# Patient Record
Sex: FEMALE | Race: BLACK OR AFRICAN AMERICAN | NOT HISPANIC OR LATINO | Employment: OTHER | URBAN - METROPOLITAN AREA
[De-identification: names, ages, dates, MRNs, and addresses within clinical notes are randomized per-mention and may not be internally consistent; named-entity substitution may affect disease eponyms.]

---

## 2017-01-03 ENCOUNTER — TELEPHONE (OUTPATIENT)
Dept: FAMILY MEDICINE | Facility: CLINIC | Age: 67
End: 2017-01-03

## 2017-01-03 NOTE — TELEPHONE ENCOUNTER
----- Message from Sole Mckeon sent at 12/29/2016  1:17 PM CST -----  Liliam ([facility]) called to see if a fax was receiving for a verification/pls call back at [phone]

## 2017-01-21 ENCOUNTER — TELEPHONE (OUTPATIENT)
Dept: FAMILY MEDICINE | Facility: CLINIC | Age: 67
End: 2017-01-21

## 2017-01-21 NOTE — TELEPHONE ENCOUNTER
----- Message from RT Harman sent at 1/21/2017  8:05 AM CST -----  Contact: pt    pt , requesting an appt to be worked in for a possible sinus infection, thanks.

## 2017-01-21 NOTE — TELEPHONE ENCOUNTER
Patient called office and left message requesting appointment r/t sinus infection. Spoke to patient who states she believes she has a sinus infection. No availability in office on this date. Offered appointment on 1/23/17; patient experiencing a lot of pain and sinus pressure, suggested being evaluated at Urgent Care. Patient agreed to be seen at Urgent Care. Encouraged patient to call office with any further concerns.

## 2017-05-30 ENCOUNTER — DOCUMENTATION ONLY (OUTPATIENT)
Dept: FAMILY MEDICINE | Facility: CLINIC | Age: 67
End: 2017-05-30

## 2017-05-30 NOTE — PROGRESS NOTES
Pre-Visit Chart Review  For Appointment Scheduled on 5-31-17    Health Maintenance Due   Topic Date Due    DEXA SCAN  06/27/2017

## 2017-05-31 ENCOUNTER — OFFICE VISIT (OUTPATIENT)
Dept: FAMILY MEDICINE | Facility: CLINIC | Age: 67
End: 2017-05-31
Payer: MEDICARE

## 2017-05-31 VITALS
BODY MASS INDEX: 24.27 KG/M2 | SYSTOLIC BLOOD PRESSURE: 126 MMHG | TEMPERATURE: 98 F | HEART RATE: 67 BPM | OXYGEN SATURATION: 98 % | WEIGHT: 151 LBS | DIASTOLIC BLOOD PRESSURE: 78 MMHG | HEIGHT: 66 IN | RESPIRATION RATE: 12 BRPM

## 2017-05-31 DIAGNOSIS — L25.9 CONTACT DERMATITIS, UNSPECIFIED CONTACT DERMATITIS TYPE, UNSPECIFIED TRIGGER: Primary | ICD-10-CM

## 2017-05-31 DIAGNOSIS — J45.20 MILD INTERMITTENT ASTHMA WITHOUT COMPLICATION: ICD-10-CM

## 2017-05-31 DIAGNOSIS — I10 HYPERTENSION, ESSENTIAL: ICD-10-CM

## 2017-05-31 DIAGNOSIS — G47.00 INSOMNIA, UNSPECIFIED TYPE: ICD-10-CM

## 2017-05-31 DIAGNOSIS — A60.9 HSV (HERPES SIMPLEX VIRUS) ANOGENITAL INFECTION: ICD-10-CM

## 2017-05-31 PROCEDURE — 1159F MED LIST DOCD IN RCRD: CPT | Mod: S$GLB,,, | Performed by: FAMILY MEDICINE

## 2017-05-31 PROCEDURE — 1126F AMNT PAIN NOTED NONE PRSNT: CPT | Mod: S$GLB,,, | Performed by: FAMILY MEDICINE

## 2017-05-31 PROCEDURE — 99499 UNLISTED E&M SERVICE: CPT | Mod: S$GLB,,, | Performed by: FAMILY MEDICINE

## 2017-05-31 PROCEDURE — 99999 PR PBB SHADOW E&M-EST. PATIENT-LVL IV: CPT | Mod: PBBFAC,,, | Performed by: FAMILY MEDICINE

## 2017-05-31 PROCEDURE — 99213 OFFICE O/P EST LOW 20 MIN: CPT | Mod: S$GLB,,, | Performed by: FAMILY MEDICINE

## 2017-05-31 RX ORDER — MONTELUKAST SODIUM 10 MG/1
TABLET ORAL
Qty: 90 TABLET | Refills: 3 | Status: SHIPPED | OUTPATIENT
Start: 2017-05-31 | End: 2018-03-01 | Stop reason: SDUPTHER

## 2017-05-31 RX ORDER — TRAZODONE HYDROCHLORIDE 50 MG/1
50 TABLET ORAL NIGHTLY PRN
Qty: 30 TABLET | Refills: 11 | Status: SHIPPED | OUTPATIENT
Start: 2017-05-31 | End: 2018-07-01 | Stop reason: SDUPTHER

## 2017-05-31 RX ORDER — VALACYCLOVIR HYDROCHLORIDE 500 MG/1
500 TABLET, FILM COATED ORAL 2 TIMES DAILY PRN
Qty: 60 TABLET | Refills: 11 | Status: SHIPPED | OUTPATIENT
Start: 2017-05-31 | End: 2018-07-08 | Stop reason: SDUPTHER

## 2017-05-31 RX ORDER — BETAMETHASONE DIPROPIONATE 0.5 MG/G
CREAM TOPICAL DAILY
Qty: 50 G | Refills: 0 | Status: SHIPPED | OUTPATIENT
Start: 2017-05-31 | End: 2018-09-10 | Stop reason: SDUPTHER

## 2017-05-31 RX ORDER — CARVEDILOL 6.25 MG/1
6.25 TABLET ORAL 2 TIMES DAILY WITH MEALS
Qty: 60 TABLET | Refills: 11 | Status: SHIPPED | OUTPATIENT
Start: 2017-05-31 | End: 2017-11-06 | Stop reason: SDUPTHER

## 2017-05-31 NOTE — PATIENT INSTRUCTIONS
Controlling High Blood Pressure  High blood pressure (hypertension) is often called the silent killer. This is because many people who have it dont know it. High blood pressure is defined as 140/90 mm Hg or higher. Know your blood pressure and remember to check it regularly. Doing so can save your life. Here are some things you can do to help control your blood pressure.    Choose heart-healthy foods  · Select low-salt, low-fat foods. Limit sodium intake to 2,400 mg per day or the amount suggested by your healthcare provider.  · Limit canned, dried, cured, packaged, and fast foods. These can contain a lot of salt.  · Eat 8 to 10 servings of fruits and vegetables every day.  · Choose lean meats, fish, or chicken.  · Eat whole-grain pasta, brown rice, and beans.  · Eat 2 to 3 servings of low-fat or fat-free dairy products.  · Ask your doctor about the DASH eating plan. This plan helps reduce blood pressure.  · When you go to a restaurant, ask that your meal be prepared with no added salt.  Maintain a healthy weight  · Ask your healthcare provider how many calories to eat a day. Then stick to that number.  · Ask your healthcare provider what weight range is healthiest for you. If you are overweight, a weight loss of only 3% to 5% of your body weight can help lower blood pressure. Generally, a good weight loss goal is to lose 10% of your body weight in a year.  · Limit snacks and sweets.  · Get regular exercise.  Get up and get active  · Choose activities you enjoy. Find ones you can do with friends or family. This includes bicycling, dancing, walking, and jogging.  · Park farther away from building entrances.  · Use stairs instead of the elevator.  · When you can, walk or bike instead of driving.  · Mcloud leaves, garden, or do household repairs.  · Be active at a moderate to vigorous level of physical activity for at least 40 minutes for a minimum of 3 to 4 days a week.   Manage stress  · Make time to relax and enjoy  life. Find time to laugh.  · Communicate your concerns with your loved ones and your healthcare provider.  · Visit with family and friends, and keep up with hobbies.  Limit alcohol and quit smoking  · Men should have no more than 2 drinks per day.  · Women should have no more than 1 drink per day.  · Talk with your healthcare provider about quitting smoking. Smoking significantly increases your risk for heart disease and stroke. Ask your healthcare provider about community smoking cessation programs and other options.  Medicines  If lifestyle changes arent enough, your healthcare provider may prescribe high blood pressure medicine. Take all medicines as prescribed. If you have any questions about your medicines, ask your healthcare provider before stopping or changing them.   Date Last Reviewed: 4/27/2016  © 8034-3111 [employer], [employer]. [address] [place] [address], [place] [address]. All rights reserved. This information is not intended as a substitute for professional medical care. Always follow your healthcare professional's instructions.

## 2017-05-31 NOTE — PROGRESS NOTES
Subjective:       Patient ID: Loren Anrde is a 67 y.o. female.    Chief Complaint: Rash    67 year old female recently was on vacation where she applied a [place] skin lotion containing sunscreen and moisturizer to her outer arms while wearing a short sleeve blouse.  She did not apply it anywhere else.  A few days later the area broke out in a micropapular/follicular rash with mild but worsening itching which is still present.  She left the cream behind and does not know the ingredients or the SPF factor.  She also is having problems sleeping since her  .  She was using citalopram but it did not help and she discontinued it.  She needs refills on carvedilol, valtrex, and singulair.    Past Medical History:  No date: Allergy  No date: Anemia      Comment: sickle cell trait  No date: Anxiety  No date: Arthritis  No date: Asthma  No date: Colon polyps  No date: Depression  3/7/2013: Dermatitis  No date: Fibromyalgia  No date: HEARING LOSS  No date: Hypertension  No date: Polyneuropathy  No date: Rheumatoid arthritis  No date: Scoliosis  No date: Sickle cell trait  No date: Trouble in sleeping  No date: Urticaria    Past Surgical History:  No date: ADENOIDECTOMY  No date: APPENDECTOMY  2008: COLONOSCOPY      Comment: Dr. Guerrero, 10 year recheck  No date: HYSTERECTOMY  No date: OOPHORECTOMY  2016: ROTATOR CUFF REPAIR Left  No date: TONSILLECTOMY      Current Outpatient Prescriptions:     A/B-CAROTENE/C/BIOFL/B6/MN/ECH (ECHINACEA C COMPLETE ORAL), Take 1 tablet by mouth once daily., Disp: , Rfl:     albuterol (PROAIR HFA) 90 mcg/actuation inhaler, Inhale 2 puffs into the lungs every 4 (four) hours as needed for Wheezing., Disp: 18 g, Rfl: 11    ascorbic acid, vitamin C, (VITAMIN C) 500 MG tablet, Take 500 mg by mouth once daily., Disp: , Rfl:     BLACK COHOSH ORAL, Take 1 capsule by mouth once daily. Female hormone blend sp - 7c Black Cohosh, Disp: , Rfl:     CALCIUM CARBONATE/VITAMIN D3  (VITAMIN D-3 ORAL), Take 100 Int'l Units by mouth once daily. , Disp: , Rfl:     carvedilol (COREG) 6.25 MG tablet, Take 1 tablet (6.25 mg total) by mouth 2 (two) times daily with meals., Disp: 60 tablet, Rfl: 11    fluticasone (FLONASE) 50 mcg/actuation nasal spray, SPRAY TWICE IN EACH NOSTRIL EVERY DAY, Disp: 48 g, Rfl: 3    fluticasone-salmeterol 250-50 mcg/dose (ADVAIR DISKUS) 250-50 mcg/dose diskus inhaler, Inhale 1 puff into the lungs 2 (two) times daily. INHALE 1 PUFF BY MOUTH INTO THE LUNGS TWICE DAILY, Disp: 60 each, Rfl: 11    folic acid (FOLVITE) 400 MCG tablet, Take 400 mcg by mouth once daily., Disp: , Rfl:     GARLIC OIL ORAL, Take 1 capsule by mouth once daily., Disp: , Rfl:     montelukast (SINGULAIR) 10 mg tablet, TAKE 1 TABLET ONE TIME DAILY, Disp: 90 tablet, Rfl: 3    NIACIN, INOSITOL NIACINATE, (NIACIN FLUSH FREE ORAL), Take 500 mg by mouth once daily., Disp: , Rfl:     OMEGA-3/DHA/EPA/FISH OIL (OMEGA-3 FISH OIL ORAL), Take 1 capsule by mouth once daily., Disp: , Rfl:     valacyclovir (VALTREX) 500 MG tablet, Take 1 tablet (500 mg total) by mouth 2 (two) times daily as needed., Disp: 60 tablet, Rfl: 11    vitamin E 100 UNIT capsule, Take 100 Units by mouth once daily., Disp: , Rfl:     DEXA SCAN due on 06/27/2017        Review of Systems   Skin: Positive for rash.   Psychiatric/Behavioral: Positive for sleep disturbance.       Objective:      Physical Exam   Constitutional: She is oriented to person, place, and time. She appears well-developed and well-nourished. No distress.   Good blood pressure control  Patient is normal weight with bmi of 24.4.   Weight is increased by 2.2lb since last visit of 10/13/16.     Neurological: She is alert and oriented to person, place, and time.   Skin: Rash (hyperkeratotic follicular pattern of micropapules over the outer elbows and forearms bilaterally) noted. She is not diaphoretic.   Psychiatric: She has a normal mood and affect. Her behavior is  normal. Judgment and thought content normal.       Assessment:       1. Contact dermatitis, unspecified contact dermatitis type, unspecified trigger    2. HSV (herpes simplex virus) anogenital infection    3. Hypertension, essential    4. Mild intermittent asthma without complication    5. Insomnia, unspecified type        Plan:       1. Contact dermatitis, unspecified contact dermatitis type, unspecified trigger  - augmented betamethasone dipropionate (DIPROLENE-AF) 0.05 % cream; Apply topically once daily at 6am.  Dispense: 50 g; Refill: 0    2. HSV (herpes simplex virus) anogenital infection  - valacyclovir (VALTREX) 500 MG tablet; Take 1 tablet (500 mg total) by mouth 2 (two) times daily as needed.  Dispense: 60 tablet; Refill: 11    3. Hypertension, essential  - carvedilol (COREG) 6.25 MG tablet; Take 1 tablet (6.25 mg total) by mouth 2 (two) times daily with meals.  Dispense: 60 tablet; Refill: 11    4. Mild intermittent asthma without complication  - montelukast (SINGULAIR) 10 mg tablet; TAKE 1 TABLET ONE TIME DAILY  Dispense: 90 tablet; Refill: 3    5. Insomnia, unspecified type  - trazodone (DESYREL) 50 MG tablet; Take 1 tablet (50 mg total) by mouth nightly as needed for Insomnia.  Dispense: 30 tablet; Refill: 11         Patient readiness: acceptance and barriers:none    During the course of the visit the patient was educated and counseled about the following:     Hypertension:   Medication: no change.  Dietary sodium restriction.  Regular aerobic exercise.    Goals: Hypertension: Reduce Blood Pressure    Did patient meet goals/outcomes: Yes    The following self management tools provided: blood pressure log    Patient Instructions (the written plan) was given to the patient/family.     Time spent with patient: 30 minutes

## 2017-08-31 ENCOUNTER — HOSPITAL ENCOUNTER (OUTPATIENT)
Dept: RADIOLOGY | Facility: CLINIC | Age: 67
Discharge: HOME | End: 2017-08-31
Attending: FAMILY MEDICINE
Payer: MEDICARE

## 2017-08-31 DIAGNOSIS — Z12.31 OTHER SCREENING MAMMOGRAM: ICD-10-CM

## 2017-08-31 PROCEDURE — 77063 BREAST TOMOSYNTHESIS BI: CPT | Mod: 26,,, | Performed by: RADIOLOGY

## 2017-08-31 PROCEDURE — 77067 SCR MAMMO BI INCL CAD: CPT | Mod: TC

## 2017-08-31 PROCEDURE — 77067 SCR MAMMO BI INCL CAD: CPT | Mod: 26,,, | Performed by: RADIOLOGY

## 2017-09-01 ENCOUNTER — PATIENT MESSAGE (OUTPATIENT)
Dept: FAMILY MEDICINE | Facility: CLINIC | Age: 67
End: 2017-09-01

## 2017-09-18 ENCOUNTER — DOCUMENTATION ONLY (OUTPATIENT)
Dept: FAMILY MEDICINE | Facility: CLINIC | Age: 67
End: 2017-09-18

## 2017-09-18 NOTE — PROGRESS NOTES
Pre-Visit Chart Review  For Appointment Scheduled on 09/19/2017      Health Maintenance Due   Topic Date Due    DEXA SCAN  06/27/2017    Pneumococcal (65+) (2 of 2 - PCV13) 07/14/2017    Influenza Vaccine  08/01/2017

## 2017-09-19 ENCOUNTER — OFFICE VISIT (OUTPATIENT)
Dept: FAMILY MEDICINE | Facility: CLINIC | Age: 67
End: 2017-09-19
Payer: MEDICARE

## 2017-09-19 VITALS
SYSTOLIC BLOOD PRESSURE: 146 MMHG | WEIGHT: 151.69 LBS | BODY MASS INDEX: 24.38 KG/M2 | HEART RATE: 64 BPM | TEMPERATURE: 98 F | DIASTOLIC BLOOD PRESSURE: 70 MMHG | HEIGHT: 66 IN

## 2017-09-19 DIAGNOSIS — G70.9 MYONEURAL DISORDER: Primary | ICD-10-CM

## 2017-09-19 DIAGNOSIS — M06.9 RHEUMATOID ARTHRITIS, INVOLVING UNSPECIFIED SITE, UNSPECIFIED RHEUMATOID FACTOR PRESENCE: ICD-10-CM

## 2017-09-19 DIAGNOSIS — Z78.0 ASYMPTOMATIC MENOPAUSAL STATE: ICD-10-CM

## 2017-09-19 DIAGNOSIS — D57.3 SICKLE CELL TRAIT: ICD-10-CM

## 2017-09-19 DIAGNOSIS — I10 UNCONTROLLED HYPERTENSION: ICD-10-CM

## 2017-09-19 DIAGNOSIS — M79.7 FIBROMYALGIA: ICD-10-CM

## 2017-09-19 DIAGNOSIS — F33.9 MAJOR DEPRESSION, RECURRENT, CHRONIC: ICD-10-CM

## 2017-09-19 DIAGNOSIS — Z00.00 ENCOUNTER FOR PREVENTIVE HEALTH EXAMINATION: ICD-10-CM

## 2017-09-19 DIAGNOSIS — Z23 FLU VACCINE NEED: ICD-10-CM

## 2017-09-19 DIAGNOSIS — F41.9 ANXIETY: ICD-10-CM

## 2017-09-19 PROCEDURE — 99499 UNLISTED E&M SERVICE: CPT | Mod: S$GLB,,, | Performed by: PHYSICIAN ASSISTANT

## 2017-09-19 PROCEDURE — G0008 ADMIN INFLUENZA VIRUS VAC: HCPCS | Mod: S$GLB,,, | Performed by: PHYSICIAN ASSISTANT

## 2017-09-19 PROCEDURE — G0439 PPPS, SUBSEQ VISIT: HCPCS | Mod: S$GLB,,, | Performed by: PHYSICIAN ASSISTANT

## 2017-09-19 PROCEDURE — 90662 IIV NO PRSV INCREASED AG IM: CPT | Mod: S$GLB,,, | Performed by: PHYSICIAN ASSISTANT

## 2017-09-19 PROCEDURE — 99999 PR PBB SHADOW E&M-EST. PATIENT-LVL IV: CPT | Mod: PBBFAC,,, | Performed by: PHYSICIAN ASSISTANT

## 2017-09-19 RX ORDER — BETAMETHASONE DIPROPIONATE 0.5 MG/G
CREAM TOPICAL 2 TIMES DAILY
Qty: 50 G | Refills: 3 | Status: SHIPPED | OUTPATIENT
Start: 2017-09-19 | End: 2019-02-15 | Stop reason: SDUPTHER

## 2017-09-19 NOTE — PROGRESS NOTES
Pt. Identified using  and name. VIS given to pt. Procedure was explained and pt. verbalized full understanding. FLU zone HD administered IM in the right deltoid using sterile technique. No bleeding at injection site. Pt. tolerated procedure well.

## 2017-09-19 NOTE — PATIENT INSTRUCTIONS
Counseling and Referral of Other Preventative  (Italic type indicates deductible and co-insurance are waived)    Patient Name: Loren Andre  Today's Date: 9/19/2017      SERVICE LIMITATIONS RECOMMENDATION    Vaccines    · Pneumococcal (once after 65)    · Influenza (annually)    · Hepatitis B (if medium/high risk)    · Prevnar 13      Hepatitis B medium/high risk factors:       - End-stage renal disease       - Hemophiliacs who received Factor VII or         IX concentrates       - Clients of institutions for the mentally             retarded       - Persons who live in the same house as          a HepB carrier       - Homosexual men       - Illicit injectable drug abusers     Pneumococcal: Scheduled - see appointments     Influenza: Scheduled - see appointments     Hepatitis B: N/A     Prevnar 13: N/A    Mammogram (biennial age 50-74)  Annually (age 40 or over)  Done this year, repeat every year    Pap (up to age 70 and after 70 if unknown history or abnormal study last 10 years)    Done this year, repeat every year     The USPSTF recommends against screening for cervical cancer in women older than age 65 years who have had adequate prior screening and are not otherwise at high risk for cervical cancer.      Colorectal cancer screening (to age 75)    · Fecal occult blood test (annual)  · Flexible sigmoidoscopy (5y)  · Screening colonoscopy (10y)  · Barium enema   Last done 4/14/08, recommend to repeat every 10  years    Diabetes self-management training (no USPSTF recommendations)  Requires referral by treating physician for patient with diabetes or renal disease. 10 hours of initial DSMT sessions of no less than 30 minutes each in a continuous 12-month period. 2 hours of follow-up DSMT in subsequent years.  N/A    Bone mass measurements (age 65 & older, biennial)  Requires diagnosis related to osteoporosis or estrogen deficiency. Biennial benefit unless patient has history of long-term glucocorticoid   Scheduled, see appointments    Glaucoma screening (no USPSTF recommendation)  Diabetes mellitus, family history   , age 50 or over    American, age 65 or over  Recommend follow up with eye care professional regularly    Medical nutrition therapy for diabetes or renal disease (no recommended schedule)  Requires referral by treating physician for patient with diabetes or renal disease or kidney transplant within the past 3 years.  Can be provided in same year as diabetes self-management training (DSMT), and CMS recommends medical nutrition therapy take place after DSMT. Up to 3 hours for initial year and 2 hours in subsequent years.  N/A    Cardiovascular screening blood tests (every 5 years)  · Fasting lipid panel  Order as a panel if possible  Scheduled, see appointments    Diabetes screening tests (at least every 3 years, Medicare covers annually or at 6-month intervals for prediabetic patients)  · Fasting blood sugar (FBS) or glucose tolerance test (GTT)  Patient must be diagnosed with one of the following:       - Hypertension       - Dyslipidemia       - Obesity (BMI 30kg/m2)       - Previous elevated impaired FBS or GTT       ... or any two of the following:       - Overweight (BMI 25 but <30)       - Family history of diabetes       - Age 65 or older       - History of gestational diabetes or birth of baby weighing more than 9 pounds  N/A    HIV screening (annually for increased risk patients)  · HIV-1 and HIV-2 by EIA, or SHARATH, rapid antibody test or oral mucosa transudate  Patients must be at increased risk for HIV infection per USPSTF guidelines or pregnant. Tests covered annually for patient at increased risk or as requested by the patient. Pregnant patients may receive up to 3 tests during pregnancy.  Risks discussed, screening is not recommended    Smoking cessation counseling (up to 8 sessions per year)  Patients must be asymptomatic of tobacco-related conditions to receive as a  preventative service.  Non-smoker    Subsequent annual wellness visit  At least 12 months since last AWV  Return in one year     The following information is provided to all patients.  This information is to help you find resources for any of the problems found today that may be affecting your health:                Living healthy guide: www.[facility].[email or web address].[email or web address]      Understanding Diabetes: www.diabetes.org      Eating healthy: www.cdc.gov/healthyweight      CDC home safety checklist: www.cdc.gov/steadi/patient.html      Agency on Aging: www.goea.[place].[facility]      Alcoholics anonymous (AA): www.aa.org      Physical Activity: www.katie.nih.gov/qf8sjwz      Tobacco use: www.quitwithusla.org

## 2017-09-19 NOTE — PROGRESS NOTES
"Loren Andre presented for a  Medicare AWV and comprehensive Health Risk Assessment today. The following components were reviewed and updated:    · Medical history  · Family History  · Social history  · Allergies and Current Medications  · Health Risk Assessment  · Health Maintenance  · Care Team     ** See Completed Assessments for Annual Wellness Visit within the encounter summary.**       The following assessments were completed:  · Living Situation  · CAGE  · Depression Screening  · Timed Get Up and Go  · Whisper Test  · Cognitive Function Screening  · Nutrition Screening  · ADL Screening  · PAQ Screening    Vitals:    09/19/17 0959 09/19/17 1000   BP: (!) 173/77 (!) 146/70   BP Location: Left arm Right arm   Patient Position: Sitting Sitting   BP Method: Small (Automatic) Medium (Manual)   Pulse: 64    Temp: 97.8 °F (36.6 °C)    TempSrc: Oral    Weight: 68.8 kg (151 lb 10.8 oz)    Height: 5' 6" (1.676 m)      Body mass index is 24.48 kg/m².  Physical Exam   Constitutional: She is oriented to person, place, and time. She appears well-developed and well-nourished.   HENT:   Head: Normocephalic and atraumatic.   Eyes: Conjunctivae are normal. Pupils are equal, round, and reactive to light.   Neck: Normal range of motion. Neck supple. No JVD present.   Cardiovascular: Normal rate and regular rhythm.  Exam reveals no gallop and no friction rub.    No murmur heard.  Pulmonary/Chest: Effort normal and breath sounds normal. No respiratory distress. She has no wheezes. She has no rales.   Neurological: She is alert and oriented to person, place, and time.   Skin: Skin is warm and dry.   Psychiatric: She has a normal mood and affect. Her behavior is normal. Judgment and thought content normal.               Diagnoses and health risks identified today and associated recommendations/orders:    Loren was seen today for health risk assessment.    Diagnoses and all orders for this visit:    Myoneural " disorder  Comments:  chronic, followed by rheum    Sickle cell trait  Comments:  chronic, continue regimen    Rheumatoid arthritis, involving unspecified site, unspecified rheumatoid factor presence  Comments:  chronic, followed by rheum    Major depression, recurrent, chronic  Comments:  controlled, continue regimen    Uncontrolled hypertension  Comments:  uncontrolled, encouraged lifestyle changes    Anxiety  Comments:  controlled, continue regimen    Fibromyalgia  Comments:  chronic, followed by rheum    Asymptomatic menopausal state  Comments:  stable, continue regimen  Orders:  -     DXA Bone Density Spine And Hip; Future    Flu vaccine need  Comments:  flu vaccine given today  Orders:  -     Influenza - High Dose (65+) (PF) (IM)    Encounter for preventive health examination    Other orders  -     Cancel: (In Office Administered) Pneumococcal Conjugate Vaccine (13 Valent) (IM)  -     Cancel: Influenza - High Dose (65+) (PF) (IM)  -     betamethasone dipropionate (DIPROLENE) 0.05 % cream; Apply topically 2 (two) times daily.        Provided Loren with a 5-10 year written screening schedule and personal prevention plan. Recommendations were developed using the USPSTF age appropriate recommendations. Education, counseling, and referrals were provided as needed. After Visit Summary printed and given to patient which includes a list of additional screenings\tests needed.    No Follow-up on file.    LONNIE Shaver

## 2017-10-05 ENCOUNTER — OFFICE VISIT (OUTPATIENT)
Dept: HEMATOLOGY/ONCOLOGY | Facility: CLINIC | Age: 67
End: 2017-10-05
Payer: MEDICARE

## 2017-10-05 VITALS
HEIGHT: 65 IN | RESPIRATION RATE: 18 BRPM | HEART RATE: 84 BPM | WEIGHT: 152.31 LBS | BODY MASS INDEX: 25.38 KG/M2 | TEMPERATURE: 98 F | SYSTOLIC BLOOD PRESSURE: 156 MMHG | DIASTOLIC BLOOD PRESSURE: 95 MMHG

## 2017-10-05 DIAGNOSIS — D72.819 LEUKOPENIA, UNSPECIFIED TYPE: ICD-10-CM

## 2017-10-05 DIAGNOSIS — D63.8 CHRONIC DISEASE ANEMIA: ICD-10-CM

## 2017-10-05 DIAGNOSIS — D57.3 SICKLE-CELL TRAIT: ICD-10-CM

## 2017-10-05 PROCEDURE — 99213 OFFICE O/P EST LOW 20 MIN: CPT | Mod: ,,, | Performed by: INTERNAL MEDICINE

## 2017-10-05 NOTE — PROGRESS NOTES
[facility] Hematology/Oncology  PROGRESS NOTE      Subjective:       Patient ID:   NAME: Loren Andre : 1950     67 y.o. female    Referring Doc: ALEX Alanis  Other Physicians: Shanelle Pena    Chief Complaint:  Anemia/leucopenia f/u    History of Present Illness:     Patient returns today for a regularly scheduled follow-up visit.  The patient has been having some carpal tunnel issues on the right arm and is seeing Dr Timmons with ortho. She is otherwise doing ok. No CP, SOB,HA's or N/V.             ROS:   GEN: normal without any fever, night sweats or weight loss  HEENT: normal with no HA's, sore throat, stiff neck, changes in vision  CV: normal with no CP, SOB, PND, NAJERA or orthopnea  PULM: normal with no SOB, cough, hemoptysis, sputum or pleuritic pain  GI: normal with no abdominal pain, nausea, vomiting, constipation, diarrhea, melanotic stools, BRBPR, or hematemesis  : normal with no hematuria, dysuria  BREAST: normal with no mass, discharge, pain  SKIN: normal with no rash, erythema, bruising, or swelling    Allergies:  Review of patient's allergies indicates:   Allergen Reactions    Doxepin Anaphylaxis     abd pain    Penicillins Rash    Sulfa (sulfonamide antibiotics) Rash    Duloxetine      Other reaction(s): insomnia    Gabapentin Nausea Only    Levofloxacin      Other reaction(s): Headache    Milnacipran      Other reaction(s): stomach pain    Nitrofurantoin monohyd/m-cryst      Other reaction(s): Itching    Prednisone      Other reaction(s): Itching    Pseudoephedrine-guaifenesin      Other reaction(s): Stomach upset    Terbinafine      Other reaction(s): rash    Trazodone      Other reaction(s): heart racing       Medications:    Current Outpatient Prescriptions:     A/B-CAROTENE/C/BIOFL/B6/MN/ECH (ECHINACEA C COMPLETE ORAL), Take 1 tablet by mouth once daily., Disp: , Rfl:     albuterol (PROAIR HFA) 90 mcg/actuation inhaler, Inhale 2 puffs into the lungs every 4 (four) hours  as needed for Wheezing., Disp: 18 g, Rfl: 11    ascorbic acid, vitamin C, (VITAMIN C) 500 MG tablet, Take 500 mg by mouth once daily., Disp: , Rfl:     augmented betamethasone dipropionate (DIPROLENE-AF) 0.05 % cream, Apply topically once daily at 6am., Disp: 50 g, Rfl: 0    betamethasone dipropionate (DIPROLENE) 0.05 % cream, Apply topically 2 (two) times daily., Disp: 50 g, Rfl: 3    BLACK COHOSH ORAL, Take 1 capsule by mouth once daily. Female hormone blend sp - 7c Black Cohosh, Disp: , Rfl:     CALCIUM CARBONATE/VITAMIN D3 (VITAMIN D-3 ORAL), Take 100 Int'l Units by mouth once daily. , Disp: , Rfl:     carvedilol (COREG) 6.25 MG tablet, Take 1 tablet (6.25 mg total) by mouth 2 (two) times daily with meals., Disp: 60 tablet, Rfl: 11    fluticasone (FLONASE) 50 mcg/actuation nasal spray, SPRAY TWICE IN EACH NOSTRIL EVERY DAY, Disp: 48 g, Rfl: 3    fluticasone-salmeterol 250-50 mcg/dose (ADVAIR DISKUS) 250-50 mcg/dose diskus inhaler, Inhale 1 puff into the lungs 2 (two) times daily. INHALE 1 PUFF BY MOUTH INTO THE LUNGS TWICE DAILY, Disp: 60 each, Rfl: 11    folic acid (FOLVITE) 400 MCG tablet, Take 400 mcg by mouth once daily., Disp: , Rfl:     GARLIC OIL ORAL, Take 1 capsule by mouth once daily., Disp: , Rfl:     montelukast (SINGULAIR) 10 mg tablet, TAKE 1 TABLET ONE TIME DAILY, Disp: 90 tablet, Rfl: 3    NIACIN, INOSITOL NIACINATE, (NIACIN FLUSH FREE ORAL), Take 500 mg by mouth once daily., Disp: , Rfl:     OMEGA-3/DHA/EPA/FISH OIL (OMEGA-3 FISH OIL ORAL), Take 1 capsule by mouth once daily., Disp: , Rfl:     trazodone (DESYREL) 50 MG tablet, Take 1 tablet (50 mg total) by mouth nightly as needed for Insomnia., Disp: 30 tablet, Rfl: 11    valacyclovir (VALTREX) 500 MG tablet, Take 1 tablet (500 mg total) by mouth 2 (two) times daily as needed., Disp: 60 tablet, Rfl: 11    vitamin E 100 UNIT capsule, Take 100 Units by mouth once daily., Disp: , Rfl:     PMHx/PSHx Updates:  See patient's last  "visit with me on 4/6/2017.  See H&P on 12/1/2015        Pathology:  No matching staging information was found for the patient.          Objective:     Vitals:  Blood pressure (!) 156/95, pulse 84, temperature 97.6 °F (36.4 °C), resp. rate 18, height 5' 5" (1.651 m), weight 69.1 kg (152 lb 4.8 oz).    Physical Examination:   GEN: no apparent distress, comfortable; AAOx3  HEAD: atraumatic and normocephalic  EYES: no pallor, no icterus, PERRLA  ENT: OMM, no pharyngeal erythema, external ears WNL; no nasal discharge; no thrush  NECK: no masses, thyroid normal, trachea midline, no LAD/LN's, supple  CV: RRR with no murmur; normal pulse; normal S1 and S2; no pedal edema  CHEST: Normal respiratory effort; CTAB; normal breath sounds; no wheeze or crackles  ABDOM: nontender and nondistended; soft; normal bowel sounds; no rebound/guarding  MUSC/Skeletal: ROM normal; no crepitus; joints normal; no deformities or arthropathy  EXTREM: no clubbing, cyanosis, inflammation or swelling  SKIN: no rashes, lesions, ulcers, petechiae or subcutaneous nodules  : no tr[place]  NEURO: grossly intact; motor/sensory WNL; AAOx3; no tremors  PSYCH: normal mood, affect and behavior  LYMPH: normal cervical, supraclavicular, axillary and groin LN's            Labs:     9/26/2017 on chart    Radiology/Diagnostic Studies:    No results found.    I have reviewed all available lab results and radiology reports.    Assessment/Plan:   (1) 67 y.o. 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 Check labs again in 12 months  2. RTC 12 months  3. F/u with PCP and rheum  4. RTC in 12 months  Fax note to " Sanket Alanis MD, ALEX Alanis and Shanelle    Discussion:     I have explained all of the above in detail and the patient understands all of the current recommendation(s). I have answered all of their questions to the best of my ability and to their complete satisfaction.   The patient is to continue with the current management plan.            Electronically signed by Denilson Salgado MD

## 2017-10-05 NOTE — LETTER
October 5, 2017      Sanket Toure MD  [address]  [place] [address]           [facility] Hematology Oncology  [address]  [address]  [place] [address]  Phone: [phone]  Fax: [phone]          Patient: Loren Andre   MR Number: [id number]   YOB: 1950   Date of Visit: 10/5/2017       Dear Dr. Sanket Toure:    Thank you for referring Loren Andre to me for evaluation. Attached you will find relevant portions of my assessment and plan of care.    If you have questions, please do not hesitate to call me. I look forward to following Loren Andre along with you.    Sincerely,    Denilson Salgado MD    Enclosure  CC:  No Recipients    If you would like to receive this communication electronically, please contact [email or web address] or [phone] to request more information on [facility] access.    For providers and/or their staff who would like to refer a patient to [facility], please contact us through our one-stop-shop provider referral line, [facility], at [phone].    If you feel you have received this communication in error or would no longer like to receive these types of communications, please e-mail [email or web address]

## 2017-10-19 ENCOUNTER — DOCUMENTATION ONLY (OUTPATIENT)
Dept: FAMILY MEDICINE | Facility: CLINIC | Age: 67
End: 2017-10-19

## 2017-10-19 NOTE — PROGRESS NOTES
Pre-Visit Chart Review  For Appointment Scheduled on 10-26-17    Health Maintenance Due   Topic Date Due    DEXA SCAN  06/27/2017    Pneumococcal (65+) (2 of 2 - PCV13) 07/14/2017

## 2017-10-24 DIAGNOSIS — Z78.0 ASYMPTOMATIC MENOPAUSAL STATE: Primary | ICD-10-CM

## 2017-10-26 ENCOUNTER — OFFICE VISIT (OUTPATIENT)
Dept: FAMILY MEDICINE | Facility: CLINIC | Age: 67
End: 2017-10-26
Payer: MEDICARE

## 2017-10-26 ENCOUNTER — HOSPITAL ENCOUNTER (OUTPATIENT)
Dept: RADIOLOGY | Facility: CLINIC | Age: 67
Discharge: HOME | End: 2017-10-26
Attending: PHYSICIAN ASSISTANT
Payer: MEDICARE

## 2017-10-26 ENCOUNTER — PATIENT MESSAGE (OUTPATIENT)
Dept: FAMILY MEDICINE | Facility: CLINIC | Age: 67
End: 2017-10-26

## 2017-10-26 VITALS
WEIGHT: 153.69 LBS | TEMPERATURE: 98 F | HEIGHT: 65 IN | HEART RATE: 68 BPM | OXYGEN SATURATION: 95 % | DIASTOLIC BLOOD PRESSURE: 78 MMHG | BODY MASS INDEX: 25.61 KG/M2 | SYSTOLIC BLOOD PRESSURE: 150 MMHG | RESPIRATION RATE: 12 BRPM

## 2017-10-26 DIAGNOSIS — Z00.00 ANNUAL PHYSICAL EXAM: Primary | ICD-10-CM

## 2017-10-26 DIAGNOSIS — R76.8 RHEUMATOID FACTOR POSITIVE: ICD-10-CM

## 2017-10-26 DIAGNOSIS — D57.3 SICKLE-CELL TRAIT: ICD-10-CM

## 2017-10-26 DIAGNOSIS — Z23 IMMUNIZATION DUE: ICD-10-CM

## 2017-10-26 DIAGNOSIS — I10 HYPERTENSION, POOR CONTROL: ICD-10-CM

## 2017-10-26 DIAGNOSIS — J45.20 ASTHMA, MILD INTERMITTENT, WELL-CONTROLLED: ICD-10-CM

## 2017-10-26 DIAGNOSIS — Z78.0 ASYMPTOMATIC MENOPAUSAL STATE: ICD-10-CM

## 2017-10-26 DIAGNOSIS — E78.5 HYPERLIPIDEMIA, UNSPECIFIED HYPERLIPIDEMIA TYPE: ICD-10-CM

## 2017-10-26 PROCEDURE — 99499 UNLISTED E&M SERVICE: CPT | Mod: S$GLB,,, | Performed by: FAMILY MEDICINE

## 2017-10-26 PROCEDURE — 90670 PCV13 VACCINE IM: CPT | Mod: S$GLB,,, | Performed by: FAMILY MEDICINE

## 2017-10-26 PROCEDURE — 77080 DXA BONE DENSITY AXIAL: CPT | Mod: 26,,, | Performed by: RADIOLOGY

## 2017-10-26 PROCEDURE — 99999 PR PBB SHADOW E&M-EST. PATIENT-LVL IV: CPT | Mod: PBBFAC,,, | Performed by: FAMILY MEDICINE

## 2017-10-26 PROCEDURE — 77080 DXA BONE DENSITY AXIAL: CPT | Mod: TC,PO

## 2017-10-26 PROCEDURE — 99397 PER PM REEVAL EST PAT 65+ YR: CPT | Mod: S$GLB,,, | Performed by: FAMILY MEDICINE

## 2017-10-26 PROCEDURE — G0009 ADMIN PNEUMOCOCCAL VACCINE: HCPCS | Mod: S$GLB,,, | Performed by: FAMILY MEDICINE

## 2017-10-26 RX ORDER — BETAMETHASONE DIPROPIONATE 0.5 MG/G
CREAM TOPICAL 2 TIMES DAILY
COMMUNITY
End: 2018-09-10 | Stop reason: SDUPTHER

## 2017-10-26 RX ORDER — FLUTICASONE PROPIONATE AND SALMETEROL 250; 50 UG/1; UG/1
1 POWDER RESPIRATORY (INHALATION) 2 TIMES DAILY
Qty: 60 EACH | Refills: 11 | Status: SHIPPED | OUTPATIENT
Start: 2017-10-26 | End: 2018-11-29 | Stop reason: SDUPTHER

## 2017-10-26 NOTE — PATIENT INSTRUCTIONS
Controlling High Blood Pressure  High blood pressure (hypertension) is often called the silent killer. This is because many people who have it dont know it. High blood pressure is defined as 140/90 mm Hg or higher. Know your blood pressure and remember to check it regularly. Doing so can save your life. Here are some things you can do to help control your blood pressure.    Choose heart-healthy foods  · Select low-salt, low-fat foods. Limit sodium intake to 2,400 mg per day or the amount suggested by your healthcare provider.  · Limit canned, dried, cured, packaged, and fast foods. These can contain a lot of salt.  · Eat 8 to 10 servings of fruits and vegetables every day.  · Choose lean meats, fish, or chicken.  · Eat whole-grain pasta, brown rice, and beans.  · Eat 2 to 3 servings of low-fat or fat-free dairy products.  · Ask your doctor about the DASH eating plan. This plan helps reduce blood pressure.  · When you go to a restaurant, ask that your meal be prepared with no added salt.  Maintain a healthy weight  · Ask your healthcare provider how many calories to eat a day. Then stick to that number.  · Ask your healthcare provider what weight range is healthiest for you. If you are overweight, a weight loss of only 3% to 5% of your body weight can help lower blood pressure. Generally, a good weight loss goal is to lose 10% of your body weight in a year.  · Limit snacks and sweets.  · Get regular exercise.  Get up and get active  · Choose activities you enjoy. Find ones you can do with friends or family. This includes bicycling, dancing, walking, and jogging.  · Park farther away from building entrances.  · Use stairs instead of the elevator.  · When you can, walk or bike instead of driving.  · Sterling leaves, garden, or do household repairs.  · Be active at a moderate to vigorous level of physical activity for at least 40 minutes for a minimum of 3 to 4 days a week.   Manage stress  · Make time to relax and enjoy  life. Find time to laugh.  · Communicate your concerns with your loved ones and your healthcare provider.  · Visit with family and friends, and keep up with hobbies.  Limit alcohol and quit smoking  · Men should have no more than 2 drinks per day.  · Women should have no more than 1 drink per day.  · Talk with your healthcare provider about quitting smoking. Smoking significantly increases your risk for heart disease and stroke. Ask your healthcare provider about community smoking cessation programs and other options.  Medicines  If lifestyle changes arent enough, your healthcare provider may prescribe high blood pressure medicine. Take all medicines as prescribed. If you have any questions about your medicines, ask your healthcare provider before stopping or changing them.   Date Last Reviewed: 4/27/2016  © 4996-8755 [employer]. [address] [place] [address], [place] [address]. All rights reserved. This information is not intended as a substitute for professional medical care. Always follow your healthcare professional's instructions.

## 2017-10-26 NOTE — PROGRESS NOTES
Subjective:       Patient ID: Loren Andre is a 67 y.o. female.    Chief Complaint: Annual Exam    67-year-old female coming in for physical exam.  She recently had lab done by Dr. Tate for his semiannual check.  She has no active complaints at this time.  She has not been checking her blood pressure she loaned her blood pressure cuff to her daughter.  She is usually elevated when she comes in here.  She is due for a Prevnar 13 and willing to do that today and had her DEXA scan done earlier today with no results available yet.  She is due for a lipid panel and we'll defer that to her next lab to be done with Dr. Tate in April.  She had mild elevation of cholesterol and is on no treatment.    Past Medical History:  No date: Allergy  No date: Anemia      Comment: sickle cell trait  No date: Anxiety  No date: Arthritis  No date: Asthma  10/5/2017: Chronic disease anemia  No date: Colon polyps  No date: Depression  3/7/2013: Dermatitis  No date: Fibromyalgia  No date: HEARING LOSS  No date: Hypertension  10/5/2017: Leucopenia  No date: Polyneuropathy  No date: Rheumatoid arthritis(714.0)  No date: Scoliosis  No date: Sickle cell trait  10/5/2017: Sickle-cell trait  No date: Trouble in sleeping  No date: Urticaria    Past Surgical History:  No date: ADENOIDECTOMY  No date: APPENDECTOMY  4/14/2008: COLONOSCOPY      Comment: Dr. Guerrero, 10 year recheck  No date: HYSTERECTOMY  No date: OOPHORECTOMY  01/2016: ROTATOR CUFF REPAIR Left  No date: TONSILLECTOMY    Review of patient's family history indicates:    Multiple sclerosis             Mother                    Seizures                       Mother                    Cancer                         Father                      Comment: lung    Alcohol abuse                  Brother                   Early death                    Brother                   Diabetes                       Maternal Aunt             Diabetes                       Maternal Uncle             Diabetes                       Maternal Grandmother      Mental illness                 Maternal Grandfather      Asthma                         Paternal Aunt             Diabetes                       Sister                    Arthritis                      Daughter                  Hyperlipidemia                 Son                       Hypertension                   Son                       Sickle cell anemia             Paternal Uncle            Asthma                         Brother                   No Known Problems              Sister                    Miscarriages / Stillbirths     Sister                      Comment: fibroids    No Known Problems              Sister                    No Known Problems              Brother                   Miscarriages / Stillbirths     Daughter                  No Known Problems              Daughter                  Allergies                      Neg Hx                    Angioedema                     Neg Hx                    Eczema                         Neg Hx                    Immunodeficiency               Neg Hx                    Rheum arthritis                Neg Hx                    Psoriasis                      Neg Hx                    Lupus                          Neg Hx                    Social History    Marital status:              Spouse name:                       Years of education:                 Number of children:               Social History Main Topics    Smoking status: Never Smoker                                                                Smokeless tobacco: Never Used                        Comment: ; retired     Alcohol use: Yes                Comment: occasionally    Drug use: No              Sexual activity: No                       Current Outpatient Prescriptions:     A/B-CAROTENE/C/BIOFL/B6/MN/ECH (ECHINACEA C COMPLETE ORAL), Take 1 tablet by mouth once daily., Disp: , Rfl:     albuterol  (PROAIR HFA) 90 mcg/actuation inhaler, Inhale 2 puffs into the lungs every 4 (four) hours as needed for Wheezing., Disp: 18 g, Rfl: 11    ascorbic acid, vitamin C, (VITAMIN C) 500 MG tablet, Take 500 mg by mouth once daily., Disp: , Rfl:     betamethasone dipropionate (DIPROLENE) 0.05 % cream, Apply topically 2 (two) times daily., Disp: , Rfl:     BLACK COHOSH ORAL, Take 1 capsule by mouth once daily. Female hormone blend sp - 7c Black Cohosh, Disp: , Rfl:     CALCIUM CARBONATE/VITAMIN D3 (VITAMIN D-3 ORAL), Take 100 Int'l Units by mouth once daily. , Disp: , Rfl:     carvedilol (COREG) 6.25 MG tablet, Take 1 tablet (6.25 mg total) by mouth 2 (two) times daily with meals., Disp: 60 tablet, Rfl: 11    fluticasone (FLONASE) 50 mcg/actuation nasal spray, SPRAY TWICE IN EACH NOSTRIL EVERY DAY, Disp: 48 g, Rfl: 3    fluticasone-salmeterol 250-50 mcg/dose (ADVAIR DISKUS) 250-50 mcg/dose diskus inhaler, Inhale 1 puff into the lungs 2 (two) times daily. INHALE 1 PUFF BY MOUTH INTO THE LUNGS TWICE DAILY, Disp: 60 each, Rfl: 11    folic acid (FOLVITE) 400 MCG tablet, Take 400 mcg by mouth once daily., Disp: , Rfl:     GARLIC OIL ORAL, Take 1 capsule by mouth once daily., Disp: , Rfl:     montelukast (SINGULAIR) 10 mg tablet, TAKE 1 TABLET ONE TIME DAILY, Disp: 90 tablet, Rfl: 3    NIACIN, INOSITOL NIACINATE, (NIACIN FLUSH FREE ORAL), Take 500 mg by mouth once daily., Disp: , Rfl:     OMEGA-3/DHA/EPA/FISH OIL (OMEGA-3 FISH OIL ORAL), Take 1 capsule by mouth once daily., Disp: , Rfl:     trazodone (DESYREL) 50 MG tablet, Take 1 tablet (50 mg total) by mouth nightly as needed for Insomnia., Disp: 30 tablet, Rfl: 11    valacyclovir (VALTREX) 500 MG tablet, Take 1 tablet (500 mg total) by mouth 2 (two) times daily as needed., Disp: 60 tablet, Rfl: 11    vitamin E 100 UNIT capsule, Take 100 Units by mouth once daily., Disp: , Rfl:     augmented betamethasone dipropionate (DIPROLENE-AF) 0.05 % cream, Apply topically  once daily at 6am., Disp: 50 g, Rfl: 0    TETANUS VACCINE due on 03/03/1967  DEXA SCAN due on 06/27/2017-done  Pneumococcal (65+)(2 of 2 - PCV13) due on 07/14/2017        Review of Systems   Constitutional: Negative for chills, diaphoresis, fatigue, fever and unexpected weight change.   HENT: Negative for congestion, ear pain, hearing loss, postnasal drip and sinus pressure.    Eyes: Negative for itching and visual disturbance.   Respiratory: Negative for cough, chest tightness, shortness of breath and wheezing.    Cardiovascular: Negative for chest pain, palpitations and leg swelling.   Gastrointestinal: Negative for abdominal pain, blood in stool, constipation, diarrhea, nausea and vomiting.   Genitourinary: Negative for dysuria, frequency, hematuria, menstrual problem, pelvic pain, vaginal bleeding and vaginal discharge.   Musculoskeletal: Negative for arthralgias, back pain, joint swelling and myalgias.   Skin: Negative for color change and rash.   Neurological: Negative for dizziness and headaches.   Hematological: Negative for adenopathy.   Psychiatric/Behavioral: Negative for sleep disturbance. The patient is not nervous/anxious.        Objective:      Physical Exam   Constitutional: She is oriented to person, place, and time. She appears well-developed and well-nourished. No distress.   Mildly elevated systolic blood pressure.  Normal weight with BMI 25.6.  She is up 2.7 pounds from her last visit with me May 31, 2017   HENT:   Head: Normocephalic and atraumatic.   Right Ear: External ear normal.   Left Ear: External ear normal.   Nose: Nose normal.   Mouth/Throat: Oropharynx is clear and moist. No oropharyngeal exudate.   Eyes: Conjunctivae are normal. Pupils are equal, round, and reactive to light. Right eye exhibits no discharge. Left eye exhibits no discharge. No scleral icterus.   Neck: Normal range of motion. Neck supple. No JVD present. No tracheal deviation present. No thyromegaly present.    Cardiovascular: Normal rate, regular rhythm and normal heart sounds.  Exam reveals no gallop and no friction rub.    No murmur heard.  Carotid pulses 2+ no bruit   Pulmonary/Chest: Effort normal and breath sounds normal. No respiratory distress. She has no wheezes. She has no rales. She exhibits no tenderness.   Abdominal: Soft. Bowel sounds are normal. She exhibits no distension and no mass. There is no tenderness. There is no rebound and no guarding.   Musculoskeletal: Normal range of motion. She exhibits no edema or tenderness.   Lymphadenopathy:     She has no cervical adenopathy.   Neurological: She is alert and oriented to person, place, and time. She has normal reflexes. She displays normal reflexes. No cranial nerve deficit or sensory deficit. She exhibits normal muscle tone.   Skin: Skin is warm and dry. No rash noted. She is not diaphoretic. No erythema. No pallor.   Psychiatric: She has a normal mood and affect. Her behavior is normal. Thought content normal.   Nursing note and vitals reviewed.      Assessment:       1. Annual physical exam    2. Hypertension, poor control    3. Hyperlipidemia, unspecified hyperlipidemia type    4. Asthma, mild intermittent, well-controlled    5. Immunization due    6. Rheumatoid factor positive    7. Sickle-cell trait    8. BMI 25.0-25.9,adult        Plan:       1. Annual physical exam    2. Hypertension, poor control  Continue current medications.  She will record home blood pressure readings for several days and then call results to me before making any changes    3. Hyperlipidemia, unspecified hyperlipidemia type  Lipid panel done at [facility] in April with Dr. Tate's labs  - Lipid panel; Future  - Lipid panel    4. Asthma, mild intermittent, well-controlled  - fluticasone-salmeterol 250-50 mcg/dose (ADVAIR DISKUS) 250-50 mcg/dose diskus inhaler; Inhale 1 puff into the lungs 2 (two) times daily. INHALE 1 PUFF BY MOUTH INTO THE LUNGS TWICE DAILY  Dispense: 60 each;  Refill: 11    5. Immunization due  - (In Office Administered) Pneumococcal Conjugate Vaccine (13 Valent) (IM)    6. Rheumatoid factor positive    7. Sickle-cell trait    8. BMI 25.0-25.9,adult         Patient readiness: acceptance and barriers:none    During the course of the visit the patient was educated and counseled about the following:     Hypertension:   Medication: no change.  Dietary sodium restriction.  Regular aerobic exercise.    Goals: Hypertension: Reduce Blood Pressure    Did patient meet goals/outcomes: Yes    The following self management tools provided: blood pressure log    Patient Instructions (the written plan) was given to the patient/family.     Time spent with patient: 30 minutes

## 2017-10-27 ENCOUNTER — TELEPHONE (OUTPATIENT)
Dept: FAMILY MEDICINE | Facility: CLINIC | Age: 67
End: 2017-10-27

## 2017-10-27 NOTE — TELEPHONE ENCOUNTER
Called pt in regards to below message. Pt states she checked her blood pressure today at 1:50pm and it was 148/ 78. Informed pt Dr. Toure wanted her to record her BP for several days and then call with that information in order for Dr. Toure to adjust her plan of care appropriately. Instructed pt to check her BP every morning until Wednesday and call us back with her readings. Pt verbalized understanding and no further questions.   ----- Message from Kayla Turner sent at 10/27/2017  2:10 PM CDT -----  Contact: self  Patient is calling [facility] regarding her blood pressure.please call patient at [phone]. Thanks!

## 2017-11-01 LAB
CHOLEST SERPL-MCNC: 246 MG/DL
CHOLEST/HDLC SERPL: 2.6 (CALC)
HDLC SERPL-MCNC: 93 MG/DL
LDLC SERPL CALC-MCNC: 139 MG/DL (CALC)
NONHDLC SERPL-MCNC: 153 MG/DL (CALC)
TRIGL SERPL-MCNC: 58 MG/DL

## 2017-11-02 ENCOUNTER — TELEPHONE (OUTPATIENT)
Dept: FAMILY MEDICINE | Facility: CLINIC | Age: 67
End: 2017-11-02

## 2017-11-02 NOTE — TELEPHONE ENCOUNTER
----- Message from Sanket Toure MD sent at 11/1/2017  7:41 PM CDT -----  Your cholesterol levels are high and increasing.  The Kensett risk score is 16%.  This means you have a 16% chance of a heart attack or stroke in the next 10 years.  The American Heart Association recommends use of a statin if the risk is over 7.5%.    Recommend start on 40 mg Lipitor or 10-20 mg of Crestor.    Results sent by email

## 2017-11-02 NOTE — TELEPHONE ENCOUNTER
Patient informed. Please send in rx What ever you feel is best to her pharmacy. Please place orders for follow up labs.

## 2017-11-03 DIAGNOSIS — E78.5 HYPERLIPIDEMIA, UNSPECIFIED HYPERLIPIDEMIA TYPE: Primary | ICD-10-CM

## 2017-11-03 NOTE — TELEPHONE ENCOUNTER
----- Message from Ange Lanier sent at 11/3/2017 12:20 PM CDT -----  Contact: patient  Patient calling to follow up on a new prescription that was supposed to be called in yesterday. Please advise.  Call back   Thanks!

## 2017-11-03 NOTE — TELEPHONE ENCOUNTER
Patient reporting blood pressure readings 10/30/17   144/83     10/31 148/77 161/90    11/2  159/94  No pulse readings    Has been in a lot of pain with right arm- has possible torn rotator cuff- has had steroid injection and is doing phys therapy.    Also needs new cholesterol med sent to [facility].

## 2017-11-06 RX ORDER — CARVEDILOL 6.25 MG/1
TABLET ORAL
Qty: 60 TABLET | Refills: 1 | Status: SHIPPED | OUTPATIENT
Start: 2017-11-06 | End: 2017-12-11 | Stop reason: SDUPTHER

## 2017-11-07 RX ORDER — ATORVASTATIN CALCIUM 40 MG/1
40 TABLET, FILM COATED ORAL DAILY
Qty: 90 TABLET | Refills: 3 | Status: SHIPPED | OUTPATIENT
Start: 2017-11-07 | End: 2018-09-10 | Stop reason: SDUPTHER

## 2017-11-07 NOTE — TELEPHONE ENCOUNTER
Looks like 11/2/17 message was not routed back to me.    Lipitor 40mg sent to [facility].  Order in for lipid panel and liver panel in  Three months.

## 2017-11-10 NOTE — TELEPHONE ENCOUNTER
"PAtient called with BP readings as requested.    "Patient calling nurse (Taya)back with blood pressure readings: 11/08 read at 2:40 141/79 pulse 83;read again at 6:00 157/90 pulse 71;11/09 at 12:00 172/88 pulse 73; than at 6:19 read 157/82 pulse 68;11/10 read at 2:01 145/81 pulse 71."   "

## 2017-11-11 RX ORDER — LISINOPRIL 10 MG/1
10 TABLET ORAL DAILY
Qty: 30 TABLET | Refills: 5 | Status: SHIPPED | OUTPATIENT
Start: 2017-11-11 | End: 2017-11-28

## 2017-11-11 NOTE — TELEPHONE ENCOUNTER
bp consistently a little high.  I would recommend we add some lisinopril 10mg to the carvedilol and recheck bp with me in four weeks.

## 2017-11-28 ENCOUNTER — OFFICE VISIT (OUTPATIENT)
Dept: FAMILY MEDICINE | Facility: CLINIC | Age: 67
End: 2017-11-28
Payer: MEDICARE

## 2017-11-28 VITALS
SYSTOLIC BLOOD PRESSURE: 158 MMHG | HEIGHT: 65 IN | DIASTOLIC BLOOD PRESSURE: 80 MMHG | WEIGHT: 150 LBS | TEMPERATURE: 98 F | BODY MASS INDEX: 24.99 KG/M2 | HEART RATE: 63 BPM

## 2017-11-28 DIAGNOSIS — I10 UNCONTROLLED HYPERTENSION: Primary | ICD-10-CM

## 2017-11-28 DIAGNOSIS — J45.20 MILD INTERMITTENT CHRONIC ASTHMA WITHOUT COMPLICATION: ICD-10-CM

## 2017-11-28 DIAGNOSIS — M25.511 ACUTE PAIN OF RIGHT SHOULDER: ICD-10-CM

## 2017-11-28 DIAGNOSIS — R00.2 HEART PALPITATIONS: ICD-10-CM

## 2017-11-28 PROCEDURE — 99999 PR PBB SHADOW E&M-EST. PATIENT-LVL V: CPT | Mod: PBBFAC,,, | Performed by: NURSE PRACTITIONER

## 2017-11-28 PROCEDURE — 99499 UNLISTED E&M SERVICE: CPT | Mod: S$GLB,,, | Performed by: NURSE PRACTITIONER

## 2017-11-28 PROCEDURE — 99213 OFFICE O/P EST LOW 20 MIN: CPT | Mod: S$GLB,,, | Performed by: NURSE PRACTITIONER

## 2017-11-28 RX ORDER — AMLODIPINE AND BENAZEPRIL HYDROCHLORIDE 5; 10 MG/1; MG/1
1 CAPSULE ORAL DAILY
Qty: 30 CAPSULE | Refills: 1 | Status: SHIPPED | OUTPATIENT
Start: 2017-11-28 | End: 2018-03-01 | Stop reason: ALTCHOICE

## 2017-11-28 NOTE — PROGRESS NOTES
Subjective:       Patient ID: Lorne Andre is a 67 y.o. female.    Chief Complaint: Hypertension (Follow Up )    HPI   Chief Complaint  Chief Complaint   Patient presents with    Hypertension     Follow Up        HPI  Loren Andre is a 67 y.o. female with medical diagnoses as listed in the medical history and problem list that presents for follow up of elevated blood pressure.  Patient's blood pressure is elevated today. Patient is taking coreg 6.25 mg BID and on 11/11/17 lisinopril 10 mg was added. Has not had any side effects with medication, tolerating well.  Blood pressure readings at home with systolic in 140's, diastolic always < 90.  Patient has been having issue with right shoulder pain due to bone spur and is having arthroscopic shoulder with Dr. Timmons this coming Monday.  BMI today is 24.96 today with a 3 pound weight loss since last visit. Established patient with last clinic appointment 10/26/2017.        PAST MEDICAL HISTORY:  Past Medical History:   Diagnosis Date    Allergy     Anemia     sickle cell trait    Anxiety     Arthritis     Asthma     Chronic disease anemia 10/5/2017    Colon polyps     Depression     Dermatitis 3/7/2013    Fibromyalgia     HEARING LOSS     Hypertension     Leucopenia 10/5/2017    Polyneuropathy     Rheumatoid arthritis(714.0)     Scoliosis     Sickle cell trait     Sickle-cell trait 10/5/2017    Trouble in sleeping     Urticaria        PAST SURGICAL HISTORY:  Past Surgical History:   Procedure Laterality Date    ADENOIDECTOMY      APPENDECTOMY      COLONOSCOPY  4/14/2008    Dr. Guerrero, 10 year recheck    HYSTERECTOMY      OOPHORECTOMY      ROTATOR CUFF REPAIR Left 01/2016    TONSILLECTOMY         SOCIAL HISTORY:  Social History     Social History    Marital status:      Spouse name: N/A    Number of children: N/A    Years of education: N/A     Occupational History    Not on file.     Social History Main Topics    Smoking  status: Never Smoker    Smokeless tobacco: Never Used      Comment: ; retired     Alcohol use Yes      Comment: occasionally    Drug use: No    Sexual activity: No     Other Topics Concern    Not on file     Social History Narrative    No narrative on file       FAMILY HISTORY:  Family History   Problem Relation Age of Onset    Multiple sclerosis Mother     Seizures Mother     Cancer Father      lung    Alcohol abuse Brother     Early death Brother     Diabetes Maternal Aunt     Diabetes Maternal Uncle     Diabetes Maternal Grandmother     Mental illness Maternal Grandfather     Asthma Paternal Aunt     Diabetes Sister     Arthritis Daughter     Hyperlipidemia Son     Hypertension Son     Sickle cell anemia Paternal Uncle     Asthma Brother     No Known Problems Sister     Miscarriages / Stillbirths Sister      fibroids    No Known Problems Sister     No Known Problems Brother     Miscarriages / Stillbirths Daughter     No Known Problems Daughter     Allergies Neg Hx     Angioedema Neg Hx     Eczema Neg Hx     Immunodeficiency Neg Hx     Rheum arthritis Neg Hx     Psoriasis Neg Hx     Lupus Neg Hx        ALLERGIES AND MEDICATIONS: updated and reviewed.  Review of patient's allergies indicates:   Allergen Reactions    Doxepin Anaphylaxis     abd pain    Penicillins Rash    Sulfa (sulfonamide antibiotics) Rash    Duloxetine      Other reaction(s): insomnia    Gabapentin Nausea Only    Levofloxacin      Other reaction(s): Headache    Milnacipran      Other reaction(s): stomach pain    Nitrofurantoin monohyd/m-cryst      Other reaction(s): Itching    Prednisone      Other reaction(s): Itching    Pseudoephedrine-guaifenesin      Other reaction(s): Stomach upset    Terbinafine      Other reaction(s): rash    Trazodone      Other reaction(s): heart racing     Current Outpatient Prescriptions   Medication Sig Dispense Refill    A/B-CAROTENE/C/BIOFL/B6/MN/ECH  (ECHINACEA C COMPLETE ORAL) Take 1 tablet by mouth once daily.      albuterol (PROAIR HFA) 90 mcg/actuation inhaler Inhale 2 puffs into the lungs every 4 (four) hours as needed for Wheezing. 18 g 11    ascorbic acid, vitamin C, (VITAMIN C) 500 MG tablet Take 500 mg by mouth once daily.      atorvastatin (LIPITOR) 40 MG tablet Take 1 tablet (40 mg total) by mouth once daily. 90 tablet 3    betamethasone dipropionate (DIPROLENE) 0.05 % cream Apply topically 2 (two) times daily.      BLACK COHOSH ORAL Take 1 capsule by mouth once daily. Female hormone blend sp - 7c Black Cohosh      CALCIUM CARBONATE/VITAMIN D3 (VITAMIN D-3 ORAL) Take 100 Int'l Units by mouth once daily.       carvedilol (COREG) 6.25 MG tablet TAKE 1 TABLET(6.25 MG) BY MOUTH TWICE DAILY WITH MEALS 60 tablet 1    fluticasone (FLONASE) 50 mcg/actuation nasal spray SPRAY TWICE IN EACH NOSTRIL EVERY DAY 48 g 3    fluticasone-salmeterol 250-50 mcg/dose (ADVAIR DISKUS) 250-50 mcg/dose diskus inhaler Inhale 1 puff into the lungs 2 (two) times daily. INHALE 1 PUFF BY MOUTH INTO THE LUNGS TWICE DAILY 60 each 11    folic acid (FOLVITE) 400 MCG tablet Take 400 mcg by mouth once daily.      GARLIC OIL ORAL Take 1 capsule by mouth once daily.      montelukast (SINGULAIR) 10 mg tablet TAKE 1 TABLET ONE TIME DAILY 90 tablet 3    NIACIN, INOSITOL NIACINATE, (NIACIN FLUSH FREE ORAL) Take 500 mg by mouth once daily.      OMEGA-3/DHA/EPA/FISH OIL (OMEGA-3 FISH OIL ORAL) Take 1 capsule by mouth once daily.      trazodone (DESYREL) 50 MG tablet Take 1 tablet (50 mg total) by mouth nightly as needed for Insomnia. 30 tablet 11    valacyclovir (VALTREX) 500 MG tablet Take 1 tablet (500 mg total) by mouth 2 (two) times daily as needed. 60 tablet 11    vitamin E 100 UNIT capsule Take 100 Units by mouth once daily.      amlodipine-benazepril 5-10 mg (LOTREL) 5-10 mg per capsule Take 1 capsule by mouth once daily. 30 capsule 1    augmented betamethasone  "dipropionate (DIPROLENE-AF) 0.05 % cream Apply topically once daily at 6am. 50 g 0     No current facility-administered medications for this visit.      Review of Systems   Constitutional: Positive for fatigue. Negative for appetite change, chills and fever.        Some fatigue related to pain   Respiratory: Positive for cough. Negative for shortness of breath.         Coughs all the time with clear secretions due to allergies and asthma   Cardiovascular: Positive for palpitations. Negative for chest pain and leg swelling.        Infrequent transient rapid heart beat   Musculoskeletal: Positive for arthralgias.        Pain to right shoulder, has been taking some advil for pain 2 tabs daily maximum   Neurological: Positive for headaches.        Occasional headache       Objective:       Vitals:    11/28/17 0951 11/28/17 0956 11/28/17 1047   BP: (!) 146/87 (!) 154/88 (!) 158/80   BP Location: Right arm Left arm Right arm   Patient Position: Sitting Sitting Sitting   BP Method: Large (Automatic)  Small (Manual)   Pulse: 63     Temp: 98.1 °F (36.7 °C)     TempSrc: Oral     Weight: 68 kg (150 lb)     Height: 5' 5" (1.651 m)       Physical Exam   Constitutional: She is oriented to person, place, and time. She appears well-developed and well-nourished. No distress.   HENT:   Head: Normocephalic and atraumatic.   Eyes: Conjunctivae and lids are normal. Right conjunctiva is not injected. Left conjunctiva is not injected.   Neck: Normal carotid pulses present. Carotid bruit is not present.   Cardiovascular: Normal rate, regular rhythm, S1 normal, S2 normal, normal heart sounds, intact distal pulses and normal pulses.  Exam reveals no gallop.    No murmur heard.  Pulses:       Carotid pulses are 2+ on the right side, and 2+ on the left side.       Radial pulses are 2+ on the right side, and 2+ on the left side.        Posterior tibial pulses are 2+ on the right side, and 2+ on the left side.   No edema   Pulmonary/Chest: " Effort normal and breath sounds normal. No respiratory distress. She has no decreased breath sounds. She has no wheezes. She has no rhonchi. She has no rales.   Neurological: She is alert and oriented to person, place, and time. She has normal strength.   Skin: Skin is warm, dry and intact. She is not diaphoretic. No pallor.   Psychiatric: She has a normal mood and affect. Her speech is normal and behavior is normal. Judgment and thought content normal. Cognition and memory are normal.   Nursing note and vitals reviewed.      Assessment:       1. Uncontrolled hypertension    2. Mild intermittent chronic asthma without complication    3. Acute pain of right shoulder    4. Heart palpitations    5. BMI 24.0-24.9, adult        Plan:   Loren was seen today for hypertension.    Diagnoses and all orders for this visit:    Uncontrolled hypertension  -     amlodipine-benazepril 5-10 mg (LOTREL) 5-10 mg per capsule; Take 1 capsule by mouth once daily.  - Stop lisinopril  - Follow up visit in 6 weeks  - Educational handouts provided. Controlling your high blood pressure    Mild intermittent chronic asthma without complication   Controlled on current treatment plan  Acute pain of right shoulder   Scheduled for surgery with Dr. Timmons 12/4/17  Heart palpitations   Infrequent, no chest pain, taking a beta blocker  BMI 24.0-24.9, adult   Healthy weight    Return in about 6 weeks (around 1/9/2018).    Patient readiness: acceptance and barriers:none    During the course of the visit the patient was educated and counseled about the following:     Hypertension:   Medication: discontinue lisinopril 10 mg and begin Lotrel 5/10 mg daily.    Goals: Hypertension: Reduce Blood Pressure    Did patient meet goals/outcomes: No    The following self management tools provided: declined    Patient Instructions (the written plan) was given to the patient/family.     Time spent with patient: 15 minutes

## 2017-11-28 NOTE — PATIENT INSTRUCTIONS
Controlling High Blood Pressure  High blood pressure (hypertension) is often called the silent killer. This is because many people who have it dont know it. High blood pressure is defined as 140/90 mm Hg or higher. Know your blood pressure and remember to check it regularly. Doing so can save your life. Here are some things you can do to help control your blood pressure.    Choose heart-healthy foods  · Select low-salt, low-fat foods. Limit sodium intake to 2,400 mg per day or the amount suggested by your healthcare provider.  · Limit canned, dried, cured, packaged, and fast foods. These can contain a lot of salt.  · Eat 8 to 10 servings of fruits and vegetables every day.  · Choose lean meats, fish, or chicken.  · Eat whole-grain pasta, brown rice, and beans.  · Eat 2 to 3 servings of low-fat or fat-free dairy products.  · Ask your doctor about the DASH eating plan. This plan helps reduce blood pressure.  · When you go to a restaurant, ask that your meal be prepared with no added salt.  Maintain a healthy weight  · Ask your healthcare provider how many calories to eat a day. Then stick to that number.  · Ask your healthcare provider what weight range is healthiest for you. If you are overweight, a weight loss of only 3% to 5% of your body weight can help lower blood pressure. Generally, a good weight loss goal is to lose 10% of your body weight in a year.  · Limit snacks and sweets.  · Get regular exercise.  Get up and get active  · Choose activities you enjoy. Find ones you can do with friends or family. This includes bicycling, dancing, walking, and jogging.  · Park farther away from building entrances.  · Use stairs instead of the elevator.  · When you can, walk or bike instead of driving.  · [place] leaves, garden, or do household repairs.  · Be active at a moderate to vigorous level of physical activity for at least 40 minutes for a minimum of 3 to 4 days a week.   Manage stress  · Make time to relax and enjoy  life. Find time to laugh.  · Communicate your concerns with your loved ones and your healthcare provider.  · Visit with family and friends, and keep up with hobbies.  Limit alcohol and quit smoking  · Men should have no more than 2 drinks per day.  · Women should have no more than 1 drink per day.  · Talk with your healthcare provider about quitting smoking. Smoking significantly increases your risk for heart disease and stroke. Ask your healthcare provider about community smoking cessation programs and other options.  Medicines  If lifestyle changes arent enough, your healthcare provider may prescribe high blood pressure medicine. Take all medicines as prescribed. If you have any questions about your medicines, ask your healthcare provider before stopping or changing them.   Date Last Reviewed: 4/27/2016  © 3986-9214 [employer]. [address] [place] [address], [place] [address]. All rights reserved. This information is not intended as a substitute for professional medical care. Always follow your healthcare professional's instructions.

## 2017-11-30 ENCOUNTER — TELEPHONE (OUTPATIENT)
Dept: FAMILY MEDICINE | Facility: CLINIC | Age: 67
End: 2017-11-30

## 2017-11-30 NOTE — TELEPHONE ENCOUNTER
----- Message from Edward Barraza sent at 11/30/2017  8:18 AM CST -----  Contact: self   Patient want to speak with a nurse regarding rx amlodipine-benazepril 5-10 mg (LOTREL) 5-10 mg per capsule, please call back at [phone] (home)

## 2017-11-30 NOTE — TELEPHONE ENCOUNTER
Patient reassured its okay to change to Lotrel and stop the Lisinopril-advised her we are trying to get her bp under better control. Patient agrees to change and follow up 1/11/18.

## 2017-12-11 RX ORDER — CARVEDILOL 6.25 MG/1
TABLET ORAL
Qty: 60 TABLET | Refills: 1 | Status: SHIPPED | OUTPATIENT
Start: 2017-12-11 | End: 2018-02-22 | Stop reason: SDUPTHER

## 2018-01-02 RX ORDER — ALBUTEROL SULFATE 90 UG/1
AEROSOL, METERED RESPIRATORY (INHALATION)
Qty: 18 G | Refills: 0 | Status: SHIPPED | OUTPATIENT
Start: 2018-01-02 | End: 2018-10-03 | Stop reason: SDUPTHER

## 2018-02-12 ENCOUNTER — PATIENT MESSAGE (OUTPATIENT)
Dept: FAMILY MEDICINE | Facility: CLINIC | Age: 68
End: 2018-02-12

## 2018-02-12 ENCOUNTER — LAB VISIT (OUTPATIENT)
Dept: LAB | Facility: HOSPITAL | Age: 68
End: 2018-02-12
Attending: FAMILY MEDICINE
Payer: MEDICARE

## 2018-02-12 DIAGNOSIS — E78.5 HYPERLIPIDEMIA, UNSPECIFIED HYPERLIPIDEMIA TYPE: ICD-10-CM

## 2018-02-12 LAB
ALBUMIN SERPL BCP-MCNC: 3.3 G/DL
ALP SERPL-CCNC: 82 U/L
ALT SERPL W/O P-5'-P-CCNC: 24 U/L
AST SERPL-CCNC: 26 U/L
BILIRUB DIRECT SERPL-MCNC: 0.2 MG/DL
BILIRUB SERPL-MCNC: 0.5 MG/DL
CHOLEST SERPL-MCNC: 133 MG/DL
CHOLEST/HDLC SERPL: 1.9 {RATIO}
HDLC SERPL-MCNC: 69 MG/DL
HDLC SERPL: 51.9 %
LDLC SERPL CALC-MCNC: 52 MG/DL
NONHDLC SERPL-MCNC: 64 MG/DL
PROT SERPL-MCNC: 6.8 G/DL
TRIGL SERPL-MCNC: 60 MG/DL

## 2018-02-12 PROCEDURE — 80076 HEPATIC FUNCTION PANEL: CPT

## 2018-02-12 PROCEDURE — 80061 LIPID PANEL: CPT

## 2018-02-12 PROCEDURE — 36415 COLL VENOUS BLD VENIPUNCTURE: CPT | Mod: PO

## 2018-02-22 ENCOUNTER — DOCUMENTATION ONLY (OUTPATIENT)
Dept: FAMILY MEDICINE | Facility: CLINIC | Age: 68
End: 2018-02-22

## 2018-02-22 RX ORDER — CARVEDILOL 6.25 MG/1
TABLET ORAL
Qty: 60 TABLET | Refills: 0 | Status: SHIPPED | OUTPATIENT
Start: 2018-02-22 | End: 2018-03-01 | Stop reason: SDUPTHER

## 2018-02-22 NOTE — PROGRESS NOTES
Pre-Visit Chart Review  For Appointment Scheduled on 3-1-18    Health Maintenance Due   Topic Date Due    TETANUS VACCINE  03/03/1968

## 2018-02-22 NOTE — TELEPHONE ENCOUNTER
Blood pressure uncontrolled, Katerina changed meds, patient cancelled follow up blood pressure check in January.  Needs ov to check bp.  No further refills after this until checked

## 2018-03-01 ENCOUNTER — OFFICE VISIT (OUTPATIENT)
Dept: FAMILY MEDICINE | Facility: CLINIC | Age: 68
End: 2018-03-01
Attending: FAMILY MEDICINE
Payer: MEDICARE

## 2018-03-01 VITALS
HEIGHT: 65 IN | SYSTOLIC BLOOD PRESSURE: 144 MMHG | DIASTOLIC BLOOD PRESSURE: 86 MMHG | HEART RATE: 74 BPM | WEIGHT: 151.44 LBS | BODY MASS INDEX: 25.23 KG/M2 | TEMPERATURE: 98 F

## 2018-03-01 DIAGNOSIS — J45.20 MILD INTERMITTENT ASTHMA WITHOUT COMPLICATION: ICD-10-CM

## 2018-03-01 DIAGNOSIS — I10 HYPERTENSION, ESSENTIAL: ICD-10-CM

## 2018-03-01 DIAGNOSIS — I10 HYPERTENSION, POOR CONTROL: Primary | ICD-10-CM

## 2018-03-01 PROCEDURE — 99999 PR PBB SHADOW E&M-EST. PATIENT-LVL III: CPT | Mod: PBBFAC,,, | Performed by: FAMILY MEDICINE

## 2018-03-01 PROCEDURE — 99214 OFFICE O/P EST MOD 30 MIN: CPT | Mod: S$GLB,,, | Performed by: FAMILY MEDICINE

## 2018-03-01 PROCEDURE — 3079F DIAST BP 80-89 MM HG: CPT | Mod: S$GLB,,, | Performed by: FAMILY MEDICINE

## 2018-03-01 PROCEDURE — 3077F SYST BP >= 140 MM HG: CPT | Mod: S$GLB,,, | Performed by: FAMILY MEDICINE

## 2018-03-01 PROCEDURE — 99499 UNLISTED E&M SERVICE: CPT | Mod: S$GLB,,, | Performed by: FAMILY MEDICINE

## 2018-03-01 RX ORDER — CARVEDILOL 6.25 MG/1
TABLET ORAL
Qty: 180 TABLET | Refills: 3 | Status: SHIPPED | OUTPATIENT
Start: 2018-03-01 | End: 2018-09-10 | Stop reason: SDUPTHER

## 2018-03-01 RX ORDER — MONTELUKAST SODIUM 10 MG/1
TABLET ORAL
Qty: 90 TABLET | Refills: 3 | Status: SHIPPED | OUTPATIENT
Start: 2018-03-01 | End: 2019-02-18 | Stop reason: SDUPTHER

## 2018-03-01 RX ORDER — CLINDAMYCIN HYDROCHLORIDE 150 MG/1
150 CAPSULE ORAL 4 TIMES DAILY
COMMUNITY
Start: 2018-02-24 | End: 2018-09-10 | Stop reason: ALTCHOICE

## 2018-03-01 RX ORDER — AA/PROT/LYSINE/METHIO/VIT C/B6 50-12.5 MG
10 TABLET ORAL DAILY
COMMUNITY

## 2018-03-01 RX ORDER — LISINOPRIL 10 MG/1
10 TABLET ORAL DAILY
COMMUNITY
End: 2018-03-01 | Stop reason: SDUPTHER

## 2018-03-01 RX ORDER — LISINOPRIL 20 MG/1
20 TABLET ORAL DAILY
Qty: 90 TABLET | Refills: 3 | Status: SHIPPED | OUTPATIENT
Start: 2018-03-01 | End: 2018-11-05

## 2018-03-01 NOTE — PROGRESS NOTES
Subjective:       Patient ID: Loren Andre is a 67 y.o. female.    Chief Complaint: Hypertension    67-year-old female coming in for blood pressure check.  She has been taken off of her Lotrel and is now on 10 mg of lisinopril along with Carvedilol 6.25 twice daily.  Her home blood pressures tend to run better than they are here but they are still mildly elevated.  She has been taking clindamycin for a chronic sinus infection that is already failed on several other antibiotics and continues to have frontal and maxillary pain.  She was warned to use probiotics and given symptoms to watch for for C. difficile.  She needs refills of her Singulair in addition to her blood pressure medications and is requested that we send copies of her recent labs to Dr. Catherine.    Past Medical History:  No date: Allergy  No date: Anemia      Comment: sickle cell trait  No date: Anxiety  No date: Arthritis  No date: Asthma  10/5/2017: Chronic disease anemia  No date: Colon polyps  No date: Depression  3/7/2013: Dermatitis  No date: Fibromyalgia  No date: HEARING LOSS  No date: Hypertension  10/5/2017: Leucopenia  No date: Polyneuropathy  No date: Rheumatoid arthritis(714.0)  No date: Scoliosis  No date: Sickle cell trait  10/5/2017: Sickle-cell trait  No date: Trouble in sleeping  No date: Urticaria    Past Surgical History:  No date: ADENOIDECTOMY  No date: APPENDECTOMY  4/14/2008: COLONOSCOPY      Comment: Dr. Guerrero, 10 year recheck  No date: HYSTERECTOMY  No date: OOPHORECTOMY  01/2016: ROTATOR CUFF REPAIR Left  No date: TONSILLECTOMY      Current Outpatient Prescriptions:     A/B-CAROTENE/C/BIOFL/B6/MN/ECH (ECHINACEA C COMPLETE ORAL), Take 1 tablet by mouth once daily., Disp: , Rfl:     ascorbic acid, vitamin C, (VITAMIN C) 500 MG tablet, Take 500 mg by mouth once daily., Disp: , Rfl:     atorvastatin (LIPITOR) 40 MG tablet, Take 1 tablet (40 mg total) by mouth once daily., Disp: 90 tablet, Rfl: 3    betamethasone  dipropionate (DIPROLENE) 0.05 % cream, Apply topically 2 (two) times daily., Disp: , Rfl:     BLACK COHOSH ORAL, Take 1 capsule by mouth once daily. Female hormone blend sp - 7c Black Cohosh, Disp: , Rfl:     CALCIUM CARBONATE/VITAMIN D3 (VITAMIN D-3 ORAL), Take 100 Int'l Units by mouth once daily. , Disp: , Rfl:     carvedilol (COREG) 6.25 MG tablet, TAKE 1 TABLET(6.25 MG) BY MOUTH TWICE DAILY WITH MEALS, Disp: 180 tablet, Rfl: 3    clindamycin (CLEOCIN) 150 MG capsule, Take 150 mg by mouth 4 (four) times daily., Disp: , Rfl:     coenzyme Q10 (CO Q-10) 10 mg capsule, Take 10 mg by mouth once daily., Disp: , Rfl:     fluticasone (FLONASE) 50 mcg/actuation nasal spray, SPRAY TWICE IN EACH NOSTRIL EVERY DAY, Disp: 48 g, Rfl: 3    folic acid (FOLVITE) 400 MCG tablet, Take 400 mcg by mouth once daily., Disp: , Rfl:     GARLIC OIL ORAL, Take 1 capsule by mouth once daily., Disp: , Rfl:     lisinopril (PRINIVIL,ZESTRIL) 10 MG tablet, Take 1 tablet (10 mg total) by mouth once daily., Disp: 90 tablet, Rfl: 3    montelukast (SINGULAIR) 10 mg tablet, TAKE 1 TABLET ONE TIME DAILY, Disp: 90 tablet, Rfl: 3    NIACIN, INOSITOL NIACINATE, (NIACIN FLUSH FREE ORAL), Take 500 mg by mouth once daily., Disp: , Rfl:     trazodone (DESYREL) 50 MG tablet, Take 1 tablet (50 mg total) by mouth nightly as needed for Insomnia., Disp: 30 tablet, Rfl: 11    valacyclovir (VALTREX) 500 MG tablet, Take 1 tablet (500 mg total) by mouth 2 (two) times daily as needed., Disp: 60 tablet, Rfl: 11    VENTOLIN HFA 90 mcg/actuation inhaler, INHALE 2 PUFFS BY MOUTH EVERY 4 HOURS AS NEEDED FOR WHEEZING, Disp: 18 g, Rfl: 0    vitamin E 100 UNIT capsule, Take 100 Units by mouth once daily., Disp: , Rfl:     augmented betamethasone dipropionate (DIPROLENE-AF) 0.05 % cream, Apply topically once daily at 6am., Disp: 50 g, Rfl: 0    fluticasone-salmeterol 250-50 mcg/dose (ADVAIR DISKUS) 250-50 mcg/dose diskus inhaler, Inhale 1 puff into the lungs  2 (two) times daily. INHALE 1 PUFF BY MOUTH INTO THE LUNGS TWICE DAILY, Disp: 60 each, Rfl: 11    OMEGA-3/DHA/EPA/FISH OIL (OMEGA-3 FISH OIL ORAL), Take 1 capsule by mouth once daily., Disp: , Rfl:         Review of Systems   HENT: Positive for congestion, sinus pain and sinus pressure.    Respiratory: Negative for chest tightness and shortness of breath.    Cardiovascular: Negative for chest pain and palpitations.   Neurological: Positive for headaches. Negative for dizziness and light-headedness.       Objective:      Physical Exam   Constitutional: She is oriented to person, place, and time. She appears well-developed and well-nourished. No distress.   Mildly elevated blood pressure  Normal weight with BMI 25.2 she is down 2.2 pounds from her last visit with me October 26, 2017   HENT:   Head: Normocephalic and atraumatic.   Eyes: EOM are normal. Pupils are equal, round, and reactive to light. No scleral icterus.   Neck: Normal range of motion. Neck supple. No JVD present. No tracheal deviation present. No thyromegaly present.   Cardiovascular: Normal rate, regular rhythm and normal heart sounds.    Pulmonary/Chest: Effort normal and breath sounds normal.   Lymphadenopathy:     She has no cervical adenopathy.   Neurological: She is alert and oriented to person, place, and time.   Skin: She is not diaphoretic.   Psychiatric: She has a normal mood and affect. Her behavior is normal. Thought content normal.   Nursing note and vitals reviewed.      Assessment:       1. Hypertension, poor control    2. Mild intermittent asthma without complication    3. Hypertension, essential        Plan:       1. Hypertension, poor control  See below    2. Mild intermittent asthma without complication  - montelukast (SINGULAIR) 10 mg tablet; TAKE 1 TABLET ONE TIME DAILY  Dispense: 90 tablet; Refill: 3    3. Hypertension, essential  - carvedilol (COREG) 6.25 MG tablet; TAKE 1 TABLET(6.25 MG) BY MOUTH TWICE DAILY WITH MEALS   Dispense: 180 tablet; Refill: 3  - lisinopril (PRINIVIL,ZESTRIL) 20 MG tablet; Take 1 tablet (20 mg total) by mouth once daily.  Dispense: 90 tablet; Refill: 3         Patient readiness: acceptance and barriers:none    During the course of the visit the patient was educated and counseled about the following:     Hypertension:   Medication: Increase lisinopril from 10 mg to 20 mg daily, continue Coreg twice a day and come in for nurses blood pressure check in 4 weeks.  Dietary sodium restriction.  Regular aerobic exercise.    Goals: Hypertension: Reduce Blood Pressure    Did patient meet goals/outcomes: No    The following self management tools provided: blood pressure log    Patient Instructions (the written plan) was given to the patient/family.     Time spent with patient: 15 minutes

## 2018-03-12 ENCOUNTER — TELEPHONE (OUTPATIENT)
Dept: FAMILY MEDICINE | Facility: CLINIC | Age: 68
End: 2018-03-12

## 2018-03-12 DIAGNOSIS — R05.9 COUGH: Primary | ICD-10-CM

## 2018-03-12 RX ORDER — PROMETHAZINE HYDROCHLORIDE AND DEXTROMETHORPHAN HYDROBROMIDE 6.25; 15 MG/5ML; MG/5ML
5 SYRUP ORAL
Qty: 180 ML | Refills: 0 | Status: SHIPPED | OUTPATIENT
Start: 2018-03-12 | End: 2018-09-10

## 2018-03-12 NOTE — TELEPHONE ENCOUNTER
[facility]  Will probbly not be covered by insurance, no cough meds covered by Medicare.  Thanks.  Katerina

## 2018-03-12 NOTE — TELEPHONE ENCOUNTER
----- Message from Quyen Campos RT sent at 3/12/2018 10:18 AM CDT -----  Contact: pt    pt , requesting to inform she needs a better cough suppressant, for the frequent cough, please call her something in, would like a call from Nurse Whitley,  thanks

## 2018-03-12 NOTE — TELEPHONE ENCOUNTER
Patient finished the Clindamycin she was prescribed by urgent care- she has frequent cough and has used a bottle of Delsym recommended by Dr. Toure at visit 3/1/18- patient asking for prescription cough med to suppress.

## 2018-04-02 ENCOUNTER — CLINICAL SUPPORT (OUTPATIENT)
Dept: FAMILY MEDICINE | Facility: CLINIC | Age: 68
End: 2018-04-02
Attending: FAMILY MEDICINE
Payer: MEDICARE

## 2018-04-02 ENCOUNTER — TELEPHONE (OUTPATIENT)
Dept: FAMILY MEDICINE | Facility: CLINIC | Age: 68
End: 2018-04-02

## 2018-04-02 VITALS — SYSTOLIC BLOOD PRESSURE: 114 MMHG | DIASTOLIC BLOOD PRESSURE: 64 MMHG | HEART RATE: 66 BPM

## 2018-04-02 DIAGNOSIS — I10 HYPERTENSION, UNSPECIFIED TYPE: Primary | ICD-10-CM

## 2018-04-02 PROCEDURE — 99499 UNLISTED E&M SERVICE: CPT | Mod: S$GLB,,, | Performed by: FAMILY MEDICINE

## 2018-04-02 PROCEDURE — 99999 PR PBB SHADOW E&M-EST. PATIENT-LVL I: CPT | Mod: PBBFAC,,, | Performed by: FAMILY MEDICINE

## 2018-05-01 ENCOUNTER — OFFICE VISIT (OUTPATIENT)
Dept: ORTHOPEDICS | Facility: CLINIC | Age: 68
End: 2018-05-01
Payer: MEDICARE

## 2018-05-01 VITALS
BODY MASS INDEX: 24.99 KG/M2 | SYSTOLIC BLOOD PRESSURE: 157 MMHG | DIASTOLIC BLOOD PRESSURE: 87 MMHG | HEART RATE: 67 BPM | HEIGHT: 65 IN | WEIGHT: 150 LBS

## 2018-05-01 DIAGNOSIS — M75.41 IMPINGEMENT SYNDROME OF RIGHT SHOULDER: Primary | ICD-10-CM

## 2018-05-01 DIAGNOSIS — M62.838 CERVICAL PARASPINAL MUSCLE SPASM: ICD-10-CM

## 2018-05-01 PROCEDURE — 20550 NJX 1 TENDON SHEATH/LIGAMENT: CPT | Mod: RT,,, | Performed by: ORTHOPAEDIC SURGERY

## 2018-05-01 PROCEDURE — 99213 OFFICE O/P EST LOW 20 MIN: CPT | Mod: 25,,, | Performed by: ORTHOPAEDIC SURGERY

## 2018-05-01 PROCEDURE — 3077F SYST BP >= 140 MM HG: CPT | Mod: ,,, | Performed by: ORTHOPAEDIC SURGERY

## 2018-05-01 PROCEDURE — 3079F DIAST BP 80-89 MM HG: CPT | Mod: ,,, | Performed by: ORTHOPAEDIC SURGERY

## 2018-05-01 RX ORDER — METHYLPREDNISOLONE ACETATE 40 MG/ML
40 INJECTION, SUSPENSION INTRA-ARTICULAR; INTRALESIONAL; INTRAMUSCULAR; SOFT TISSUE
Status: DISCONTINUED | OUTPATIENT
Start: 2018-05-01 | End: 2018-05-01 | Stop reason: HOSPADM

## 2018-05-01 RX ADMIN — METHYLPREDNISOLONE ACETATE 40 MG: 40 INJECTION, SUSPENSION INTRA-ARTICULAR; INTRALESIONAL; INTRAMUSCULAR; SOFT TISSUE at 08:05

## 2018-05-01 NOTE — PROGRESS NOTES
Subjective:       Patient ID: Loren Andre is a 68 y.o. female.    Chief Complaint: Pain of the Right Shoulder (Follow up for right SAD (12/4/18). She states that it is feeling much better. Just  A little sore. )      History of Present Illness  Patient comes in today for her right shoulder. Her shoulder was doing great she is having some mild discomfort along the medial scapular border.    Current Medications  Current Outpatient Prescriptions   Medication Sig Dispense Refill    A/B-CAROTENE/C/BIOFL/B6/MN/ECH (ECHINACEA C COMPLETE ORAL) Take 1 tablet by mouth once daily.      ascorbic acid, vitamin C, (VITAMIN C) 500 MG tablet Take 500 mg by mouth once daily.      atorvastatin (LIPITOR) 40 MG tablet Take 1 tablet (40 mg total) by mouth once daily. 90 tablet 3    betamethasone dipropionate (DIPROLENE) 0.05 % cream Apply topically 2 (two) times daily.      BLACK COHOSH ORAL Take 1 capsule by mouth once daily. Female hormone blend sp - 7c Black Cohosh      CALCIUM CARBONATE/VITAMIN D3 (VITAMIN D-3 ORAL) Take 100 Int'l Units by mouth once daily.       carvedilol (COREG) 6.25 MG tablet TAKE 1 TABLET(6.25 MG) BY MOUTH TWICE DAILY WITH MEALS 180 tablet 3    coenzyme Q10 (CO Q-10) 10 mg capsule Take 10 mg by mouth once daily.      fluticasone (FLONASE) 50 mcg/actuation nasal spray SPRAY TWICE IN EACH NOSTRIL EVERY DAY 48 g 3    fluticasone-salmeterol 250-50 mcg/dose (ADVAIR DISKUS) 250-50 mcg/dose diskus inhaler Inhale 1 puff into the lungs 2 (two) times daily. INHALE 1 PUFF BY MOUTH INTO THE LUNGS TWICE DAILY 60 each 11    folic acid (FOLVITE) 400 MCG tablet Take 400 mcg by mouth once daily.      GARLIC OIL ORAL Take 1 capsule by mouth once daily.      lisinopril (PRINIVIL,ZESTRIL) 20 MG tablet Take 1 tablet (20 mg total) by mouth once daily. 90 tablet 3    montelukast (SINGULAIR) 10 mg tablet TAKE 1 TABLET ONE TIME DAILY 90 tablet 3    NIACIN, INOSITOL NIACINATE, (NIACIN FLUSH FREE ORAL) Take 500 mg by  mouth once daily.      valacyclovir (VALTREX) 500 MG tablet Take 1 tablet (500 mg total) by mouth 2 (two) times daily as needed. 60 tablet 11    VENTOLIN HFA 90 mcg/actuation inhaler INHALE 2 PUFFS BY MOUTH EVERY 4 HOURS AS NEEDED FOR WHEEZING 18 g 0    vitamin E 100 UNIT capsule Take 100 Units by mouth once daily.      augmented betamethasone dipropionate (DIPROLENE-AF) 0.05 % cream Apply topically once daily at 6am. 50 g 0    clindamycin (CLEOCIN) 150 MG capsule Take 150 mg by mouth 4 (four) times daily.      OMEGA-3/DHA/EPA/FISH OIL (OMEGA-3 FISH OIL ORAL) Take 1 capsule by mouth once daily.      promethazine-dextromethorphan (PROMETHAZINE-DM) 6.25-15 mg/5 mL Syrp Take 5 mLs by mouth every 4 to 6 hours as needed (cough). 180 mL 0    trazodone (DESYREL) 50 MG tablet Take 1 tablet (50 mg total) by mouth nightly as needed for Insomnia. 30 tablet 11     No current facility-administered medications for this visit.        Allergies  Review of patient's allergies indicates:   Allergen Reactions    Doxepin Anaphylaxis     abd pain    Penicillins Rash    Sulfa (sulfonamide antibiotics) Rash    Duloxetine      Other reaction(s): insomnia    Gabapentin Nausea Only    Levofloxacin      Other reaction(s): Headache    Milnacipran      Other reaction(s): stomach pain    Nitrofurantoin monohyd/m-cryst      Other reaction(s): Itching    Prednisone      Other reaction(s): Itching    Pseudoephedrine-guaifenesin      Other reaction(s): Stomach upset    Terbinafine      Other reaction(s): rash    Trazodone      Other reaction(s): heart racing       Past Medical History  Past Medical History:   Diagnosis Date    Allergy     Anemia     sickle cell trait    Anxiety     Arthritis     Asthma     Chronic disease anemia 10/5/2017    Colon polyps     Depression     Dermatitis 3/7/2013    Fibromyalgia     HEARING LOSS     Hypertension     Leucopenia 10/5/2017    Polyneuropathy     Rheumatoid arthritis(714.0)      Scoliosis     Sickle cell trait     Sickle-cell trait 10/5/2017    Trouble in sleeping     Urticaria        Surgical History  Past Surgical History:   Procedure Laterality Date    ADENOIDECTOMY      APPENDECTOMY      COLONOSCOPY  4/14/2008    Dr. Guerrero, 10 year recheck    HYSTERECTOMY      OOPHORECTOMY      ROTATOR CUFF REPAIR Left 01/2016    ROTATOR CUFF REPAIR W/ DISTAL CLAVICLE EXCISION Right 12/04/2017    Dr[facility]ligio Timmons [facility] )    TONSILLECTOMY         Family History:   Family History   Problem Relation Age of Onset    Multiple sclerosis Mother     Seizures Mother     Cancer Father      lung    Alcohol abuse Brother     Early death Brother     Diabetes Maternal Aunt     Diabetes Maternal Uncle     Diabetes Maternal Grandmother     Mental illness Maternal Grandfather     Asthma Paternal Aunt     Diabetes Sister     Arthritis Daughter     Hyperlipidemia Son     Hypertension Son     Sickle cell anemia Paternal Uncle     Asthma Brother     No Known Problems Sister     Miscarriages / Stillbirths Sister      fibroids    No Known Problems Sister     No Known Problems Brother     Miscarriages / Stillbirths Daughter     No Known Problems Daughter     Allergies Neg Hx     Angioedema Neg Hx     Eczema Neg Hx     Immunodeficiency Neg Hx     Rheum arthritis Neg Hx     Psoriasis Neg Hx     Lupus Neg Hx        Social History:   Social History     Social History    Marital status:      Spouse name: N/A    Number of children: N/A    Years of education: N/A     Occupational History    Not on file.     Social History Main Topics    Smoking status: Never Smoker    Smokeless tobacco: Never Used      Comment: ; retired     Alcohol use Yes      Comment: occasionally    Drug use: No    Sexual activity: No     Other Topics Concern    Not on file     Social History Narrative    No narrative on file       Hospitalization/Major Diagnostic Procedure:      Review of Systems     General/Constitutional:  Chills denies. Fatigue denies. Fever denies. Weight gain denies. Weight loss denies.    Respiratory:  Shortness of breath denies.    Cardiovascular:  Chest pain denies.    Gastrointestinal:  Constipation denies. Diarrhea denies. Nausea denies. Vomiting denies.     Hematology:  Easy bruising denies. Prolonged bleeding denies.     Genitourinary:  Frequent urination denies. Pain in lower back denies. Painful urination denies.     Musculoskeletal:  See HPI for details    Skin:  Rash denies.    Neurologic:  Dizziness denies. Gait abnormalities denies. Seizures denies. Tingling/Numbess denies.    Psychiatric:  Anxiety denies. Depressed mood denies.     Objective:   Vital Signs:   Vitals:    05/01/18 0759   BP: (!) 157/87   Pulse: 67        Physical Exam      General Examination:     Constitutional: The patient is alert and oriented to lace person and time. Mood is pleasant.     Head/Face: Normal facial features normal eyebrows    Eyes: Normal extraocular motion bilaterally    Lungs: Respirations are equal and unlabored    Gait is coordinated.    Cardiovascular: There are no swelling or varicosities present.    Lymphatic: Negative for adenopathy    Skin: Normal    Neurological: Level of consciousness normal. Oriented to place person and time and situation    Psychiatric: Oriented to time place person and situation    Patient has full range of motion of her shoulder. She has mild pain along the medial scapular border. Negative impingement negative crossover. Full range of motion of the left shoulder. Negative impingement. Rotator cuff is strong to resisted testing.      XRAY Report/ Interpretation:No new XRAYS Today.      Assessment:       No diagnosis found.    Plan:       There are no diagnoses linked to this encounter.     No Follow-up on file.    Stable following arthroscopy of the shoulder. I injected her along the medial scapular border with Depo-Medrol and lidocaine.  A total of 3 trigger point injections. She will follow-up  when necessary    This note was created using Dragon voice recognition software that occasionally misinterpreted phrases or words.

## 2018-05-01 NOTE — PROCEDURES
Tendon Sheath  Date/Time: 5/1/2018 8:11 AM  Performed by: MICHELE WEAVER  Authorized by: MICHELE WEAVER     Consent Done?: Yes (Verbal)  Timeout: prior to procedure the correct patient, procedure, and site was verified   Indications:  Pain  Site marked: the procedure site was marked    Timeout: prior to procedure the correct patient, procedure, and site was verified    Location: scapula.  Prep: patient was prepped and draped in usual sterile fashion    Ultrasonic guidance for needle placement?: No    Needle size:  25 G  Medications:  40 mg methylPREDNISolone acetate 40 mg/mL  Patient tolerance:  Patient tolerated the procedure well with no immediate complications

## 2018-05-11 ENCOUNTER — TELEPHONE (OUTPATIENT)
Dept: FAMILY MEDICINE | Facility: CLINIC | Age: 68
End: 2018-05-11

## 2018-05-11 NOTE — TELEPHONE ENCOUNTER
Xanax is too habit forming for a sleeping pill.  Agree with increase of trazodone to 100mg, try two 50's, can send in 100's if that works.

## 2018-05-11 NOTE — TELEPHONE ENCOUNTER
Patient has Trazodone 50mg- says it helps her sleep some nights but other night she can't- asked for Xanax- advised her it is a sedative and a controlled substance and cannot be prescribed for sleep or covered on her insurance- she asks if Trazodone dose can be increased-Romel. Advised her she can take two of her 50mg on the nights one is not working for her.

## 2018-05-11 NOTE — TELEPHONE ENCOUNTER
----- Message from Ange Lanier sent at 5/11/2018  9:42 AM CDT -----  Contact: patient  Type: Needs Medical Advice    Who Called:  Patient  Symptoms (please be specific):  Sleeping pattern is out of wack again  How long has patient had these symptoms:  For a while now  Pharmacy name and phone #:  NA  Best Call Back Number:     Additional Information: She is calling to speak with the Nurse Taya. Please advise

## 2018-05-11 NOTE — TELEPHONE ENCOUNTER
Patient notified to try taking 2 of the 50mg and call next week if she wants the 100mg called in.

## 2018-05-17 ENCOUNTER — PES CALL (OUTPATIENT)
Dept: ADMINISTRATIVE | Facility: CLINIC | Age: 68
End: 2018-05-17

## 2018-06-04 ENCOUNTER — PES CALL (OUTPATIENT)
Dept: ADMINISTRATIVE | Facility: CLINIC | Age: 68
End: 2018-06-04

## 2018-06-26 ENCOUNTER — TELEPHONE (OUTPATIENT)
Dept: FAMILY MEDICINE | Facility: CLINIC | Age: 68
End: 2018-06-26

## 2018-06-26 DIAGNOSIS — F43.23 ADJUSTMENT DISORDER WITH MIXED ANXIETY AND DEPRESSED MOOD: Primary | ICD-10-CM

## 2018-06-26 RX ORDER — SERTRALINE HYDROCHLORIDE 25 MG/1
25 TABLET, FILM COATED ORAL DAILY
Qty: 30 TABLET | Refills: 5 | Status: SHIPPED | OUTPATIENT
Start: 2018-06-26 | End: 2018-09-10 | Stop reason: ALTCHOICE

## 2018-06-26 NOTE — TELEPHONE ENCOUNTER
----- Message from Gem Uribe sent at 6/26/2018  7:25 AM CDT -----  Type: Needs Medical Advice    Who Called:  Patient  Best Call Back Number: [phone]  Additional Information: Patient stated she left previous message for nurse (Taya)requesting to speak with nurse concerning medication: trazodone (DESYREL) 50 MG tablet/patient stated medication is not helping/please call patient back to advise.

## 2018-06-26 NOTE — TELEPHONE ENCOUNTER
Patient says she is not sleeping well since her  passed- she has increased the Trazodone 50mg to two tabs at night- she called [employer] and was told Xanax would be covered- explained to patient Dr. Toure will not prescribe that and why- I asked her if she was feeling anxious or depressed- she states that at times she is and gets to missing . Feels more anxious and depressed at night.

## 2018-06-26 NOTE — TELEPHONE ENCOUNTER
We can use zoloft/sertraline for depression and anxiety symptoms, she can still take trazodone if needed for sleep.

## 2018-07-01 DIAGNOSIS — G47.00 INSOMNIA, UNSPECIFIED TYPE: ICD-10-CM

## 2018-07-02 RX ORDER — TRAZODONE HYDROCHLORIDE 50 MG/1
TABLET ORAL
Qty: 30 TABLET | Refills: 5 | Status: SHIPPED | OUTPATIENT
Start: 2018-07-02 | End: 2018-09-10 | Stop reason: SDUPTHER

## 2018-07-08 ENCOUNTER — TELEPHONE (OUTPATIENT)
Dept: FAMILY MEDICINE | Facility: CLINIC | Age: 68
End: 2018-07-08

## 2018-07-08 DIAGNOSIS — A60.9 HSV (HERPES SIMPLEX VIRUS) ANOGENITAL INFECTION: ICD-10-CM

## 2018-07-09 RX ORDER — VALACYCLOVIR HYDROCHLORIDE 500 MG/1
TABLET, FILM COATED ORAL
Qty: 60 TABLET | Refills: 0 | Status: SHIPPED | OUTPATIENT
Start: 2018-07-09 | End: 2018-09-10 | Stop reason: SDUPTHER

## 2018-07-12 ENCOUNTER — TELEPHONE (OUTPATIENT)
Dept: FAMILY MEDICINE | Facility: CLINIC | Age: 68
End: 2018-07-12

## 2018-07-12 NOTE — TELEPHONE ENCOUNTER
----- Message from Irlanda Andre sent at 7/12/2018 10:01 AM CDT -----  Asking if her blood type is in her chart ? Call  [phone] (home)

## 2018-07-13 ENCOUNTER — TELEPHONE (OUTPATIENT)
Dept: FAMILY MEDICINE | Facility: CLINIC | Age: 68
End: 2018-07-13

## 2018-07-13 NOTE — TELEPHONE ENCOUNTER
----- Message from Edward Barraza sent at 7/13/2018  8:07 AM CDT -----  Contact: self   Patient need to speak with a nurse regarding what blood type she has, please call back at [phone] (home)

## 2018-07-13 NOTE — TELEPHONE ENCOUNTER
Patient notified blood type not available- per  insurance likely won't cover for routine diagnosis. She will check with her oncologist to see if they have it or they can order it.

## 2018-09-04 ENCOUNTER — DOCUMENTATION ONLY (OUTPATIENT)
Dept: FAMILY MEDICINE | Facility: CLINIC | Age: 68
End: 2018-09-04

## 2018-09-04 NOTE — PROGRESS NOTES
Pre-Visit Chart Review  For Appointment Scheduled on 9-10-18    Health Maintenance Due   Topic Date Due    TETANUS VACCINE  03/03/1968    Colonoscopy  04/04/2018    Influenza Vaccine  08/01/2018

## 2018-09-10 ENCOUNTER — OFFICE VISIT (OUTPATIENT)
Dept: FAMILY MEDICINE | Facility: CLINIC | Age: 68
End: 2018-09-10
Attending: FAMILY MEDICINE
Payer: MEDICARE

## 2018-09-10 VITALS
SYSTOLIC BLOOD PRESSURE: 132 MMHG | BODY MASS INDEX: 24.5 KG/M2 | DIASTOLIC BLOOD PRESSURE: 76 MMHG | TEMPERATURE: 98 F | OXYGEN SATURATION: 98 % | HEIGHT: 65 IN | RESPIRATION RATE: 16 BRPM | WEIGHT: 147.06 LBS | HEART RATE: 67 BPM

## 2018-09-10 DIAGNOSIS — I10 GOOD HYPERTENSION CONTROL: Primary | ICD-10-CM

## 2018-09-10 DIAGNOSIS — E78.5 HYPERLIPIDEMIA, UNSPECIFIED HYPERLIPIDEMIA TYPE: ICD-10-CM

## 2018-09-10 DIAGNOSIS — G47.00 INSOMNIA, UNSPECIFIED TYPE: ICD-10-CM

## 2018-09-10 DIAGNOSIS — Z12.11 COLON CANCER SCREENING: ICD-10-CM

## 2018-09-10 DIAGNOSIS — I10 HYPERTENSION, ESSENTIAL: ICD-10-CM

## 2018-09-10 DIAGNOSIS — A60.9 HSV (HERPES SIMPLEX VIRUS) ANOGENITAL INFECTION: ICD-10-CM

## 2018-09-10 PROCEDURE — 3078F DIAST BP <80 MM HG: CPT | Mod: CPTII,,, | Performed by: FAMILY MEDICINE

## 2018-09-10 PROCEDURE — 3077F SYST BP >= 140 MM HG: CPT | Mod: CPTII,,, | Performed by: FAMILY MEDICINE

## 2018-09-10 PROCEDURE — 99214 OFFICE O/P EST MOD 30 MIN: CPT | Mod: S$PBB,,, | Performed by: FAMILY MEDICINE

## 2018-09-10 PROCEDURE — 1101F PT FALLS ASSESS-DOCD LE1/YR: CPT | Mod: CPTII,,, | Performed by: FAMILY MEDICINE

## 2018-09-10 PROCEDURE — 99999 PR PBB SHADOW E&M-EST. PATIENT-LVL IV: CPT | Mod: PBBFAC,,, | Performed by: FAMILY MEDICINE

## 2018-09-10 PROCEDURE — 99214 OFFICE O/P EST MOD 30 MIN: CPT | Mod: PBBFAC,PO | Performed by: FAMILY MEDICINE

## 2018-09-10 RX ORDER — BETAMETHASONE DIPROPIONATE 0.5 MG/G
CREAM TOPICAL 2 TIMES DAILY
Qty: 15 G | Refills: 1 | Status: SHIPPED | OUTPATIENT
Start: 2018-09-10 | End: 2019-02-18 | Stop reason: SDUPTHER

## 2018-09-10 RX ORDER — TRAZODONE HYDROCHLORIDE 50 MG/1
50 TABLET ORAL NIGHTLY
Qty: 90 TABLET | Refills: 3 | Status: SHIPPED | OUTPATIENT
Start: 2018-09-10 | End: 2019-08-20 | Stop reason: DRUGHIGH

## 2018-09-10 RX ORDER — VALACYCLOVIR HYDROCHLORIDE 500 MG/1
500 TABLET, FILM COATED ORAL 2 TIMES DAILY PRN
Qty: 60 TABLET | Refills: 0 | Status: SHIPPED | OUTPATIENT
Start: 2018-09-10 | End: 2018-09-11 | Stop reason: SDUPTHER

## 2018-09-10 RX ORDER — CARVEDILOL 6.25 MG/1
TABLET ORAL
Qty: 180 TABLET | Refills: 3 | Status: SHIPPED | OUTPATIENT
Start: 2018-09-10 | End: 2018-12-13 | Stop reason: SDUPTHER

## 2018-09-10 RX ORDER — ATORVASTATIN CALCIUM 40 MG/1
40 TABLET, FILM COATED ORAL DAILY
Qty: 90 TABLET | Refills: 1 | Status: SHIPPED | OUTPATIENT
Start: 2018-09-10 | End: 2019-04-22 | Stop reason: SDUPTHER

## 2018-09-10 NOTE — PATIENT INSTRUCTIONS
Controlling High Blood Pressure  High blood pressure (hypertension) is often called the silent killer. This is because many people who have it dont know it. High blood pressure is defined as 140/90 mm Hg or higher. Know your blood pressure and remember to check it regularly. Doing so can save your life. Here are some things you can do to help control your blood pressure.    Choose heart-healthy foods  · Select low-salt, low-fat foods. Limit sodium intake to 2,400 mg per day or the amount suggested by your healthcare provider.  · Limit canned, dried, cured, packaged, and fast foods. These can contain a lot of salt.  · Eat 8 to 10 servings of fruits and vegetables every day.  · Choose lean meats, fish, or chicken.  · Eat whole-grain pasta, brown rice, and beans.  · Eat 2 to 3 servings of low-fat or fat-free dairy products.  · Ask your doctor about the DASH eating plan. This plan helps reduce blood pressure.  · When you go to a restaurant, ask that your meal be prepared with no added salt.  Maintain a healthy weight  · Ask your healthcare provider how many calories to eat a day. Then stick to that number.  · Ask your healthcare provider what weight range is healthiest for you. If you are overweight, a weight loss of only 3% to 5% of your body weight can help lower blood pressure. Generally, a good weight loss goal is to lose 10% of your body weight in a year.  · Limit snacks and sweets.  · Get regular exercise.  Get up and get active  · Choose activities you enjoy. Find ones you can do with friends or family. This includes bicycling, dancing, walking, and jogging.  · Park farther away from building entrances.  · Use stairs instead of the elevator.  · When you can, walk or bike instead of driving.  · Dalton leaves, garden, or do household repairs.  · Be active at a moderate to vigorous level of physical activity for at least 40 minutes for a minimum of 3 to 4 days a week.   Manage stress  · Make time to relax and enjoy  life. Find time to laugh.  · Communicate your concerns with your loved ones and your healthcare provider.  · Visit with family and friends, and keep up with hobbies.  Limit alcohol and quit smoking  · Men should have no more than 2 drinks per day.  · Women should have no more than 1 drink per day.  · Talk with your healthcare provider about quitting smoking. Smoking significantly increases your risk for heart disease and stroke. Ask your healthcare provider about community smoking cessation programs and other options.  Medicines  If lifestyle changes arent enough, your healthcare provider may prescribe high blood pressure medicine. Take all medicines as prescribed. If you have any questions about your medicines, ask your healthcare provider before stopping or changing them.   Date Last Reviewed: 4/27/2016  © 7082-4302 [employer] [address] [place] [address], [place] [address]. All rights reserved. This information is not intended as a substitute for professional medical care. Always follow your healthcare professional's instructions.

## 2018-09-10 NOTE — PROGRESS NOTES
Subjective:       Patient ID: Loren Andre is a 68 y.o. female.    Chief Complaint: Hypertension    68-year-old female coming in for recheck of blood pressure.  She has not been checking it at home lately because she was not having headaches and in think it would be elevated.  She has been having some intermittent vague abdominal discomfort.  She denies any sensation of heartburn but does have some mild belching.  She has a tendency to constipation with no blood in bowel movements and no urinary problems.  She has no symptoms at this time.  She has not noted whether eating aggravates or relieves her symptoms.  She is due for her colonoscopy.    Past Medical History:  No date: Allergy  No date: Anemia      Comment:  sickle cell trait  No date: Anxiety  No date: Arthritis  No date: Asthma  10/5/2017: Chronic disease anemia  No date: Colon polyps  No date: Depression  3/7/2013: Dermatitis  No date: Fibromyalgia  No date: HEARING LOSS  No date: Hypertension  10/5/2017: Leucopenia  No date: Polyneuropathy  No date: Rheumatoid arthritis(714.0)  No date: Scoliosis  No date: Sickle cell trait  10/5/2017: Sickle-cell trait  No date: Trouble in sleeping  No date: Urticaria    Past Surgical History:  No date: ADENOIDECTOMY  No date: APPENDECTOMY  4/14/2008: COLONOSCOPY      Comment:  Dr. Guerrero, 10 year recheck  No date: HYSTERECTOMY  No date: OOPHORECTOMY  01/2016: ROTATOR CUFF REPAIR; Left  12/04/2017: ROTATOR CUFF REPAIR W/ DISTAL CLAVICLE EXCISION; Right      Comment:  Dr. Eligio Timmons ( [facility] )  No date: TONSILLECTOMY    Current Outpatient Medications on File Prior to Visit:  A/B-CAROTENE/C/BIOFL/B6/MN/ECH (ECHINACEA C COMPLETE ORAL), Take 1 tablet by mouth daily as needed. , Disp: , Rfl:   ascorbic acid, vitamin C, (VITAMIN C) 500 MG tablet, Take 500 mg by mouth daily as needed. , Disp: , Rfl:   BLACK COHOSH ORAL, Take 1 capsule by mouth 2 (two) times daily. Female hormone blend sp - 7c Black Cohosh , Disp: ,  Rfl:   CALCIUM CARBONATE/VITAMIN D3 (VITAMIN D-3 ORAL), Take 100 Int'l Units by mouth once daily. , Disp: , Rfl:   coenzyme Q10 (CO Q-10) 10 mg capsule, Take 10 mg by mouth once daily., Disp: , Rfl:   fluticasone-salmeterol 250-50 mcg/dose (ADVAIR DISKUS) 250-50 mcg/dose diskus inhaler, Inhale 1 puff into the lungs 2 (two) times daily. INHALE 1 PUFF BY MOUTH INTO THE LUNGS TWICE DAILY (Patient taking differently: Inhale 1 puff into the lungs 2 (two) times daily as needed. INHALE 1 PUFF BY MOUTH INTO THE LUNGS TWICE DAILY), Disp: 60 each, Rfl: 11  folic acid (FOLVITE) 400 MCG tablet, Take 400 mcg by mouth once daily., Disp: , Rfl:   GARLIC OIL ORAL, Take 1 capsule by mouth once daily., Disp: , Rfl:   lisinopril (PRINIVIL,ZESTRIL) 20 MG tablet, Take 1 tablet (20 mg total) by mouth once daily., Disp: 90 tablet, Rfl: 3  montelukast (SINGULAIR) 10 mg tablet, TAKE 1 TABLET ONE TIME DAILY, Disp: 90 tablet, Rfl: 3  NIACIN, INOSITOL NIACINATE, (NIACIN FLUSH FREE ORAL), Take 500 mg by mouth once daily., Disp: , Rfl:   VENTOLIN HFA 90 mcg/actuation inhaler, INHALE 2 PUFFS BY MOUTH EVERY 4 HOURS AS NEEDED FOR WHEEZING, Disp: 18 g, Rfl: 0  vitamin E 100 UNIT capsule, Take 100 Units by mouth once daily., Disp: , Rfl:   (DISCONTINUED) atorvastatin (LIPITOR) 40 MG tablet, Take 1 tablet (40 mg total) by mouth once daily., Disp: 90 tablet, Rfl: 3  (DISCONTINUED) betamethasone dipropionate (DIPROLENE) 0.05 % cream, Apply topically 2 (two) times daily., Disp: , Rfl:   (DISCONTINUED) carvedilol (COREG) 6.25 MG tablet, TAKE 1 TABLET(6.25 MG) BY MOUTH TWICE DAILY WITH MEALS, Disp: 180 tablet, Rfl: 3  (DISCONTINUED) traZODone (DESYREL) 50 MG tablet, TAKE 1 TABLET BY MOUTH NIGHTLY AS NEEDED FOR INSOMNIA, Disp: 30 tablet, Rfl: 5  (DISCONTINUED) valACYclovir (VALTREX) 500 MG tablet, TAKE 1 TABLET BY MOUTH TWICE DAILY AS NEEDED, Disp: 60 tablet, Rfl: 0  (DISCONTINUED) augmented betamethasone dipropionate (DIPROLENE-AF) 0.05 % cream, Apply  topically once daily at 6am., Disp: 50 g, Rfl: 0  (DISCONTINUED) clindamycin (CLEOCIN) 150 MG capsule, Take 150 mg by mouth 4 (four) times daily., Disp: , Rfl:   (DISCONTINUED) fluticasone (FLONASE) 50 mcg/actuation nasal spray, SPRAY TWICE IN EACH NOSTRIL EVERY DAY, Disp: 48 g, Rfl: 3  (DISCONTINUED) OMEGA-3/DHA/EPA/FISH OIL (OMEGA-3 FISH OIL ORAL), Take 1 capsule by mouth once daily., Disp: , Rfl:   (DISCONTINUED) promethazine-dextromethorphan (PROMETHAZINE-DM) 6.25-15 mg/5 mL Syrp, Take 5 mLs by mouth every 4 to 6 hours as needed (cough)., Disp: 180 mL, Rfl: 0  (DISCONTINUED) sertraline (ZOLOFT) 25 MG tablet, Take 1 tablet (25 mg total) by mouth once daily., Disp: 30 tablet, Rfl: 5    No current facility-administered medications on file prior to visit.           Review of Systems   Constitutional: Negative for chills, diaphoresis and fever.   HENT: Negative for congestion, postnasal drip, rhinorrhea, sinus pressure and sinus pain.    Respiratory: Negative for chest tightness, shortness of breath and wheezing.    Cardiovascular: Negative for chest pain and palpitations.   Gastrointestinal: Positive for abdominal pain (mild, vague, intermittent and not present now) and constipation. Negative for abdominal distention, blood in stool, diarrhea, nausea and vomiting.   Genitourinary: Negative for dysuria, frequency and hematuria.   Musculoskeletal: Positive for arthralgias. Negative for joint swelling.   Skin: Positive for rash (Intermittent rash on the neck in hot weather).   Neurological: Negative for dizziness, light-headedness and headaches.   Psychiatric/Behavioral: Positive for sleep disturbance (Much better with trazodone). Negative for dysphoric mood. The patient is nervous/anxious.        Objective:      Physical Exam   Constitutional: She is oriented to person, place, and time. She appears well-developed and well-nourished. No distress.   Good blood pressure control  Normal weight with a BMI of 24.5 she is  down 4.4 lb from her March 1, 2018 visit   HENT:   Head: Normocephalic and atraumatic.   Eyes: EOM are normal. Pupils are equal, round, and reactive to light. No scleral icterus.   Neck: Normal range of motion. Neck supple.   Cardiovascular: Normal rate, regular rhythm and normal heart sounds.   Abdominal: Soft. Bowel sounds are normal. She exhibits no distension and no mass. There is no tenderness. There is no rebound and no guarding. No hernia.   Neurological: She is alert and oriented to person, place, and time.   Skin: She is not diaphoretic.   Psychiatric: She has a normal mood and affect. Her behavior is normal. Judgment and thought content normal.   Nursing note and vitals reviewed.      Assessment:       1. Good hypertension control    2. Colon cancer screening    3. Hyperlipidemia, unspecified hyperlipidemia type    4. Hypertension, essential    5. HSV (herpes simplex virus) anogenital infection    6. Insomnia, unspecified type        Plan:       1. Good hypertension control  Well controlled, no changes needed.  Patient instructed to keep a log for the next week and send results to me and to check it again for a week before she comes in for follow-up    2. Colon cancer screening  Due for screening colonoscopy  - Ambulatory referral to Gastroenterology    3. Hyperlipidemia, unspecified hyperlipidemia type  Next lab due in February will do with her next blood pressure recheck in 6 months  - atorvastatin (LIPITOR) 40 MG tablet; Take 1 tablet (40 mg total) by mouth once daily.  Dispense: 90 tablet; Refill: 1    4. Hypertension, essential  No changes needed  - carvedilol (COREG) 6.25 MG tablet; TAKE 1 TABLET(6.25 MG) BY MOUTH TWICE DAILY WITH MEALS  Dispense: 180 tablet; Refill: 3    5. HSV (herpes simplex virus) anogenital infection  Intermittent symptoms with pulse therapy  - valACYclovir (VALTREX) 500 MG tablet; Take 1 tablet (500 mg total) by mouth 2 (two) times daily as needed.  Dispense: 60 tablet; Refill:  0    6. Insomnia, unspecified type  - traZODone (DESYREL) 50 MG tablet; Take 1 tablet (50 mg total) by mouth nightly.  Dispense: 90 tablet; Refill: 3         Patient readiness: acceptance and barriers:none    During the course of the visit the patient was educated and counseled about the following:     Hypertension:   Medication: no change.  Dietary sodium restriction.  Regular aerobic exercise.    Goals: Hypertension: Reduce Blood Pressure    Did patient meet goals/outcomes: Yes    The following self management tools provided: blood pressure log    Patient Instructions (the written plan) was given to the patient/family.     Time spent with patient: 30 minutes

## 2018-09-11 DIAGNOSIS — A60.9 HSV (HERPES SIMPLEX VIRUS) ANOGENITAL INFECTION: ICD-10-CM

## 2018-09-11 RX ORDER — VALACYCLOVIR HYDROCHLORIDE 500 MG/1
500 TABLET, FILM COATED ORAL 2 TIMES DAILY
Qty: 60 TABLET | Refills: 0 | Status: SHIPPED | OUTPATIENT
Start: 2018-09-11 | End: 2018-10-05 | Stop reason: SDUPTHER

## 2018-09-21 DIAGNOSIS — Z12.11 SCREENING FOR COLON CANCER: Primary | ICD-10-CM

## 2018-09-24 ENCOUNTER — TELEPHONE (OUTPATIENT)
Dept: FAMILY MEDICINE | Facility: CLINIC | Age: 68
End: 2018-09-24

## 2018-09-24 RX ORDER — AMLODIPINE BESYLATE 5 MG/1
5 TABLET ORAL DAILY
COMMUNITY
End: 2018-10-02 | Stop reason: SDUPTHER

## 2018-09-24 NOTE — TELEPHONE ENCOUNTER
Went to [facility]- Amlodipine 5mg one daily. Erythromycin for sinusitis. F/u appt made 10/2/18.

## 2018-09-24 NOTE — TELEPHONE ENCOUNTER
----- Message from Indio Adler sent at 9/24/2018  8:07 AM CDT -----  Contact: pt  Pt is calling to speak with a nurse about her blood pressure, had to go to the ER on Friday,pls call pt back and advise   Call Back#[phone]  Thanks

## 2018-09-26 ENCOUNTER — DOCUMENTATION ONLY (OUTPATIENT)
Dept: FAMILY MEDICINE | Facility: CLINIC | Age: 68
End: 2018-09-26

## 2018-09-26 NOTE — PROGRESS NOTES
Pre-Visit Chart Review  For Appointment Scheduled on 10-2-18    Health Maintenance Due   Topic Date Due    TETANUS VACCINE  03/03/1967    Colonoscopy  04/04/2018    Influenza Vaccine  08/01/2018

## 2018-10-02 ENCOUNTER — PES CALL (OUTPATIENT)
Dept: ADMINISTRATIVE | Facility: CLINIC | Age: 68
End: 2018-10-02

## 2018-10-02 ENCOUNTER — OFFICE VISIT (OUTPATIENT)
Dept: FAMILY MEDICINE | Facility: CLINIC | Age: 68
End: 2018-10-02
Attending: FAMILY MEDICINE
Payer: MEDICARE

## 2018-10-02 VITALS
DIASTOLIC BLOOD PRESSURE: 66 MMHG | HEIGHT: 65 IN | TEMPERATURE: 98 F | SYSTOLIC BLOOD PRESSURE: 118 MMHG | WEIGHT: 145.75 LBS | BODY MASS INDEX: 24.28 KG/M2 | HEART RATE: 74 BPM

## 2018-10-02 DIAGNOSIS — J01.00 ACUTE MAXILLARY SINUSITIS, RECURRENCE NOT SPECIFIED: ICD-10-CM

## 2018-10-02 DIAGNOSIS — I10 HYPERTENSION, ESSENTIAL: ICD-10-CM

## 2018-10-02 DIAGNOSIS — I10 GOOD HYPERTENSION CONTROL: Primary | ICD-10-CM

## 2018-10-02 DIAGNOSIS — J30.89 SEASONAL ALLERGIC RHINITIS DUE TO OTHER ALLERGIC TRIGGER: ICD-10-CM

## 2018-10-02 PROCEDURE — 99213 OFFICE O/P EST LOW 20 MIN: CPT | Mod: PBBFAC,PO | Performed by: FAMILY MEDICINE

## 2018-10-02 PROCEDURE — 3078F DIAST BP <80 MM HG: CPT | Mod: CPTII,,, | Performed by: FAMILY MEDICINE

## 2018-10-02 PROCEDURE — 1101F PT FALLS ASSESS-DOCD LE1/YR: CPT | Mod: CPTII,,, | Performed by: FAMILY MEDICINE

## 2018-10-02 PROCEDURE — 3074F SYST BP LT 130 MM HG: CPT | Mod: CPTII,,, | Performed by: FAMILY MEDICINE

## 2018-10-02 PROCEDURE — 99999 PR PBB SHADOW E&M-EST. PATIENT-LVL III: CPT | Mod: PBBFAC,,, | Performed by: FAMILY MEDICINE

## 2018-10-02 PROCEDURE — 99214 OFFICE O/P EST MOD 30 MIN: CPT | Mod: S$PBB,,, | Performed by: FAMILY MEDICINE

## 2018-10-02 PROCEDURE — 90662 IIV NO PRSV INCREASED AG IM: CPT | Mod: PBBFAC,PO

## 2018-10-02 RX ORDER — AMLODIPINE BESYLATE 5 MG/1
5 TABLET ORAL DAILY
Qty: 30 TABLET | Refills: 0 | Status: SHIPPED | OUTPATIENT
Start: 2018-10-02 | End: 2018-10-02 | Stop reason: SDUPTHER

## 2018-10-02 RX ORDER — AZITHROMYCIN 250 MG/1
250 TABLET, FILM COATED ORAL DAILY
COMMUNITY
End: 2018-10-16 | Stop reason: ALTCHOICE

## 2018-10-02 RX ORDER — AMLODIPINE BESYLATE 5 MG/1
5 TABLET ORAL DAILY
Qty: 90 TABLET | Refills: 3 | Status: SHIPPED | OUTPATIENT
Start: 2018-10-02 | End: 2018-10-16

## 2018-10-02 NOTE — PROGRESS NOTES
Subjective:       Patient ID: Loren Andre is a 68 y.o. female.    Chief Complaint: Follow-up ([facility] f/u high BP and Sinus infection)    68-year-old female comes in with a history of going to Urgent Care for sinus infection and was found to have a very high blood pressure of 180 over?.  They referred her to the emergency room but she declined to go initially in instead went home the and had her family take her later.  At the ER her blood pressure was in the 220 systolic over she thinks 180 diastolic.  She was also found to have a sinus infection and was treated with a Zithromax Z-Ovi.  They gave her amlodipine 5 mg after bring her pressure down with something given IV and discharged her on the amlodipine.  I have not yet been able to get records from the ER visit.  She brings in a blood pressure log since that time with systolics ranging from a low of 119 and a high of 140 and diastolics ranging from 63-80.  Her pressure today is very good.  She completed the Zithromax Z-Ovi and all of her sinusitis symptoms have resolved with no facial pain or pressure.  She still has a minor amount of clear nasal drainage.    Past Medical History:  No date: Allergy  No date: Anemia      Comment:  sickle cell trait  No date: Anxiety  No date: Arthritis  No date: Asthma  10/5/2017: Chronic disease anemia  No date: Colon polyps  No date: Depression  3/7/2013: Dermatitis  No date: Fibromyalgia  No date: HEARING LOSS  No date: Hypertension  10/5/2017: Leucopenia  No date: Polyneuropathy  No date: Rheumatoid arthritis(714.0)  No date: Scoliosis  No date: Sickle cell trait  10/5/2017: Sickle-cell trait  No date: Trouble in sleeping  No date: Urticaria    Past Surgical History:  No date: ADENOIDECTOMY  No date: APPENDECTOMY  4/14/2008: COLONOSCOPY      Comment:  Dr. Guerrero, 10 year recheck  No date: HYSTERECTOMY  No date: OOPHORECTOMY  01/2016: ROTATOR CUFF REPAIR; Left  12/04/2017: ROTATOR CUFF REPAIR W/ DISTAL CLAVICLE EXCISION;  Right      Comment:  Dr. Eligio Timmons ( [facility] )  No date: TONSILLECTOMY    Current Outpatient Medications on File Prior to Visit:  A/B-CAROTENE/C/BIOFL/B6/MN/ECH (ECHINACEA C COMPLETE ORAL), Take 1 tablet by mouth daily as needed. , Disp: , Rfl:   ascorbic acid, vitamin C, (VITAMIN C) 500 MG tablet, Take 500 mg by mouth daily as needed. , Disp: , Rfl:   atorvastatin (LIPITOR) 40 MG tablet, Take 1 tablet (40 mg total) by mouth once daily., Disp: 90 tablet, Rfl: 1  azithromycin (Z-MARISABEL) 250 MG tablet, Take 250 mg by mouth once daily., Disp: , Rfl:   betamethasone dipropionate (DIPROLENE) 0.05 % cream, Apply topically 2 (two) times daily., Disp: 15 g, Rfl: 1  BLACK COHOSH ORAL, Take 1 capsule by mouth 2 (two) times daily. Female hormone blend sp - 7c Black Cohosh , Disp: , Rfl:   CALCIUM CARBONATE/VITAMIN D3 (VITAMIN D-3 ORAL), Take 100 Int'l Units by mouth once daily. , Disp: , Rfl:   carvedilol (COREG) 6.25 MG tablet, TAKE 1 TABLET(6.25 MG) BY MOUTH TWICE DAILY WITH MEALS, Disp: 180 tablet, Rfl: 3  coenzyme Q10 (CO Q-10) 10 mg capsule, Take 10 mg by mouth once daily., Disp: , Rfl:   fluticasone-salmeterol 250-50 mcg/dose (ADVAIR DISKUS) 250-50 mcg/dose diskus inhaler, Inhale 1 puff into the lungs 2 (two) times daily. INHALE 1 PUFF BY MOUTH INTO THE LUNGS TWICE DAILY (Patient taking differently: Inhale 1 puff into the lungs 2 (two) times daily as needed. INHALE 1 PUFF BY MOUTH INTO THE LUNGS TWICE DAILY), Disp: 60 each, Rfl: 11  folic acid (FOLVITE) 400 MCG tablet, Take 400 mcg by mouth once daily., Disp: , Rfl:   GARLIC OIL ORAL, Take 1 capsule by mouth once daily., Disp: , Rfl:   lisinopril (PRINIVIL,ZESTRIL) 20 MG tablet, Take 1 tablet (20 mg total) by mouth once daily., Disp: 90 tablet, Rfl: 3  montelukast (SINGULAIR) 10 mg tablet, TAKE 1 TABLET ONE TIME DAILY, Disp: 90 tablet, Rfl: 3  NIACIN, INOSITOL NIACINATE, (NIACIN FLUSH FREE ORAL), Take 500 mg by mouth once daily., Disp: , Rfl:   traZODone (DESYREL)  50 MG tablet, Take 1 tablet (50 mg total) by mouth nightly., Disp: 90 tablet, Rfl: 3  valACYclovir (VALTREX) 500 MG tablet, Take 1 tablet (500 mg total) by mouth 2 (two) times daily. for 7 days, Disp: 60 tablet, Rfl: 0  VENTOLIN HFA 90 mcg/actuation inhaler, INHALE 2 PUFFS BY MOUTH EVERY 4 HOURS AS NEEDED FOR WHEEZING, Disp: 18 g, Rfl: 0  vitamin E 100 UNIT capsule, Take 100 Units by mouth once daily., Disp: , Rfl:   (DISCONTINUED) amLODIPine (NORVASC) 5 MG tablet, Take 5 mg by mouth once daily., Disp: , Rfl:     No current facility-administered medications on file prior to visit.           Review of Systems   Constitutional: Negative for chills, diaphoresis and fever.   HENT: Positive for congestion, postnasal drip and rhinorrhea. Negative for sinus pressure and sinus pain.    Respiratory: Negative for cough, chest tightness and shortness of breath.    Cardiovascular: Negative for chest pain and palpitations.       Objective:      Physical Exam   Constitutional: She appears well-developed and well-nourished. No distress.   Excellent blood pressure control  Normal weight with a BMI of 24.3 she is down 1.4 lb from her last visit with me September 10, 2018   HENT:   Head: Normocephalic and atraumatic.   Right Ear: External ear normal.   Left Ear: External ear normal.   Nose: Nose normal.   Mouth/Throat: Oropharynx is clear and moist. No oropharyngeal exudate.   Minor nasal turbinate swelling with no erythema and only a small amount of clear discharge.  No facial tenderness to percussion   Eyes: EOM are normal. Pupils are equal, round, and reactive to light. No scleral icterus.   Neck: Normal range of motion. Neck supple. No JVD present. No tracheal deviation present. No thyromegaly present.   Cardiovascular: Normal rate, regular rhythm and normal heart sounds. Exam reveals no gallop and no friction rub.   No murmur heard.  Pulmonary/Chest: Effort normal and breath sounds normal. No stridor. No respiratory distress.  She has no wheezes. She has no rales. She exhibits no tenderness.   Musculoskeletal: She exhibits no edema.   Lymphadenopathy:     She has no cervical adenopathy.   Skin: She is not diaphoretic.   Nursing note and vitals reviewed.      Assessment:       1. Good hypertension control    2. Seasonal allergic rhinitis due to other allergic trigger    3. Acute maxillary sinusitis, recurrence not specified    4. Hypertension, essential        Plan:       1. Good hypertension control  No changes needed, continue amlodipine 5 mg and recheck blood pressure in about 3 months    2. Seasonal allergic rhinitis due to other allergic trigger  Patient requests referral to Allergy for re-evaluation.  Last saw Allergy longer than 10 years ago with no record available  - Ambulatory referral to Allergy    3. Acute maxillary sinusitis, recurrence not specified  Resolving, no fever in the last 48 hr so she will be given a high-dose flu vaccine before leaving the office    4. Hypertension, essential  - amLODIPine (NORVASC) 5 MG tablet; Take 1 tablet (5 mg total) by mouth once daily.  Dispense: 90 tablet; Refill: 3         Patient readiness: acceptance and barriers:none    During the course of the visit the patient was educated and counseled about the following:     Hypertension:   Medication: no change.  Dietary sodium restriction.  Regular aerobic exercise.    Goals: Hypertension: Reduce Blood Pressure    Did patient meet goals/outcomes: Yes    The following self management tools provided: blood pressure log    Patient Instructions (the written plan) was given to the patient/family.     Time spent with patient: 30 minutes

## 2018-10-03 ENCOUNTER — TELEPHONE (OUTPATIENT)
Dept: FAMILY MEDICINE | Facility: CLINIC | Age: 68
End: 2018-10-03

## 2018-10-03 RX ORDER — ALBUTEROL SULFATE 90 UG/1
AEROSOL, METERED RESPIRATORY (INHALATION)
Qty: 18 G | Refills: 0 | Status: SHIPPED | OUTPATIENT
Start: 2018-10-03 | End: 2019-10-07 | Stop reason: SDUPTHER

## 2018-10-03 NOTE — TELEPHONE ENCOUNTER
----- Message from Kobi Garza sent at 10/3/2018 11:13 AM CDT -----  Contact: pt  The Pt is requesting a call back regarding the allergy Dr that she is to see. Please call Pt to advise.     Call Back#: [phone]  Thanks

## 2018-10-04 ENCOUNTER — ANESTHESIA (OUTPATIENT)
Dept: ENDOSCOPY | Facility: HOSPITAL | Age: 68
End: 2018-10-04
Payer: MEDICARE

## 2018-10-04 ENCOUNTER — HOSPITAL ENCOUNTER (OUTPATIENT)
Facility: HOSPITAL | Age: 68
Discharge: HOME | End: 2018-10-04
Attending: INTERNAL MEDICINE | Admitting: INTERNAL MEDICINE
Payer: MEDICARE

## 2018-10-04 ENCOUNTER — ANESTHESIA EVENT (OUTPATIENT)
Dept: ENDOSCOPY | Facility: HOSPITAL | Age: 68
End: 2018-10-04
Payer: MEDICARE

## 2018-10-04 VITALS
DIASTOLIC BLOOD PRESSURE: 81 MMHG | OXYGEN SATURATION: 100 % | WEIGHT: 145 LBS | SYSTOLIC BLOOD PRESSURE: 169 MMHG | RESPIRATION RATE: 17 BRPM | HEART RATE: 69 BPM | TEMPERATURE: 98 F | BODY MASS INDEX: 23.3 KG/M2 | HEIGHT: 66 IN

## 2018-10-04 DIAGNOSIS — Z12.11 SCREEN FOR COLON CANCER: ICD-10-CM

## 2018-10-04 DIAGNOSIS — K64.8 INTERNAL HEMORRHOIDS: Primary | ICD-10-CM

## 2018-10-04 PROCEDURE — D9220A PRA ANESTHESIA: Mod: ANES,,, | Performed by: ANESTHESIOLOGY

## 2018-10-04 PROCEDURE — G0121 COLON CA SCRN NOT HI RSK IND: HCPCS | Performed by: INTERNAL MEDICINE

## 2018-10-04 PROCEDURE — 37000009 HC ANESTHESIA EA ADD 15 MINS: Performed by: INTERNAL MEDICINE

## 2018-10-04 PROCEDURE — G0121 COLON CA SCRN NOT HI RSK IND: HCPCS | Mod: ,,, | Performed by: INTERNAL MEDICINE

## 2018-10-04 PROCEDURE — 37000008 HC ANESTHESIA 1ST 15 MINUTES: Performed by: INTERNAL MEDICINE

## 2018-10-04 PROCEDURE — 25000003 PHARM REV CODE 250: Performed by: INTERNAL MEDICINE

## 2018-10-04 PROCEDURE — D9220A PRA ANESTHESIA: Mod: CRNA,,, | Performed by: NURSE ANESTHETIST, CERTIFIED REGISTERED

## 2018-10-04 PROCEDURE — 63600175 PHARM REV CODE 636 W HCPCS: Performed by: NURSE ANESTHETIST, CERTIFIED REGISTERED

## 2018-10-04 RX ORDER — LIDOCAINE HCL/PF 100 MG/5ML
SYRINGE (ML) INTRAVENOUS
Status: DISCONTINUED | OUTPATIENT
Start: 2018-10-04 | End: 2018-10-04

## 2018-10-04 RX ORDER — SODIUM CHLORIDE 0.9 % (FLUSH) 0.9 %
3 SYRINGE (ML) INJECTION
Status: CANCELLED | OUTPATIENT
Start: 2018-10-04

## 2018-10-04 RX ORDER — SODIUM CHLORIDE 9 MG/ML
INJECTION, SOLUTION INTRAVENOUS CONTINUOUS
Status: DISCONTINUED | OUTPATIENT
Start: 2018-10-04 | End: 2018-10-04 | Stop reason: HOSPADM

## 2018-10-04 RX ORDER — PROPOFOL 10 MG/ML
VIAL (ML) INTRAVENOUS
Status: DISCONTINUED | OUTPATIENT
Start: 2018-10-04 | End: 2018-10-04

## 2018-10-04 RX ADMIN — PROPOFOL 50 MG: 10 INJECTION, EMULSION INTRAVENOUS at 12:10

## 2018-10-04 RX ADMIN — SODIUM CHLORIDE: 0.9 INJECTION, SOLUTION INTRAVENOUS at 11:10

## 2018-10-04 RX ADMIN — PROPOFOL 50 MG: 10 INJECTION, EMULSION INTRAVENOUS at 11:10

## 2018-10-04 RX ADMIN — LIDOCAINE HYDROCHLORIDE 100 MG: 20 INJECTION, SOLUTION INTRAVENOUS at 11:10

## 2018-10-04 RX ADMIN — PROPOFOL 100 MG: 10 INJECTION, EMULSION INTRAVENOUS at 11:10

## 2018-10-04 NOTE — OR NURSING
Have you had a colonoscopy LESS THAN 3 years ago?   * If YES, answer these questions*:        NO  1. Did patient have a prior colonic polyp in a previous surveillance/diagnostic colonoscopy and is 18 years or older on date of encounter?       NO  2. Documentation of < 3 year interval since the patients last colonoscopy due to medical reasons (eg., last colonoscopy incomplete, last colonoscopy had inadequate prep, piecemeal removal of adenomas, or last colonoscopy found > 10 adenomas) ?   Last colon 10 years ago

## 2018-10-04 NOTE — TRANSFER OF CARE
"Anesthesia Transfer of Care Note    Patient: Loren Andre    Procedure(s) Performed: Procedure(s) (LRB):  COLONOSCOPY (N/A)    Patient location: PACU    Anesthesia Type: general    Transport from OR: Transported from OR on room air with adequate spontaneous ventilation    Post pain: adequate analgesia    Post assessment: no apparent anesthetic complications and tolerated procedure well    Post vital signs: stable    Level of consciousness: awake    Nausea/Vomiting: no nausea/vomiting    Complications: none    Transfer of care protocol was followed      Last vitals:   Visit Vitals  BP (!) 156/72 (BP Location: Left arm)   Temp 36.6 °C (97.9 °F) (Temporal)   Resp 18   Ht 5' 6" (1.676 m)   Wt 65.8 kg (145 lb)   SpO2 100%   Breastfeeding? No   BMI 23.40 kg/m²     "

## 2018-10-04 NOTE — ANESTHESIA POSTPROCEDURE EVALUATION
"Anesthesia Post Evaluation    Patient: Loren Andre    Procedure(s) Performed: Procedure(s) (LRB):  COLONOSCOPY (N/A)    Final Anesthesia Type: general  Patient location during evaluation: PACU  Patient participation: Yes- Able to Participate  Level of consciousness: awake and alert, oriented and awake  Post-procedure vital signs: reviewed and stable  Pain management: adequate  Airway patency: patent  PONV status at discharge: No PONV  Anesthetic complications: no      Cardiovascular status: blood pressure returned to baseline and hemodynamically stable  Respiratory status: unassisted, spontaneous ventilation and room air  Hydration status: euvolemic  Follow-up not needed.        Visit Vitals  BP (!) 92/55   Pulse 70   Temp 36.6 °C (97.9 °F) (Temporal)   Resp 18   Ht 5' 6" (1.676 m)   Wt 65.8 kg (145 lb)   SpO2 100%   Breastfeeding? No   BMI 23.40 kg/m²       Pain/Charlene Score: Pain Assessment Performed: Yes (10/4/2018 12:10 PM)  Presence of Pain: denies (10/4/2018 10:58 AM)        "

## 2018-10-04 NOTE — [employer] PATIENT INSTRUCTIONS
Discharge Summary/Instructions after an Endoscopic Procedure  Patient Name: Loren Andre  Patient MRN: [id number]  Patient YOB: 1950  Thursday, October 04, 2018  Tam Amin MD  RESTRICTIONS:  During your procedure today, you received medications for sedation.  These   medications may affect your judgment, balance and coordination.  Therefore,   for 24 hours, you have the following restrictions:   - DO NOT drive a car, operate machinery, make legal/financial decisions,   sign important papers or drink alcohol.    ACTIVITY:  Today: no heavy lifting, straining or running due to procedural   sedation/anesthesia.  The following day: return to full activity including work.  DIET:  Eat and drink normally unless instructed otherwise.     TREATMENT FOR COMMON SIDE EFFECTS:  - Mild abdominal pain, nausea, belching, bloating or excessive gas:  rest,   eat lightly and use a heating pad.  - Sore Throat: treat with throat lozenges and/or gargle with warm salt   water.  - Because air was used during the procedure, expelling large amounts of air   from your rectum or belching is normal.  - If a bowel prep was taken, you may not have a bowel movement for 1-3 days.    This is normal.  SYMPTOMS TO WATCH FOR AND REPORT TO YOUR PHYSICIAN:  1. Abdominal pain or bloating, other than gas cramps.  2. Chest pain.  3. Back pain.  4. Signs of infection such as: chills or fever occurring within 24 hours   after the procedure.  5. Rectal bleeding, which would show as bright red, maroon, or black stools.   (A tablespoon of blood from the rectum is not serious, especially if   hemorrhoids are present.)  6. Vomiting.  7. Weakness or dizziness.  GO DIRECTLY TO THE NEAREST EMERGENCY ROOM IF YOU HAVE ANY OF THE FOLLOWING:      Difficulty breathing              Chills and/or fever over 101 F   Persistent vomiting and/or vomiting blood   Severe abdominal pain   Severe chest pain   Black, tarry stools   Bleeding- more than one  tablespoon   Any other symptom or condition that you feel may need urgent attention  Your doctor recommends these additional instructions:  If any biopsies were taken, your doctors clinic will contact you in 1 to 2   weeks with any results.  - Patient has a contact number available for emergencies.  The signs and   symptoms of potential delayed complications were discussed with the   patient.  Return to normal activities tomorrow.  Written discharge   instructions were provided to the patient.   - High fiber diet.   - Continue present medications.   - Repeat colonoscopy in 10 years for screening purposes.   - Discharge patient to home (ambulatory).   - Return to my office PRN.  For questions, problems or results please call your physician - Tam Amin MD at Work:  [phone].  [facility], EMERGENCY ROOM PHONE NUMBER: [phone]  IF A COMPLICATION OR EMERGENCY SITUATION ARISES AND YOU ARE UNABLE TO REACH   YOUR PHYSICIAN - GO DIRECTLY TO THE EMERGENCY ROOM.  Tam Amin MD  10/4/2018 12:12:29 PM  This report has been verified and signed electronically.  [employer]

## 2018-10-04 NOTE — ANESTHESIA PREPROCEDURE EVALUATION
10/04/2018  Loren Andre is a 68 y.o., female.    Anesthesia Evaluation    I have reviewed the Patient Summary Reports.    I have reviewed the Nursing Notes.      Review of Systems  Anesthesia Hx:  No problems with previous Anesthesia    Cardiovascular:   Hypertension    Neurological:   Neuromuscular Disease,    Psych:   Psychiatric History          Physical Exam  General:  Well nourished    Airway/Jaw/Neck:  Airway Findings: Mouth Opening: Normal Tongue: Normal  General Airway Assessment: Adult  Mallampati: II  TM Distance: Normal, at least 6 cm  Jaw/Neck Findings:  Neck ROM: Normal ROM     Eyes/Ears/Nose:  Eyes/Ears/Nose Findings:    Dental:  Dental Findings: In tact   Chest/Lungs:  Chest/Lungs Findings: Normal Respiratory Rate     Heart/Vascular:  Heart Findings: Rate: Normal  Rhythm: Regular Rhythm        Mental Status:  Mental Status Findings:  Cooperative, Alert and Oriented         Anesthesia Plan  Type of Anesthesia, risks & benefits discussed:  Anesthesia Type:  general  Patient's Preference: General  Intra-op Monitoring Plan: standard ASA monitors  Intra-op Monitoring Plan Comments:   Post Op Pain Control Plan:   Post Op Pain Control Plan Comments:   Induction:   IV  Beta Blocker:  Patient is not currently on a Beta-Blocker (No further documentation required).       Informed Consent: Patient understands risks and agrees with Anesthesia plan.  Questions answered. Anesthesia consent signed with patient.  ASA Score: 2     Day of Surgery Review of History & Physical:    H&P update referred to the surgeon.         Ready For Surgery From Anesthesia Perspective.

## 2018-10-04 NOTE — H&P
CC: Screening for colorectal cancer - last scope greater than 10 years ago    68 year old female with above. States that symptoms are absent, no alleviating/exacerbating factors. No family history of CA. No personal history of polyps. No bleeding or weight loss.     ROS:  No headache, no fever/chills, no chest pain/SOB, no nausea/vomiting/diarrhea/constipation/GI bleeding/abdominal pain, no dysuria/hematuria.    VSSAF   Exam:   Alert and oriented x 3; no apparent distress   PERRLA, sclera anicteric  CV: Regular rate/rhythm, normal PMI   Lungs: Clear bilaterally with no wheeze/rales   Abdomen: Soft, NT/ND, normal bowel sounds   Ext: No cyanosis, clubbing     Impression:   As above    Plan:   Proceed with endoscopy. Further recs to follow.

## 2018-10-05 DIAGNOSIS — A60.9 HSV (HERPES SIMPLEX VIRUS) ANOGENITAL INFECTION: ICD-10-CM

## 2018-10-05 RX ORDER — VALACYCLOVIR HYDROCHLORIDE 500 MG/1
TABLET, FILM COATED ORAL
Qty: 60 TABLET | Refills: 0 | Status: SHIPPED | OUTPATIENT
Start: 2018-10-05 | End: 2018-11-05 | Stop reason: ALTCHOICE

## 2018-10-06 LAB
ALBUMIN SERPL-MCNC: 3.9 G/DL (ref 3.6–5.1)
ALBUMIN/GLOB SERPL: 1.3 (CALC) (ref 1–2.5)
ALP SERPL-CCNC: 80 U/L (ref 33–130)
ALT SERPL-CCNC: 21 U/L (ref 6–29)
AST SERPL-CCNC: 27 U/L (ref 10–35)
BASOPHILS # BLD AUTO: 22 CELLS/UL (ref 0–200)
BASOPHILS NFR BLD AUTO: 0.6 %
BILIRUB SERPL-MCNC: 0.5 MG/DL (ref 0.2–1.2)
BUN SERPL-MCNC: 15 MG/DL (ref 7–25)
BUN/CREAT SERPL: 13 (CALC) (ref 6–22)
CALCIUM SERPL-MCNC: 9.2 MG/DL (ref 8.6–10.4)
CHLORIDE SERPL-SCNC: 102 MMOL/L (ref 98–110)
CO2 SERPL-SCNC: 26 MMOL/L (ref 20–32)
CREAT SERPL-MCNC: 1.15 MG/DL (ref 0.5–0.99)
EOSINOPHIL # BLD AUTO: 90 CELLS/UL (ref 15–500)
EOSINOPHIL NFR BLD AUTO: 2.5 %
ERYTHROCYTE [DISTWIDTH] IN BLOOD BY AUTOMATED COUNT: 13.8 % (ref 11–15)
GFR SERPL CREATININE-BSD FRML MDRD: 49 ML/MIN/1.73M2
GLOBULIN SER CALC-MCNC: 2.9 G/DL (CALC) (ref 1.9–3.7)
GLUCOSE SERPL-MCNC: 100 MG/DL (ref 65–99)
HCT VFR BLD AUTO: 40.2 % (ref 35–45)
HGB BLD-MCNC: 12.6 G/DL (ref 11.7–15.5)
LYMPHOCYTES # BLD AUTO: 1318 CELLS/UL (ref 850–3900)
LYMPHOCYTES NFR BLD AUTO: 36.6 %
MCH RBC QN AUTO: 26.7 PG (ref 27–33)
MCHC RBC AUTO-ENTMCNC: 31.3 G/DL (ref 32–36)
MCV RBC AUTO: 85.2 FL (ref 80–100)
MONOCYTES # BLD AUTO: 439 CELLS/UL (ref 200–950)
MONOCYTES NFR BLD AUTO: 12.2 %
NEUTROPHILS # BLD AUTO: 1732 CELLS/UL (ref 1500–7800)
NEUTROPHILS NFR BLD AUTO: 48.1 %
PLATELET # BLD AUTO: 244 THOUSAND/UL (ref 140–400)
PMV BLD REES-ECKER: 10.7 FL (ref 7.5–12.5)
POTASSIUM SERPL-SCNC: 4.6 MMOL/L (ref 3.5–5.3)
PROT SERPL-MCNC: 6.8 G/DL (ref 6.1–8.1)
RBC # BLD AUTO: 4.72 MILLION/UL (ref 3.8–5.1)
SODIUM SERPL-SCNC: 138 MMOL/L (ref 135–146)
WBC # BLD AUTO: 3.6 THOUSAND/UL (ref 3.8–10.8)

## 2018-10-11 ENCOUNTER — OFFICE VISIT (OUTPATIENT)
Dept: HEMATOLOGY/ONCOLOGY | Facility: CLINIC | Age: 68
End: 2018-10-11
Payer: MEDICARE

## 2018-10-11 VITALS
WEIGHT: 147 LBS | HEART RATE: 71 BPM | SYSTOLIC BLOOD PRESSURE: 155 MMHG | DIASTOLIC BLOOD PRESSURE: 82 MMHG | RESPIRATION RATE: 18 BRPM | BODY MASS INDEX: 23.73 KG/M2 | TEMPERATURE: 98 F

## 2018-10-11 DIAGNOSIS — D63.8 CHRONIC DISEASE ANEMIA: ICD-10-CM

## 2018-10-11 DIAGNOSIS — D57.3 SICKLE-CELL TRAIT: Primary | ICD-10-CM

## 2018-10-11 DIAGNOSIS — D72.819 LEUKOPENIA, UNSPECIFIED TYPE: ICD-10-CM

## 2018-10-11 PROCEDURE — 99213 OFFICE O/P EST LOW 20 MIN: CPT | Mod: ,,, | Performed by: INTERNAL MEDICINE

## 2018-10-11 PROCEDURE — 3077F SYST BP >= 140 MM HG: CPT | Mod: ,,, | Performed by: INTERNAL MEDICINE

## 2018-10-11 PROCEDURE — 3079F DIAST BP 80-89 MM HG: CPT | Mod: ,,, | Performed by: INTERNAL MEDICINE

## 2018-10-11 PROCEDURE — 1101F PT FALLS ASSESS-DOCD LE1/YR: CPT | Mod: ,,, | Performed by: INTERNAL MEDICINE

## 2018-10-11 NOTE — PROGRESS NOTES
[facility] Hematology/Oncology  PROGRESS NOTE      Subjective:       Patient ID:   NAME: Loren Andre : 1950     68 y.o. female    Referring Doc: ALEX Alanis  Other Physicians: Shanelle Pena Dibuono    Chief Complaint:  Anemia/leucopenia f/u    History of Present Illness:     Patient returns today for a regularly scheduled follow-up visit. She had recent endoscopy with Dr Amin.  She is otherwise doing ok. No CP, SOB,HA's or N/V. She had some internal hemorrhoids that were not actively bleeding. She was recently went to ER because of HTN and she is on new BP meds. She is here by herself.           ROS:   GEN: normal without any fever, night sweats or weight loss  HEENT: normal with no HA's, sore throat, stiff neck, changes in vision  CV: normal with no CP, SOB, PND, NAJERA or orthopnea  PULM: normal with no SOB, cough, hemoptysis, sputum or pleuritic pain  GI: normal with no abdominal pain, nausea, vomiting, constipation, diarrhea, melanotic stools, BRBPR, or hematemesis  : normal with no hematuria, dysuria  BREAST: normal with no mass, discharge, pain  SKIN: normal with no rash, erythema, bruising, or swelling    Allergies:  Review of patient's allergies indicates:   Allergen Reactions    Doxepin Anaphylaxis     abd pain    Penicillins Rash    Sulfa (sulfonamide antibiotics) Rash    Duloxetine      Other reaction(s): insomnia    Gabapentin Nausea Only    Levofloxacin      Other reaction(s): Headache    Milnacipran      Other reaction(s): stomach pain    Nitrofurantoin monohyd/m-cryst      Other reaction(s): Itching    Prednisone      Other reaction(s): Itching    Pseudoephedrine-guaifenesin      Other reaction(s): Stomach upset    Terbinafine      Other reaction(s): rash    Trazodone      Other reaction(s): heart racing       Medications:    Current Outpatient Medications:     A/B-CAROTENE/C/BIOFL/B6/MN/ECH (ECHINACEA C COMPLETE ORAL), Take 1 tablet by mouth daily as needed. , Disp: ,  Rfl:     amLODIPine (NORVASC) 5 MG tablet, Take 1 tablet (5 mg total) by mouth once daily., Disp: 90 tablet, Rfl: 3    ascorbic acid, vitamin C, (VITAMIN C) 500 MG tablet, Take 500 mg by mouth daily as needed. , Disp: , Rfl:     atorvastatin (LIPITOR) 40 MG tablet, Take 1 tablet (40 mg total) by mouth once daily., Disp: 90 tablet, Rfl: 1    azithromycin (Z-MARISABEL) 250 MG tablet, Take 250 mg by mouth once daily., Disp: , Rfl:     betamethasone dipropionate (DIPROLENE) 0.05 % cream, Apply topically 2 (two) times daily., Disp: 15 g, Rfl: 1    BLACK COHOSH ORAL, Take 1 capsule by mouth 2 (two) times daily. Female hormone blend sp - 7c Black Cohosh , Disp: , Rfl:     CALCIUM CARBONATE/VITAMIN D3 (VITAMIN D-3 ORAL), Take 100 Int'l Units by mouth once daily. , Disp: , Rfl:     carvedilol (COREG) 6.25 MG tablet, TAKE 1 TABLET(6.25 MG) BY MOUTH TWICE DAILY WITH MEALS, Disp: 180 tablet, Rfl: 3    coenzyme Q10 (CO Q-10) 10 mg capsule, Take 10 mg by mouth once daily., Disp: , Rfl:     fluticasone-salmeterol 250-50 mcg/dose (ADVAIR DISKUS) 250-50 mcg/dose diskus inhaler, Inhale 1 puff into the lungs 2 (two) times daily. INHALE 1 PUFF BY MOUTH INTO THE LUNGS TWICE DAILY (Patient taking differently: Inhale 1 puff into the lungs 2 (two) times daily as needed. INHALE 1 PUFF BY MOUTH INTO THE LUNGS TWICE DAILY), Disp: 60 each, Rfl: 11    folic acid (FOLVITE) 400 MCG tablet, Take 400 mcg by mouth once daily., Disp: , Rfl:     GARLIC OIL ORAL, Take 1 capsule by mouth once daily., Disp: , Rfl:     lisinopril (PRINIVIL,ZESTRIL) 20 MG tablet, Take 1 tablet (20 mg total) by mouth once daily., Disp: 90 tablet, Rfl: 3    montelukast (SINGULAIR) 10 mg tablet, TAKE 1 TABLET ONE TIME DAILY, Disp: 90 tablet, Rfl: 3    NIACIN, INOSITOL NIACINATE, (NIACIN FLUSH FREE ORAL), Take 500 mg by mouth once daily., Disp: , Rfl:     traZODone (DESYREL) 50 MG tablet, Take 1 tablet (50 mg total) by mouth nightly., Disp: 90 tablet, Rfl: 3     valACYclovir (VALTREX) 500 MG tablet, TAKE 1 TABLET (500 MG TOTAL) BY MOUTH 2 (TWO) TIMES DAILY FOR 7 DAYS AS DIRECTED. , Disp: 60 tablet, Rfl: 0    VENTOLIN HFA 90 mcg/actuation inhaler, INHALE 2 PUFFS BY MOUTH EVERY 4 HOURS AS NEEDED FOR WHEEZING, Disp: 18 g, Rfl: 0    vitamin E 100 UNIT capsule, Take 100 Units by mouth once daily., Disp: , Rfl:     PMHx/PSHx Updates:  See patient's last visit with me on 10/5/2017.  See H&P on 12/1/2015        Pathology:  Cancer Staging  No matching staging information was found for the patient.          Objective:     Vitals:  Blood pressure (!) 155/82, pulse 71, temperature 98.2 °F (36.8 °C), resp. rate 18, weight 66.7 kg (147 lb).    Physical Examination:   GEN: no apparent distress, comfortable; AAOx3  HEAD: atraumatic and normocephalic  EYES: no pallor, no icterus, PERRLA  ENT: OMM, no pharyngeal erythema, external ears WNL; no nasal discharge; no thrush  NECK: no masses, thyroid normal, trachea midline, no LAD/LN's, supple  CV: RRR with no murmur; normal pulse; normal S1 and S2; no pedal edema  CHEST: Normal respiratory effort; CTAB; normal breath sounds; no wheeze or crackles  ABDOM: nontender and nondistended; soft; normal bowel sounds; no rebound/guarding  MUSC/Skeletal: ROM normal; no crepitus; joints normal; no deformities or arthropathy  EXTREM: no clubbing, cyanosis, inflammation or swelling  SKIN: no rashes, lesions, ulcers, petechiae or subcutaneous nodules  : no [place]  NEURO: grossly intact; motor/sensory WNL; AAOx3; no tremors  PSYCH: normal mood, affect and behavior  LYMPH: normal cervical, supraclavicular, axillary and groin LN's            Labs:     10/5/2018  Lab Results   Component Value Date    WBC 3.6 (L) 10/05/2018    HGB 12.6 10/05/2018    HCT 40.2 10/05/2018    MCV 85.2 10/05/2018     10/05/2018     BMP  Lab Results   Component Value Date     10/05/2018    K 4.6 10/05/2018     10/05/2018    CO2 26 10/05/2018    BUN 15 10/05/2018     CREATININE 1.15 (H) 10/05/2018    CALCIUM 9.2 10/05/2018    ANIONGAP 9 10/13/2016    ESTGFRAFRICA 56 (L) 10/05/2018    EGFRNONAA 49 (L) 10/05/2018     Lab Results   Component Value Date    ALT 21 10/05/2018    AST 27 10/05/2018    ALKPHOS 80 10/05/2018    BILITOT 0.5 10/05/2018         Radiology/Diagnostic Studies:    No results found.    I have reviewed all available lab results and radiology reports.    Assessment/Plan:   (1) 68 y.o. 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 Sickle-cell trait     2. Leukopenia, unspecified type     3. Chronic disease anemia           PLAN:  1. Check labs again in 12 months  2. RTC 12 months  3. F/u with PCP, GI and rheum etc  4. RTC in 12 months  Fax note to  Sanket Toure MD, Eloisa Zhang and Shanelle    Discussion:     I have explained all of the above in detail and the patient understands all of the current recommendation(s). I have answered all of their questions to the best of my ability and to their complete satisfaction.   The patient is to continue with the current management plan.            Electronically signed by Denilson Salgado MD

## 2018-10-11 NOTE — LETTER
October 11, 2018      Sanket Toure MD  [address]  [place] [address]           [facility] - Hematology Oncology  [address]  [address]  [place] [address]  Phone: [phone]  Fax: [phone]          Patient: Loren Andre   MR Number: [id number]   YOB: 1950   Date of Visit: 10/11/2018       Dear Dr. Sanket Toure:    Thank you for referring Loren Andre to me for evaluation. Attached you will find relevant portions of my assessment and plan of care.    If you have questions, please do not hesitate to call me. I look forward to following Loren Andre along with you.    Sincerely,    Denilson Salgado MD    Enclosure  CC:  No Recipients    If you would like to receive this communication electronically, please contact [email or web address] or [phone] to request more information on [facility] Link access.    For providers and/or their staff who would like to refer a patient to [facility], please contact us through our one-stop-shop provider referral line, [facility], at [phone].    If you feel you have received this communication in error or would no longer like to receive these types of communications, please e-mail [email or web address]

## 2018-10-15 ENCOUNTER — DOCUMENTATION ONLY (OUTPATIENT)
Dept: FAMILY MEDICINE | Facility: CLINIC | Age: 68
End: 2018-10-15

## 2018-10-15 ENCOUNTER — TELEPHONE (OUTPATIENT)
Dept: FAMILY MEDICINE | Facility: CLINIC | Age: 68
End: 2018-10-15

## 2018-10-15 NOTE — PROGRESS NOTES
Pre-Visit Chart Review  For Appointment Scheduled on 10-16-18    Health Maintenance Due   Topic Date Due    TETANUS VACCINE  03/03/1967    Influenza Vaccine  08/01/2018

## 2018-10-15 NOTE — TELEPHONE ENCOUNTER
----- Message from Sara Jolly sent at 10/15/2018  8:09 AM CDT -----  Contact: Self   Patient is requesting a call back from [facility] to discuss some issues from the new medicine amLODIPine (NORVASC) 5 MG tablet that the doctor put her on at her last visit.  Call back at [phone] (home).  Thanks.

## 2018-10-16 ENCOUNTER — OFFICE VISIT (OUTPATIENT)
Dept: FAMILY MEDICINE | Facility: CLINIC | Age: 68
End: 2018-10-16
Attending: FAMILY MEDICINE
Payer: MEDICARE

## 2018-10-16 VITALS
SYSTOLIC BLOOD PRESSURE: 138 MMHG | RESPIRATION RATE: 16 BRPM | OXYGEN SATURATION: 95 % | TEMPERATURE: 98 F | DIASTOLIC BLOOD PRESSURE: 64 MMHG | HEART RATE: 65 BPM | WEIGHT: 146.19 LBS | BODY MASS INDEX: 23.49 KG/M2 | HEIGHT: 66 IN

## 2018-10-16 DIAGNOSIS — R00.2 PALPITATIONS: Primary | ICD-10-CM

## 2018-10-16 DIAGNOSIS — I10 GOOD HYPERTENSION CONTROL: ICD-10-CM

## 2018-10-16 DIAGNOSIS — J45.20 MILD INTERMITTENT CHRONIC ASTHMA WITHOUT COMPLICATION: ICD-10-CM

## 2018-10-16 DIAGNOSIS — R07.89 CHEST TIGHTNESS: ICD-10-CM

## 2018-10-16 DIAGNOSIS — I10 HYPERTENSION, ESSENTIAL: ICD-10-CM

## 2018-10-16 PROCEDURE — 3078F DIAST BP <80 MM HG: CPT | Mod: CPTII,,, | Performed by: FAMILY MEDICINE

## 2018-10-16 PROCEDURE — 93005 ELECTROCARDIOGRAM TRACING: CPT | Mod: PBBFAC,PO | Performed by: INTERNAL MEDICINE

## 2018-10-16 PROCEDURE — 99213 OFFICE O/P EST LOW 20 MIN: CPT | Mod: PBBFAC,PO,25 | Performed by: FAMILY MEDICINE

## 2018-10-16 PROCEDURE — 99999 PR PBB SHADOW E&M-EST. PATIENT-LVL III: CPT | Mod: PBBFAC,,, | Performed by: FAMILY MEDICINE

## 2018-10-16 PROCEDURE — 99214 OFFICE O/P EST MOD 30 MIN: CPT | Mod: S$PBB,,, | Performed by: FAMILY MEDICINE

## 2018-10-16 PROCEDURE — 3075F SYST BP GE 130 - 139MM HG: CPT | Mod: CPTII,,, | Performed by: FAMILY MEDICINE

## 2018-10-16 PROCEDURE — 1101F PT FALLS ASSESS-DOCD LE1/YR: CPT | Mod: CPTII,,, | Performed by: FAMILY MEDICINE

## 2018-10-16 PROCEDURE — 93010 ELECTROCARDIOGRAM REPORT: CPT | Mod: S$PBB,,, | Performed by: INTERNAL MEDICINE

## 2018-10-16 RX ORDER — AMLODIPINE BESYLATE 5 MG/1
2.5 TABLET ORAL DAILY
Qty: 90 TABLET | Refills: 3
Start: 2018-10-16 | End: 2018-11-05 | Stop reason: SINTOL

## 2018-10-16 NOTE — PROGRESS NOTES
Subjective:       Patient ID: Loren Andre is a 68 y.o. female.    Chief Complaint: Chest Pain (tightness in chest at night / heart racing )    68-year-old female seen earlier this month as a follow-up from an ER visit for high blood pressure.  She had had multiple instances of hypertension and had been started on amlodipine 5 mg daily in the ER in addition to her other medications.  At the time of her visit her blood pressure was controlled and she was having no symptoms but soon afterwards she began experiencing nocturnal palpitations with a heaviness in the chest.  She had no shortness of breath nausea or vomiting or diaphoresis.  She rarely noticed any symptoms during the day and only as she was preparing to go to sleep.  Her blood pressures have been under fair control bringing in 2 readings of 135/69 and 140/64 both of the pulses were in the mid 60s.  She is asymptomatic at this time.  She reports she has been having little bit of trouble with her asthma which she describes as producing headaches.  She does not note any wheezing.  She did use her Ventolin inhaler this morning and did not note any palpitations or chest symptoms afterwards.    Past Medical History:  No date: Allergy  No date: Anemia      Comment:  sickle cell trait  No date: Anxiety  No date: Arthritis  No date: Asthma  10/5/2017: Chronic disease anemia  No date: Colon polyps  No date: Depression  3/7/2013: Dermatitis  No date: Fibromyalgia  No date: HEARING LOSS  No date: Hypertension  10/5/2017: Leucopenia  No date: Polyneuropathy  No date: Rheumatoid arthritis(714.0)  No date: Scoliosis  No date: Sickle cell trait  10/5/2017: Sickle-cell trait  No date: Trouble in sleeping  No date: Urticaria    Past Surgical History:  No date: ADENOIDECTOMY  No date: APPENDECTOMY  4/14/2008: COLONOSCOPY      Comment:  Dr. Guerrero, 10 year recheck  10/4/2018: COLONOSCOPY; N/A      Comment:  Procedure: COLONOSCOPY;  Surgeon: Tam Kemp MD;                 Location: [facility];  Service: Endoscopy;  Laterality:                N/A;  10/4/2018: COLONOSCOPY; N/A      Comment:  Performed by Tam Kemp MD at [facility]  No date: HYSTERECTOMY  No date: OOPHORECTOMY  01/2016: ROTATOR CUFF REPAIR; Left  12/04/2017: ROTATOR CUFF REPAIR W/ DISTAL CLAVICLE EXCISION; Right      Comment:  Dr. Eligio Timmons ( [facility] )  No date: TONSILLECTOMY    Current Outpatient Medications on File Prior to Visit:  amLODIPine (NORVASC) 5 MG tablet, Take 1 tablet (5 mg total) by mouth once daily., Disp: 90 tablet, Rfl: 3  ascorbic acid, vitamin C, (VITAMIN C) 500 MG tablet, Take 500 mg by mouth daily as needed. , Disp: , Rfl:   atorvastatin (LIPITOR) 40 MG tablet, Take 1 tablet (40 mg total) by mouth once daily., Disp: 90 tablet, Rfl: 1  betamethasone dipropionate (DIPROLENE) 0.05 % cream, Apply topically 2 (two) times daily., Disp: 15 g, Rfl: 1  BLACK COHOSH ORAL, Take 1 capsule by mouth 2 (two) times daily. Female hormone blend sp - 7c Black Cohosh , Disp: , Rfl:   CALCIUM CARBONATE/VITAMIN D3 (VITAMIN D-3 ORAL), Take 100 Int'l Units by mouth once daily. , Disp: , Rfl:   carvedilol (COREG) 6.25 MG tablet, TAKE 1 TABLET(6.25 MG) BY MOUTH TWICE DAILY WITH MEALS, Disp: 180 tablet, Rfl: 3  coenzyme Q10 (CO Q-10) 10 mg capsule, Take 10 mg by mouth once daily., Disp: , Rfl:   fluticasone-salmeterol 250-50 mcg/dose (ADVAIR DISKUS) 250-50 mcg/dose diskus inhaler, Inhale 1 puff into the lungs 2 (two) times daily. INHALE 1 PUFF BY MOUTH INTO THE LUNGS TWICE DAILY (Patient taking differently: Inhale 1 puff into the lungs 2 (two) times daily as needed. INHALE 1 PUFF BY MOUTH INTO THE LUNGS TWICE DAILY), Disp: 60 each, Rfl: 11  folic acid (FOLVITE) 400 MCG tablet, Take 400 mcg by mouth once daily., Disp: , Rfl:   GARLIC OIL ORAL, Take 1 capsule by mouth once daily., Disp: , Rfl:   lisinopril (PRINIVIL,ZESTRIL) 20 MG tablet, Take 1 tablet (20 mg total) by mouth once daily., Disp: 90 tablet, Rfl:  3  montelukast (SINGULAIR) 10 mg tablet, TAKE 1 TABLET ONE TIME DAILY, Disp: 90 tablet, Rfl: 3  NIACIN, INOSITOL NIACINATE, (NIACIN FLUSH FREE ORAL), Take 500 mg by mouth once daily., Disp: , Rfl:   traZODone (DESYREL) 50 MG tablet, Take 1 tablet (50 mg total) by mouth nightly., Disp: 90 tablet, Rfl: 3  valACYclovir (VALTREX) 500 MG tablet, TAKE 1 TABLET (500 MG TOTAL) BY MOUTH 2 (TWO) TIMES DAILY FOR 7 DAYS AS DIRECTED. , Disp: 60 tablet, Rfl: 0  VENTOLIN HFA 90 mcg/actuation inhaler, INHALE 2 PUFFS BY MOUTH EVERY 4 HOURS AS NEEDED FOR WHEEZING, Disp: 18 g, Rfl: 0  vitamin E 100 UNIT capsule, Take 100 Units by mouth once daily., Disp: , Rfl:   (DISCONTINUED) A/B-CAROTENE/C/BIOFL/B6/MN/ECH (ECHINACEA C COMPLETE ORAL), Take 1 tablet by mouth daily as needed. , Disp: , Rfl:   (DISCONTINUED) azithromycin (Z-MARISABEL) 250 MG tablet, Take 250 mg by mouth once daily., Disp: , Rfl:     No current facility-administered medications on file prior to visit.           Review of Systems   Constitutional: Negative for chills, diaphoresis and fever.   Respiratory: Positive for chest tightness. Negative for cough, shortness of breath and wheezing.    Cardiovascular: Positive for chest pain and palpitations.   Gastrointestinal: Negative for nausea and vomiting.       Objective:      Physical Exam   Constitutional: She appears well-developed and well-nourished. No distress.   Good blood pressure control  Regular pulse  Normal weight with a BMI of 23.6 she is up 0.4 lb from her October 2, 2018 visit   HENT:   Head: Normocephalic and atraumatic.   Cardiovascular: Normal rate, regular rhythm and normal heart sounds. Exam reveals no gallop and no friction rub.   No murmur heard.  Pulmonary/Chest: Effort normal and breath sounds normal. No stridor. No respiratory distress. She has no wheezes. She has no rales. She exhibits no tenderness.   Skin: She is not diaphoretic.   Nursing note and vitals reviewed.      Assessment:       1. Palpitations     2. Good hypertension control    3. Mild intermittent chronic asthma without complication    4. Chest tightness    5. Hypertension, essential        Plan:       1. Palpitations  Onset occurred after she started on amlodipine.  There have been no changes in her carvedilol.  The patient will be scheduled for a treadmill stress echo.  She will reduce the amlodipine to 1/2 tablet or 2.5 mg daily.  If her stress test is scheduled for the morning she will hold the evening carvedilol before the test and delay the morning dose until after the test.  If it is scheduled for the afternoon she will hold the morning dose only.  - IN OFFICE EKG 12-LEAD (to Muse)  - EKG RHYTHM STRIP (to Muse); Future  - Echocardiogram stress test (CUPID ONLY-[facility]); Future    2. Good hypertension control  See above    3. Mild intermittent chronic asthma without complication  Asymptomatic at this time, use of Ventolin inhaler does not appear to be associated with her palpitations    4. Chest tightness  - Echocardiogram stress test (CUPID ONLY-[facility]); Future    5. Hypertension, essential  - amLODIPine (NORVASC) 5 MG tablet; Take 0.5 tablets (2.5 mg total) by mouth once daily.  Dispense: 90 tablet; Refill: 3

## 2018-10-19 ENCOUNTER — CLINICAL SUPPORT (OUTPATIENT)
Dept: CARDIOLOGY | Facility: CLINIC | Age: 68
End: 2018-10-19
Attending: FAMILY MEDICINE
Payer: MEDICARE

## 2018-10-19 VITALS — BODY MASS INDEX: 23.49 KG/M2 | WEIGHT: 146.19 LBS | HEIGHT: 66 IN

## 2018-10-19 DIAGNOSIS — R07.89 CHEST TIGHTNESS: ICD-10-CM

## 2018-10-19 DIAGNOSIS — R00.2 PALPITATIONS: ICD-10-CM

## 2018-10-19 LAB
ASCENDING AORTA: 2.44 CM
BSA FOR ECHO PROCEDURE: 1.76 M2
CV ECHO LV RWT: 0.36 CM
CV STRESS BASE HR: 62
DIASTOLIC BLOOD PRESSURE: 74
DOP CALC LVOT AREA: 2.03 CM2
DOP CALC LVOT DIAMETER: 1.61 CM
DOP CALC LVOT STROKE VOLUME: 33.31 CM3
DOP CALCLVOT PEAK VEL VTI: 16.37 CM
E WAVE DECELERATION TIME: 180.97 MSEC
E/A RATIO: 0.98
E/E' RATIO: 14.67
ECHO LV POSTERIOR WALL: 0.95 CM (ref 0.6–1.1)
FRACTIONAL SHORTENING: 38 % (ref 28–44)
INTERVENTRICULAR SEPTUM: 0.62 CM (ref 0.6–1.1)
LA MAJOR: 3.38 CM
LA MINOR: 4.22 CM
LA WIDTH: 2.04 CM
LEFT ATRIUM SIZE: 3.23 CM
LEFT ATRIUM VOLUME INDEX: 11.9 ML/M2
LEFT ATRIUM VOLUME: 21.02 CM3
LEFT INTERNAL DIMENSION IN SYSTOLE: 2.69 CM (ref 2.1–4)
LEFT VENTRICLE DIASTOLIC VOLUME INDEX: 48.11 ML/M2
LEFT VENTRICLE DIASTOLIC VOLUME: 84.67 ML
LEFT VENTRICLE MASS INDEX: 59.1 G/M2
LEFT VENTRICLE SYSTOLIC VOLUME INDEX: 15.3 ML/M2
LEFT VENTRICLE SYSTOLIC VOLUME: 26.86 ML
LEFT VENTRICULAR INTERNAL DIMENSION IN DIASTOLE: 4.33 CM (ref 3.5–6)
LEFT VENTRICULAR MASS: 103.93 G
LV LATERAL E/E' RATIO: 14.67
LV SEPTAL E/E' RATIO: 14.67
MV PEAK A VEL: 0.9 M/S
MV PEAK E VEL: 0.88 M/S
MV STENOSIS PRESSURE HALF TIME: 52.48 MS
MV VALVE AREA P 1/2 METHOD: 4.19 CM2
OHS CV CPX 1 MINUTE RECOVERY HEART RATE: 105 BPM
OHS CV CPX 85 PERCENT MAX PREDICTED HEART RATE MALE: 123
OHS CV CPX ESTIMATED METS: 11
OHS CV CPX MAX PREDICTED HEART RATE: 145
OHS CV CPX PATIENT IS FEMALE: 1
OHS CV CPX PATIENT IS MALE: 0
OHS CV CPX PEAK DIASTOLIC BLOOD PRESSURE: 73 MMHG
OHS CV CPX PEAK HEAR RATE: 153
OHS CV CPX PEAK RATE PRESSURE PRODUCT: NORMAL
OHS CV CPX PEAK SYSTOLIC BLOOD PRESSURE: 249
OHS CV CPX PERCENT MAX PREDICTED HEART RATE ACHIEVED: 105
OHS CV CPX PERCENT TARGET HEART RATE ACHIEVED: 105.52
OHS CV CPX RATE PRESSURE PRODUCT PRESENTING: 8990
OHS CV CPX TARGET HEART RATE: 145
PULM VEIN S/D RATIO: 1.15
PV PEAK D VEL: 0.4 M/S
PV PEAK S VEL: 0.46 M/S
RA MAJOR: 4.46 CM
RA WIDTH: 3.3 CM
RETIRED EF AND QEF - SEE NOTES: 68.28 %
SINUS: 2.42 CM
STJ: 2.37 CM
STRESS ECHO POST EXERCISE DUR MIN: 6 MIN
STRESS ECHO POST EXERCISE DUR SEC: 45
STRESS ST DEPRESSION: 2 MM
SYSTOLIC BLOOD PRESSURE: 145
TDI LATERAL: 0.06
TDI SEPTAL: 0.06
TDI: 0.06

## 2018-10-19 PROCEDURE — 99211 OFF/OP EST MAY X REQ PHY/QHP: CPT | Mod: PBBFAC,PO,25

## 2018-10-19 PROCEDURE — 99999 PR PBB SHADOW E&M-EST. PATIENT-LVL I: CPT | Mod: PBBFAC,,,

## 2018-10-19 PROCEDURE — 93351 STRESS TTE COMPLETE: CPT | Mod: PBBFAC,PO | Performed by: INTERNAL MEDICINE

## 2018-10-22 ENCOUNTER — TELEPHONE (OUTPATIENT)
Dept: FAMILY MEDICINE | Facility: CLINIC | Age: 68
End: 2018-10-22

## 2018-10-23 ENCOUNTER — OFFICE VISIT (OUTPATIENT)
Dept: ALLERGY | Facility: CLINIC | Age: 68
End: 2018-10-23
Attending: FAMILY MEDICINE
Payer: MEDICARE

## 2018-10-23 VITALS — BODY MASS INDEX: 23.59 KG/M2 | HEIGHT: 66 IN | OXYGEN SATURATION: 99 % | WEIGHT: 146.81 LBS | HEART RATE: 67 BPM

## 2018-10-23 DIAGNOSIS — H10.423 SIMPLE CHRONIC CONJUNCTIVITIS OF BOTH EYES: ICD-10-CM

## 2018-10-23 DIAGNOSIS — J31.0 CHRONIC RHINITIS: Primary | ICD-10-CM

## 2018-10-23 DIAGNOSIS — J45.40 MODERATE PERSISTENT ASTHMA WITHOUT COMPLICATION: ICD-10-CM

## 2018-10-23 DIAGNOSIS — J32.9 RECURRENT SINUS INFECTIONS: ICD-10-CM

## 2018-10-23 PROCEDURE — 99499 UNLISTED E&M SERVICE: CPT | Mod: S$GLB,,, | Performed by: ALLERGY & IMMUNOLOGY

## 2018-10-23 PROCEDURE — 99213 OFFICE O/P EST LOW 20 MIN: CPT | Mod: PBBFAC,PO | Performed by: ALLERGY & IMMUNOLOGY

## 2018-10-23 PROCEDURE — 99999 PR PBB SHADOW E&M-EST. PATIENT-LVL III: CPT | Mod: PBBFAC,,, | Performed by: ALLERGY & IMMUNOLOGY

## 2018-10-23 PROCEDURE — 99204 OFFICE O/P NEW MOD 45 MIN: CPT | Mod: S$PBB,,, | Performed by: ALLERGY & IMMUNOLOGY

## 2018-10-23 PROCEDURE — 1101F PT FALLS ASSESS-DOCD LE1/YR: CPT | Mod: CPTII,,, | Performed by: ALLERGY & IMMUNOLOGY

## 2018-10-23 RX ORDER — AZELASTINE 1 MG/ML
2 SPRAY, METERED NASAL 2 TIMES DAILY
Qty: 30 ML | Refills: 12 | Status: SHIPPED | OUTPATIENT
Start: 2018-10-23 | End: 2020-07-08

## 2018-10-23 NOTE — TELEPHONE ENCOUNTER
----- Message from Sanket Toure MD sent at 10/19/2018  6:33 PM CDT -----  Overall the stress test is negative for signs of a blockage.  The EKG did look like a blockage but the echo showed normal wall motion and usually over rides the EKG.  There was repeated episodes of rapid heartbeat during the recovery phase after exercise.  This is very likely which she is experiencing at night.  The amlodipine will not suppress the extra activity but we could increase her carvedilol which should reduce the frequency of palpitations.    The heart is very strong and the exercise capacity was above average.

## 2018-10-23 NOTE — TELEPHONE ENCOUNTER
----- Message from Indio Adler sent at 10/22/2018  1:56 PM CDT -----  Contact: pt  Pt is returning call, call placed to pod no answer   Call Back#[phone]  Thanks

## 2018-10-23 NOTE — PROGRESS NOTES
Subjective:       Patient ID: Loren Andre is a 68 y.o. female.    Chief Complaint:  Allergies (allergies, asthma, pt has hx of dustmite  allergy\)      67 yo woman presents for new patient evaluation of allergies and asthma. She states she has had both for decades. She gets slight congestion, runny nose, sneeze and PND. Sometimes itchy watery eyes. Gets recurrent sinus infections few times per year, on antibiotics 2-3 times per year. No pneumonia. Worse in fall and winter but has all year. No time of day worse. No eczema. No known food, insect or latex allergy. She has asthma and is on Advair 250 BID and montelukast daily. She sues ventolin less then once per week and no night symptoms. No steroid burst in last year. Feels well controlled. She stats Claritin does not help sinuses. Just started zyrtec again. Has on OTC nose spray like Flonase and using 1 SEN daily. Also does neti pot. Never been on azelastine. Never had sinus surgery. Did try allergy shots many years ago and no help. Does have HTN, anemia, RA, sickle cell trait.        Environmental History: see history section for home environment  Review of Systems   Constitutional: Negative for appetite change, chills, fatigue and fever.   HENT: Positive for congestion, postnasal drip, rhinorrhea, sinus pressure and sneezing. Negative for ear discharge, ear pain, facial swelling, nosebleeds, sore throat, trouble swallowing and voice change.    Eyes: Negative for discharge, redness, itching and visual disturbance.   Respiratory: Positive for cough, shortness of breath and wheezing. Negative for choking and chest tightness.    Cardiovascular: Negative for chest pain, palpitations and leg swelling.   Gastrointestinal: Negative for abdominal distention, abdominal pain, constipation, diarrhea, nausea and vomiting.   Genitourinary: Negative for difficulty urinating.   Musculoskeletal: Positive for arthralgias. Negative for gait problem, joint swelling and myalgias.    Skin: Negative for color change and rash.   Neurological: Negative for dizziness, syncope, weakness, light-headedness and headaches.   Hematological: Negative for adenopathy. Does not bruise/bleed easily.   Psychiatric/Behavioral: Negative for agitation, behavioral problems, confusion and sleep disturbance. The patient is not nervous/anxious.         Objective:      Physical Exam   Constitutional: She is oriented to person, place, and time. She appears well-developed and well-nourished. No distress.   HENT:   Head: Normocephalic and atraumatic.   Right Ear: Hearing, tympanic membrane, external ear and ear canal normal.   Left Ear: Hearing, tympanic membrane, external ear and ear canal normal.   Nose: No mucosal edema, rhinorrhea, sinus tenderness or septal deviation. No epistaxis. Right sinus exhibits no maxillary sinus tenderness and no frontal sinus tenderness. Left sinus exhibits no maxillary sinus tenderness and no frontal sinus tenderness.   Mouth/Throat: Uvula is midline, oropharynx is clear and moist and mucous membranes are normal. No uvula swelling.   Eyes: Conjunctivae are normal. Right eye exhibits no discharge. Left eye exhibits no discharge.   Neck: Normal range of motion. No thyromegaly present.   Cardiovascular: Normal rate, regular rhythm and normal heart sounds.   No murmur heard.  Pulmonary/Chest: Effort normal and breath sounds normal. No respiratory distress. She has no wheezes.   Abdominal: Soft. She exhibits no distension. There is no tenderness.   Musculoskeletal: Normal range of motion. She exhibits no edema or tenderness.   Lymphadenopathy:     She has no cervical adenopathy.   Neurological: She is alert and oriented to person, place, and time.   Skin: Skin is warm and dry. No rash noted. No erythema.   Psychiatric: She has a normal mood and affect. Her behavior is normal. Judgment and thought content normal.   Nursing note and vitals reviewed.      Laboratory:   none performed    Assessment:       1. Chronic rhinitis    2. Simple chronic conjunctivitis of both eyes    3. Moderate persistent asthma without complication    4. Recurrent sinus infections         Plan:       1. Dust mite avoidance, measures discussed and handout provided.  2. Continue fluticasone but use 2 SEN daily and add azelastine 2 SEN daily  3. cetirizine prn  4. continue montelukast 10 mg daily and Advair 250 1 puff BID  5. Ventolin 2 puffs every 4 hours as needed  6. If not better needs consider re test

## 2018-10-23 NOTE — TELEPHONE ENCOUNTER
Patient given stress test results- patient wants Dr. Toure to know she did not take the Amlodipine last night and had no palpitations- also she was not as tired today.

## 2018-10-23 NOTE — LETTER
October 23, 2018      Sanket Toure MD  [address]  [place] [address]           [place] - Allergy  [address]  [place] [address]  Phone: [phone]          Patient: Loren Andre   MR Number: [id number]   YOB: 1950   Date of Visit: 10/23/2018       Dear Dr. Sanket Toure:    Thank you for referring Loren Andre to me for evaluation. Attached you will find relevant portions of my assessment and plan of care.    If you have questions, please do not hesitate to call me. I look forward to following Loren Andre along with you.    Sincerely,    Sera Almeida MD    Enclosure  CC:  No Recipients    If you would like to receive this communication electronically, please contact [email or web address] or [phone] to request more information on [facility] Link access.    For providers and/or their staff who would like to refer a patient to [facility], please contact us through our one-stop-shop provider referral line, [facility], at [phone].    If you feel you have received this communication in error or would no longer like to receive these types of communications, please e-mail [email or web address]

## 2018-10-29 ENCOUNTER — DOCUMENTATION ONLY (OUTPATIENT)
Dept: FAMILY MEDICINE | Facility: CLINIC | Age: 68
End: 2018-10-29

## 2018-10-29 NOTE — PROGRESS NOTES
Pre-Visit Chart Review  For Appointment Scheduled on 11-5-18    Health Maintenance Due   Topic Date Due    TETANUS VACCINE  03/03/1967

## 2018-11-02 ENCOUNTER — PES CALL (OUTPATIENT)
Dept: ADMINISTRATIVE | Facility: CLINIC | Age: 68
End: 2018-11-02

## 2018-11-05 ENCOUNTER — OFFICE VISIT (OUTPATIENT)
Dept: FAMILY MEDICINE | Facility: CLINIC | Age: 68
End: 2018-11-05
Attending: FAMILY MEDICINE
Payer: MEDICARE

## 2018-11-05 VITALS
WEIGHT: 148.13 LBS | RESPIRATION RATE: 16 BRPM | DIASTOLIC BLOOD PRESSURE: 70 MMHG | TEMPERATURE: 98 F | OXYGEN SATURATION: 97 % | HEART RATE: 66 BPM | SYSTOLIC BLOOD PRESSURE: 158 MMHG | HEIGHT: 66 IN | BODY MASS INDEX: 23.81 KG/M2

## 2018-11-05 DIAGNOSIS — I10 HYPERTENSION, ESSENTIAL: ICD-10-CM

## 2018-11-05 DIAGNOSIS — J45.20 MILD INTERMITTENT CHRONIC ASTHMA WITHOUT COMPLICATION: ICD-10-CM

## 2018-11-05 DIAGNOSIS — R00.2 PALPITATIONS: ICD-10-CM

## 2018-11-05 DIAGNOSIS — I10 HYPERTENSION, POOR CONTROL: Primary | ICD-10-CM

## 2018-11-05 PROCEDURE — 1101F PT FALLS ASSESS-DOCD LE1/YR: CPT | Mod: CPTII,S$GLB,, | Performed by: FAMILY MEDICINE

## 2018-11-05 PROCEDURE — 3077F SYST BP >= 140 MM HG: CPT | Mod: CPTII,S$GLB,, | Performed by: FAMILY MEDICINE

## 2018-11-05 PROCEDURE — 99213 OFFICE O/P EST LOW 20 MIN: CPT | Mod: S$GLB,,, | Performed by: FAMILY MEDICINE

## 2018-11-05 PROCEDURE — 3078F DIAST BP <80 MM HG: CPT | Mod: CPTII,S$GLB,, | Performed by: FAMILY MEDICINE

## 2018-11-05 PROCEDURE — 99999 PR PBB SHADOW E&M-EST. PATIENT-LVL IV: CPT | Mod: PBBFAC,,, | Performed by: FAMILY MEDICINE

## 2018-11-05 RX ORDER — LISINOPRIL 40 MG/1
40 TABLET ORAL DAILY
Qty: 90 TABLET | Refills: 1 | Status: SHIPPED | OUTPATIENT
Start: 2018-11-05 | End: 2018-12-13 | Stop reason: SDUPTHER

## 2018-11-05 NOTE — PROGRESS NOTES
Subjective:       Patient ID: Loren Andre is a 68 y.o. female.    Chief Complaint: Hypertension    68-year-old female coming in to recheck blood pressure.  She had been placed on amlodipine with her lisinopril and carvedilol but she developed palpitations.  We reduce amlodipine from 5 mg to 2.5 mg in the palpitations may have improved a little bit but continued.  She then discontinue the amlodipine altogether and her palpitations have ceased.  Currently she is on carvedilol 6.25 mg twice daily and lisinopril 20 mg daily.  She is not having any palpitations but she brings in a blood pressure log with her pressures running in the high normal to mildly elevated range.  She has asthma and is tolerating the carvedilol at its present dose but I am little concerned about increasing it.  An alternative would be changing carvedilol to diltiazem or increasing the lisinopril.    Past Medical History:  No date: Allergy  No date: Anemia      Comment:  sickle cell trait  No date: Anxiety  No date: Arthritis  No date: Asthma  10/5/2017: Chronic disease anemia  No date: Colon polyps  No date: Depression  3/7/2013: Dermatitis  No date: Fibromyalgia  No date: HEARING LOSS  No date: Hypertension  10/5/2017: Leucopenia  No date: Polyneuropathy  No date: Rheumatoid arthritis(714.0)  No date: Scoliosis  No date: Sickle cell trait  10/5/2017: Sickle-cell trait  No date: Trouble in sleeping  No date: Urticaria  Past Surgical History:  No date: ADENOIDECTOMY  No date: APPENDECTOMY  4/14/2008: COLONOSCOPY      Comment:  Dr. Guerrero, 10 year recheck  10/4/2018: COLONOSCOPY; N/A      Comment:  Procedure: COLONOSCOPY;  Surgeon: Tam Kemp MD;                Location: [facility];  Service: Endoscopy;  Laterality:                N/A;  10/4/2018: COLONOSCOPY; N/A      Comment:  Performed by Tam Kemp MD at [facility]  No date: HYSTERECTOMY  No date: OOPHORECTOMY  01/2016: ROTATOR CUFF REPAIR; Left  12/04/2017: ROTATOR CUFF REPAIR  W/ DISTAL CLAVICLE EXCISION; Right      Comment:  Dr. Eligio Timmons ( [facility] )  No date: TONSILLECTOMY    Current Outpatient Medications on File Prior to Visit:  ascorbic acid, vitamin C, (VITAMIN C) 500 MG tablet, Take 500 mg by mouth daily as needed. , Disp: , Rfl:   atorvastatin (LIPITOR) 40 MG tablet, Take 1 tablet (40 mg total) by mouth once daily., Disp: 90 tablet, Rfl: 1  azelastine (ASTELIN) 137 mcg (0.1 %) nasal spray, 2 sprays (274 mcg total) by Nasal route 2 (two) times daily., Disp: 30 mL, Rfl: 12  betamethasone dipropionate (DIPROLENE) 0.05 % cream, Apply topically 2 (two) times daily., Disp: 15 g, Rfl: 1  BLACK COHOSH ORAL, Take 1 capsule by mouth 2 (two) times daily. Female hormone blend sp - 7c Black Cohosh , Disp: , Rfl:   CALCIUM CARBONATE/VITAMIN D3 (VITAMIN D-3 ORAL), Take 100 Int'l Units by mouth once daily. , Disp: , Rfl:   carvedilol (COREG) 6.25 MG tablet, TAKE 1 TABLET(6.25 MG) BY MOUTH TWICE DAILY WITH MEALS, Disp: 180 tablet, Rfl: 3  coenzyme Q10 (CO Q-10) 10 mg capsule, Take 10 mg by mouth once daily., Disp: , Rfl:   fluticasone-salmeterol 250-50 mcg/dose (ADVAIR DISKUS) 250-50 mcg/dose diskus inhaler, Inhale 1 puff into the lungs 2 (two) times daily. INHALE 1 PUFF BY MOUTH INTO THE LUNGS TWICE DAILY (Patient taking differently: Inhale 1 puff into the lungs 2 (two) times daily as needed. INHALE 1 PUFF BY MOUTH INTO THE LUNGS TWICE DAILY), Disp: 60 each, Rfl: 11  folic acid (FOLVITE) 400 MCG tablet, Take 400 mcg by mouth once daily., Disp: , Rfl:   GARLIC OIL ORAL, Take 1 capsule by mouth once daily., Disp: , Rfl:   NIACIN, INOSITOL NIACINATE, (NIACIN FLUSH FREE ORAL), Take 500 mg by mouth once daily., Disp: , Rfl:   traZODone (DESYREL) 50 MG tablet, Take 1 tablet (50 mg total) by mouth nightly., Disp: 90 tablet, Rfl: 3  VENTOLIN HFA 90 mcg/actuation inhaler, INHALE 2 PUFFS BY MOUTH EVERY 4 HOURS AS NEEDED FOR WHEEZING, Disp: 18 g, Rfl: 0  vitamin E 100 UNIT capsule, Take 100 Units by  mouth once daily., Disp: , Rfl:   (DISCONTINUED) lisinopril (PRINIVIL,ZESTRIL) 20 MG tablet, Take 1 tablet (20 mg total) by mouth once daily., Disp: 90 tablet, Rfl: 3  montelukast (SINGULAIR) 10 mg tablet, TAKE 1 TABLET ONE TIME DAILY, Disp: 90 tablet, Rfl: 3  (DISCONTINUED) amLODIPine (NORVASC) 5 MG tablet, Take 0.5 tablets (2.5 mg total) by mouth once daily., Disp: 90 tablet, Rfl: 3  (DISCONTINUED) valACYclovir (VALTREX) 500 MG tablet, TAKE 1 TABLET (500 MG TOTAL) BY MOUTH 2 (TWO) TIMES DAILY FOR 7 DAYS AS DIRECTED. , Disp: 60 tablet, Rfl: 0    No current facility-administered medications on file prior to visit.           Review of Systems   Respiratory: Negative for chest tightness, shortness of breath and wheezing.    Cardiovascular: Negative for chest pain and palpitations.       Objective:      Physical Exam   Constitutional: She appears well-developed and well-nourished. No distress.   Mildly elevated systolic blood pressure  Normal weight with a BMI 23.9 she is up 2 lb from her October 16, 2018 visit   Cardiovascular: Normal rate, regular rhythm and normal heart sounds. Exam reveals no gallop and no friction rub.   No murmur heard.  Skin: She is not diaphoretic.   Psychiatric: She has a normal mood and affect. Her behavior is normal. Judgment and thought content normal.   Nursing note and vitals reviewed.      Assessment:       1. Hypertension, poor control    2. Hypertension, essential    3. Palpitations    4. Mild intermittent chronic asthma without complication        Plan:       1. Hypertension, poor control  Increase lisinopril to 20 mg 2 daily until she uses upper current supply and make sure she does not have palpitations when her blood pressure reduce his.  She was then given a prescription for the 40 mg lisinopril to replace to 20 is 1 they are used up.  Continue the carvedilol 6.25 b.i.d. for now.  If she has problems with the palpitations will reduce the lisinopril back to 20 daily and substitute  diltiazem for the carvedilol where we should be able to increase the dose without having problems with asthma flare ups    2. Hypertension, essential  - lisinopril (PRINIVIL,ZESTRIL) 40 MG tablet; Take 1 tablet (40 mg total) by mouth once daily.  Dispense: 90 tablet; Refill: 1    3. Palpitations  Resolved post discontinuation of amlodipine.    4. Mild intermittent chronic asthma without complication  Asymptomatic currently

## 2018-11-05 NOTE — PATIENT INSTRUCTIONS
Controlling High Blood Pressure  High blood pressure (hypertension) is often called the silent killer. This is because many people who have it dont know it. High blood pressure is defined as 140/90 mm Hg or higher. Know your blood pressure and remember to check it regularly. Doing so can save your life. Here are some things you can do to help control your blood pressure.    Choose heart-healthy foods  · Select low-salt, low-fat foods. Limit sodium intake to 2,400 mg per day or the amount suggested by your healthcare provider.  · Limit canned, dried, cured, packaged, and fast foods. These can contain a lot of salt.  · Eat 8 to 10 servings of fruits and vegetables every day.  · Choose lean meats, fish, or chicken.  · Eat whole-grain pasta, brown rice, and beans.  · Eat 2 to 3 servings of low-fat or fat-free dairy products.  · Ask your doctor about the DASH eating plan. This plan helps reduce blood pressure.  · When you go to a restaurant, ask that your meal be prepared with no added salt.  Maintain a healthy weight  · Ask your healthcare provider how many calories to eat a day. Then stick to that number.  · Ask your healthcare provider what weight range is healthiest for you. If you are overweight, a weight loss of only 3% to 5% of your body weight can help lower blood pressure. Generally, a good weight loss goal is to lose 10% of your body weight in a year.  · Limit snacks and sweets.  · Get regular exercise.  Get up and get active  · Choose activities you enjoy. Find ones you can do with friends or family. This includes bicycling, dancing, walking, and jogging.  · Park farther away from building entrances.  · Use stairs instead of the elevator.  · When you can, walk or bike instead of driving.  · [place] leaves, garden, or do household repairs.  · Be active at a moderate to vigorous level of physical activity for at least 40 minutes for a minimum of 3 to 4 days a week.   Manage stress  · Make time to relax and enjoy  life. Find time to laugh.  · Communicate your concerns with your loved ones and your healthcare provider.  · Visit with family and friends, and keep up with hobbies.  Limit alcohol and quit smoking  · Men should have no more than 2 drinks per day.  · Women should have no more than 1 drink per day.  · Talk with your healthcare provider about quitting smoking. Smoking significantly increases your risk for heart disease and stroke. Ask your healthcare provider about community smoking cessation programs and other options.  Medicines  If lifestyle changes arent enough, your healthcare provider may prescribe high blood pressure medicine. Take all medicines as prescribed. If you have any questions about your medicines, ask your healthcare provider before stopping or changing them.   Date Last Reviewed: 4/27/2016  © 3128-6224 [employer]. [address] [place] [address], [place] [address]. All rights reserved. This information is not intended as a substitute for professional medical care. Always follow your healthcare professional's instructions.

## 2018-11-07 ENCOUNTER — DOCUMENTATION ONLY (OUTPATIENT)
Dept: FAMILY MEDICINE | Facility: CLINIC | Age: 68
End: 2018-11-07

## 2018-11-07 NOTE — PROGRESS NOTES
Pre-Visit Chart Review  For Appointment Scheduled on 12-13-18    There are no preventive care reminders to display for this patient.

## 2018-11-29 DIAGNOSIS — J45.20 ASTHMA, MILD INTERMITTENT, WELL-CONTROLLED: ICD-10-CM

## 2018-11-29 RX ORDER — FLUTICASONE PROPIONATE AND SALMETEROL 50; 250 UG/1; UG/1
POWDER RESPIRATORY (INHALATION)
Qty: 60 EACH | Refills: 0 | Status: SHIPPED | OUTPATIENT
Start: 2018-11-29 | End: 2018-12-13 | Stop reason: SDUPTHER

## 2018-12-13 ENCOUNTER — OFFICE VISIT (OUTPATIENT)
Dept: FAMILY MEDICINE | Facility: CLINIC | Age: 68
End: 2018-12-13
Attending: FAMILY MEDICINE
Payer: MEDICARE

## 2018-12-13 ENCOUNTER — OFFICE VISIT (OUTPATIENT)
Dept: FAMILY MEDICINE | Facility: CLINIC | Age: 68
End: 2018-12-13
Payer: MEDICARE

## 2018-12-13 VITALS
WEIGHT: 137.81 LBS | SYSTOLIC BLOOD PRESSURE: 122 MMHG | RESPIRATION RATE: 12 BRPM | TEMPERATURE: 98 F | HEIGHT: 66 IN | HEART RATE: 62 BPM | OXYGEN SATURATION: 97 % | BODY MASS INDEX: 22.15 KG/M2 | DIASTOLIC BLOOD PRESSURE: 74 MMHG

## 2018-12-13 VITALS
BODY MASS INDEX: 24.13 KG/M2 | SYSTOLIC BLOOD PRESSURE: 146 MMHG | HEART RATE: 69 BPM | WEIGHT: 150.13 LBS | DIASTOLIC BLOOD PRESSURE: 77 MMHG | HEIGHT: 66 IN

## 2018-12-13 DIAGNOSIS — I10 GOOD HYPERTENSION CONTROL: Primary | ICD-10-CM

## 2018-12-13 DIAGNOSIS — I10 HYPERTENSION, ESSENTIAL: ICD-10-CM

## 2018-12-13 DIAGNOSIS — Z00.00 ENCOUNTER FOR PREVENTIVE HEALTH EXAMINATION: Primary | ICD-10-CM

## 2018-12-13 DIAGNOSIS — J45.20 MILD INTERMITTENT CHRONIC ASTHMA WITHOUT COMPLICATION: ICD-10-CM

## 2018-12-13 DIAGNOSIS — I10 UNCONTROLLED HYPERTENSION: ICD-10-CM

## 2018-12-13 DIAGNOSIS — M79.7 FIBROMYALGIA: ICD-10-CM

## 2018-12-13 DIAGNOSIS — J45.20 ASTHMA, MILD INTERMITTENT, WELL-CONTROLLED: ICD-10-CM

## 2018-12-13 DIAGNOSIS — D57.3 SICKLE-CELL TRAIT: ICD-10-CM

## 2018-12-13 DIAGNOSIS — F41.9 ANXIETY: ICD-10-CM

## 2018-12-13 PROCEDURE — 3078F DIAST BP <80 MM HG: CPT | Mod: CPTII,S$GLB,, | Performed by: NURSE PRACTITIONER

## 2018-12-13 PROCEDURE — 1101F PT FALLS ASSESS-DOCD LE1/YR: CPT | Mod: CPTII,S$GLB,, | Performed by: FAMILY MEDICINE

## 2018-12-13 PROCEDURE — 99213 OFFICE O/P EST LOW 20 MIN: CPT | Mod: S$GLB,,, | Performed by: FAMILY MEDICINE

## 2018-12-13 PROCEDURE — 99999 PR PBB SHADOW E&M-EST. PATIENT-LVL IV: CPT | Mod: PBBFAC,,, | Performed by: NURSE PRACTITIONER

## 2018-12-13 PROCEDURE — 3078F DIAST BP <80 MM HG: CPT | Mod: CPTII,S$GLB,, | Performed by: FAMILY MEDICINE

## 2018-12-13 PROCEDURE — 99499 UNLISTED E&M SERVICE: CPT | Mod: S$GLB,,, | Performed by: NURSE PRACTITIONER

## 2018-12-13 PROCEDURE — 3074F SYST BP LT 130 MM HG: CPT | Mod: CPTII,S$GLB,, | Performed by: FAMILY MEDICINE

## 2018-12-13 PROCEDURE — 99999 PR PBB SHADOW E&M-EST. PATIENT-LVL III: CPT | Mod: PBBFAC,,, | Performed by: FAMILY MEDICINE

## 2018-12-13 PROCEDURE — G0439 PPPS, SUBSEQ VISIT: HCPCS | Mod: S$GLB,,, | Performed by: NURSE PRACTITIONER

## 2018-12-13 PROCEDURE — 3074F SYST BP LT 130 MM HG: CPT | Mod: CPTII,S$GLB,, | Performed by: NURSE PRACTITIONER

## 2018-12-13 RX ORDER — MOMETASONE FUROATE 1 MG/G
CREAM TOPICAL
COMMUNITY
Start: 2016-06-13 | End: 2019-10-03 | Stop reason: SDUPTHER

## 2018-12-13 RX ORDER — FLUTICASONE PROPIONATE 50 MCG
SPRAY, SUSPENSION (ML) NASAL
COMMUNITY
Start: 2016-03-30 | End: 2019-02-18 | Stop reason: SDUPTHER

## 2018-12-13 RX ORDER — ASCORBATE CALCIUM 500 MG
500 TABLET ORAL DAILY PRN
COMMUNITY
End: 2018-12-13

## 2018-12-13 RX ORDER — FLUTICASONE PROPIONATE AND SALMETEROL 250; 50 UG/1; UG/1
POWDER RESPIRATORY (INHALATION)
Qty: 60 EACH | Refills: 5 | Status: SHIPPED | OUTPATIENT
Start: 2018-12-13 | End: 2019-12-18 | Stop reason: SDUPTHER

## 2018-12-13 RX ORDER — CETIRIZINE HYDROCHLORIDE 10 MG/1
10 TABLET ORAL DAILY
COMMUNITY
End: 2019-02-18 | Stop reason: SDUPTHER

## 2018-12-13 RX ORDER — CARVEDILOL 6.25 MG/1
TABLET ORAL
Qty: 180 TABLET | Refills: 3 | Status: SHIPPED | OUTPATIENT
Start: 2018-12-13 | End: 2019-10-01 | Stop reason: DRUGHIGH

## 2018-12-13 RX ORDER — LISINOPRIL 40 MG/1
40 TABLET ORAL DAILY
Qty: 90 TABLET | Refills: 3 | Status: SHIPPED | OUTPATIENT
Start: 2018-12-13 | End: 2020-01-08 | Stop reason: SDUPTHER

## 2018-12-13 RX ORDER — VALACYCLOVIR HYDROCHLORIDE 500 MG/1
TABLET, FILM COATED ORAL DAILY PRN
COMMUNITY
Start: 2016-02-21 | End: 2019-07-17 | Stop reason: SDUPTHER

## 2018-12-13 NOTE — PROGRESS NOTES
I offered to discuss end of life issues, including information on how to make advance directives that the patient could use to name someone who would make medical decisions on their behalf if they became too ill to make themselves.    ___Patient declined  __Patient is interested, I provided paper work and offered to discuss.    ** PT will bring own copy**

## 2018-12-13 NOTE — Clinical Note
Primary Care Providers:Sanket Toure MD, MD (General)Your patient was seen today for a HRA visit. Gap(s) in care (HEDIS gaps) have been identified during this visit that require additional testing and possible follow up.No orders of the defined types were placed in this encounter.These orders were placed using [facility] approved protocol and any results will be forwarded to your office for appropriate follow up. I have included a copy of my visit note; please review the note and feel free to contact me with any questions. Thank you for allowing me to participate in the care of your patients.Maia Herron NP

## 2018-12-13 NOTE — PROGRESS NOTES
Subjective:       Patient ID: Loren Andre is a 68 y.o. female.    Chief Complaint: Hypertension    68-year-old female coming in for a follow-up on blood pressure control after her November 5, 2018 visit at which time lisinopril was increased from 20 mg to 40 mg daily.  She has a history of asthma and I am trying not to increase her beta-blocker.  She reports her blood pressures have been good generally in the 120s over 70s since switching the lisinopril.  She has no complaints of dizziness or lightheadedness.  She has had no problems with the lisinopril increase.  She does need a refill of her Advair and a refill of the carvedilol and lisinopril.  Health maintenance is up-to-date.    Past Medical History:  No date: Allergy  No date: Anemia      Comment:  sickle cell trait  No date: Anxiety  No date: Arthritis  No date: Asthma  10/5/2017: Chronic disease anemia  No date: Colon polyps  No date: Depression  3/7/2013: Dermatitis  No date: Fibromyalgia  No date: HEARING LOSS  No date: Hypertension  10/5/2017: Leucopenia  No date: Polyneuropathy  No date: Rheumatoid arthritis(714.0)  No date: Scoliosis  No date: Sickle cell trait  10/5/2017: Sickle-cell trait  No date: Trouble in sleeping  No date: Urticaria    Past Surgical History:  No date: ADENOIDECTOMY  No date: APPENDECTOMY  4/14/2008: COLONOSCOPY      Comment:  Dr. Guerrero, 10 year recheck  10/4/2018: COLONOSCOPY; N/A      Comment:  Procedure: COLONOSCOPY;  Surgeon: Tam Kemp MD;                Location: [facility];  Service: Endoscopy;  Laterality:                N/A;  10/4/2018: COLONOSCOPY; N/A      Comment:  Performed by Tam Kemp MD at [facility]  No date: HYSTERECTOMY  No date: OOPHORECTOMY  01/2016: ROTATOR CUFF REPAIR; Left  12/04/2017: ROTATOR CUFF REPAIR W/ DISTAL CLAVICLE EXCISION; Right      Comment:  Dr. Eligio Timmons ( [facility] )  No date: TONSILLECTOMY    Current Outpatient Medications on File Prior to Visit:  ascorbic acid,  vitamin C, (VITAMIN C) 500 MG tablet, Take 500 mg by mouth daily as needed. , Disp: , Rfl:   atorvastatin (LIPITOR) 40 MG tablet, Take 1 tablet (40 mg total) by mouth once daily., Disp: 90 tablet, Rfl: 1  azelastine (ASTELIN) 137 mcg (0.1 %) nasal spray, 2 sprays (274 mcg total) by Nasal route 2 (two) times daily. (Patient taking differently: 2 sprays by Nasal route 2 (two) times daily. [employer] brand), Disp: 30 mL, Rfl: 12  betamethasone dipropionate (DIPROLENE) 0.05 % cream, Apply topically 2 (two) times daily., Disp: 15 g, Rfl: 1  BLACK COHOSH ORAL, Take 1 capsule by mouth 2 (two) times daily. Female hormone blend sp - 7c Black Cohosh , Disp: , Rfl:   CALCIUM CARBONATE/VITAMIN D3 (VITAMIN D-3 ORAL), Take 100 Int'l Units by mouth once daily. , Disp: , Rfl:   cetirizine (ZYRTEC) 10 MG tablet, Take 10 mg by mouth once daily., Disp: , Rfl:   coenzyme Q10 (CO Q-10) 10 mg capsule, Take 10 mg by mouth once daily., Disp: , Rfl:   fluticasone (FLONASE) 50 mcg/actuation nasal spray, SPRAY TWICE IN EACH NOSTRIL EVERY DAY, Disp: , Rfl:   folic acid (FOLVITE) 400 MCG tablet, Take 400 mcg by mouth once daily., Disp: , Rfl:   GARLIC OIL ORAL, Take 1 capsule by mouth., Disp: , Rfl:   mometasone 0.1% (ELOCON) 0.1 % cream, APPLY 1 APPLICATION TOPICALLY ONCE A DAY prn, Disp: , Rfl:   montelukast (SINGULAIR) 10 mg tablet, TAKE 1 TABLET ONE TIME DAILY, Disp: 90 tablet, Rfl: 3  NIACIN, INOSITOL NIACINATE, (NIACIN FLUSH FREE ORAL), Take 500 mg by mouth once daily., Disp: , Rfl:   OMEGA-3-DHA-EPA-FISH OIL ORAL, Take 1 capsule by mouth once daily. , Disp: , Rfl:   traZODone (DESYREL) 50 MG tablet, Take 1 tablet (50 mg total) by mouth nightly., Disp: 90 tablet, Rfl: 3  valACYclovir (VALTREX) 500 MG tablet, daily as needed. As directed, Disp: , Rfl:   VENTOLIN HFA 90 mcg/actuation inhaler, INHALE 2 PUFFS BY MOUTH EVERY 4 HOURS AS NEEDED FOR WHEEZING, Disp: 18 g, Rfl: 0  vitamin E 100 UNIT capsule, Take 100 Units by mouth once daily., Disp:  , Rfl:   (DISCONTINUED) ADVAIR DISKUS 250-50 mcg/dose diskus inhaler, INHALE 1 PUFF INTO THE LUNGS TWICE DAILY, Disp: 60 each, Rfl: 0  (DISCONTINUED) BLACK COHOSH ORAL, Take 1 capsule by mouth., Disp: , Rfl:   (DISCONTINUED) carvedilol (COREG) 6.25 MG tablet, TAKE 1 TABLET(6.25 MG) BY MOUTH TWICE DAILY WITH MEALS, Disp: 180 tablet, Rfl: 3  (DISCONTINUED) GARLIC OIL ORAL, Take 1 capsule by mouth once daily., Disp: , Rfl:   (DISCONTINUED) lisinopril (PRINIVIL,ZESTRIL) 40 MG tablet, Take 1 tablet (40 mg total) by mouth once daily., Disp: 90 tablet, Rfl: 1  (DISCONTINUED) ascorbate calcium 500 mg Tab, Take 500 mg by mouth daily as needed. , Disp: , Rfl:     No current facility-administered medications on file prior to visit.     There are no preventive care reminders to display for this patient.        Review of Systems   Respiratory: Negative for chest tightness and shortness of breath.    Cardiovascular: Negative for chest pain and palpitations.   Gastrointestinal: Negative for nausea and vomiting.   Neurological: Negative for dizziness, light-headedness and headaches.       Objective:      Physical Exam   Constitutional: She is oriented to person, place, and time. She appears well-developed and well-nourished. No distress.   Good blood pressure control  Normal weight with a BMI 22.2 she is down 10.4 lb from her November 5, 2018 visit which she attributes to diet and exercise.   Cardiovascular: Normal rate, regular rhythm and normal heart sounds. Exam reveals no gallop and no friction rub.   No murmur heard.  Pulmonary/Chest: Effort normal and breath sounds normal. No stridor. No respiratory distress. She has no wheezes. She has no rales. She exhibits no tenderness.   Neurological: She is alert and oriented to person, place, and time.   Skin: She is not diaphoretic.   Psychiatric: She has a normal mood and affect. Her behavior is normal. Judgment and thought content normal.   Nursing note and vitals reviewed.       Assessment:       1. Good hypertension control    2. Asthma, mild intermittent, well-controlled    3. Hypertension, essential        Plan:       1. Good hypertension control  Good control, no changes needed    2. Asthma, mild intermittent, well-controlled  Stable and asymptomatic at present  - fluticasone-salmeterol 250-50 mcg/dose (ADVAIR DISKUS) 250-50 mcg/dose diskus inhaler; INHALE 1 PUFF INTO THE LUNGS TWICE DAILY  Dispense: 60 each; Refill: 5    3. Hypertension, essential  - carvedilol (COREG) 6.25 MG tablet; TAKE 1 TABLET(6.25 MG) BY MOUTH TWICE DAILY WITH MEALS  Dispense: 180 tablet; Refill: 3  - lisinopril (PRINIVIL,ZESTRIL) 40 MG tablet; Take 1 tablet (40 mg total) by mouth once daily.  Dispense: 90 tablet; Refill: 3         Patient readiness: acceptance and barriers:none    During the course of the visit the patient was educated and counseled about the following:     Hypertension:   Medication: no change.  Dietary sodium restriction.  Regular aerobic exercise.    Goals: Hypertension: Reduce Blood Pressure    Did patient meet goals/outcomes: Yes    The following self management tools provided: blood pressure log    Patient Instructions (the written plan) was given to the patient/family.     Time spent with patient: 15 minutes

## 2018-12-13 NOTE — PATIENT INSTRUCTIONS
Counseling and Referral of Other Preventative  (Italic type indicates deductible and co-insurance are waived)    Patient Name: Loren Andre  Today's Date: 12/13/2018    Health Maintenance       Date Due Completion Date    Lipid Panel 02/12/2019 2/12/2018    Mammogram 08/31/2019 8/31/2017    TETANUS VACCINE 11/05/2019 11/5/2009    DEXA SCAN 10/26/2020 10/26/2017    Colonoscopy 10/04/2028 10/4/2018    Override on 4/4/2008: Done (Dr Guerrero   report in legacy   kb)    Override on 11/9/2005: Done ([facility]--Dr Patino--in legacy)        No orders of the defined types were placed in this encounter.    The following information is provided to all patients.  This information is to help you find resources for any of the problems found today that may be affecting your health:                Living healthy guide: www.[facility].[place].gov      Understanding Diabetes: www.diabetes.org      Eating healthy: www.cdc.gov/healthyweight      CDC home safety checklist: www.cdc.gov/steadi/patient.html      Agency on Aging: www.goea.[place].[facility]      Alcoholics anonymous (AA): www.aa.org      Physical Activity: www.katie.nih.gov/mk5ytwz      Tobacco use: www.quitwithusla.org

## 2018-12-18 ENCOUNTER — TELEPHONE (OUTPATIENT)
Dept: FAMILY MEDICINE | Facility: CLINIC | Age: 68
End: 2018-12-18

## 2018-12-18 DIAGNOSIS — L65.9 THINNING HAIR: Primary | ICD-10-CM

## 2018-12-18 NOTE — PROGRESS NOTES
"Loren Andre presented for a  Medicare AWV and comprehensive Health Risk Assessment today. The following components were reviewed and updated:    · Medical history  · Family History  · Social history  · Allergies and Current Medications  · Health Risk Assessment  · Health Maintenance  · Care Team     ** See Completed Assessments for Annual Wellness Visit within the encounter summary.**       The following assessments were completed:  · Living Situation  · CAGE  · Depression Screening  · Timed Get Up and Go  · Whisper Test  · Cognitive Function Screening  · Nutrition Screening  · ADL Screening  · PAQ Screening    Vitals:    12/13/18 1010   BP: (!) 146/77   Pulse: 69   Weight: 68.1 kg (150 lb 2.1 oz)   Height: 5' 6" (1.676 m)     Body mass index is 24.23 kg/m².  Physical Exam   Constitutional: She is oriented to person, place, and time. She appears well-developed.   HENT:   Head: Normocephalic and atraumatic.   Eyes: Pupils are equal, round, and reactive to light.   Neck: Normal range of motion.   Cardiovascular: Normal rate, regular rhythm and normal heart sounds.   No murmur heard.  Pulmonary/Chest: Effort normal and breath sounds normal. No respiratory distress. She has no decreased breath sounds. She has no wheezes. She has no rhonchi. She has no rales.   Abdominal: Soft. Bowel sounds are normal.   Musculoskeletal: She exhibits no edema.   Neurological: She is alert and oriented to person, place, and time. She exhibits normal muscle tone.   Skin: Skin is warm and dry. She is not diaphoretic. No pallor.   Psychiatric: She has a normal mood and affect. Her speech is normal and behavior is normal.   Nursing note and vitals reviewed.            Diagnoses and health risks identified today and associated recommendations/orders:    Encounter for preventive health examination    Sickle-cell trait  Comments:  chronic; continue to monitor    Anxiety  Comments:  stable; continue to monitor    Uncontrolled " hypertension  Comments:  uncontrolled; continue medication regimen    Mild intermittent chronic asthma without complication  Comments:  chronic; continue to monitor    Fibromyalgia  Comments:  stable; continue to monitor        Provided Loren with a 5-10 year written screening schedule and personal prevention plan. Recommendations were developed using the USPSTF age appropriate recommendations. Education, counseling, and referrals were provided as needed. After Visit Summary printed and given to patient which includes a list of additional screenings\tests needed.    F/U with PCP in 3-4 months    Maia Herron NP     Patient readiness: acceptance and barriers:none    During the course of the visit the patient was educated and counseled about the following:     Hypertension:   Regular aerobic exercise.    Goals: Hypertension: Reduce Blood Pressure    Did patient meet goals/outcomes: No    The following self management tools provided: declined    Patient Instructions (the written plan) was given to the patient/family.     Time spent with patient: 55 minutes    Barriers to medications present (no )    Adverse reactions to current medications (no)    Over the counter medications reviewed (Yes)

## 2018-12-18 NOTE — TELEPHONE ENCOUNTER
----- Message from Jeffery Wasserman sent at 12/18/2018  8:59 AM CST -----  Contact: self   Patient want to know if you can fax a referral over to [facility] patient did not have fax number only provided phone number patient also want to speak with a nurse regarding matter please call patient back at [phone] (home)       [facility] Phone Number [phone]

## 2018-12-29 ENCOUNTER — OFFICE VISIT (OUTPATIENT)
Dept: URGENT CARE | Facility: CLINIC | Age: 68
End: 2018-12-29
Payer: MEDICARE

## 2018-12-29 VITALS
WEIGHT: 150.19 LBS | DIASTOLIC BLOOD PRESSURE: 85 MMHG | OXYGEN SATURATION: 98 % | SYSTOLIC BLOOD PRESSURE: 198 MMHG | BODY MASS INDEX: 24.24 KG/M2 | TEMPERATURE: 97 F | HEART RATE: 65 BPM

## 2018-12-29 DIAGNOSIS — H61.21 IMPACTED CERUMEN OF RIGHT EAR: ICD-10-CM

## 2018-12-29 DIAGNOSIS — J32.9 SINUSITIS, UNSPECIFIED CHRONICITY, UNSPECIFIED LOCATION: Primary | ICD-10-CM

## 2018-12-29 DIAGNOSIS — I10 HYPERTENSION, UNSPECIFIED TYPE: ICD-10-CM

## 2018-12-29 PROCEDURE — 1101F PT FALLS ASSESS-DOCD LE1/YR: CPT | Mod: CPTII,S$GLB,, | Performed by: NURSE PRACTITIONER

## 2018-12-29 PROCEDURE — 99214 OFFICE O/P EST MOD 30 MIN: CPT | Mod: S$GLB,,, | Performed by: NURSE PRACTITIONER

## 2018-12-29 PROCEDURE — 3079F DIAST BP 80-89 MM HG: CPT | Mod: CPTII,S$GLB,, | Performed by: NURSE PRACTITIONER

## 2018-12-29 PROCEDURE — 3077F SYST BP >= 140 MM HG: CPT | Mod: CPTII,S$GLB,, | Performed by: NURSE PRACTITIONER

## 2018-12-29 RX ORDER — AZITHROMYCIN 250 MG/1
TABLET, FILM COATED ORAL
Qty: 6 TABLET | Refills: 0 | Status: SHIPPED | OUTPATIENT
Start: 2018-12-29 | End: 2019-02-18 | Stop reason: SDUPTHER

## 2018-12-29 RX ORDER — DEXAMETHASONE SODIUM PHOSPHATE 4 MG/ML
8 INJECTION, SOLUTION INTRA-ARTICULAR; INTRALESIONAL; INTRAMUSCULAR; INTRAVENOUS; SOFT TISSUE
Status: DISCONTINUED | OUTPATIENT
Start: 2018-12-29 | End: 2018-12-29

## 2018-12-29 RX ORDER — FLUTICASONE PROPIONATE 50 MCG
1 SPRAY, SUSPENSION (ML) NASAL DAILY
Qty: 1 BOTTLE | Refills: 0 | Status: SHIPPED | OUTPATIENT
Start: 2018-12-29 | End: 2019-02-18 | Stop reason: SDUPTHER

## 2018-12-29 NOTE — PROGRESS NOTES
Subjective:       Patient ID: Loren Andre is a 68 y.o. female.    Vitals:  weight is 68.1 kg (150 lb 3.2 oz). Her oral temperature is 97.3 °F (36.3 °C). Her blood pressure is 198/85 (abnormal) and her pulse is 65. Her oxygen saturation is 98%.     Chief Complaint: Sinusitis    Sinusitis   This is a new problem. The current episode started in the past 7 days. The problem has been gradually worsening since onset. There has been no fever. Her pain is at a severity of 4/10. The pain is mild. Associated symptoms include congestion, coughing, headaches and sinus pressure. Past treatments include saline sprays (netti pot ). The treatment provided mild relief.       Constitution: Negative for fever.   HENT: Positive for facial swelling, congestion, sinus pain and sinus pressure.    Respiratory: Positive for cough.    Neurological: Positive for headaches.       Objective:      Physical Exam   Constitutional: She is oriented to person, place, and time. She appears well-developed and well-nourished.   HENT:   Mouth/Throat: Oropharynx is clear and moist.   + maxillary sinus swelling and TTP.   L TM normal  R TM with cerumen impaction    Eyes: Conjunctivae are normal.   Neck: Normal range of motion.   Cardiovascular: Normal rate and regular rhythm.   Pulmonary/Chest: Effort normal and breath sounds normal.   Neurological: She is alert and oriented to person, place, and time.   Skin: Skin is warm and dry.   Psychiatric: She has a normal mood and affect.   Nursing note and vitals reviewed.      Assessment:       1. Sinusitis, unspecified chronicity, unspecified location    2. Impacted cerumen of right ear    3. Hypertension, unspecified type        Plan:         Sinusitis, unspecified chronicity, unspecified location    Impacted cerumen of right ear    Hypertension, unspecified type    Other orders  -     Discontinue: dexamethasone injection 8 mg  -     azithromycin (ZITHROMAX Z-MARISABEL) 250 MG tablet; As directed  Dispense: 6  tablet; Refill: 0  -     fluticasone (FLONASE) 50 mcg/actuation nasal spray; 1 spray (50 mcg total) by Each Nare route once daily.  Dispense: 1 Bottle; Refill: 0

## 2018-12-29 NOTE — PATIENT INSTRUCTIONS
Sinusitis (Antibiotic Treatment)    The sinuses are air-filled spaces within the bones of the face. They connect to the inside of the nose. Sinusitis is an inflammation of the tissue lining the sinus cavity. Sinus inflammation can occur during a cold. It can also be due to allergies to pollens and other particles in the air. Sinusitis can cause symptoms of sinus congestion and fullness. A sinus infection causes fever, headache and facial pain. There is often green or yellow drainage from the nose or into the back of the throat (post-nasal drip). You have been given antibiotics to treat this condition.  Home care:  · Take the full course of antibiotics as instructed. Do not stop taking them, even if you feel better.  · Drink plenty of water, hot tea, and other liquids. This may help thin mucus. It also may promote sinus drainage.  · Heat may help soothe painful areas of the face. Use a towel soaked in hot water. Or,  the shower and direct the hot spray onto your face. Using a vaporizer along with a menthol rub at night may also help.   · An expectorant containing guaifenesin may help thin the mucus and promote drainage from the sinuses.  · Over-the-counter decongestants may be used unless a similar medicine was prescribed. Nasal sprays work the fastest. Use one that contains phenylephrine or oxymetazoline. First blow the nose gently. Then use the spray. Do not use these medicines more often than directed on the label or symptoms may get worse. You may also use tablets containing pseudoephedrine. Avoid products that combine ingredients, because side effects may be increased. Read labels. You can also ask the pharmacist for help. (NOTE: Persons with high blood pressure should not use decongestants. They can raise blood pressure.)  · Over-the-counter antihistamines may help if allergies contributed to your sinusitis.    · Do not use nasal rinses or irrigation during an acute sinus infection, unless told to by  your health care provider. Rinsing may spread the infection to other sinuses.  · Use acetaminophen or ibuprofen to control pain, unless another pain medicine was prescribed. (If you have chronic liver or kidney disease or ever had a stomach ulcer, talk with your doctor before using these medicines. Aspirin should never be used in anyone under 18 years of age who is ill with a fever. It may cause severe liver damage.)  · Don't smoke. This can worsen symptoms.  Follow-up care  Follow up with your healthcare provider or our staff if you are not improving within the next week.  When to seek medical advice  Call your healthcare provider if any of these occur:  · Facial pain or headache becoming more severe  · Stiff neck  · Unusual drowsiness or confusion  · Swelling of the forehead or eyelids  · Vision problems, including blurred or double vision  · Fever of 100.4ºF (38ºC) or higher, or as directed by your healthcare provider  · Seizure  · Breathing problems  · Symptoms not resolving within 10 days  Date Last Reviewed: 4/13/2015  © 4489-8369 [employer]. [address] [place] [address], [place] [address]. All rights reserved. This information is not intended as a substitute for professional medical care. Always follow your healthcare professional's instructions.

## 2019-02-16 NOTE — TELEPHONE ENCOUNTER
This is a very strong cortisone cream and can cause skin damage if overused.  What does she need it for?

## 2019-02-18 ENCOUNTER — CLINICAL SUPPORT (OUTPATIENT)
Dept: URGENT CARE | Facility: CLINIC | Age: 69
End: 2019-02-18
Payer: MEDICARE

## 2019-02-18 VITALS
HEIGHT: 66 IN | TEMPERATURE: 98 F | WEIGHT: 151.38 LBS | HEART RATE: 68 BPM | SYSTOLIC BLOOD PRESSURE: 204 MMHG | DIASTOLIC BLOOD PRESSURE: 95 MMHG | BODY MASS INDEX: 24.33 KG/M2 | RESPIRATION RATE: 12 BRPM

## 2019-02-18 DIAGNOSIS — J01.90 ACUTE NON-RECURRENT SINUSITIS, UNSPECIFIED LOCATION: ICD-10-CM

## 2019-02-18 DIAGNOSIS — I10 ELEVATED BLOOD PRESSURE READING IN OFFICE WITH DIAGNOSIS OF HYPERTENSION: Primary | ICD-10-CM

## 2019-02-18 DIAGNOSIS — R82.90 FOUL SMELLING URINE: ICD-10-CM

## 2019-02-18 LAB
BILIRUB UR QL STRIP: NEGATIVE
GLUCOSE UR QL STRIP: NEGATIVE
KETONES UR QL STRIP: NEGATIVE
LEUKOCYTE ESTERASE UR QL STRIP: NEGATIVE
PH, POC UA: 5
POC BLOOD, URINE: NEGATIVE
POC NITRATES, URINE: NEGATIVE
PROT UR QL STRIP: NEGATIVE
SP GR UR STRIP: 1 (ref 1–1.03)
UROBILINOGEN UR STRIP-ACNC: NORMAL (ref 0.1–1.1)

## 2019-02-18 PROCEDURE — 81003 POCT URINALYSIS, DIPSTICK, AUTOMATED, W/O SCOPE: ICD-10-PCS | Mod: QW,S$GLB,, | Performed by: NURSE PRACTITIONER

## 2019-02-18 PROCEDURE — 81003 URINALYSIS AUTO W/O SCOPE: CPT | Mod: QW,S$GLB,, | Performed by: NURSE PRACTITIONER

## 2019-02-18 PROCEDURE — 99214 PR OFFICE/OUTPT VISIT, EST, LEVL IV, 30-39 MIN: ICD-10-PCS | Mod: 25,S$GLB,, | Performed by: NURSE PRACTITIONER

## 2019-02-18 PROCEDURE — 99214 OFFICE O/P EST MOD 30 MIN: CPT | Mod: 25,S$GLB,, | Performed by: NURSE PRACTITIONER

## 2019-02-18 RX ORDER — AZITHROMYCIN 250 MG/1
TABLET, FILM COATED ORAL
Qty: 6 TABLET | Refills: 0 | Status: SHIPPED | OUTPATIENT
Start: 2019-02-18 | End: 2019-03-11 | Stop reason: ALTCHOICE

## 2019-02-18 RX ORDER — PROMETHAZINE HYDROCHLORIDE AND DEXTROMETHORPHAN HYDROBROMIDE 6.25; 15 MG/5ML; MG/5ML
5 SYRUP ORAL NIGHTLY PRN
Qty: 118 ML | Refills: 0 | Status: SHIPPED | OUTPATIENT
Start: 2019-02-18 | End: 2019-02-28

## 2019-02-18 RX ORDER — BETAMETHASONE DIPROPIONATE 0.5 MG/G
CREAM TOPICAL
Qty: 45 G | Refills: 0 | Status: SHIPPED | OUTPATIENT
Start: 2019-02-18 | End: 2020-07-08

## 2019-02-18 RX ORDER — MONTELUKAST SODIUM 10 MG/1
10 TABLET ORAL DAILY
Qty: 30 TABLET | Refills: 2 | Status: SHIPPED | OUTPATIENT
Start: 2019-02-18 | End: 2019-04-22 | Stop reason: SDUPTHER

## 2019-02-18 NOTE — PROGRESS NOTES
"Subjective:       Patient ID: Loren Andre is a 68 y.o. female.    Vitals:  height is 5' 6" (1.676 m) and weight is 68.7 kg (151 lb 6.4 oz). Her oral temperature is 97.6 °F (36.4 °C). Her blood pressure is 214/91 (abnormal) and her pulse is 68. Her respiration is 12.     Chief Complaint: Sinus Problem    Patient complains of sinus pressure, sore throat, headache, chills and coughing for 7-8 days. Patient also reports "foul-odor to urine." denies burning with urination, hematuria, frequency. "the only time my urine smells is when I get a sinus infection." Denies sob, chest pain, nausea, vomiting, diarrhea. Patient states she did not take her BP meds this morning.      Sinus Problem   This is a new problem. The current episode started in the past 7 days. The problem has been gradually worsening since onset. Associated symptoms include congestion, coughing, headaches, sinus pressure and a sore throat. Pertinent negatives include no chills or shortness of breath. Treatments tried: Netti-pot. The treatment provided no relief.       Constitution: Negative for chills, fatigue and fever.   HENT: Positive for congestion, postnasal drip, sinus pain, sinus pressure and sore throat.    Neck: Negative for painful lymph nodes.   Cardiovascular: Negative for chest pain and leg swelling.   Eyes: Negative for double vision and blurred vision.   Respiratory: Positive for cough. Negative for shortness of breath.    Gastrointestinal: Negative for abdominal pain, nausea, vomiting and diarrhea.   Endocrine: negative.   Genitourinary: Negative for dysuria, frequency, urgency, urine decreased and history of kidney stones.   Musculoskeletal: Negative for joint pain, joint swelling, muscle cramps and muscle ache.   Skin: Negative for color change, pale, rash and bruising.   Allergic/Immunologic: Negative for seasonal allergies.   Neurological: Positive for headaches. Negative for dizziness, history of vertigo, light-headedness and passing " out.   Hematologic/Lymphatic: Negative for swollen lymph nodes.   Psychiatric/Behavioral: Negative for nervous/anxious, sleep disturbance and depression. The patient is not nervous/anxious.        Objective:      Physical Exam   Constitutional: She is oriented to person, place, and time. Vital signs are normal. She appears well-developed and well-nourished. She is cooperative.  Non-toxic appearance. She does not have a sickly appearance. She does not appear ill. No distress.   HENT:   Head: Normocephalic and atraumatic.   Right Ear: Hearing, tympanic membrane, external ear and ear canal normal.   Left Ear: Hearing, tympanic membrane, external ear and ear canal normal.   Nose: Mucosal edema and rhinorrhea present. No nasal deformity. No epistaxis. Right sinus exhibits maxillary sinus tenderness and frontal sinus tenderness. Left sinus exhibits maxillary sinus tenderness and frontal sinus tenderness.   Mouth/Throat: Uvula is midline, oropharynx is clear and moist and mucous membranes are normal. No trismus in the jaw. Normal dentition. No uvula swelling. No posterior oropharyngeal erythema.   Eyes: Conjunctivae and lids are normal. Right eye exhibits no discharge. Left eye exhibits no discharge. No scleral icterus.   Sclera clear bilat   Neck: Trachea normal, normal range of motion, full passive range of motion without pain and phonation normal. Neck supple.   Cardiovascular: Normal rate, regular rhythm, normal heart sounds, intact distal pulses and normal pulses.   Pulmonary/Chest: Effort normal and breath sounds normal. No respiratory distress.   Abdominal: Soft. Normal appearance and bowel sounds are normal. She exhibits no distension, no pulsatile midline mass and no mass. There is no tenderness.   Musculoskeletal: Normal range of motion. She exhibits no edema or deformity.   Neurological: She is alert and oriented to person, place, and time. She exhibits normal muscle tone. Coordination normal. GCS eye subscore  is 4. GCS verbal subscore is 5. GCS motor subscore is 6.   Skin: Skin is warm, dry and intact. No rash noted. She is not diaphoretic. No pallor.   Psychiatric: She has a normal mood and affect. Her speech is normal and behavior is normal. Judgment and thought content normal. Cognition and memory are normal.   Nursing note and vitals reviewed.      Assessment:       1. Elevated blood pressure reading in office with diagnosis of hypertension    2. Foul smelling urine    3. Acute non-recurrent sinusitis, unspecified location        Plan:         UA negative.     Due to patient presentation as well as length of symptoms, will treat for bacterial sinusitis. Patient is being discharged home. Discussed reasons to return and importance of followup. All questions addressed and patient given discharge instructions and followup information.  Elevated blood pressure reading in office with diagnosis of hypertension    Foul smelling urine  -     POCT Urinalysis, Dipstick, Automated, W/O Scope    Acute non-recurrent sinusitis, unspecified location    Other orders  -     azithromycin (ZITHROMAX) 250 MG tablet; Take 2 tablets (500 mg) on  Day 1,  followed by 1 tablet (250 mg) once daily on Days 2 through 5.  Dispense: 6 tablet; Refill: 0  -     montelukast (SINGULAIR) 10 mg tablet; Take 1 tablet (10 mg total) by mouth once daily.  Dispense: 30 tablet; Refill: 2  -     promethazine-dextromethorphan (PROMETHAZINE-DM) 6.25-15 mg/5 mL Syrp; Take 5 mLs by mouth nightly as needed.  Dispense: 118 mL; Refill: 0

## 2019-02-18 NOTE — PATIENT INSTRUCTIONS
"  Take your blood pressure medication when you get home.     Discharge Instructions for High Blood Pressure (Hypertension)  You have been diagnosed with high blood pressure (also called hypertension). This means the force of blood against your artery walls is too strong. It also means your heart is working hard to move blood. High blood pressure usually has no symptoms, but over time, it can damage your heart, blood vessels, eyes, kidneys, and other organs. With help from your doctor, you can manage your blood pressure and protect your health.  Taking medicine  · Learn to take your own blood pressure. Keep a record of your results. Ask your doctor which readings mean that you need medical attention.  · Take your blood pressure medicine exactly as directed. Dont skip doses. Missing doses can cause your blood pressure to get out of control.  · If you do miss a dose (or doses) check with your healthcare provider about what to do.  · Avoid medicine that contain heart stimulants, including over-the-counter drugs. Check for warnings about high blood pressure on the label. Ask the pharmacist before purchasing something you haven't used before  · Check with your doctor or pharmacist before taking a decongestant. Some decongestants can worsen high blood pressure.  Lifestyle changes  · Maintain a healthy weight. Get help to lose any extra pounds.  · Cut back on salt.  ¨ Limit canned, dried, packaged, and fast foods.  ¨ Dont add salt to your food at the table.  ¨ Season foods with herbs instead of salt when you cook.  ¨ Request no added salt when you go to a restaurant.  ¨ The American Heart Associations (AHA) "ideal" sodium intake recommendation is 1,500 milligrams per day.  However, since American's eat so much salt, the AHA says a positive change can occur by cutting back to even 2,400 milligrams of sodium a day.   · Follow the DASH (Dietary Approaches to Stop Hypertension) eating plan. This plan recommends vegetables, " fruits, whole gains, and other heart healthy foods.  · Begin an exercise program. Ask your doctor how to get started. The American Heart Association recommends aerobic exercise 3 to 4 times a week for an average of 40 minutes at a time, with your doctor's approval. Simple activities like walking or gardening can help.  · Break the smoking habit. Enroll in a stop-smoking program to improve your chances of success. Ask your healthcare provider about programs and medicines to help you stop smoking.  · Limit drinks that contain caffeine (coffee, black or green tea, cola) to 2 per day.  · Never take stimulants such as amphetamines or cocaine; these drugs can be deadly for someone with high blood pressure.  · Control your stress. Learn stress-management techniques.  · Limit alcohol to no more than 1 drink a day for women and 2 drinks a day for men.  Follow-up care  Make a follow-up appointment as directed by our staff.     When to seek medical care  Call your doctor immediately or seek emergency care if you have any of the following:  · Chest pain or shortness of breath (call 911)  · Moderate to severe headache  · Weakness in the muscles of your face, arms, or legs  · Trouble speaking  · Extreme drowsiness  · Confusion  · Fainting or dizziness  · Pulsating or rushing sound in your ears  · Unexplained nosebleed  · Weakness, tingling, or numbness of your face, arms, or legs  · Change in vision  · Blood pressure measured at home that is greater than 180/110   Date Last Reviewed: 4/27/2016  © 0613-0288 [employer]. [address] [place] [address], [place] [address]. All rights reserved. This information is not intended as a substitute for professional medical care. Always follow your healthcare professional's instructions.        Sinusitis (Antibiotic Treatment)    The sinuses are air-filled spaces within the bones of the face. They connect to the inside of the nose. Sinusitis is an inflammation of the tissue lining the  sinus cavity. Sinus inflammation can occur during a cold. It can also be due to allergies to pollens and other particles in the air. Sinusitis can cause symptoms of sinus congestion and fullness. A sinus infection causes fever, headache and facial pain. There is often green or yellow drainage from the nose or into the back of the throat (post-nasal drip). You have been given antibiotics to treat this condition.  Home care:  · Take the full course of antibiotics as instructed. Do not stop taking them, even if you feel better.  · Drink plenty of water, hot tea, and other liquids. This may help thin mucus. It also may promote sinus drainage.  · Heat may help soothe painful areas of the face. Use a towel soaked in hot water. Or,  the shower and direct the hot spray onto your face. Using a vaporizer along with a menthol rub at night may also help.   · An expectorant containing guaifenesin may help thin the mucus and promote drainage from the sinuses.  · Over-the-counter decongestants may be used unless a similar medicine was prescribed. Nasal sprays work the fastest. Use one that contains phenylephrine or oxymetazoline. First blow the nose gently. Then use the spray. Do not use these medicines more often than directed on the label or symptoms may get worse. You may also use tablets containing pseudoephedrine. Avoid products that combine ingredients, because side effects may be increased. Read labels. You can also ask the pharmacist for help. (NOTE: Persons with high blood pressure should not use decongestants. They can raise blood pressure.)  · Over-the-counter antihistamines may help if allergies contributed to your sinusitis.    · Do not use nasal rinses or irrigation during an acute sinus infection, unless told to by your health care provider. Rinsing may spread the infection to other sinuses.  · Use acetaminophen or ibuprofen to control pain, unless another pain medicine was prescribed. (If you have chronic  liver or kidney disease or ever had a stomach ulcer, talk with your doctor before using these medicines. Aspirin should never be used in anyone under 18 years of age who is ill with a fever. It may cause severe liver damage.)  · Don't smoke. This can worsen symptoms.  Follow-up care  Follow up with your healthcare provider or our staff if you are not improving within the next week.  When to seek medical advice  Call your healthcare provider if any of these occur:  · Facial pain or headache becoming more severe  · Stiff neck  · Unusual drowsiness or confusion  · Swelling of the forehead or eyelids  · Vision problems, including blurred or double vision  · Fever of 100.4ºF (38ºC) or higher, or as directed by your healthcare provider  · Seizure  · Breathing problems  · Symptoms not resolving within 10 days  Date Last Reviewed: 4/13/2015  © 5563-9172 [employer]. [address] [place] [address], [place] [address]. All rights reserved. This information is not intended as a substitute for professional medical care. Always follow your healthcare professional's instructions.

## 2019-02-19 ENCOUNTER — DOCUMENTATION ONLY (OUTPATIENT)
Dept: FAMILY MEDICINE | Facility: CLINIC | Age: 69
End: 2019-02-19

## 2019-02-19 NOTE — PROGRESS NOTES
Pre-Visit Chart Review  For Appointment Scheduled on 3-11-19    Health Maintenance Due   Topic Date Due    Lipid Panel  02/12/2019

## 2019-02-22 ENCOUNTER — PATIENT OUTREACH (OUTPATIENT)
Dept: ADMINISTRATIVE | Facility: HOSPITAL | Age: 69
End: 2019-02-22

## 2019-02-22 DIAGNOSIS — E78.5 HYPERLIPIDEMIA, UNSPECIFIED HYPERLIPIDEMIA TYPE: Primary | ICD-10-CM

## 2019-02-22 NOTE — LETTER
February 22, 2019    Loren Andre  [address] [facility] [address]             [facility]  [address]  [facility] [address]  Phone: [phone] Dear L[facility] is committed to your overall health and would like to ensure that you are up to date on your recommended test and/or procedures.   Sanket Toure MD  has found that your chart shows you may be due for the following:    LIPID PANEL    If you have had any of the above done at another facility, please let us know so that we may obtain copies from that facility.  If you have a copy of these records, please provide a copy for us to scan into your chart.  You are welcome to request that the report be faxed to us at  ([phone]).     Otherwise, please schedule these appointments at your earliest convenience by calling [phone] or going to "[email or web address].    If you have an upcoming scheduled appointment for the item above, please disregard this letter.    Sincerely,  Your [facility]  MD Liliam Pham LPN Clinical Care Coordinator  [facility]  [address] [facility] [address]  Phone [phone]  Fax [phone]

## 2019-03-11 ENCOUNTER — LAB VISIT (OUTPATIENT)
Dept: LAB | Facility: HOSPITAL | Age: 69
End: 2019-03-11
Attending: FAMILY MEDICINE
Payer: MEDICARE

## 2019-03-11 ENCOUNTER — OFFICE VISIT (OUTPATIENT)
Dept: FAMILY MEDICINE | Facility: CLINIC | Age: 69
End: 2019-03-11
Attending: FAMILY MEDICINE
Payer: MEDICARE

## 2019-03-11 VITALS
HEIGHT: 66 IN | TEMPERATURE: 98 F | DIASTOLIC BLOOD PRESSURE: 70 MMHG | BODY MASS INDEX: 24.41 KG/M2 | SYSTOLIC BLOOD PRESSURE: 126 MMHG | WEIGHT: 151.88 LBS | HEART RATE: 72 BPM | OXYGEN SATURATION: 97 % | RESPIRATION RATE: 12 BRPM

## 2019-03-11 DIAGNOSIS — I10 GOOD HYPERTENSION CONTROL: ICD-10-CM

## 2019-03-11 DIAGNOSIS — E78.5 HYPERLIPIDEMIA, UNSPECIFIED HYPERLIPIDEMIA TYPE: ICD-10-CM

## 2019-03-11 DIAGNOSIS — I10 GOOD HYPERTENSION CONTROL: Primary | ICD-10-CM

## 2019-03-11 DIAGNOSIS — M54.2 NECK PAIN: ICD-10-CM

## 2019-03-11 LAB
ANION GAP SERPL CALC-SCNC: 6 MMOL/L
BUN SERPL-MCNC: 21 MG/DL
CALCIUM SERPL-MCNC: 9.9 MG/DL
CHLORIDE SERPL-SCNC: 106 MMOL/L
CHOLEST SERPL-MCNC: 154 MG/DL
CHOLEST/HDLC SERPL: 1.9 {RATIO}
CO2 SERPL-SCNC: 28 MMOL/L
CREAT SERPL-MCNC: 1.2 MG/DL
EST. GFR  (AFRICAN AMERICAN): 52.9 ML/MIN/1.73 M^2
EST. GFR  (NON AFRICAN AMERICAN): 45.9 ML/MIN/1.73 M^2
GLUCOSE SERPL-MCNC: 91 MG/DL
HDLC SERPL-MCNC: 80 MG/DL
HDLC SERPL: 51.9 %
LDLC SERPL CALC-MCNC: 64 MG/DL
NONHDLC SERPL-MCNC: 74 MG/DL
POTASSIUM SERPL-SCNC: 4.3 MMOL/L
SODIUM SERPL-SCNC: 140 MMOL/L
TRIGL SERPL-MCNC: 50 MG/DL

## 2019-03-11 PROCEDURE — 1101F PT FALLS ASSESS-DOCD LE1/YR: CPT | Mod: CPTII,S$GLB,, | Performed by: FAMILY MEDICINE

## 2019-03-11 PROCEDURE — 80061 LIPID PANEL: CPT

## 2019-03-11 PROCEDURE — 80048 BASIC METABOLIC PNL TOTAL CA: CPT

## 2019-03-11 PROCEDURE — 1101F PR PT FALLS ASSESS DOC 0-1 FALLS W/OUT INJ PAST YR: ICD-10-PCS | Mod: CPTII,S$GLB,, | Performed by: FAMILY MEDICINE

## 2019-03-11 PROCEDURE — 36415 COLL VENOUS BLD VENIPUNCTURE: CPT | Mod: PO

## 2019-03-11 PROCEDURE — 99499 UNLISTED E&M SERVICE: CPT | Mod: S$GLB,,, | Performed by: FAMILY MEDICINE

## 2019-03-11 PROCEDURE — 99999 PR PBB SHADOW E&M-EST. PATIENT-LVL IV: ICD-10-PCS | Mod: PBBFAC,,, | Performed by: FAMILY MEDICINE

## 2019-03-11 PROCEDURE — 99214 OFFICE O/P EST MOD 30 MIN: CPT | Mod: S$GLB,,, | Performed by: FAMILY MEDICINE

## 2019-03-11 PROCEDURE — 3078F DIAST BP <80 MM HG: CPT | Mod: CPTII,S$GLB,, | Performed by: FAMILY MEDICINE

## 2019-03-11 PROCEDURE — 3074F SYST BP LT 130 MM HG: CPT | Mod: CPTII,S$GLB,, | Performed by: FAMILY MEDICINE

## 2019-03-11 PROCEDURE — 99214 PR OFFICE/OUTPT VISIT, EST, LEVL IV, 30-39 MIN: ICD-10-PCS | Mod: S$GLB,,, | Performed by: FAMILY MEDICINE

## 2019-03-11 PROCEDURE — 3074F PR MOST RECENT SYSTOLIC BLOOD PRESSURE < 130 MM HG: ICD-10-PCS | Mod: CPTII,S$GLB,, | Performed by: FAMILY MEDICINE

## 2019-03-11 PROCEDURE — 99999 PR PBB SHADOW E&M-EST. PATIENT-LVL IV: CPT | Mod: PBBFAC,,, | Performed by: FAMILY MEDICINE

## 2019-03-11 PROCEDURE — 99499 RISK ADDL DX/OHS AUDIT: ICD-10-PCS | Mod: S$GLB,,, | Performed by: FAMILY MEDICINE

## 2019-03-11 PROCEDURE — 3078F PR MOST RECENT DIASTOLIC BLOOD PRESSURE < 80 MM HG: ICD-10-PCS | Mod: CPTII,S$GLB,, | Performed by: FAMILY MEDICINE

## 2019-03-11 RX ORDER — DICLOFENAC SODIUM 10 MG/G
2 GEL TOPICAL DAILY
Qty: 100 G | Refills: 5 | Status: SHIPPED | OUTPATIENT
Start: 2019-03-11 | End: 2019-08-20
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
## 2019-03-11 NOTE — PATIENT INSTRUCTIONS
Controlling High Blood Pressure  High blood pressure (hypertension) is often called the silent killer. This is because many people who have it dont know it. High blood pressure is defined as 140/90 mm Hg or higher. Know your blood pressure and remember to check it regularly. Doing so can save your life. Here are some things you can do to help control your blood pressure.    Choose heart-healthy foods  · Select low-salt, low-fat foods. Limit sodium intake to 2,400 mg per day or the amount suggested by your healthcare provider.  · Limit canned, dried, cured, packaged, and fast foods. These can contain a lot of salt.  · Eat 8 to 10 servings of fruits and vegetables every day.  · Choose lean meats, fish, or chicken.  · Eat whole-grain pasta, brown rice, and beans.  · Eat 2 to 3 servings of low-fat or fat-free dairy products.  · Ask your doctor about the DASH eating plan. This plan helps reduce blood pressure.  · When you go to a restaurant, ask that your meal be prepared with no added salt.  Maintain a healthy weight  · Ask your healthcare provider how many calories to eat a day. Then stick to that number.  · Ask your healthcare provider what weight range is healthiest for you. If you are overweight, a weight loss of only 3% to 5% of your body weight can help lower blood pressure. Generally, a good weight loss goal is to lose 10% of your body weight in a year.  · Limit snacks and sweets.  · Get regular exercise.  Get up and get active  · Choose activities you enjoy. Find ones you can do with friends or family. This includes bicycling, dancing, walking, and jogging.  · Park farther away from building entrances.  · Use stairs instead of the elevator.  · When you can, walk or bike instead of driving.  · [place] leaves, garden, or do household repairs.  · Be active at a moderate to vigorous level of physical activity for at least 40 minutes for a minimum of 3 to 4 days a week.   Manage stress  · Make time to relax and enjoy  life. Find time to laugh.  · Communicate your concerns with your loved ones and your healthcare provider.  · Visit with family and friends, and keep up with hobbies.  Limit alcohol and quit smoking  · Men should have no more than 2 drinks per day.  · Women should have no more than 1 drink per day.  · Talk with your healthcare provider about quitting smoking. Smoking significantly increases your risk for heart disease and stroke. Ask your healthcare provider about community smoking cessation programs and other options.  Medicines  If lifestyle changes arent enough, your healthcare provider may prescribe high blood pressure medicine. Take all medicines as prescribed. If you have any questions about your medicines, ask your healthcare provider before stopping or changing them.   Date Last Reviewed: 4/27/2016  © 3577-0041 [employer]. [address] [place] [address], [place] [address]. All rights reserved. This information is not intended as a substitute for professional medical care. Always follow your healthcare professional's instructions.

## 2019-03-25 ENCOUNTER — TELEPHONE (OUTPATIENT)
Dept: FAMILY MEDICINE | Facility: CLINIC | Age: 69
End: 2019-03-25

## 2019-03-25 DIAGNOSIS — J45.909 ASTHMA, UNSPECIFIED ASTHMA SEVERITY, UNSPECIFIED WHETHER COMPLICATED, UNSPECIFIED WHETHER PERSISTENT: Primary | ICD-10-CM

## 2019-03-25 RX ORDER — ALBUTEROL SULFATE 0.83 MG/ML
2.5 SOLUTION RESPIRATORY (INHALATION) EVERY 6 HOURS PRN
Qty: 90 ML | Refills: 2 | Status: SHIPPED | OUTPATIENT
Start: 2019-03-25 | End: 2020-03-24

## 2019-03-25 NOTE — TELEPHONE ENCOUNTER
Patient notified Albuterol sent to [facility] and supply order faxed to [facility].

## 2019-03-25 NOTE — TELEPHONE ENCOUNTER
----- Message from Jeffery Wasserman sent at 3/25/2019 10:44 AM CDT -----  Contact: self   Patient want to speak with a nurse regarding replacing nebulizer holes and refill on medication please sent to [facility] any questions please call back at [phone] (home)       [facility] [id number] - [address] [place] [facility]  [address] [address]  Phone: [phone] Fax: [phone]

## 2019-03-25 NOTE — TELEPHONE ENCOUNTER
Patient says since it is warming up and pollen is bad her asthma has flared up. Asking for refill of Albuterol for her nebulizer. She wants a small amount sent in. I think they come 30 to a box. I have the order for the nebulizer supplies on your desk for signature.

## 2019-04-02 ENCOUNTER — TELEPHONE (OUTPATIENT)
Dept: FAMILY MEDICINE | Facility: CLINIC | Age: 69
End: 2019-04-02

## 2019-04-02 DIAGNOSIS — J45.909 ASTHMA, UNSPECIFIED ASTHMA SEVERITY, UNSPECIFIED WHETHER COMPLICATED, UNSPECIFIED WHETHER PERSISTENT: Primary | ICD-10-CM

## 2019-04-02 NOTE — TELEPHONE ENCOUNTER
Spoke to patient- says [facility] didn't receive my fax with nebulizer supply order-will refax.

## 2019-04-02 NOTE — TELEPHONE ENCOUNTER
----- Message from Gem Hernandez sent at 4/2/2019 10:15 AM CDT -----  Type: Needs Medical Advice    Who Called:  Patient   Best Call Back Number: [phone]  Additional Information: patient is requesting a return call concerning the nebulizer and supplies ordered by Dr. Toure. Please contact to advise.     Thank you

## 2019-04-22 DIAGNOSIS — E78.5 HYPERLIPIDEMIA, UNSPECIFIED HYPERLIPIDEMIA TYPE: ICD-10-CM

## 2019-04-22 RX ORDER — ATORVASTATIN CALCIUM 40 MG/1
TABLET, FILM COATED ORAL
Qty: 90 TABLET | Refills: 3 | Status: SHIPPED | OUTPATIENT
Start: 2019-04-22 | End: 2020-01-08 | Stop reason: SDUPTHER

## 2019-04-22 RX ORDER — MONTELUKAST SODIUM 10 MG/1
TABLET ORAL
Qty: 90 TABLET | Refills: 3 | Status: SHIPPED | OUTPATIENT
Start: 2019-04-22 | End: 2020-01-08 | Stop reason: SDUPTHER

## 2019-07-09 RX ORDER — VALACYCLOVIR HYDROCHLORIDE 500 MG/1
TABLET, FILM COATED ORAL
Qty: 60 TABLET | Refills: 0 | OUTPATIENT
Start: 2019-07-09

## 2019-07-09 NOTE — TELEPHONE ENCOUNTER
Taken off med list by Stephania shannon in November 2018 stating she was not taking this any longer.

## 2019-07-17 DIAGNOSIS — A60.9 HSV (HERPES SIMPLEX VIRUS) ANOGENITAL INFECTION: Primary | ICD-10-CM

## 2019-07-17 RX ORDER — VALACYCLOVIR HYDROCHLORIDE 500 MG/1
TABLET, FILM COATED ORAL
Qty: 60 TABLET | Refills: 3 | Status: SHIPPED | OUTPATIENT
Start: 2019-07-17 | End: 2020-10-01 | Stop reason: SDUPTHER

## 2019-07-25 DIAGNOSIS — A60.9 HSV (HERPES SIMPLEX VIRUS) ANOGENITAL INFECTION: ICD-10-CM

## 2019-07-25 RX ORDER — VALACYCLOVIR HYDROCHLORIDE 500 MG/1
TABLET, FILM COATED ORAL
Qty: 60 TABLET | Refills: 3 | Status: CANCELLED | OUTPATIENT
Start: 2019-07-25

## 2019-07-25 NOTE — TELEPHONE ENCOUNTER
This refill was just sent to [facility] one week ago.  Why are we getting a request for [employer] now?

## 2019-07-25 NOTE — TELEPHONE ENCOUNTER
----- Message from Edward Barraza sent at 7/25/2019  8:20 AM CDT -----  Contact: patient   Type:  RX Refill Request    Who Called:  Patient   Refill or New Rx:  Refill   RX Name and Strength:  valACYclovir (VALTREX) 500 MG tablet  Preferred Pharmacy with phone number:    [id number] - [place] - [address] AT [facility]  [address]  [place] [address]  Phone: [phone] Fax: [phone]  Local or Mail Order: local   Best Call Back Number: [phone] (home)

## 2019-07-26 ENCOUNTER — DOCUMENTATION ONLY (OUTPATIENT)
Dept: FAMILY MEDICINE | Facility: CLINIC | Age: 69
End: 2019-07-26

## 2019-07-26 NOTE — PROGRESS NOTES
Pre-Visit Chart Review  For Appointment Scheduled on 7-31-19    Health Maintenance Due   Topic Date Due    Mammogram  08/31/2019

## 2019-07-31 ENCOUNTER — OFFICE VISIT (OUTPATIENT)
Dept: FAMILY MEDICINE | Facility: CLINIC | Age: 69
End: 2019-07-31
Attending: FAMILY MEDICINE
Payer: MEDICARE

## 2019-07-31 VITALS
WEIGHT: 152.13 LBS | BODY MASS INDEX: 24.45 KG/M2 | DIASTOLIC BLOOD PRESSURE: 72 MMHG | HEART RATE: 72 BPM | RESPIRATION RATE: 12 BRPM | OXYGEN SATURATION: 98 % | TEMPERATURE: 98 F | HEIGHT: 66 IN | SYSTOLIC BLOOD PRESSURE: 180 MMHG

## 2019-07-31 DIAGNOSIS — R76.8 RHEUMATOID FACTOR POSITIVE: ICD-10-CM

## 2019-07-31 DIAGNOSIS — I10 UNCONTROLLED HYPERTENSION: ICD-10-CM

## 2019-07-31 DIAGNOSIS — M76.62 ACHILLES TENDINITIS OF LEFT LOWER EXTREMITY: ICD-10-CM

## 2019-07-31 DIAGNOSIS — D57.3 SICKLE-CELL TRAIT: ICD-10-CM

## 2019-07-31 DIAGNOSIS — J01.00 ACUTE NON-RECURRENT MAXILLARY SINUSITIS: ICD-10-CM

## 2019-07-31 DIAGNOSIS — Z12.31 ENCOUNTER FOR SCREENING MAMMOGRAM FOR BREAST CANCER: ICD-10-CM

## 2019-07-31 DIAGNOSIS — M72.2 PLANTAR FASCIITIS: Primary | ICD-10-CM

## 2019-07-31 PROCEDURE — 3077F PR MOST RECENT SYSTOLIC BLOOD PRESSURE >= 140 MM HG: ICD-10-PCS | Mod: HCNC,CPTII,S$GLB, | Performed by: FAMILY MEDICINE

## 2019-07-31 PROCEDURE — 99999 PR PBB SHADOW E&M-EST. PATIENT-LVL IV: ICD-10-PCS | Mod: PBBFAC,HCNC,, | Performed by: FAMILY MEDICINE

## 2019-07-31 PROCEDURE — 1101F PR PT FALLS ASSESS DOC 0-1 FALLS W/OUT INJ PAST YR: ICD-10-PCS | Mod: HCNC,CPTII,S$GLB, | Performed by: FAMILY MEDICINE

## 2019-07-31 PROCEDURE — 3077F SYST BP >= 140 MM HG: CPT | Mod: HCNC,CPTII,S$GLB, | Performed by: FAMILY MEDICINE

## 2019-07-31 PROCEDURE — 3078F PR MOST RECENT DIASTOLIC BLOOD PRESSURE < 80 MM HG: ICD-10-PCS | Mod: HCNC,CPTII,S$GLB, | Performed by: FAMILY MEDICINE

## 2019-07-31 PROCEDURE — 3078F DIAST BP <80 MM HG: CPT | Mod: HCNC,CPTII,S$GLB, | Performed by: FAMILY MEDICINE

## 2019-07-31 PROCEDURE — 99214 OFFICE O/P EST MOD 30 MIN: CPT | Mod: HCNC,S$GLB,, | Performed by: FAMILY MEDICINE

## 2019-07-31 PROCEDURE — 99214 PR OFFICE/OUTPT VISIT, EST, LEVL IV, 30-39 MIN: ICD-10-PCS | Mod: HCNC,S$GLB,, | Performed by: FAMILY MEDICINE

## 2019-07-31 PROCEDURE — 99999 PR PBB SHADOW E&M-EST. PATIENT-LVL IV: CPT | Mod: PBBFAC,HCNC,, | Performed by: FAMILY MEDICINE

## 2019-07-31 PROCEDURE — 1101F PT FALLS ASSESS-DOCD LE1/YR: CPT | Mod: HCNC,CPTII,S$GLB, | Performed by: FAMILY MEDICINE

## 2019-07-31 RX ORDER — DOXYCYCLINE HYCLATE 100 MG
100 TABLET ORAL 2 TIMES DAILY
Qty: 20 TABLET | Refills: 0 | Status: SHIPPED | OUTPATIENT
Start: 2019-07-31 | End: 2019-08-10

## 2019-07-31 NOTE — PROGRESS NOTES
Subjective:       Patient ID: Loren Andre is a 69 y.o. female.    Chief Complaint: Foot Pain (left heal )    69-year-old female coming in with pain in her left foot as well as a two week history of frontal headaches.  She also needs a mammogram.  In May she was going down two steps with in her house when she lost her balance and fell backwards sitting on the floor level above the 1st step.  She did not have any injury to the point of impact but in the fall her left foot apparently slap down hard on the lower step or the landing below it.  She had some discomfort in the plantar heel and at the Achilles insertion on the left side which has persisted to present.  She notes pain is worse when she 1st gets out of bed and is relieved by stretching and also the pain is worse after she has been sitting for a while and 1st gets up.  After a few steps the pain disappears entirely.  She has had problems with plantar fasciitis on the right side in the past but has no symptoms there currently.    The patient has been experiencing pain in the frontal and maxillary areas bilaterally, mostly in the maxillary areas.  Pain is a steady ache usually worse in the morning with no throbbing sensation, no photophobia or phonophobia.  She has no nausea or vomiting.  She does have the sensation of a toothache in the upper jaw bilaterally. She has no fever or chills.  She has no cough or chest congestion.    The patient has been checking her blood pressure and usually her normal 120-130 over 70s.  She was rushing to get here today and attributes her elevated blood pressure to that.  Even though she sat for a while relaxing it still remains high but she does not feel she needs any additional blood pressure medication.    Past Medical History:  No date: Allergy  No date: Anemia      Comment:  sickle cell trait  No date: Anxiety  No date: Arthritis  No date: Asthma  10/5/2017: Chronic disease anemia  No date: Colon polyps  No date:  Depression  3/7/2013: Dermatitis  No date: Fibromyalgia  No date: HEARING LOSS  No date: Hyperlipidemia  No date: Hypertension  10/5/2017: Leucopenia  No date: Polyneuropathy  No date: Rheumatoid arthritis(714.0)  No date: Scoliosis  No date: Sickle cell trait  10/5/2017: Sickle-cell trait  No date: Trouble in sleeping  No date: Urticaria    Past Surgical History:  No date: ADENOIDECTOMY  No date: APPENDECTOMY  4/14/2008: COLONOSCOPY      Comment:  Dr. Guerrero, 10 year recheck  10/4/2018: COLONOSCOPY; N/A      Comment:  Performed by Tam Kemp MD at [facility]  No date: HYSTERECTOMY  No date: OOPHORECTOMY  01/2016: ROTATOR CUFF REPAIR; Left  12/04/2017: ROTATOR CUFF REPAIR W/ DISTAL CLAVICLE EXCISION; Right      Comment:  Dr. Eligio Timmons ( [facility] )  No date: TONSILLECTOMY    Current Outpatient Medications on File Prior to Visit:  albuterol (PROVENTIL) 2.5 mg /3 mL (0.083 %) nebulizer solution, Take 3 mLs (2.5 mg total) by nebulization every 6 (six) hours as needed for Wheezing. Rescue, Disp: 90 mL, Rfl: 2  ascorbic acid, vitamin C, (VITAMIN C) 500 MG tablet, Take 500 mg by mouth daily as needed. , Disp: , Rfl:   atorvastatin (LIPITOR) 40 MG tablet, TAKE 1 TABLET EVERY DAY, Disp: 90 tablet, Rfl: 3  azelastine (ASTELIN) 137 mcg (0.1 %) nasal spray, 2 sprays (274 mcg total) by Nasal route 2 (two) times daily. (Patient taking differently: 2 sprays by Nasal route 2 (two) times daily as needed. [place] brand), Disp: 30 mL, Rfl: 12  BLACK COHOSH ORAL, Take 1 capsule by mouth 2 (two) times daily. Female hormone blend sp - 7c Black Cohosh , Disp: , Rfl:   CALCIUM CARBONATE/VITAMIN D3 (VITAMIN D-3 ORAL), Take 100 Int'l Units by mouth once daily. , Disp: , Rfl:   carvedilol (COREG) 6.25 MG tablet, TAKE 1 TABLET(6.25 MG) BY MOUTH TWICE DAILY WITH MEALS, Disp: 180 tablet, Rfl: 3  coenzyme Q10 (CO Q-10) 10 mg capsule, Take 10 mg by mouth once daily., Disp: , Rfl:   diclofenac sodium (VOLTAREN) 1 % Gel, Apply 2 g  topically once daily., Disp: 100 g, Rfl: 5  fluticasone-salmeterol 250-50 mcg/dose (ADVAIR DISKUS) 250-50 mcg/dose diskus inhaler, INHALE 1 PUFF INTO THE LUNGS TWICE DAILY (Patient taking differently: Inhale 1 puff into the lungs daily as needed. INHALE 1 PUFF INTO THE LUNGS TWICE DAILY), Disp: 60 each, Rfl: 5  folic acid (FOLVITE) 400 MCG tablet, Take 400 mcg by mouth once daily., Disp: , Rfl:   GARLIC OIL ORAL, Take 1 capsule by mouth., Disp: , Rfl:   lisinopril (PRINIVIL,ZESTRIL) 40 MG tablet, Take 1 tablet (40 mg total) by mouth once daily., Disp: 90 tablet, Rfl: 3  mometasone 0.1% (ELOCON) 0.1 % cream, APPLY 1 APPLICATION TOPICALLY ONCE A DAY prn, Disp: , Rfl:   montelukast (SINGULAIR) 10 mg tablet, TAKE 1 TABLET EVERY DAY, Disp: 90 tablet, Rfl: 3  NIACIN, INOSITOL NIACINATE, (NIACIN FLUSH FREE ORAL), Take 500 mg by mouth once daily., Disp: , Rfl:   OMEGA-3-DHA-EPA-FISH OIL ORAL, Take 1 capsule by mouth once daily. , Disp: , Rfl:   traZODone (DESYREL) 50 MG tablet, Take 1 tablet (50 mg total) by mouth nightly. (Patient taking differently: Take 100 mg by mouth nightly. ), Disp: 90 tablet, Rfl: 3  valACYclovir (VALTREX) 500 MG tablet, TAKE 1 TABLET (500 MG TOTAL) BY MOUTH 2 (TWO) TIMES DAILY FOR 7 DAYS AS DIRECTED. , Disp: 60 tablet, Rfl: 3  VENTOLIN HFA 90 mcg/actuation inhaler, INHALE 2 PUFFS BY MOUTH EVERY 4 HOURS AS NEEDED FOR WHEEZING, Disp: 18 g, Rfl: 0  vitamin E 100 UNIT capsule, Take 100 Units by mouth once daily., Disp: , Rfl:   betamethasone dipropionate (DIPROLENE) 0.05 % cream, APPLY EXTERNALLY TO THE AFFECTED AREA TWICE DAILY, Disp: 45 g, Rfl: 0  (DISCONTINUED) cetirizine (ZYRTEC) 10 MG tablet, Take 10 mg by mouth once daily., Disp: , Rfl:     No current facility-administered medications on file prior to visit.         Review of Systems   Eyes: Negative for photophobia and visual disturbance.   Respiratory: Negative for chest tightness and shortness of breath.    Cardiovascular: Negative for chest  pain and palpitations.   Gastrointestinal: Negative for nausea and vomiting.   Musculoskeletal: Positive for arthralgias and gait problem. Negative for back pain, joint swelling, neck pain and neck stiffness.   Neurological: Positive for headaches. Negative for dizziness and light-headedness.       Objective:      Physical Exam   Constitutional: She is oriented to person, place, and time. She appears well-developed and well-nourished. No distress.   Elevated systolic blood pressure  Normal weight with a BMI of 24.6 she is up 0.2 lb from her last visit with me March 11, 2019 at which time her blood pressure was 126 over 70.   HENT:   Head: Normocephalic and atraumatic.   Right Ear: Hearing, tympanic membrane, external ear and ear canal normal.   Left Ear: Hearing, tympanic membrane, external ear and ear canal normal.   Nose: Mucosal edema and rhinorrhea present. Right sinus exhibits maxillary sinus tenderness. Right sinus exhibits no frontal sinus tenderness. Left sinus exhibits maxillary sinus tenderness. Left sinus exhibits no frontal sinus tenderness.   Mouth/Throat: Oropharynx is clear and moist. No oropharyngeal exudate, posterior oropharyngeal edema, posterior oropharyngeal erythema or tonsillar abscesses.   Eyes: Pupils are equal, round, and reactive to light. EOM are normal. No scleral icterus.   Cardiovascular: Normal rate, regular rhythm and normal heart sounds. Exam reveals no gallop and no friction rub.   No murmur heard.  Pulmonary/Chest: Effort normal and breath sounds normal. No stridor. No respiratory distress. She has no wheezes. She has no rales. She exhibits no tenderness.   Musculoskeletal:        Left ankle: She exhibits normal range of motion, no swelling, no ecchymosis and no deformity. Tenderness. Achilles tendon exhibits no pain, no defect and normal Saavedra's test results.        Left foot: There is bony tenderness (Mild tenderness over the plantar aponeurosis at the heel). There is normal  range of motion, no swelling, no crepitus and no deformity.   Neurological: She is alert and oriented to person, place, and time.   Skin: Skin is warm and dry. She is not diaphoretic.   Psychiatric: She has a normal mood and affect. Her behavior is normal. Judgment and thought content normal.   Nursing note and vitals reviewed.      Assessment:       1. Plantar fasciitis    2. Achilles tendinitis of left lower extremity    3. Acute non-recurrent maxillary sinusitis    4. Uncontrolled hypertension    5. Encounter for screening mammogram for breast cancer    6. Rheumatoid factor positive    7. Sickle-cell trait    8. BMI 24.0-24.9, adult        Plan:       1. Plantar fasciitis  Stretching exercises discussed and demonstrated    2. Achilles tendinitis of left lower extremity  Stretching exercises discussed and demonstrated    3. Acute non-recurrent maxillary sinusitis  Will avoid any steroid use with elevated blood pressure  - doxycycline (VIBRA-TABS) 100 MG tablet; Take 1 tablet (100 mg total) by mouth 2 (two) times daily. for 10 days  Dispense: 20 tablet; Refill: 0    4. Uncontrolled hypertension  Patient declines change in medication.  She will keep a blood pressure log for the next week and call me Monday with results    5. Encounter for screening mammogram for breast cancer  Scheduled  - Mammo Digital Screening Bilat; Future    6. Rheumatoid factor positive  Asymptomatic    7. Sickle-cell trait  Asymptomatic    8. BMI 24.0-24.9, adult  Good weight with no changes needed

## 2019-07-31 NOTE — PATIENT INSTRUCTIONS
Controlling High Blood Pressure  High blood pressure (hypertension) is often called the silent killer. This is because many people who have it dont know it. High blood pressure is defined as 140/90 mm Hg or higher. Know your blood pressure and remember to check it regularly. Doing so can save your life. Here are some things you can do to help control your blood pressure.    Choose heart-healthy foods  · Select low-salt, low-fat foods. Limit sodium intake to 2,400 mg per day or the amount suggested by your healthcare provider.  · Limit canned, dried, cured, packaged, and fast foods. These can contain a lot of salt.  · Eat 8 to 10 servings of fruits and vegetables every day.  · Choose lean meats, fish, or chicken.  · Eat whole-grain pasta, brown rice, and beans.  · Eat 2 to 3 servings of low-fat or fat-free dairy products.  · Ask your doctor about the DASH eating plan. This plan helps reduce blood pressure.  · When you go to a restaurant, ask that your meal be prepared with no added salt.  Maintain a healthy weight  · Ask your healthcare provider how many calories to eat a day. Then stick to that number.  · Ask your healthcare provider what weight range is healthiest for you. If you are overweight, a weight loss of only 3% to 5% of your body weight can help lower blood pressure. Generally, a good weight loss goal is to lose 10% of your body weight in a year.  · Limit snacks and sweets.  · Get regular exercise.  Get up and get active  · Choose activities you enjoy. Find ones you can do with friends or family. This includes bicycling, dancing, walking, and jogging.  · Park farther away from building entrances.  · Use stairs instead of the elevator.  · When you can, walk or bike instead of driving.  · [place] leaves, garden, or do household repairs.  · Be active at a moderate to vigorous level of physical activity for at least 40 minutes for a minimum of 3 to 4 days a week.   Manage stress  · Make time to relax and enjoy  life. Find time to laugh.  · Communicate your concerns with your loved ones and your healthcare provider.  · Visit with family and friends, and keep up with hobbies.  Limit alcohol and quit smoking  · Men should have no more than 2 drinks per day.  · Women should have no more than 1 drink per day.  · Talk with your healthcare provider about quitting smoking. Smoking significantly increases your risk for heart disease and stroke. Ask your healthcare provider about community smoking cessation programs and other options.  Medicines  If lifestyle changes arent enough, your healthcare provider may prescribe high blood pressure medicine. Take all medicines as prescribed. If you have any questions about your medicines, ask your healthcare provider before stopping or changing them.   Date Last Reviewed: 4/27/2016  © 9090-4115 [employer]. [address] [place] [address], [place] [address]. All rights reserved. This information is not intended as a substitute for professional medical care. Always follow your healthcare professional's instructions.

## 2019-08-01 ENCOUNTER — TELEPHONE (OUTPATIENT)
Dept: FAMILY MEDICINE | Facility: CLINIC | Age: 69
End: 2019-08-01

## 2019-08-01 ENCOUNTER — DOCUMENTATION ONLY (OUTPATIENT)
Dept: FAMILY MEDICINE | Facility: CLINIC | Age: 69
End: 2019-08-01

## 2019-08-01 NOTE — TELEPHONE ENCOUNTER
She was given this in May 2017 after she broke out in a rash after using a skin lotion.  It was felt to be a contact dermatitis.  She was just here yesterday and did not mention a rash.  Diprolene is very strong and should not be used for long periods of time.  This needs to be evaluated.

## 2019-08-01 NOTE — TELEPHONE ENCOUNTER
Patient says she gets a rash every summer on neck, hands and arms- is out of the Diprolene and asking for refill.

## 2019-08-01 NOTE — PROGRESS NOTES
Pre-Visit Chart Review  For Appointment Scheduled on 8-5-19    Health Maintenance Due   Topic Date Due    Mammogram  08/31/2019

## 2019-08-01 NOTE — TELEPHONE ENCOUNTER
----- Message from Juan STALEY Kasandrashawn sent at 8/1/2019  8:10 AM CDT -----  Contact: same  Patient called in and is requesting a refill on her Rx for betamethasone dipropionate (DIPROLENE) 0.05 % cream, APPLY EXTERNALLY TO THE AFFECTED AREA TWICE DAILY, 45 g with 0 refills last filled on 2/18/2019 by Maia Partida.      [id number] - [place] - [address] [facility]  [address] [facility] [place] [address]  Phone: [phone] Fax: [phone]    Patient call back number is [phone]

## 2019-08-05 ENCOUNTER — HOSPITAL ENCOUNTER (OUTPATIENT)
Dept: RADIOLOGY | Facility: CLINIC | Age: 69
Discharge: HOME | End: 2019-08-05
Attending: FAMILY MEDICINE
Payer: MEDICARE

## 2019-08-05 DIAGNOSIS — Z12.31 ENCOUNTER FOR SCREENING MAMMOGRAM FOR BREAST CANCER: ICD-10-CM

## 2019-08-05 PROCEDURE — 77067 SCR MAMMO BI INCL CAD: CPT | Mod: TC,HCNC,PO

## 2019-08-05 PROCEDURE — 77063 MAMMO DIGITAL SCREENING BILAT WITH TOMOSYNTHESIS_CAD: ICD-10-PCS | Mod: 26,HCNC,, | Performed by: RADIOLOGY

## 2019-08-05 PROCEDURE — 77067 MAMMO DIGITAL SCREENING BILAT WITH TOMOSYNTHESIS_CAD: ICD-10-PCS | Mod: 26,HCNC,, | Performed by: RADIOLOGY

## 2019-08-05 PROCEDURE — 77067 SCR MAMMO BI INCL CAD: CPT | Mod: 26,HCNC,, | Performed by: RADIOLOGY

## 2019-08-05 PROCEDURE — 77063 BREAST TOMOSYNTHESIS BI: CPT | Mod: 26,HCNC,, | Performed by: RADIOLOGY

## 2019-08-12 ENCOUNTER — TELEPHONE (OUTPATIENT)
Dept: FAMILY MEDICINE | Facility: CLINIC | Age: 69
End: 2019-08-12

## 2019-08-12 NOTE — TELEPHONE ENCOUNTER
Blood pressure appears to be getting better with the more recent readings but it is still a little high.  Pulses are adequate.  We could increase the carvedilol slightly or add a low-dose diuretic/fluid pill

## 2019-08-12 NOTE — TELEPHONE ENCOUNTER
Patient dropped off blood pressure log    7-31-19 5 pm 158/72 p 67  8- 1-19  3 pm 144/75 p 70  8- 2-19 5 pm 165/?    p 68  8-3-19 12:00 132/73  p 68  8-4-19 12:00 132/72  p 70  8-5-19 3 pm 133/76   p 69

## 2019-08-13 NOTE — TELEPHONE ENCOUNTER
The next step up on coreg is 12.5mg twice a day.  She can take two of her 6.25's twice a day and see how the blood pressure runs before we send in a rx for the 12.5mg.

## 2019-08-13 NOTE — TELEPHONE ENCOUNTER
----- Message from Lizzie Oreilly sent at 8/12/2019  5:34 PM CDT -----  Pt missed a call and would like the nurse to return their call pt can be reached at [phone]        Thank you!

## 2019-08-20 ENCOUNTER — OFFICE VISIT (OUTPATIENT)
Dept: PODIATRY | Facility: CLINIC | Age: 69
End: 2019-08-20
Payer: MEDICARE

## 2019-08-20 ENCOUNTER — TELEPHONE (OUTPATIENT)
Dept: FAMILY MEDICINE | Facility: CLINIC | Age: 69
End: 2019-08-20

## 2019-08-20 ENCOUNTER — HOSPITAL ENCOUNTER (OUTPATIENT)
Dept: RADIOLOGY | Facility: CLINIC | Age: 69
Discharge: HOME | End: 2019-08-20
Attending: PODIATRIST
Payer: MEDICARE

## 2019-08-20 VITALS — WEIGHT: 151.88 LBS | HEIGHT: 66 IN | BODY MASS INDEX: 24.41 KG/M2

## 2019-08-20 DIAGNOSIS — G47.00 INSOMNIA, UNSPECIFIED TYPE: ICD-10-CM

## 2019-08-20 DIAGNOSIS — M79.672 FOOT PAIN, LEFT: ICD-10-CM

## 2019-08-20 DIAGNOSIS — M72.2 PLANTAR FASCIITIS: ICD-10-CM

## 2019-08-20 DIAGNOSIS — M24.573 EQUINUS CONTRACTURE OF ANKLE: ICD-10-CM

## 2019-08-20 DIAGNOSIS — M72.2 PLANTAR FASCIITIS: Primary | ICD-10-CM

## 2019-08-20 DIAGNOSIS — M79.672 LEFT FOOT PAIN: Primary | ICD-10-CM

## 2019-08-20 PROCEDURE — 1101F PR PT FALLS ASSESS DOC 0-1 FALLS W/OUT INJ PAST YR: ICD-10-PCS | Mod: HCNC,CPTII,S$GLB, | Performed by: PODIATRIST

## 2019-08-20 PROCEDURE — 73630 X-RAY EXAM OF FOOT: CPT | Mod: TC,HCNC,FY,PO,LT

## 2019-08-20 PROCEDURE — 73630 X-RAY EXAM OF FOOT: CPT | Mod: 26,HCNC,LT,S$GLB | Performed by: RADIOLOGY

## 2019-08-20 PROCEDURE — 99203 PR OFFICE/OUTPT VISIT, NEW, LEVL III, 30-44 MIN: ICD-10-PCS | Mod: 25,HCNC,S$GLB, | Performed by: PODIATRIST

## 2019-08-20 PROCEDURE — 99203 OFFICE O/P NEW LOW 30 MIN: CPT | Mod: 25,HCNC,S$GLB, | Performed by: PODIATRIST

## 2019-08-20 PROCEDURE — 29540 STRAPPING ANKLE &/FOOT: CPT | Mod: HCNC,LT,S$GLB, | Performed by: PODIATRIST

## 2019-08-20 PROCEDURE — 1101F PT FALLS ASSESS-DOCD LE1/YR: CPT | Mod: HCNC,CPTII,S$GLB, | Performed by: PODIATRIST

## 2019-08-20 PROCEDURE — 29540 PR STRAPPING; ANKLE &/OR FOOT: ICD-10-PCS | Mod: HCNC,LT,S$GLB, | Performed by: PODIATRIST

## 2019-08-20 PROCEDURE — 99999 PR PBB SHADOW E&M-EST. PATIENT-LVL II: CPT | Mod: PBBFAC,HCNC,, | Performed by: PODIATRIST

## 2019-08-20 PROCEDURE — 99999 PR PBB SHADOW E&M-EST. PATIENT-LVL II: ICD-10-PCS | Mod: PBBFAC,HCNC,, | Performed by: PODIATRIST

## 2019-08-20 PROCEDURE — 73630 XR FOOT COMPLETE 3 VIEW LEFT: ICD-10-PCS | Mod: 26,HCNC,LT,S$GLB | Performed by: RADIOLOGY

## 2019-08-20 RX ORDER — DICLOFENAC SODIUM 10 MG/G
2 GEL TOPICAL 4 TIMES DAILY
Qty: 100 G | Refills: 2 | Status: SHIPPED | OUTPATIENT
Start: 2019-08-20 | End: 2020-07-08

## 2019-08-20 RX ORDER — TRAZODONE HYDROCHLORIDE 100 MG/1
100 TABLET ORAL NIGHTLY
Qty: 90 TABLET | Refills: 1 | Status: SHIPPED | OUTPATIENT
Start: 2019-08-20 | End: 2020-03-19

## 2019-08-20 NOTE — TELEPHONE ENCOUNTER
----- Message from Alexandre Gunn sent at 8/20/2019  9:05 AM CDT -----  Contact: Patient  Type: Needs Medical Advice    Who Called:  Patient  Best Call Back Number: [phone]  Additional Information: Patient is calling to let Whitley know that pain in feet is not being relieved with exercise recommended by Tejal. Please advise what she should try next

## 2019-08-20 NOTE — TELEPHONE ENCOUNTER
----- Message from Supriya Dia sent at 8/20/2019 12:34 PM CDT -----  Contact: pt   Type:  Patient Returning Call    Who Called: pt  Who Left Message for Patient: Coby  Does the patient know what this is regarding?:  Would the patient rather a call back or a response via [employer]? Call back  Best Call Back Number: [phone]   Additional Information:

## 2019-08-20 NOTE — TELEPHONE ENCOUNTER
Patient needs refill of Trazodone- 50mg didn't work so she has been taking 2-working well- changed dose to 100mg. [employer]

## 2019-08-20 NOTE — PROGRESS NOTES
Subjective:      Patient ID: Loren Andre is a 69 y.o. female.    Chief Complaint: No chief complaint on file.    Sharp deep pain bottom left heel.  Gradual onset, worsening over past several weeks, aggravated by increased weight bearing, shoe gear, pressure.  No previous medical treatment.  OTC pain med not helping. Denies  Trauma, surgery both feet.    Review of Systems   Constitution: Negative for chills, diaphoresis, fever, malaise/fatigue and night sweats.   Cardiovascular: Negative for claudication, cyanosis, leg swelling and syncope.   Skin: Negative for color change, dry skin, nail changes, rash, suspicious lesions and unusual hair distribution.   Musculoskeletal: Negative for falls, joint pain, joint swelling, muscle cramps, muscle weakness and stiffness.   Gastrointestinal: Negative for constipation, diarrhea, nausea and vomiting.   Neurological: Negative for brief paralysis, disturbances in coordination, focal weakness, numbness, paresthesias, sensory change and tremors.           Objective:      Physical Exam   Constitutional: She is oriented to person, place, and time. She appears well-developed and well-nourished. She is cooperative. No distress.   Cardiovascular:   Pulses:       Popliteal pulses are 2+ on the right side, and 2+ on the left side.        Dorsalis pedis pulses are 2+ on the right side, and 2+ on the left side.        Posterior tibial pulses are 2+ on the right side, and 2+ on the left side.   Capillary refill 3 seconds all toes/distal feet, all toes/both feet warm to touch.      Negative lymphadenopathy bilateral popliteal fossa and tarsal tunnel.      Negavie lower extremity edema bilateral.     Musculoskeletal:        Right ankle: She exhibits normal range of motion, no swelling, no ecchymosis, no deformity, no laceration and normal pulse. Achilles tendon normal. Achilles tendon exhibits no pain, no defect and normal Saavedra's test results.   Sharp deep pain to palpation inferior  heel left at medial calcaneal tubercle without ecchymosis, erythema, edema, or cardinal signs infection, and no signs of trauma.    Ankle dorsiflexion decreased at <10 degrees bilateral with moderate increase with knee flexion bilateral.    Otherwise, Normal angle, base, station of gait. All ten toes without clubbing, cyanosis, or signs of ischemia.  No pain to palpation bilateral lower extremities.  Range of motion, stability, muscle strength, and muscle tone normal bilateral feet and legs.    Lymphadenopathy: No inguinal adenopathy noted on the right or left side.   Negative lymphadenopathy bilateral popliteal fossa and tarsal tunnel.    Negative lymphangitic streaking bilateral feet/ankles/legs.   Neurological: She is alert and oriented to person, place, and time. She has normal strength. She displays no atrophy and no tremor. No sensory deficit. She exhibits normal muscle tone. Gait normal.   Reflex Scores:       Patellar reflexes are 2+ on the right side and 2+ on the left side.       Achilles reflexes are 2+ on the right side and 2+ on the left side.  Negative tinel sign to percussion sural, superficial peroneal, deep peroneal, saphenous, and posterior tibial nerves right and left ankles and feet.     Skin: Skin is warm, dry and intact. Capillary refill takes 2 to 3 seconds. No abrasion, no bruising, no burn, no ecchymosis, no laceration, no lesion and no rash noted. She is not diaphoretic. No cyanosis or erythema. No pallor. Nails show no clubbing.     Skin is normal age and health appropriate color, turgor, texture, and temperature bilateral lower extremities without ulceration, hyperpigmentation, discoloration, masses nodules or cords palpated.  No ecchymosis, erythema, edema, or cardinal signs of infection bilateral lower extremities.     Psychiatric: She has a normal mood and affect.             Assessment:       Encounter Diagnoses   Name Primary?    Plantar fasciitis Yes    Foot pain, left      Equinus contracture of ankle          Plan:       Diagnoses and all orders for this visit:    Plantar fasciitis  -     X-Ray Foot Complete Left; Future    Foot pain, left  -     X-Ray Foot Complete Left; Future    Equinus contracture of ankle  -     X-Ray Foot Complete Left; Future    Other orders  -     diclofenac sodium (VOLTAREN) 1 % Gel; Apply 2 g topically 4 (four) times daily.      I counseled the patient on her conditions, their implications and medical management.        Patient will stretch the tendo achilles complex three times daily as demonstrated in the office.  Literature was dispensed illustrating proper stretching technique.    I applied a plantar rest strapping to the patient's left foot to offload symptomatic area, support the arch, and relieve pain.    Patient will obtain over the counter arch supports and wear them in shoes whenever possible.  Athletic shoes intended for walking or running are usually best.    The patient was advised that NSAID-type medications have two very important potential side effects: gastrointestinal irritation including hemorrhage and renal injuries. She was asked to take the medication with food and to stop if she experiences any GI upset. I asked her to call for vomiting, abdominal pain or black/bloody stools. The patient expresses understanding of these issues and questions were answered.      Discussed conservative treatment with shoes of adequate dimensions, material, and style to alleviate symptoms and delay or prevent surgical intervention.    Rx xrays, voltaren gel          No follow-ups on file.

## 2019-09-03 ENCOUNTER — CLINICAL SUPPORT (OUTPATIENT)
Dept: URGENT CARE | Facility: CLINIC | Age: 69
End: 2019-09-03
Payer: MEDICARE

## 2019-09-03 VITALS
SYSTOLIC BLOOD PRESSURE: 156 MMHG | DIASTOLIC BLOOD PRESSURE: 84 MMHG | BODY MASS INDEX: 25.33 KG/M2 | HEART RATE: 67 BPM | RESPIRATION RATE: 16 BRPM | HEIGHT: 65 IN | WEIGHT: 152 LBS | TEMPERATURE: 97 F

## 2019-09-03 DIAGNOSIS — R51.9 SINUS HEADACHE: ICD-10-CM

## 2019-09-03 DIAGNOSIS — J01.90 ACUTE NON-RECURRENT SINUSITIS, UNSPECIFIED LOCATION: Primary | ICD-10-CM

## 2019-09-03 PROCEDURE — 99214 OFFICE O/P EST MOD 30 MIN: CPT | Mod: S$GLB,,, | Performed by: NURSE PRACTITIONER

## 2019-09-03 PROCEDURE — 99214 PR OFFICE/OUTPT VISIT, EST, LEVL IV, 30-39 MIN: ICD-10-PCS | Mod: S$GLB,,, | Performed by: NURSE PRACTITIONER

## 2019-09-03 RX ORDER — PROMETHAZINE HYDROCHLORIDE AND DEXTROMETHORPHAN HYDROBROMIDE 6.25; 15 MG/5ML; MG/5ML
5 SYRUP ORAL NIGHTLY PRN
Qty: 118 ML | Refills: 0 | Status: SHIPPED | OUTPATIENT
Start: 2019-09-03 | End: 2019-09-13

## 2019-09-03 RX ORDER — AZITHROMYCIN 250 MG/1
TABLET, FILM COATED ORAL
Qty: 6 TABLET | Refills: 0 | Status: SHIPPED | OUTPATIENT
Start: 2019-09-03 | End: 2019-12-26

## 2019-09-03 NOTE — PATIENT INSTRUCTIONS
Sinus Headaches     When using nasal spray, keep your chin down and angle the spray away from center.     Sinus headaches can cause a gnawing pain behind the nose and eyes. The pain most often gets worse in the afternoon and evening. You may also run a fever. Sinus headaches are caused by colds or allergies that make the nasal passages inflamed or infected.  To help prevent sinus headaches:  · Treat colds promptly to keep mucus from backing up.  · Avoid things that trigger sinus problems, such as pollens, dust, smoke, fumes, and strong odors.  · Take allergy medicines as directed by your healthcare provider.  To relieve the pain:  · Keep your sinuses open and free of mucus. Try over-the-counter sinus rinse products.  · Use a nasal decongestant as directed to reduce the inflammation.  · Drink fluids to keep the mucus thinner. This helps it drain more easily. You can also use a humidifier.  · Apply hot packs to the area around your sinuses. Use a hot water bottle.  · See your healthcare provider if your sinus headache lasts more than 2 weeks. You may need medicine for a sinus infection or an exam to check for other headache conditions, like migraines.  Date Last Reviewed: 10/1/2016  © 2856-0242 [employer]. [address] [place] [address], [place] [address]. All rights reserved. This information is not intended as a substitute for professional medical care. Always follow your healthcare professional's instructions.        Sinusitis (Antibiotic Treatment)    The sinuses are air-filled spaces within the bones of the face. They connect to the inside of the nose. Sinusitis is an inflammation of the tissue lining the sinus cavity. Sinus inflammation can occur during a cold. It can also be due to allergies to pollens and other particles in the air. Sinusitis can cause symptoms of sinus congestion and fullness. A sinus infection causes fever, headache and facial pain. There is often green or yellow drainage from the  nose or into the back of the throat (post-nasal drip). You have been given antibiotics to treat this condition.  Home care:  · Take the full course of antibiotics as instructed. Do not stop taking them, even if you feel better.  · Drink plenty of water, hot tea, and other liquids. This may help thin mucus. It also may promote sinus drainage.  · Heat may help soothe painful areas of the face. Use a towel soaked in hot water. Or,  the shower and direct the hot spray onto your face. Using a vaporizer along with a menthol rub at night may also help.   · An expectorant containing guaifenesin may help thin the mucus and promote drainage from the sinuses.  · Over-the-counter decongestants may be used unless a similar medicine was prescribed. Nasal sprays work the fastest. Use one that contains phenylephrine or oxymetazoline. First blow the nose gently. Then use the spray. Do not use these medicines more often than directed on the label or symptoms may get worse. You may also use tablets containing pseudoephedrine. Avoid products that combine ingredients, because side effects may be increased. Read labels. You can also ask the pharmacist for help. (NOTE: Persons with high blood pressure should not use decongestants. They can raise blood pressure.)  · Over-the-counter antihistamines may help if allergies contributed to your sinusitis.    · Do not use nasal rinses or irrigation during an acute sinus infection, unless told to by your health care provider. Rinsing may spread the infection to other sinuses.  · Use acetaminophen or ibuprofen to control pain, unless another pain medicine was prescribed. (If you have chronic liver or kidney disease or ever had a stomach ulcer, talk with your doctor before using these medicines. Aspirin should never be used in anyone under 18 years of age who is ill with a fever. It may cause severe liver damage.)  · Don't smoke. This can worsen symptoms.  Follow-up care  Follow up with your  healthcare provider or our staff if you are not improving within the next week.  When to seek medical advice  Call your healthcare provider if any of these occur:  · Facial pain or headache becoming more severe  · Stiff neck  · Unusual drowsiness or confusion  · Swelling of the forehead or eyelids  · Vision problems, including blurred or double vision  · Fever of 100.4ºF (38ºC) or higher, or as directed by your healthcare provider  · Seizure  · Breathing problems  · Symptoms not resolving within 10 days  Date Last Reviewed: 4/13/2015 © 2000-2017 [employer]. [address] [place] [address]. All rights reserved. This information is not intended as a substitute for professional medical care. Always follow your healthcare professional's instructions.        Self-Care for Sinusitis     Drinking plenty of water can help sinuses drain.   Sinusitis can often be managed with self-care. Self-care can keep sinuses moist and make you feel more comfortable. Remember to follow your doctor's instructions closely. This can make a big difference in getting your sinus problem under control.  Drink fluids  Drinking extra fluids helps thin your mucus. This lets it drain from your sinuses more easily. Have a glass of water every hour or two. A humidifier helps in much the same way. Fluids can also offset the drying effects of certain medicines. If you use a humidifier, follow the product maker's instructions on how to use it. Clean it on a regular schedule.  Use saltwater rinses  Rinses help keep your sinuses and nose moist. Mix a teaspoon of salt in 8 ounces of fresh, warm water. Use a bulb syringe to gently squirt the water into your nose a few times a day. You can also buy ready-made saline nasal sprays.  Apply hot or cold packs  Applying heat to the area surrounding your sinuses may make you feel more comfortable. Use a hot water bottle or a hand towel dipped in hot water. Some people also find ice packs  effective for relieving pain.  Medicines  Your doctor may prescribe medications to help treat your sinusitis. If you have an infection, antibiotics can help clear it up. If you are prescribed antibiotics, take all pills on schedule until they are gone, even if you feel better. Decongestants help relieve swelling. Use decongestant sprays for short periods only under the direction of your doctor. If you have allergies, your doctor may prescribe medications to help relieve them.   Date Last Reviewed: 10/1/2016  © 9091-6689 [employer]. [address] [place] [address], [place] [address]. All rights reserved. This information is not intended as a substitute for professional medical care. Always follow your healthcare professional's instructions.

## 2019-09-03 NOTE — PROGRESS NOTES
"Subjective:       Patient ID: Loren Andre is a 69 y.o. female.    Vitals:  height is 5' 5" (1.651 m) and weight is 68.9 kg (152 lb). Her temperature is 97.4 °F (36.3 °C). Her blood pressure is 156/84 (abnormal) and her pulse is 67. Her respiration is 16.     Chief Complaint: Sinus Problem    Patient complains of runny nose, sore throat, cough, and facial pressure for 4 days. Denies fever, shortness of breath, chest pain, nausea, vomiting, diarrhea.    Sinus Problem   This is a new problem. Episode onset: 3 days. The problem has been gradually worsening since onset. There has been no fever. Associated symptoms include congestion, coughing, headaches, sinus pressure and a sore throat. Pertinent negatives include no chills or shortness of breath. (Runny nose, itchy ear  )       Constitution: Negative for chills, fatigue and fever.   HENT: Positive for congestion, postnasal drip, sinus pain, sinus pressure and sore throat.    Neck: Negative for painful lymph nodes.   Cardiovascular: Negative for chest pain and leg swelling.   Eyes: Negative for double vision and blurred vision.   Respiratory: Positive for cough. Negative for sputum production, shortness of breath and wheezing.    Gastrointestinal: Negative for abdominal pain, nausea, vomiting and diarrhea.   Endocrine: negative.   Genitourinary: Negative for dysuria, frequency, urgency and history of kidney stones.   Musculoskeletal: Negative for joint pain, joint swelling, muscle cramps and muscle ache.   Skin: Negative for color change, pale, rash and bruising.   Allergic/Immunologic: Negative for seasonal allergies.   Neurological: Positive for headaches. Negative for dizziness, history of vertigo, light-headedness and passing out.   Hematologic/Lymphatic: Negative for swollen lymph nodes.   Psychiatric/Behavioral: Negative for nervous/anxious, sleep disturbance and depression. The patient is not nervous/anxious.        Objective:      Physical Exam "   Constitutional: She is oriented to person, place, and time. Vital signs are normal. She appears well-developed and well-nourished. She is cooperative.  Non-toxic appearance. She does not have a sickly appearance. She does not appear ill. No distress.   HENT:   Head: Normocephalic and atraumatic.   Right Ear: Hearing, tympanic membrane, external ear and ear canal normal.   Left Ear: Hearing, tympanic membrane, external ear and ear canal normal.   Nose: Mucosal edema and rhinorrhea present. No nasal deformity. No epistaxis. Right sinus exhibits maxillary sinus tenderness and frontal sinus tenderness. Left sinus exhibits maxillary sinus tenderness and frontal sinus tenderness.   Mouth/Throat: Uvula is midline and mucous membranes are normal. No trismus in the jaw. Normal dentition. No uvula swelling. Posterior oropharyngeal erythema present. No oropharyngeal exudate or posterior oropharyngeal edema.   Eyes: Conjunctivae and lids are normal. Right eye exhibits no discharge. Left eye exhibits no discharge. No scleral icterus.   Sclera clear bilat   Neck: Trachea normal, normal range of motion, full passive range of motion without pain and phonation normal. Neck supple.   Cardiovascular: Normal rate, regular rhythm, normal heart sounds, intact distal pulses and normal pulses.   Pulmonary/Chest: Effort normal and breath sounds normal. No respiratory distress.   Abdominal: Soft. Normal appearance and bowel sounds are normal. She exhibits no distension, no pulsatile midline mass and no mass. There is no tenderness.   Musculoskeletal: Normal range of motion. She exhibits no edema or deformity.   Lymphadenopathy:     She has no cervical adenopathy.   Neurological: She is alert and oriented to person, place, and time. She exhibits normal muscle tone. Coordination normal. GCS eye subscore is 4. GCS verbal subscore is 5. GCS motor subscore is 6.   Skin: Skin is warm, dry and intact. No rash noted. She is not diaphoretic. No  pallor.   Psychiatric: She has a normal mood and affect. Her speech is normal and behavior is normal. Judgment and thought content normal. Cognition and memory are normal.   Nursing note and vitals reviewed.      Assessment:       1. Acute non-recurrent sinusitis, unspecified location    2. Sinus headache        Plan:       Advised to continue taking her Singulair and Flonase daily. Return to clinic for any worsening of symptoms.       Acute non-recurrent sinusitis, unspecified location    Sinus headache    Other orders  -     azithromycin (ZITHROMAX) 250 MG tablet; Take 2 tablets (500 mg) on  Day 1,  followed by 1 tablet (250 mg) once daily on Days 2 through 5.  Dispense: 6 tablet; Refill: 0  -     promethazine-dextromethorphan (PROMETHAZINE-DM) 6.25-15 mg/5 mL Syrp; Take 5 mLs by mouth nightly as needed.  Dispense: 118 mL; Refill: 0

## 2019-09-09 ENCOUNTER — TELEPHONE (OUTPATIENT)
Dept: FAMILY MEDICINE | Facility: CLINIC | Age: 69
End: 2019-09-09

## 2019-09-09 NOTE — TELEPHONE ENCOUNTER
----- Message from Ange Lanier sent at 9/9/2019 12:54 PM CDT -----  Contact: Patient  Type: Needs Medical Advice    Who Called:  Patient  Best Call Back Number:   Additional Information: Calling to advise the Nurse that she dropped of paperwork at the  that needs to be filled out by Dr Toure

## 2019-09-10 ENCOUNTER — TELEPHONE (OUTPATIENT)
Dept: FAMILY MEDICINE | Facility: CLINIC | Age: 69
End: 2019-09-10

## 2019-09-10 NOTE — TELEPHONE ENCOUNTER
Handicap license form faxed to [facility] in [place] [employer] at [phone] at patient request.

## 2019-09-16 ENCOUNTER — OFFICE VISIT (OUTPATIENT)
Dept: FAMILY MEDICINE | Facility: CLINIC | Age: 69
End: 2019-09-16
Payer: MEDICARE

## 2019-09-16 VITALS
HEART RATE: 70 BPM | SYSTOLIC BLOOD PRESSURE: 134 MMHG | BODY MASS INDEX: 25.49 KG/M2 | DIASTOLIC BLOOD PRESSURE: 70 MMHG | TEMPERATURE: 98 F | HEIGHT: 65 IN | RESPIRATION RATE: 17 BRPM | OXYGEN SATURATION: 97 % | WEIGHT: 153 LBS

## 2019-09-16 DIAGNOSIS — L23.9 ALLERGIC CONTACT DERMATITIS, UNSPECIFIED TRIGGER: Primary | ICD-10-CM

## 2019-09-16 PROCEDURE — 1101F PT FALLS ASSESS-DOCD LE1/YR: CPT | Mod: HCNC,CPTII,S$GLB, | Performed by: NURSE PRACTITIONER

## 2019-09-16 PROCEDURE — 1101F PR PT FALLS ASSESS DOC 0-1 FALLS W/OUT INJ PAST YR: ICD-10-PCS | Mod: HCNC,CPTII,S$GLB, | Performed by: NURSE PRACTITIONER

## 2019-09-16 PROCEDURE — 3078F DIAST BP <80 MM HG: CPT | Mod: HCNC,CPTII,S$GLB, | Performed by: NURSE PRACTITIONER

## 2019-09-16 PROCEDURE — 99999 PR PBB SHADOW E&M-EST. PATIENT-LVL V: ICD-10-PCS | Mod: PBBFAC,HCNC,, | Performed by: NURSE PRACTITIONER

## 2019-09-16 PROCEDURE — 3075F PR MOST RECENT SYSTOLIC BLOOD PRESS GE 130-139MM HG: ICD-10-PCS | Mod: HCNC,CPTII,S$GLB, | Performed by: NURSE PRACTITIONER

## 2019-09-16 PROCEDURE — 99214 PR OFFICE/OUTPT VISIT, EST, LEVL IV, 30-39 MIN: ICD-10-PCS | Mod: HCNC,S$GLB,, | Performed by: NURSE PRACTITIONER

## 2019-09-16 PROCEDURE — 3075F SYST BP GE 130 - 139MM HG: CPT | Mod: HCNC,CPTII,S$GLB, | Performed by: NURSE PRACTITIONER

## 2019-09-16 PROCEDURE — 99999 PR PBB SHADOW E&M-EST. PATIENT-LVL V: CPT | Mod: PBBFAC,HCNC,, | Performed by: NURSE PRACTITIONER

## 2019-09-16 PROCEDURE — 99214 OFFICE O/P EST MOD 30 MIN: CPT | Mod: HCNC,S$GLB,, | Performed by: NURSE PRACTITIONER

## 2019-09-16 PROCEDURE — 3078F PR MOST RECENT DIASTOLIC BLOOD PRESSURE < 80 MM HG: ICD-10-PCS | Mod: HCNC,CPTII,S$GLB, | Performed by: NURSE PRACTITIONER

## 2019-09-16 RX ORDER — HYDROCORTISONE 25 MG/G
CREAM TOPICAL 2 TIMES DAILY
Qty: 20 G | Refills: 0 | Status: SHIPPED | OUTPATIENT
Start: 2019-09-16 | End: 2019-09-16

## 2019-09-16 RX ORDER — HYDROCORTISONE 25 MG/G
CREAM TOPICAL 2 TIMES DAILY
Qty: 29 G | Refills: 0 | Status: SHIPPED | OUTPATIENT
Start: 2019-09-16 | End: 2019-10-03

## 2019-09-16 RX ORDER — METHYLPREDNISOLONE 4 MG/1
TABLET ORAL
Qty: 1 PACKAGE | Refills: 0 | Status: SHIPPED | OUTPATIENT
Start: 2019-09-16 | End: 2019-09-16

## 2019-09-16 RX ORDER — METHYLPREDNISOLONE 4 MG/1
TABLET ORAL
Qty: 1 PACKAGE | Refills: 0 | Status: SHIPPED | OUTPATIENT
Start: 2019-09-16 | End: 2020-07-08

## 2019-09-16 NOTE — PATIENT INSTRUCTIONS
"  Contact Dermatitis  Contact dermatitis is a skin rash caused by something that touches the skin and makes it irritated and inflamed. Your skin may be red, swollen, dry, and may be cracked. Blisters may form and ooze. The rash will itch.  Contact dermatitis can form on the face and neck, backs of hands, forearms, genitals, and lower legs.  People can get contact dermatitis from lots of sources. These include:  · Plants such as poison ivy, oak, or sumac  · Chemicals in hair dyes and rinses, soaps, solvents, waxes, fingernail polish, and deodorants   · Jewelry or watchbands made of nickel  Contact dermatitis is not passed from person to person.  Talk with your healthcare provider about what may have caused the rash. A type of allergy testing called "patch testing" may be used to discover what you are allergic to. You will need to avoid the source of your rash in the future to prevent it from coming back.  Treatment is done to relieve itching and prevent the rash from coming back. The rash should go away in a few days to a few weeks.  Home care  Your healthcare provider may prescribe medicine to relieve swelling and itching. Follow all instructions when using these medicines.  General care:  · Avoid anything that heats up your skin, such as hot showers or baths, or direct sunlight. This can make itching worse.  · Apply cold compresses to soothe your sores to help relieve your symptoms. Do this for 30 minutes 3 to 4 times a day. You can make a cold compress by soaking a cloth in cold water. Squeeze out excess water. You can add colloidal oatmeal to the water to help reduce itching. For severe itching in a small area, apply an ice pack wrapped in a thin towel. Do this for 20 minutes 3 to 4 times a day.  · You can also try wet dressings. One way to do this is to wear a wet piece of clothing under a dry one. Wear a damp shirt under a dry shirt if your upper body is affected. This can relieve itching and prevent you from " scratching the affected area.  · You can also help relieve large areas of itching by taking a lukewarm bath with colloidal oatmeal added to the water.  · Use hydrocortisone cream for redness and irritation, unless another medicine was prescribed. You can also use benzocaine anesthetic cream or spray. Calamine lotion can also relieve mild symptoms.  · Use oral diphenhydramine to help reduce itching. You can buy this antihistamine at drug and grocery stores. It can make you sleepy, so use lower doses during the daytime. Or you can use loratadine. This is an antihistamine that will not make you sleepy. Do not use diphenhydramine if you have glaucoma or have trouble urinating due to an enlarged prostate.  · If a plant causes your rash, make sure to wash your skin and the clothes you were wearing when you came into contact with the plant. This is to wash away the plant oils that gave you the rash and prevent more or worse symptoms.  · Stay away from the substance or object that causes your symptoms. If you cant avoid it, wear gloves or some other type of protection.  Follow-up care  Follow up with your healthcare provider, or as advised.  When to seek medical advice  Call your healthcare provider right away if any of these occur:  · Spreading of the rash to other parts of your body  · Severe swelling of your face, eyelids, mouth, throat or tongue  · Trouble urinating due to swelling in the genital area  · Fever of 100.4°F (38°C) or higher  · Redness or swelling that gets worse  · Pain that gets worse  · Foul-smelling fluid leaking from the skin  · Yellow-brown crusts on the open blisters  Date Last Reviewed: 9/1/2016  © 9118-7392 [employer]. [address] [place] [address], [place] [address]. All rights reserved. This information is not intended as a substitute for professional medical care. Always follow your healthcare professional's instructions.    Take the medrol dose pack (methylprednisolone).  Take a  benadryl at bedtime.  Avoid hot showers/baths.  If you are not better in 2-3 days, if worse or you have concerns or questions, please do not hesitate to call.  You can reach us at [phone] Monday through Thursday (except holidays) 8:30 a.m. to 5 p.m. or call Dr. Toure.    Note:  I do not work on Fridays.  So if you have concerns or questions, please contact your primary care provider team or [facility] or go to the Urgent Care on Friday for concerns.     Thank you for using [facility]!    MARIA LUISA Davis, CNP, MILP-BC  [facility]    To rate your experience with PHIL Davis, click on the link below:      [email or web address]

## 2019-09-16 NOTE — PROGRESS NOTES
Loren Andre is a 69 y.o. female patient of Sanket Toure MD who presents to the clinic today for   Chief Complaint   Patient presents with    Rash     arm , legs .neck,ears ,breast   .    HPI    Pt, who is not known to me, reports a new problem to me:  Rash on neck, ears, arms, legs and under the breasts .    These symptoms began 1+ mo  ago and status is continuing.     Pt denies the following symptoms:  Feeling ill, fever    Aggravating factors include nothing identified .    Relieving factors include nothing .    OTC Medications tried are benadryl, hydrocortisone and benadryl cream--nothing helped.    Prescription medications taken for symptoms are none.    Pertinent medical history:  Has had the rash on there neck in the past    ROS    Constitutional:  no fever, no fatigue.    Skin:  See chief complaint/HPI.      HEENT:  No complaints.  recently treated for sinus infections, which resolved.    Heart/Lungs:  No complaints.  Was having occ palpitations, testing showed no concerns.    GI/:  No sxs.    MS:  No new problems in bones, joints or muscles.      Past Medical History:   Diagnosis Date    Allergy     Anemia     sickle cell trait    Anxiety     Arthritis     Asthma     Chronic disease anemia 10/5/2017    Colon polyps     Depression     Dermatitis 3/7/2013    Fibromyalgia     HEARING LOSS     Hyperlipidemia     Hypertension     Leucopenia 10/5/2017    Polyneuropathy     Rheumatoid arthritis(714.0)     Scoliosis     Sickle cell trait     Sickle-cell trait 10/5/2017    Trouble in sleeping     Urticaria        Current Outpatient Medications:     albuterol (PROVENTIL) 2.5 mg /3 mL (0.083 %) nebulizer solution, Take 3 mLs (2.5 mg total) by nebulization every 6 (six) hours as needed for Wheezing. Rescue, Disp: 90 mL, Rfl: 2    ascorbic acid, vitamin C, (VITAMIN C) 500 MG tablet, Take 500 mg by mouth daily as needed. , Disp: , Rfl:     atorvastatin (LIPITOR) 40 MG tablet, TAKE 1  TABLET EVERY DAY, Disp: 90 tablet, Rfl: 3    azelastine (ASTELIN) 137 mcg (0.1 %) nasal spray, 2 sprays (274 mcg total) by Nasal route 2 (two) times daily. (Patient taking differently: 2 sprays by Nasal route 2 (two) times daily as needed. [employer] brand), Disp: 30 mL, Rfl: 12    azithromycin (ZITHROMAX) 250 MG tablet, Take 2 tablets (500 mg) on  Day 1,  followed by 1 tablet (250 mg) once daily on Days 2 through 5., Disp: 6 tablet, Rfl: 0    betamethasone dipropionate (DIPROLENE) 0.05 % cream, APPLY EXTERNALLY TO THE AFFECTED AREA TWICE DAILY, Disp: 45 g, Rfl: 0    BLACK COHOSH ORAL, Take 1 capsule by mouth 2 (two) times daily. Female hormone blend sp - 7c Black Cohosh , Disp: , Rfl:     CALCIUM CARBONATE/VITAMIN D3 (VITAMIN D-3 ORAL), Take 100 Int'l Units by mouth once daily. , Disp: , Rfl:     carvedilol (COREG) 6.25 MG tablet, TAKE 1 TABLET(6.25 MG) BY MOUTH TWICE DAILY WITH MEALS, Disp: 180 tablet, Rfl: 3    coenzyme Q10 (CO Q-10) 10 mg capsule, Take 10 mg by mouth once daily., Disp: , Rfl:     diclofenac sodium (VOLTAREN) 1 % Gel, Apply 2 g topically 4 (four) times daily., Disp: 100 g, Rfl: 2    fluticasone-salmeterol 250-50 mcg/dose (ADVAIR DISKUS) 250-50 mcg/dose diskus inhaler, INHALE 1 PUFF INTO THE LUNGS TWICE DAILY (Patient taking differently: Inhale 1 puff into the lungs daily as needed. INHALE 1 PUFF INTO THE LUNGS TWICE DAILY), Disp: 60 each, Rfl: 5    folic acid (FOLVITE) 400 MCG tablet, Take 400 mcg by mouth once daily., Disp: , Rfl:     GARLIC OIL ORAL, Take 1 capsule by mouth., Disp: , Rfl:     lisinopril (PRINIVIL,ZESTRIL) 40 MG tablet, Take 1 tablet (40 mg total) by mouth once daily., Disp: 90 tablet, Rfl: 3    mometasone 0.1% (ELOCON) 0.1 % cream, APPLY 1 APPLICATION TOPICALLY ONCE A DAY prn, Disp: , Rfl:     montelukast (SINGULAIR) 10 mg tablet, TAKE 1 TABLET EVERY DAY, Disp: 90 tablet, Rfl: 3    NIACIN, INOSITOL NIACINATE, (NIACIN FLUSH FREE ORAL), Take 500 mg by mouth once  daily., Disp: , Rfl:     OMEGA-3-DHA-EPA-FISH OIL ORAL, Take 1 capsule by mouth once daily. , Disp: , Rfl:     traZODone (DESYREL) 100 MG tablet, Take 1 tablet (100 mg total) by mouth every evening., Disp: 90 tablet, Rfl: 1    valACYclovir (VALTREX) 500 MG tablet, TAKE 1 TABLET (500 MG TOTAL) BY MOUTH 2 (TWO) TIMES DAILY FOR 7 DAYS AS DIRECTED. , Disp: 60 tablet, Rfl: 3    VENTOLIN HFA 90 mcg/actuation inhaler, INHALE 2 PUFFS BY MOUTH EVERY 4 HOURS AS NEEDED FOR WHEEZING, Disp: 18 g, Rfl: 0    vitamin E 100 UNIT capsule, Take 100 Units by mouth once daily., Disp: , Rfl:       PHYSICAL EXAM    Alert, coop 69 y.o. female patient in no acute distress, is not ill-appearing.    Vitals:    09/16/19 1119   BP: 134/70   Pulse: 70   Resp: 17   Temp: 97.9 °F (36.6 °C)     VS reviewed.  VS stable.  CC, nursing note, medications & PMH all reviewed today.    Head:  Normocephalic, atraumatic.    EENT:  Ext nose/ears normal.               Eyes with normal lids, not injected.     Resp:  Respirations even, unlabored              Lungs CTA bilat    Heart:  Regular rate.  BP in the normal range.  CV:  Cap RF brisk    MS:  Ambulates independently           NEURO:  Alert and oriented x 4.  Responds appropriately during interaction.             Skin:  Warm, dry, color good.            A/an rash consisting of small papules is scattered over her arms, legs and neck.  Most are singles lesions but there are several small groups.           Lesions are pruritic.              No pustules or vesicles noted.    Psych:  Responds appropriately throughout the visit.               Relaxed.  Well-groomed    Allergic contact dermatitis, unspecified trigger  -     hydrocortisone 2.5 % cream; Apply topically 2 (two) times daily.  Dispense: 29 g; Refill: 0  -     methylPREDNISolone (MEDROL DOSEPACK) 4 mg tablet; use as directed  Dispense: 1 Package; Refill: 0    Pt today presents with one month of papular rash scattered over arms, legs, on her neck  and ears that is pruritic.  No known exposures.  Has used OTCs w/o improvement.  Has taken steroids in the past w/o problems.  Small papules are scattered over the affected areas, single and in small groups, consistent with contact allergic rash.    This is a new problem to me.  No work up is planned.        Pt advised to perform comfort measures recommended on patient instruction sheet .    If not better in 3 days, the patient is advised to call us.  If worse or concerns, the patient is advised to call us.  Explained exam findings, diagnosis and treatment plan to patient.  Questions answered and patient states understanding.          .

## 2019-09-16 NOTE — Clinical Note
Sanket Toure MD,  I saw your patient last week in [facility]  If you have any questions, please do not hesitate to contact me.  Thank you!  PHIL Davis, [facility]

## 2019-09-17 ENCOUNTER — OFFICE VISIT (OUTPATIENT)
Dept: PODIATRY | Facility: CLINIC | Age: 69
End: 2019-09-17
Payer: MEDICARE

## 2019-09-17 VITALS — HEIGHT: 65 IN | BODY MASS INDEX: 25.49 KG/M2 | WEIGHT: 153 LBS

## 2019-09-17 DIAGNOSIS — M72.2 PLANTAR FASCIITIS: Primary | ICD-10-CM

## 2019-09-17 DIAGNOSIS — M24.573 EQUINUS CONTRACTURE OF ANKLE: ICD-10-CM

## 2019-09-17 DIAGNOSIS — M79.672 FOOT PAIN, LEFT: ICD-10-CM

## 2019-09-17 PROCEDURE — 99212 PR OFFICE/OUTPT VISIT, EST, LEVL II, 10-19 MIN: ICD-10-PCS | Mod: HCNC,S$GLB,, | Performed by: PODIATRIST

## 2019-09-17 PROCEDURE — 99999 PR PBB SHADOW E&M-EST. PATIENT-LVL III: ICD-10-PCS | Mod: PBBFAC,HCNC,, | Performed by: PODIATRIST

## 2019-09-17 PROCEDURE — 99999 PR PBB SHADOW E&M-EST. PATIENT-LVL III: CPT | Mod: PBBFAC,HCNC,, | Performed by: PODIATRIST

## 2019-09-17 PROCEDURE — 3288F PR FALLS RISK ASSESSMENT DOCUMENTED: ICD-10-PCS | Mod: HCNC,CPTII,S$GLB, | Performed by: PODIATRIST

## 2019-09-17 PROCEDURE — 1100F PR PT FALLS ASSESS DOC 2+ FALLS/FALL W/INJURY/YR: ICD-10-PCS | Mod: HCNC,CPTII,S$GLB, | Performed by: PODIATRIST

## 2019-09-17 PROCEDURE — 3288F FALL RISK ASSESSMENT DOCD: CPT | Mod: HCNC,CPTII,S$GLB, | Performed by: PODIATRIST

## 2019-09-17 PROCEDURE — 1100F PTFALLS ASSESS-DOCD GE2>/YR: CPT | Mod: HCNC,CPTII,S$GLB, | Performed by: PODIATRIST

## 2019-09-17 PROCEDURE — 99212 OFFICE O/P EST SF 10 MIN: CPT | Mod: HCNC,S$GLB,, | Performed by: PODIATRIST

## 2019-09-17 NOTE — PROGRESS NOTES
Subjective:      Patient ID: Loren Andre is a 69 y.o. female.    Chief Complaint: Follow-up (left foot pain)    Sharp deep pain bottom left heel.  Gradual onset, improving over past several weeks, aggravated by increased weight bearing, shoe gear, pressure.  Stretches, inserts, athletic shoes helped a lot. Denies  Trauma, surgery both feet.  xrays negative acute injury.    Review of Systems   Constitution: Negative for chills, diaphoresis, fever, malaise/fatigue and night sweats.   Cardiovascular: Negative for claudication, cyanosis, leg swelling and syncope.   Skin: Negative for color change, dry skin, nail changes, rash, suspicious lesions and unusual hair distribution.   Musculoskeletal: Negative for falls, joint pain, joint swelling, muscle cramps, muscle weakness and stiffness.   Gastrointestinal: Negative for constipation, diarrhea, nausea and vomiting.   Neurological: Negative for brief paralysis, disturbances in coordination, focal weakness, numbness, paresthesias, sensory change and tremors.           Objective:      Physical Exam   Constitutional: She is oriented to person, place, and time. She appears well-developed and well-nourished. She is cooperative. No distress.   Cardiovascular:   Pulses:       Popliteal pulses are 2+ on the right side, and 2+ on the left side.        Dorsalis pedis pulses are 2+ on the right side, and 2+ on the left side.        Posterior tibial pulses are 2+ on the right side, and 2+ on the left side.   Capillary refill 3 seconds all toes/distal feet, all toes/both feet warm to touch.      Negative lymphadenopathy bilateral popliteal fossa and tarsal tunnel.      Negavie lower extremity edema bilateral.     Musculoskeletal:        Right ankle: She exhibits normal range of motion, no swelling, no ecchymosis, no deformity, no laceration and normal pulse. Achilles tendon normal. Achilles tendon exhibits no pain, no defect and normal Saavedra's test results.   No current pain to  palpation inferior heel left at medial calcaneal tubercle without ecchymosis, erythema, edema, or cardinal signs infection, and no signs of trauma.    Ankle dorsiflexion decreased at <10 degrees bilateral with moderate increase with knee flexion bilateral.    Otherwise, Normal angle, base, station of gait. All ten toes without clubbing, cyanosis, or signs of ischemia.  No pain to palpation bilateral lower extremities.  Range of motion, stability, muscle strength, and muscle tone normal bilateral feet and legs.    Lymphadenopathy: No inguinal adenopathy noted on the right or left side.   Negative lymphadenopathy bilateral popliteal fossa and tarsal tunnel.    Negative lymphangitic streaking bilateral feet/ankles/legs.   Neurological: She is alert and oriented to person, place, and time. She has normal strength. She displays no atrophy and no tremor. No sensory deficit. She exhibits normal muscle tone. Gait normal.   Reflex Scores:       Patellar reflexes are 2+ on the right side and 2+ on the left side.       Achilles reflexes are 2+ on the right side and 2+ on the left side.  Negative tinel sign to percussion sural, superficial peroneal, deep peroneal, saphenous, and posterior tibial nerves right and left ankles and feet.     Skin: Skin is warm, dry and intact. Capillary refill takes 2 to 3 seconds. No abrasion, no bruising, no burn, no ecchymosis, no laceration, no lesion and no rash noted. She is not diaphoretic. No cyanosis or erythema. No pallor. Nails show no clubbing.     Skin is normal age and health appropriate color, turgor, texture, and temperature bilateral lower extremities without ulceration, hyperpigmentation, discoloration, masses nodules or cords palpated.  No ecchymosis, erythema, edema, or cardinal signs of infection bilateral lower extremities.     Psychiatric: She has a normal mood and affect.             Assessment:       Encounter Diagnoses   Name Primary?    Plantar fasciitis Yes    Foot  pain, left     Equinus contracture of ankle          Plan:       Loern was seen today for follow-up.    Diagnoses and all orders for this visit:    Plantar fasciitis    Foot pain, left    Equinus contracture of ankle      I counseled the patient on her conditions, their implications and medical management.        Continue stretches, inserts, athletic shoes, activity to tolerance, nsaid gel prn only.        Follow up if symptoms worsen or fail to improve.

## 2019-09-24 LAB
ALBUMIN: 4.2 GRAM/DL (ref 3.5–5)
ALP SERPL-CCNC: 75 UNIT/L (ref 38–126)
ALT SERPL W P-5'-P-CCNC: 20 UNIT/L (ref 7–56)
ANION GAP SERPL CALC-SCNC: 15 MEQ/L (ref 9–18)
AST SERPL-CCNC: 21 UNIT/L (ref 7–40)
BASOPHILS ABSOLUTE COUNT: 0 K/UL (ref 0–0.2)
BASOPHILS NFR BLD: 0.3 % (ref 0–2)
BILIRUB SERPL-MCNC: 0.4 MG/DL (ref 0–1.2)
BUN BLD-MCNC: 19 MG/DL (ref 7–21)
BUN/CREAT SERPL: 17 RATIO (ref 6–22)
CALC OSMOLALITY: 280 MOSM/KG (ref 275–295)
CALCIUM SERPL-MCNC: 9.1 MG/DL (ref 8.5–10.3)
CHLORIDE SERPL-SCNC: 100 MEQ/L (ref 98–107)
CO2 SERPL-SCNC: 29 MEQ/L (ref 21–31)
CREAT SERPL-MCNC: 1.1 MG/DL (ref 0.5–1)
DIFFERENTIAL TYPE: NORMAL
EOSINOPHIL NFR BLD: 2.6 % (ref 0–4)
EOSINOPHILS ABSOLUTE COUNT: 0.2 K/UL (ref 0–0.7)
ERYTHROCYTE [DISTWIDTH] IN BLOOD BY AUTOMATED COUNT: 14.9 GRAM/DL (ref 12–15.3)
GFR: 49.6 ML/MIN/1.73M2
GLUCOSE SERPL-MCNC: 94 MG/DL (ref 70–100)
HCT VFR BLD AUTO: 38.3 % (ref 37–47)
HGB BLD-MCNC: 12.4 GRAM/DL (ref 12–16)
LYMPHOCYTES %: 28.1 % (ref 15–45)
LYMPHOCYTES ABSOLUTE COUNT: 1.8 K/UL (ref 1–4.2)
MCH RBC QN AUTO: 27.6 PICOGRAM (ref 27–33)
MCHC RBC AUTO-ENTMCNC: 32.5 GRAM/DL (ref 32–36)
MCV RBC AUTO: 85 FEMTOLITER (ref 81–99)
MONOCYTES %: 15.4 % (ref 3–13)
MONOCYTES ABSOLUTE COUNT: 1 K/UL (ref 0.1–0.8)
NEUTROPHILS ABSOLUTE COUNT: 3.5 K/UL (ref 2.1–7.6)
NEUTROPHILS RELATIVE PERCENT: 53.6 % (ref 32–80)
PLATELET # BLD AUTO: 212 K/UL (ref 150–350)
PMV BLD AUTO: 9.1 FEMTOLITER (ref 7–10.2)
POTASSIUM SERPL-SCNC: 4.7 MEQ/L (ref 3.5–5)
RBC # BLD AUTO: 4.5 MIL/UL (ref 4.2–5.4)
SODIUM BLD-SCNC: 139 MEQ/L (ref 135–145)
TOTAL PROTEIN: 6.5 GRAM/DL (ref 6.3–8.2)
WBC # BLD AUTO: 6.5 K/UL (ref 4.5–11)

## 2019-10-01 RX ORDER — CARVEDILOL 12.5 MG/1
12.5 TABLET ORAL 2 TIMES DAILY WITH MEALS
Qty: 180 TABLET | Refills: 3 | Status: SHIPPED | OUTPATIENT
Start: 2019-10-01 | End: 2019-10-03

## 2019-10-01 NOTE — TELEPHONE ENCOUNTER
Carvedilol increased in message on 8/12/19- bp done at ov 9/16/19 was 134/70 p70-patient needs refill sent to [facility].

## 2019-10-01 NOTE — TELEPHONE ENCOUNTER
----- Message from Yi Alfredo sent at 10/1/2019 10:25 AM CDT -----  Contact: self  Patient requesting to speak to Whitley regarding patient states she could not  blood pressure medication ,but will like to go into details with Whitley per patient       Please call to advice [phone] (home)

## 2019-10-03 ENCOUNTER — OFFICE VISIT (OUTPATIENT)
Dept: FAMILY MEDICINE | Facility: CLINIC | Age: 69
End: 2019-10-03
Attending: FAMILY MEDICINE
Payer: MEDICARE

## 2019-10-03 VITALS
SYSTOLIC BLOOD PRESSURE: 154 MMHG | RESPIRATION RATE: 12 BRPM | OXYGEN SATURATION: 97 % | BODY MASS INDEX: 25.16 KG/M2 | TEMPERATURE: 98 F | HEIGHT: 65 IN | WEIGHT: 151 LBS | DIASTOLIC BLOOD PRESSURE: 62 MMHG | HEART RATE: 75 BPM

## 2019-10-03 DIAGNOSIS — I10 HYPERTENSION, ESSENTIAL: ICD-10-CM

## 2019-10-03 DIAGNOSIS — E78.5 HYPERLIPIDEMIA, UNSPECIFIED HYPERLIPIDEMIA TYPE: ICD-10-CM

## 2019-10-03 DIAGNOSIS — I10 UNCONTROLLED HYPERTENSION: ICD-10-CM

## 2019-10-03 DIAGNOSIS — Z00.00 ENCOUNTER FOR PREVENTIVE HEALTH EXAMINATION: Primary | ICD-10-CM

## 2019-10-03 DIAGNOSIS — L30.9 DERMATITIS: ICD-10-CM

## 2019-10-03 DIAGNOSIS — R76.8 RHEUMATOID FACTOR POSITIVE: ICD-10-CM

## 2019-10-03 DIAGNOSIS — D57.3 SICKLE-CELL TRAIT: ICD-10-CM

## 2019-10-03 DIAGNOSIS — D63.8 CHRONIC DISEASE ANEMIA: ICD-10-CM

## 2019-10-03 DIAGNOSIS — D72.819 LEUKOPENIA, UNSPECIFIED TYPE: ICD-10-CM

## 2019-10-03 PROCEDURE — 3077F SYST BP >= 140 MM HG: CPT | Mod: HCNC,CPTII,S$GLB, | Performed by: FAMILY MEDICINE

## 2019-10-03 PROCEDURE — 3078F PR MOST RECENT DIASTOLIC BLOOD PRESSURE < 80 MM HG: ICD-10-PCS | Mod: HCNC,CPTII,S$GLB, | Performed by: FAMILY MEDICINE

## 2019-10-03 PROCEDURE — 3078F DIAST BP <80 MM HG: CPT | Mod: HCNC,CPTII,S$GLB, | Performed by: FAMILY MEDICINE

## 2019-10-03 PROCEDURE — 99999 PR PBB SHADOW E&M-EST. PATIENT-LVL IV: CPT | Mod: PBBFAC,HCNC,, | Performed by: FAMILY MEDICINE

## 2019-10-03 PROCEDURE — G0008 FLU VACCINE - HIGH DOSE (65+) PRESERVATIVE FREE IM: ICD-10-PCS | Mod: HCNC,S$GLB,, | Performed by: FAMILY MEDICINE

## 2019-10-03 PROCEDURE — 90662 FLU VACCINE - HIGH DOSE (65+) PRESERVATIVE FREE IM: ICD-10-PCS | Mod: HCNC,S$GLB,, | Performed by: FAMILY MEDICINE

## 2019-10-03 PROCEDURE — 99999 PR PBB SHADOW E&M-EST. PATIENT-LVL IV: ICD-10-PCS | Mod: PBBFAC,HCNC,, | Performed by: FAMILY MEDICINE

## 2019-10-03 PROCEDURE — 99397 PER PM REEVAL EST PAT 65+ YR: CPT | Mod: 25,HCNC,S$GLB, | Performed by: FAMILY MEDICINE

## 2019-10-03 PROCEDURE — 99397 PR PREVENTIVE VISIT,EST,65 & OVER: ICD-10-PCS | Mod: 25,HCNC,S$GLB, | Performed by: FAMILY MEDICINE

## 2019-10-03 PROCEDURE — 3077F PR MOST RECENT SYSTOLIC BLOOD PRESSURE >= 140 MM HG: ICD-10-PCS | Mod: HCNC,CPTII,S$GLB, | Performed by: FAMILY MEDICINE

## 2019-10-03 PROCEDURE — 99499 UNLISTED E&M SERVICE: CPT | Mod: HCNC,S$GLB,, | Performed by: FAMILY MEDICINE

## 2019-10-03 PROCEDURE — G0008 ADMIN INFLUENZA VIRUS VAC: HCPCS | Mod: HCNC,S$GLB,, | Performed by: FAMILY MEDICINE

## 2019-10-03 PROCEDURE — 99499 RISK ADDL DX/OHS AUDIT: ICD-10-PCS | Mod: HCNC,S$GLB,, | Performed by: FAMILY MEDICINE

## 2019-10-03 PROCEDURE — 90662 IIV NO PRSV INCREASED AG IM: CPT | Mod: HCNC,S$GLB,, | Performed by: FAMILY MEDICINE

## 2019-10-03 RX ORDER — CARVEDILOL 12.5 MG/1
25 TABLET ORAL 2 TIMES DAILY WITH MEALS
Qty: 180 TABLET | Refills: 3
Start: 2019-10-03 | End: 2020-02-24 | Stop reason: SDUPTHER

## 2019-10-03 RX ORDER — MOMETASONE FUROATE 1 MG/G
CREAM TOPICAL
Qty: 45 G | Refills: 0 | Status: SHIPPED | OUTPATIENT
Start: 2019-10-03 | End: 2020-07-08

## 2019-10-03 NOTE — PROGRESS NOTES
Subjective:       Patient ID: Loren Andre is a 69 y.o. female.    Chief Complaint: Annual Exam    69-year-old female coming in for physical exam.  Lab work has been done previously by Dr. Salgado and her lipid panel was done earlier this year with no need for a repeat at this time.  The patient is complaining of a rash on the scalp, neck, outer arms bilaterally worse on the left, and outer thighs bilaterally worse on the left.  She has a little bit between the breast and below the breasts otherwise the trunk is relatively spared.  It seems greatest in concentration just be low the scalp and in fact extends up into the hairline posteriorly.  She was using a Carress shampoo at the time of onset and stopped using it about a week ago.  She was given a Medrol pack and 2.5% hydrocortisone cream at a previous visit and states that while on the Medrol it did improve a little bit but is soon as the steroids for completed it flared up again.  She has a remote history of nonspecific dermatitis.  She has no known other contact irritant.  She uses Dove soap for bathing.  History includes allergic rhinitis and asthma and she rarely uses her rescue inhaler, she has anxiety and depression, hypertension, hyperlipidemia, sickle cell trait, rheumatoid arthritis, anemia of chronic disease with leukopenia and polyneuropathy.  Currently she is taking lisinopril 40 mg daily and carvedilol 12.5 mg twice daily for her blood pressure.  She is allergic to sulfas.    Past Medical History:  No date: Allergy  No date: Anemia      Comment:  sickle cell trait  No date: Anxiety  No date: Arthritis  No date: Asthma  10/5/2017: Chronic disease anemia  No date: Colon polyps  No date: Depression  3/7/2013: Dermatitis  No date: Fibromyalgia  No date: HEARING LOSS  No date: Hyperlipidemia  No date: Hypertension  10/5/2017: Leucopenia  No date: Polyneuropathy  No date: Rheumatoid arthritis(714.0)  No date: Scoliosis  No date: Sickle cell  trait  10/5/2017: Sickle-cell trait  No date: Trouble in sleeping  No date: Urticaria    Past Surgical History:  No date: ADENOIDECTOMY  No date: APPENDECTOMY  4/14/2008: COLONOSCOPY      Comment:  Dr. Guerrero, 10 year recheck  10/4/2018: COLONOSCOPY; N/A      Comment:  Procedure: COLONOSCOPY;  Surgeon: Tam Kemp MD;                Location: [facility];  Service: Endoscopy;  Laterality:                N/A;  No date: HYSTERECTOMY  No date: OOPHORECTOMY  01/2016: ROTATOR CUFF REPAIR; Left  12/04/2017: ROTATOR CUFF REPAIR W/ DISTAL CLAVICLE EXCISION; Right      Comment:  Dr. Eligio Timmons ( [facility] )  No date: TONSILLECTOMY)    Review of patient's family history indicates:  Problem: Multiple sclerosis      Relation: Mother          Age of Onset: (Not Specified)  Problem: Seizures      Relation: Mother          Age of Onset: (Not Specified)  Problem: Cancer      Relation: Father          Age of Onset: (Not Specified)          Comment: lung  Problem: Alcohol abuse      Relation: Brother          Age of Onset: (Not Specified)  Problem: Early death      Relation: Brother          Age of Onset: (Not Specified)  Problem: Diabetes      Relation: Maternal Aunt          Age of Onset: (Not Specified)  Problem: Diabetes      Relation: Maternal Uncle          Age of Onset: (Not Specified)  Problem: Diabetes      Relation: Maternal Grandmother          Age of Onset: (Not Specified)  Problem: Mental illness      Relation: Maternal Grandfather          Age of Onset: (Not Specified)  Problem: Alzheimer's disease      Relation: Maternal Grandfather          Age of Onset: (Not Specified)  Problem: Asthma      Relation: Paternal Aunt          Age of Onset: (Not Specified)  Problem: Sickle cell anemia      Relation: Paternal Aunt          Age of Onset: (Not Specified)  Problem: Diabetes      Relation: Sister          Age of Onset: (Not Specified)  Problem: Arthritis      Relation: Daughter          Age of Onset: (Not  Specified)  Problem: Miscarriages / Stillbirths      Relation: Daughter          Age of Onset: (Not Specified)  Problem: Anemia      Relation: Daughter          Age of Onset: (Not Specified)  Problem: Hyperlipidemia      Relation: Son          Age of Onset: (Not Specified)  Problem: Hypertension      Relation: Son          Age of Onset: (Not Specified)  Problem: Allergies      Relation: Son          Age of Onset: (Not Specified)  Problem: Sickle cell anemia      Relation: Paternal Uncle          Age of Onset: (Not Specified)  Problem: Asthma      Relation: Brother          Age of Onset: (Not Specified)  Problem: No Known Problems      Relation: Sister          Age of Onset: (Not Specified)  Problem: No Known Problems      Relation: Sister          Age of Onset: (Not Specified)  Problem: No Known Problems      Relation: Sister          Age of Onset: (Not Specified)  Problem: No Known Problems      Relation: Brother          Age of Onset: (Not Specified)  Problem: Miscarriages / Stillbirths      Relation: Daughter          Age of Onset: (Not Specified)  Problem: Thyroid disease      Relation: Daughter          Age of Onset: (Not Specified)  Problem: No Known Problems      Relation: Daughter          Age of Onset: (Not Specified)  Problem: Angioedema      Relation: Neg Hx          Age of Onset: (Not Specified)  Problem: Eczema      Relation: Neg Hx          Age of Onset: (Not Specified)  Problem: Immunodeficiency      Relation: Neg Hx          Age of Onset: (Not Specified)  Problem: Rheum arthritis      Relation: Neg Hx          Age of Onset: (Not Specified)  Problem: Psoriasis      Relation: Neg Hx          Age of Onset: (Not Specified)  Problem: Lupus      Relation: Neg Hx          Age of Onset: (Not Specified)    Social History    Tobacco Use      Smoking status: Never Smoker      Smokeless tobacco: Never Used    Alcohol use: Yes      Comment: occasionally - wine    Drug use: No    Current Outpatient Medications on  File Prior to Visit:  albuterol (PROVENTIL) 2.5 mg /3 mL (0.083 %) nebulizer solution, Take 3 mLs (2.5 mg total) by nebulization every 6 (six) hours as needed for Wheezing. Rescue, Disp: 90 mL, Rfl: 2  ascorbic acid, vitamin C, (VITAMIN C) 500 MG tablet, Take 500 mg by mouth daily as needed. , Disp: , Rfl:   atorvastatin (LIPITOR) 40 MG tablet, TAKE 1 TABLET EVERY DAY, Disp: 90 tablet, Rfl: 3  azelastine (ASTELIN) 137 mcg (0.1 %) nasal spray, 2 sprays (274 mcg total) by Nasal route 2 (two) times daily. (Patient taking differently: 2 sprays by Nasal route 2 (two) times daily. [employer] brand), Disp: 30 mL, Rfl: 12  betamethasone dipropionate (DIPROLENE) 0.05 % cream, APPLY EXTERNALLY TO THE AFFECTED AREA TWICE DAILY, Disp: 45 g, Rfl: 0  BLACK COHOSH ORAL, Take 1 capsule by mouth 2 (two) times daily. Female hormone blend sp - 7c Black Cohosh , Disp: , Rfl:   CALCIUM CARBONATE/VITAMIN D3 (VITAMIN D-3 ORAL), Take 100 Int'l Units by mouth once daily. , Disp: , Rfl:   coenzyme Q10 (CO Q-10) 10 mg capsule, Take 10 mg by mouth once daily., Disp: , Rfl:   diclofenac sodium (VOLTAREN) 1 % Gel, Apply 2 g topically 4 (four) times daily., Disp: 100 g, Rfl: 2  fluticasone-salmeterol 250-50 mcg/dose (ADVAIR DISKUS) 250-50 mcg/dose diskus inhaler, INHALE 1 PUFF INTO THE LUNGS TWICE DAILY (Patient taking differently: Inhale 1 puff into the lungs daily as needed. INHALE 1 PUFF INTO THE LUNGS TWICE DAILY), Disp: 60 each, Rfl: 5  folic acid (FOLVITE) 400 MCG tablet, Take 400 mcg by mouth once daily., Disp: , Rfl:   GARLIC OIL ORAL, Take 1 capsule by mouth., Disp: , Rfl:   lisinopril (PRINIVIL,ZESTRIL) 40 MG tablet, Take 1 tablet (40 mg total) by mouth once daily., Disp: 90 tablet, Rfl: 3  montelukast (SINGULAIR) 10 mg tablet, TAKE 1 TABLET EVERY DAY, Disp: 90 tablet, Rfl: 3  NIACIN, INOSITOL NIACINATE, (NIACIN FLUSH FREE ORAL), Take 500 mg by mouth once daily., Disp: , Rfl:   traZODone (DESYREL) 100 MG tablet, Take 1 tablet (100 mg  total) by mouth every evening., Disp: 90 tablet, Rfl: 1  valACYclovir (VALTREX) 500 MG tablet, TAKE 1 TABLET (500 MG TOTAL) BY MOUTH 2 (TWO) TIMES DAILY FOR 7 DAYS AS DIRECTED.  (Patient taking differently: as needed. ), Disp: 60 tablet, Rfl: 3  VENTOLIN HFA 90 mcg/actuation inhaler, INHALE 2 PUFFS BY MOUTH EVERY 4 HOURS AS NEEDED FOR WHEEZING, Disp: 18 g, Rfl: 0  vitamin E 100 UNIT capsule, Take 100 Units by mouth once daily., Disp: , Rfl:   (DISCONTINUED) carvedilol (COREG) 12.5 MG tablet, Take 1 tablet (12.5 mg total) by mouth 2 (two) times daily with meals., Disp: 180 tablet, Rfl: 3  (DISCONTINUED) hydrocortisone 2.5 % cream, Apply topically 2 (two) times daily., Disp: 29 g, Rfl: 0  azithromycin (ZITHROMAX) 250 MG tablet, Take 2 tablets (500 mg) on  Day 1,  followed by 1 tablet (250 mg) once daily on Days 2 through 5. (Patient not taking: Reported on 10/3/2019), Disp: 6 tablet, Rfl: 0  methylPREDNISolone (MEDROL DOSEPACK) 4 mg tablet, use as directed (Patient not taking: Reported on 10/3/2019), Disp: 1 Package, Rfl: 0  OMEGA-3-DHA-EPA-FISH OIL ORAL, Take 1 capsule by mouth once daily. , Disp: , Rfl:   (DISCONTINUED) cetirizine (ZYRTEC) 10 MG tablet, Take 10 mg by mouth once daily., Disp: , Rfl:   (DISCONTINUED) mometasone 0.1% (ELOCON) 0.1 % cream, APPLY 1 APPLICATION TOPICALLY ONCE A DAY prn, Disp: , Rfl:     No current facility-administered medications on file prior to visit.     Shingles Vaccine(1 of 2) due on 03/03/1999  Influenza Vaccine(1) due on 09/01/2019      Review of Systems   Constitutional: Negative for chills, diaphoresis, fatigue, fever and unexpected weight change.   HENT: Negative for congestion, ear pain, hearing loss, postnasal drip and sinus pressure.    Eyes: Negative for itching and visual disturbance.   Respiratory: Negative for cough, chest tightness, shortness of breath and wheezing.    Cardiovascular: Negative for chest pain, palpitations and leg swelling.   Gastrointestinal: Negative  for abdominal pain, blood in stool, constipation, diarrhea, nausea and vomiting.   Genitourinary: Negative for dysuria, frequency, hematuria, menstrual problem, pelvic pain, vaginal bleeding and vaginal discharge.   Musculoskeletal: Negative for arthralgias, back pain, joint swelling and myalgias.   Skin: Positive for rash.   Neurological: Negative for dizziness and headaches.   Hematological: Negative for adenopathy.   Psychiatric/Behavioral: Negative for sleep disturbance. The patient is not nervous/anxious.        Objective:      Physical Exam   Constitutional: She is oriented to person, place, and time. She appears well-developed and well-nourished. No distress.   Elevated blood pressure  Normal weight with a BMI of 25.5 she is down 1.1 lb from her July 31, 2019 visit   HENT:   Head: Normocephalic and atraumatic.   Right Ear: External ear normal.   Left Ear: External ear normal.   Nose: Nose normal.   Mouth/Throat: Oropharynx is clear and moist. No oropharyngeal exudate.   Eyes: Pupils are equal, round, and reactive to light. Conjunctivae and EOM are normal. Right eye exhibits no discharge. Left eye exhibits no discharge. No scleral icterus.   Neck: Normal range of motion. Neck supple. No JVD present. No tracheal deviation present. No thyromegaly present.   Cardiovascular: Normal rate, regular rhythm and normal heart sounds. Exam reveals no gallop and no friction rub.   No murmur heard.  Pulmonary/Chest: Effort normal and breath sounds normal. She has no wheezes. She has no rales. She exhibits no tenderness.   Abdominal: Soft. Bowel sounds are normal. She exhibits no mass. There is no tenderness. There is no rebound and no guarding.   Musculoskeletal: Normal range of motion. She exhibits no edema or tenderness.   Lymphadenopathy:     She has no cervical adenopathy.   Neurological: She is alert and oriented to person, place, and time. She has normal reflexes. She displays normal reflexes. No cranial nerve deficit  or sensory deficit. She exhibits normal muscle tone.   Skin: Skin is warm and dry. Rash noted. Rash is macular and papular. She is not diaphoretic. No erythema.        Psychiatric: She has a normal mood and affect. Her behavior is normal. Judgment and thought content normal.   Nursing note and vitals reviewed.      Assessment:       1. Encounter for preventive health examination    2. Uncontrolled hypertension    3. Rheumatoid factor positive    4. Sickle-cell trait    5. Chronic disease anemia    6. Leukopenia, unspecified type    7. Hyperlipidemia, unspecified hyperlipidemia type    8. Dermatitis    9. BMI 25.0-25.9,adult    10. Hypertension, essential        Plan:       1. Encounter for preventive health examination    2. Uncontrolled hypertension  On maximal lisinopril, good pulse so we could increase carvedilol and is not having problems with asthma.  She is having active dermatitis problems and has a history of allergy to sulfa so I would rather avoid diuretics.  Will increase carvedilol to 25 mg b.i.d., watch for aggravation of asthma tendencies and she will call with blood pressure readings in two weeks.    3. Rheumatoid factor positive  Relatively asymptomatic    4. Sickle-cell trait  Recent CBC done by Dr. Salgado was normal with no anemia    5. Chronic disease anemia  See above    6. Leukopenia, unspecified type  See above    7. Hyperlipidemia, unspecified hyperlipidemia type  Lab Results   Component Value Date    CHOL 154 03/11/2019    CHOL 133 02/12/2018    CHOL 246 (H) 10/31/2017     Lab Results   Component Value Date    HDL 80 (H) 03/11/2019    HDL 69 02/12/2018    HDL 93 10/31/2017     Lab Results   Component Value Date    LDLCALC 64.0 03/11/2019    LDLCALC 52.0 (L) 02/12/2018    LDLCALC 139 (H) 10/31/2017     Lab Results   Component Value Date    TRIG 50 03/11/2019    TRIG 60 02/12/2018    TRIG 58 10/31/2017     Lab Results   Component Value Date    CHOLHDL 51.9 (H) 03/11/2019    CHOLHDL 51.9 (H)  02/12/2018    CHOLHDL 2.6 10/31/2017     Well-controlled recheck March 2020    8. Dermatitis  Dermatology referral.  Sparing use of mometasone cream once daily  - mometasone 0.1% (ELOCON) 0.1 % cream; APPLY 1 APPLICATION TOPICALLY ONCE A DAY prn  Dispense: 45 g; Refill: 0  - Ambulatory referral to Dermatology    9. BMI 25.0-25.9,adult    10. Hypertension, essential  See above  - carvedilol (COREG) 12.5 MG tablet; Take 2 tablets (25 mg total) by mouth 2 (two) times daily with meals.  Dispense: 180 tablet; Refill: 3

## 2019-10-07 RX ORDER — ALBUTEROL SULFATE 90 UG/1
AEROSOL, METERED RESPIRATORY (INHALATION)
Qty: 18 G | Refills: 0 | Status: SHIPPED | OUTPATIENT
Start: 2019-10-07 | End: 2020-01-08 | Stop reason: SDUPTHER

## 2019-10-08 ENCOUNTER — INITIAL CONSULT (OUTPATIENT)
Dept: DERMATOLOGY | Facility: CLINIC | Age: 69
End: 2019-10-08
Attending: FAMILY MEDICINE
Payer: MEDICARE

## 2019-10-08 VITALS — WEIGHT: 151 LBS | BODY MASS INDEX: 25.16 KG/M2 | HEIGHT: 65 IN

## 2019-10-08 DIAGNOSIS — L40.9 SCALP PSORIASIS: ICD-10-CM

## 2019-10-08 DIAGNOSIS — L30.9 ECZEMA, UNSPECIFIED TYPE: Primary | ICD-10-CM

## 2019-10-08 DIAGNOSIS — L25.9 CONTACT DERMATITIS, UNSPECIFIED CONTACT DERMATITIS TYPE, UNSPECIFIED TRIGGER: ICD-10-CM

## 2019-10-08 PROCEDURE — 99999 PR PBB SHADOW E&M-EST. PATIENT-LVL III: CPT | Mod: PBBFAC,HCNC,, | Performed by: DERMATOLOGY

## 2019-10-08 PROCEDURE — 99999 PR PBB SHADOW E&M-EST. PATIENT-LVL III: ICD-10-PCS | Mod: PBBFAC,HCNC,, | Performed by: DERMATOLOGY

## 2019-10-08 PROCEDURE — 99203 OFFICE O/P NEW LOW 30 MIN: CPT | Mod: HCNC,S$GLB,, | Performed by: DERMATOLOGY

## 2019-10-08 PROCEDURE — 99203 PR OFFICE/OUTPT VISIT, NEW, LEVL III, 30-44 MIN: ICD-10-PCS | Mod: HCNC,S$GLB,, | Performed by: DERMATOLOGY

## 2019-10-08 PROCEDURE — 1101F PT FALLS ASSESS-DOCD LE1/YR: CPT | Mod: HCNC,CPTII,S$GLB, | Performed by: DERMATOLOGY

## 2019-10-08 PROCEDURE — 1101F PR PT FALLS ASSESS DOC 0-1 FALLS W/OUT INJ PAST YR: ICD-10-PCS | Mod: HCNC,CPTII,S$GLB, | Performed by: DERMATOLOGY

## 2019-10-08 RX ORDER — FLUOCINONIDE 0.5 MG/G
CREAM TOPICAL 2 TIMES DAILY
Qty: 60 G | Refills: 0 | Status: SHIPPED | OUTPATIENT
Start: 2019-10-08 | End: 2020-07-08

## 2019-10-08 NOTE — LETTER
October 8, 2019      Sanket Toure MD  [address]  [facility] [address]           [address], [address]  [facility] [address]  Phone: [phone]          Patient: Loren Andre   MR Number: [id number]   YOB: 1950   Date of Visit: 10/8/2019       Dear Dr. Sanket Toure:    Thank you for referring Loren Andre to me for evaluation. Attached you will find relevant portions of my assessment and plan of care.    If you have questions, please do not hesitate to call me. I look forward to following Loren Andre along with you.    Sincerely,    Long Berry MD    Enclosure  CC:  No Recipients    If you would like to receive this communication electronically, please contact [email or web address] or [phone] to request more information on [employer] Link access.    For providers and/or their staff who would like to refer a patient to [facility], please contact us through our one-stop-shop provider referral line, [facility] , at [phone].    If you feel you have received this communication in error or would no longer like to receive these types of communications, please e-mail [email or web address]

## 2019-10-08 NOTE — PATIENT INSTRUCTIONS
XEROSIS (DRY SKIN)        1. Definition    Xerosis is the term for dry skin.  We all have a natural oil coating over our skin produced by the skin oil glands.  If this oil is removed, the skin becomes dry which can lead to cracking, which can lead to inflammation.  Xerosis is usually a long-term problem that recurs often, especially in the winter.    2. Cause     Long hot baths or showers can remove our natural oil and lead to xerosis.  One should never take more than one bath or shower a day and for no longer than ten minutes.   Use of harsh soaps such as Zest, Dial, and Ivory can worsen and cause xerosis.   Cold winter weather worsens xerosis because the amount of moisture contained in cold air is much less than the amount of moisture in warm air.    3. Treatment     Treatment is intended to restore the natural oil to your skin.  Keep the skin lubricated.     Do not take more than one bath or shower a day.  Use lukewarm water, not hot.  Hot water dries out the skin.     Use a gentle moisturizing soap such as Cetaphil soap, Oil of Olay, Dove, Basis, Ivory moisture care, Restoraderm cleanser.     When toweling dry, dont rub.  Blot the skin so there is still some water left on the skin.  You should apply a moisturizing cream to all of the skin such as Cerave cream, Cetaphil cream, Restoraderm or Eucerin Original Formula cream.   Alpha hydroxyacid lotions, i.e., AmLactin, also work very well for preventing dry skin, but may burn when used on inflamed or reddened skin.     If you like to swim during the winter months, you should not use soap when getting out of the pool.  When you have finished swimming, rinse off the chlorine with cool to warm water.  If this will be the only shower of the day, then you may use Cetaphil or another mild soap to cleanse your skin.  After the shower, apply a moisturizing cream to all of the skin as above.        [address] [place] [address]/ [phone]  [phone] FAX/ [email or web address]

## 2019-10-08 NOTE — PROGRESS NOTES
Subjective:       Patient ID:  Loren Andre is a 69 y.o. female who presents for   Chief Complaint   Patient presents with    Rash     arms and neck     HPI    New patient presents today with a rash on the neck and arms, x months, dry and itchy, has been on rx hydrocortisone. Medrol dosepack and currently on mometasone cream.   Scalp is dry.  Hair extensions only past month or so.    Review of Systems   Constitutional: Negative for fever, chills and fatigue.   HENT: Negative for nosebleeds, congestion and sore throat.    Gastrointestinal: Negative for vomiting, diarrhea and constipation.   Musculoskeletal: Negative for joint swelling and arthralgias.   Skin: Positive for itching, rash and dry skin.   Hematologic/Lymphatic: Does not bruise/bleed easily.        Objective:    Physical Exam   Constitutional: She appears well-developed and well-nourished. No distress.   HENT:   Mouth/Throat: Lips normal.    Eyes: No conjunctival no injection.   Neurological: She is alert and oriented to person, place, and time. She is not disoriented.   Psychiatric: She has a normal mood and affect.   Skin:   Areas Examined (abnormalities noted in diagram):   Scalp / Hair Palpated and Inspected  Head / Face Inspection Performed  Neck Inspection Performed  Chest / Axilla Inspection Performed  RUE Inspected  LUE Inspection Performed  RLE Inspected  LLE Inspection Performed  Nails and Digits Inspection Performed                   Diagram Legend     Erythematous scaling macule/papule c/w actinic keratosis       Vascular papule c/w angioma      Pigmented verrucoid papule/plaque c/w seborrheic keratosis      Yellow umbilicated papule c/w sebaceous hyperplasia      Irregularly shaped tan macule c/w lentigo     1-2 mm smooth white papules consistent with Milia      Movable subcutaneous cyst with punctum c/w epidermal inclusion cyst      Subcutaneous movable cyst c/w pilar cyst      Firm pink to brown papule c/w dermatofibroma       Pedunculated fleshy papule(s) c/w skin tag(s)      Evenly pigmented macule c/w junctional nevus     Mildly variegated pigmented, slightly irregular-bordered macule c/w mildly atypical nevus      Flesh colored to evenly pigmented papule c/w intradermal nevus       Pink pearly papule/plaque c/w basal cell carcinoma      Erythematous hyperkeratotic cursted plaque c/w SCC      Surgical scar with no sign of skin cancer recurrence      Open and closed comedones      Inflammatory papules and pustules      Verrucoid papule consistent consistent with wart     Erythematous eczematous patches and plaques     Dystrophic onycholytic nail with subungual debris c/w onychomycosis     Umbilicated papule    Erythematous-base heme-crusted tan verrucoid plaque consistent with inflamed seborrheic keratosis     Erythematous Silvery Scaling Plaque c/w Psoriasis     See annotation      Assessment / Plan:        Eczema, unspecified type  -     fluocinonide 0.05% (LIDEX) 0.05 % cream; Apply topically 2 (two) times daily. Prn rash.  Dispense: 60 g; Refill: 0  Good skin care regimen discussed including limiting to one bath or shower/day, using lukewarm water with mild soap and moisturizing cream to skin 1 - 2x/day. Brochure was provided and reviewed with patient.  Discussed with patient the etiology and pathogenesis of the disease or skin lesion(s) and possible treatments and aggravators.    Reviewed with patient different treatment options and associated risks.  Told patient to stop all products and make up.  Discussed that less is more right now.  Patient to wash with glycerin bar soap and avoid hot water.  Avoid fragrances.  Patient may need patch testing if index of suspicion is high and patient continues to flare with rashes.  Instructed patient to use mild soaps like glycerin bar soap for washing the face and body.  Can also consider avoiding applying on body except for armpits, groin, and soles.  Discussed with patient the risks of  topical steroids, including, but not limited, to atrophy, rosacea, acne, glaucoma, cataracts, adrenal suppression, striae.    Scalp psoriasis  -     fluocinonide 0.05% (LIDEX) 0.05 % cream; Apply topically 2 (two) times daily. Prn rash.  Dispense: 60 g; Refill: 0  Discussed with patient the etiology and pathogenesis of the disease or skin lesion(s) and possible treatments and aggravators.    Reviewed with patient different treatment options and associated risks.  Proper application of medications and or care for affected area(s) and condition(s) reviewed.    Contact dermatitis, unspecified contact dermatitis type, unspecified trigger  Told patient to stop all products and make up.  Discussed that less is more right now.  Patient to wash with glycerin bar soap and avoid hot water.  Avoid fragrances.  Patient may need patch testing if index of suspicion is high and patient continues to flare with rashes.  Discussed with patient the etiology and pathogenesis of the disease or skin lesion(s) and possible treatments and aggravators.    Discussed today with the patient the option of patch testing to alleviate the risk of allergens.             Follow up if symptoms worsen or fail to improve.

## 2019-10-10 ENCOUNTER — OFFICE VISIT (OUTPATIENT)
Dept: HEMATOLOGY/ONCOLOGY | Facility: CLINIC | Age: 69
End: 2019-10-10
Payer: MEDICARE

## 2019-10-10 VITALS
HEART RATE: 76 BPM | TEMPERATURE: 99 F | BODY MASS INDEX: 25.6 KG/M2 | DIASTOLIC BLOOD PRESSURE: 69 MMHG | WEIGHT: 151.5 LBS | SYSTOLIC BLOOD PRESSURE: 123 MMHG | RESPIRATION RATE: 20 BRPM

## 2019-10-10 DIAGNOSIS — D63.8 CHRONIC DISEASE ANEMIA: ICD-10-CM

## 2019-10-10 DIAGNOSIS — D57.3 SICKLE-CELL TRAIT: Primary | ICD-10-CM

## 2019-10-10 DIAGNOSIS — D72.819 LEUKOPENIA, UNSPECIFIED TYPE: ICD-10-CM

## 2019-10-10 PROCEDURE — 3074F PR MOST RECENT SYSTOLIC BLOOD PRESSURE < 130 MM HG: ICD-10-PCS | Mod: S$GLB,,, | Performed by: INTERNAL MEDICINE

## 2019-10-10 PROCEDURE — 3078F DIAST BP <80 MM HG: CPT | Mod: S$GLB,,, | Performed by: INTERNAL MEDICINE

## 2019-10-10 PROCEDURE — 99213 PR OFFICE/OUTPT VISIT, EST, LEVL III, 20-29 MIN: ICD-10-PCS | Mod: S$GLB,,, | Performed by: INTERNAL MEDICINE

## 2019-10-10 PROCEDURE — 99213 OFFICE O/P EST LOW 20 MIN: CPT | Mod: S$GLB,,, | Performed by: INTERNAL MEDICINE

## 2019-10-10 PROCEDURE — 1101F PT FALLS ASSESS-DOCD LE1/YR: CPT | Mod: S$GLB,,, | Performed by: INTERNAL MEDICINE

## 2019-10-10 PROCEDURE — 1101F PR PT FALLS ASSESS DOC 0-1 FALLS W/OUT INJ PAST YR: ICD-10-PCS | Mod: S$GLB,,, | Performed by: INTERNAL MEDICINE

## 2019-10-10 PROCEDURE — 3078F PR MOST RECENT DIASTOLIC BLOOD PRESSURE < 80 MM HG: ICD-10-PCS | Mod: S$GLB,,, | Performed by: INTERNAL MEDICINE

## 2019-10-10 PROCEDURE — 3074F SYST BP LT 130 MM HG: CPT | Mod: S$GLB,,, | Performed by: INTERNAL MEDICINE

## 2019-11-03 RX ORDER — ALBUTEROL SULFATE 90 UG/1
AEROSOL, METERED RESPIRATORY (INHALATION)
Qty: 18 G | Refills: 0 | OUTPATIENT
Start: 2019-11-03

## 2019-11-04 NOTE — TELEPHONE ENCOUNTER
Patient stated since the weather changed she has needed the ventolin some times bid - tid. Stated she has been using Advair and nebulizer.

## 2019-11-04 NOTE — TELEPHONE ENCOUNTER
Should be using no more than three puffs a week on rescue inhaler.  Why is she going through it so fast?  Is she still taking the advair?

## 2019-11-04 NOTE — TELEPHONE ENCOUNTER
Med list she is taking advair one puff prn, it should be one puff bid daily, not prn  Her last refill ws given 12/13/18 and was only good for six months.

## 2019-11-04 NOTE — TELEPHONE ENCOUNTER
Patient stated she still has some ventolin and will only use it 3 times a week if needed. She has not needed to use it or the advair until recently so she has plenty of the advair on hand and refills. She will start using advair as instructed by Dr Toure's note. She will call if she has a problem.

## 2019-12-18 DIAGNOSIS — J45.20 ASTHMA, MILD INTERMITTENT, WELL-CONTROLLED: ICD-10-CM

## 2019-12-18 RX ORDER — FLUTICASONE PROPIONATE AND SALMETEROL 250; 50 UG/1; UG/1
POWDER RESPIRATORY (INHALATION)
Qty: 60 EACH | Refills: 1 | Status: SHIPPED | OUTPATIENT
Start: 2019-12-18 | End: 2020-01-29 | Stop reason: SDUPTHER

## 2019-12-20 DIAGNOSIS — L40.9 SCALP PSORIASIS: ICD-10-CM

## 2019-12-20 DIAGNOSIS — L30.9 ECZEMA, UNSPECIFIED TYPE: ICD-10-CM

## 2019-12-20 NOTE — TELEPHONE ENCOUNTER
----- Message from Gabe Christian sent at 12/20/2019 10:14 AM CST -----  Type:  RX Refill Request    Who Called: self   Refill or New Rx:  Refill   RX Name and Strength:  Fluocinonide 0.5 mg  cream60 grams  How is the patient currently taking it? (ex. 1XDay):   Is this a 30 day or 90 day RX:  30mg   Preferred Pharmacy with phone number:  [facility]   Local or Mail Order:  Local   Ordering Provider:  Dr Long Berry  Best Call Back Number:  [phone]   Additional Information:

## 2019-12-23 RX ORDER — FLUOCINONIDE 0.5 MG/G
CREAM TOPICAL 2 TIMES DAILY
Qty: 60 G | Refills: 0 | OUTPATIENT
Start: 2019-12-23

## 2019-12-26 ENCOUNTER — CLINICAL SUPPORT (OUTPATIENT)
Dept: URGENT CARE | Facility: CLINIC | Age: 69
End: 2019-12-26
Payer: MEDICARE

## 2019-12-26 VITALS
DIASTOLIC BLOOD PRESSURE: 80 MMHG | BODY MASS INDEX: 26.19 KG/M2 | HEART RATE: 68 BPM | RESPIRATION RATE: 12 BRPM | TEMPERATURE: 98 F | OXYGEN SATURATION: 98 % | SYSTOLIC BLOOD PRESSURE: 170 MMHG | WEIGHT: 155 LBS

## 2019-12-26 DIAGNOSIS — J01.01 ACUTE RECURRENT MAXILLARY SINUSITIS: Primary | ICD-10-CM

## 2019-12-26 PROCEDURE — 99214 PR OFFICE/OUTPT VISIT, EST, LEVL IV, 30-39 MIN: ICD-10-PCS | Mod: S$GLB,,, | Performed by: NURSE PRACTITIONER

## 2019-12-26 PROCEDURE — 99214 OFFICE O/P EST MOD 30 MIN: CPT | Mod: S$GLB,,, | Performed by: NURSE PRACTITIONER

## 2019-12-26 RX ORDER — BENZONATATE 200 MG/1
200 CAPSULE ORAL 3 TIMES DAILY PRN
Qty: 30 CAPSULE | Refills: 0 | Status: SHIPPED | OUTPATIENT
Start: 2019-12-26 | End: 2020-01-05

## 2019-12-26 RX ORDER — AZITHROMYCIN 250 MG/1
TABLET, FILM COATED ORAL
Qty: 6 TABLET | Refills: 0 | Status: SHIPPED | OUTPATIENT
Start: 2019-12-26 | End: 2020-07-08

## 2019-12-26 NOTE — PROGRESS NOTES
Subjective:       Patient ID: Loren Andre is a 69 y.o. female.    Vitals:  weight is 70.3 kg (155 lb). Her temperature is 97.8 °F (36.6 °C). Her blood pressure is 170/80 (abnormal) and her pulse is 68. Her respiration is 12 and oxygen saturation is 98%.     Chief Complaint: Cough    Symptoms started about 4 days. Pressure headaches. Cough is the most bothersome symptom.     Cough   This is a new problem. The current episode started in the past 7 days. The problem has been unchanged. The cough is productive of sputum. Associated symptoms include chills, headaches and myalgias. Pertinent negatives include no chest pain or wheezing. Nothing aggravates the symptoms. Risk factors for lung disease include animal exposure. She has tried OTC inhaler and OTC cough suppressant (nasal spray, netti pot) for the symptoms. The treatment provided no relief.       Constitution: Positive for chills and fatigue.   HENT: Positive for congestion, sinus pain (maxillary) and sinus pressure.    Cardiovascular: Negative for chest pain and palpitations.   Respiratory: Positive for chest tightness, cough (productive, all day) and asthma. Negative for wheezing.    Gastrointestinal: Negative for abdominal pain, nausea, vomiting, constipation and diarrhea.   Musculoskeletal: Positive for muscle ache.   Allergic/Immunologic: Positive for asthma.   Neurological: Positive for headaches.       Objective:      Physical Exam   Constitutional: She is oriented to person, place, and time. She appears well-developed and well-nourished.   HENT:   Head: Normocephalic and atraumatic.   Right Ear: No tenderness. Tympanic membrane is not erythematous and not bulging. A middle ear effusion is present.   Left Ear: No tenderness. Tympanic membrane is not erythematous and not bulging. A middle ear effusion is present.   Nose: Mucosal edema present. Right sinus exhibits maxillary sinus tenderness. Right sinus exhibits no frontal sinus tenderness. Left sinus  exhibits maxillary sinus tenderness. Left sinus exhibits no frontal sinus tenderness.   Mouth/Throat: Posterior oropharyngeal erythema and cobblestoning present.   Eyes: No scleral icterus.   Cardiovascular: Normal rate, regular rhythm and normal heart sounds.   No murmur heard.  Pulmonary/Chest: Effort normal and breath sounds normal. No respiratory distress. She has no wheezes. She has no rales.   Lymphadenopathy:     She has no cervical adenopathy.   Neurological: She is alert and oriented to person, place, and time.   Skin: Capillary refill takes less than 2 seconds.   Psychiatric: She has a normal mood and affect. Her behavior is normal. Thought content normal.         Assessment:       1. Acute recurrent maxillary sinusitis        Plan:         Acute recurrent maxillary sinusitis  -     azithromycin (Z-MARISABEL) 250 MG tablet; Take 2 tablets by mouth on day 1; Take 1 tablet by mouth on days 2-5  Dispense: 6 tablet; Refill: 0  -     benzonatate (TESSALON) 200 MG capsule; Take 1 capsule (200 mg total) by mouth 3 (three) times daily as needed for Cough.  Dispense: 30 capsule; Refill: 0  -     Ambulatory referral to ENT

## 2019-12-27 ENCOUNTER — TELEPHONE (OUTPATIENT)
Dept: DERMATOLOGY | Facility: CLINIC | Age: 69
End: 2019-12-27

## 2019-12-27 NOTE — TELEPHONE ENCOUNTER
----- Message from Azucena Chandler sent at 12/27/2019  9:22 AM CST -----  Contact: Patient  Type: Needs Medical Advice    Who Called:  Patient  Pharmacy name and phone #:  [facility]  Best Call Back Number: [phone] (home)   Additional Information: The patient is calling in regards to the creme fluocinonide 0.05% (LIDEX) 0.05 % cream and would like a refill sent in today she is out.

## 2020-01-08 ENCOUNTER — OFFICE VISIT (OUTPATIENT)
Dept: HOME HEALTH SERVICES | Facility: CLINIC | Age: 70
End: 2020-01-08
Payer: MEDICARE

## 2020-01-08 VITALS
DIASTOLIC BLOOD PRESSURE: 97 MMHG | WEIGHT: 153 LBS | SYSTOLIC BLOOD PRESSURE: 169 MMHG | BODY MASS INDEX: 25.49 KG/M2 | HEART RATE: 80 BPM | HEIGHT: 65 IN

## 2020-01-08 DIAGNOSIS — D57.3 SICKLE-CELL TRAIT: ICD-10-CM

## 2020-01-08 DIAGNOSIS — E78.5 HYPERLIPIDEMIA, UNSPECIFIED HYPERLIPIDEMIA TYPE: ICD-10-CM

## 2020-01-08 DIAGNOSIS — I10 UNCONTROLLED HYPERTENSION: ICD-10-CM

## 2020-01-08 DIAGNOSIS — Z00.00 ENCOUNTER FOR PREVENTIVE HEALTH EXAMINATION: Primary | ICD-10-CM

## 2020-01-08 DIAGNOSIS — D63.8 CHRONIC DISEASE ANEMIA: ICD-10-CM

## 2020-01-08 DIAGNOSIS — F41.9 ANXIETY: ICD-10-CM

## 2020-01-08 DIAGNOSIS — J45.20 MILD INTERMITTENT CHRONIC ASTHMA WITHOUT COMPLICATION: ICD-10-CM

## 2020-01-08 DIAGNOSIS — R76.8 RHEUMATOID FACTOR POSITIVE: ICD-10-CM

## 2020-01-08 DIAGNOSIS — I10 HYPERTENSION, ESSENTIAL: ICD-10-CM

## 2020-01-08 PROCEDURE — 3077F SYST BP >= 140 MM HG: CPT | Mod: CPTII,S$GLB,, | Performed by: NURSE PRACTITIONER

## 2020-01-08 PROCEDURE — G0439 PPPS, SUBSEQ VISIT: HCPCS | Mod: S$GLB,,, | Performed by: NURSE PRACTITIONER

## 2020-01-08 PROCEDURE — 3080F PR MOST RECENT DIASTOLIC BLOOD PRESSURE >= 90 MM HG: ICD-10-PCS | Mod: CPTII,S$GLB,, | Performed by: NURSE PRACTITIONER

## 2020-01-08 PROCEDURE — 3080F DIAST BP >= 90 MM HG: CPT | Mod: CPTII,S$GLB,, | Performed by: NURSE PRACTITIONER

## 2020-01-08 PROCEDURE — 99499 RISK ADDL DX/OHS AUDIT: ICD-10-PCS | Mod: S$GLB,,, | Performed by: NURSE PRACTITIONER

## 2020-01-08 PROCEDURE — 99499 UNLISTED E&M SERVICE: CPT | Mod: S$GLB,,, | Performed by: NURSE PRACTITIONER

## 2020-01-08 PROCEDURE — 3077F PR MOST RECENT SYSTOLIC BLOOD PRESSURE >= 140 MM HG: ICD-10-PCS | Mod: CPTII,S$GLB,, | Performed by: NURSE PRACTITIONER

## 2020-01-08 PROCEDURE — G0439 PR MEDICARE ANNUAL WELLNESS SUBSEQUENT VISIT: ICD-10-PCS | Mod: S$GLB,,, | Performed by: NURSE PRACTITIONER

## 2020-01-08 RX ORDER — MONTELUKAST SODIUM 10 MG/1
10 TABLET ORAL DAILY
Qty: 90 TABLET | Refills: 3 | Status: SHIPPED | OUTPATIENT
Start: 2020-01-08 | End: 2021-03-19

## 2020-01-08 RX ORDER — ATORVASTATIN CALCIUM 40 MG/1
40 TABLET, FILM COATED ORAL DAILY
Qty: 90 TABLET | Refills: 0 | Status: SHIPPED | OUTPATIENT
Start: 2020-01-08 | End: 2020-07-14

## 2020-01-08 RX ORDER — LISINOPRIL 40 MG/1
40 TABLET ORAL DAILY
Qty: 90 TABLET | Refills: 2 | Status: SHIPPED | OUTPATIENT
Start: 2020-01-08 | End: 2020-09-10

## 2020-01-08 RX ORDER — ALBUTEROL SULFATE 90 UG/1
2 AEROSOL, METERED RESPIRATORY (INHALATION) EVERY 4 HOURS PRN
Qty: 3 INHALER | Refills: 3 | Status: SHIPPED | OUTPATIENT
Start: 2020-01-08 | End: 2020-07-08

## 2020-01-08 NOTE — PATIENT INSTRUCTIONS
Counseling and Referral of Other Preventative  (Italic type indicates deductible and co-insurance are waived)    Patient Name: Loren Andre  Today's Date: 1/8/2020    Health Maintenance       Date Due Completion Date    TETANUS VACCINE 02/28/2020 (Originally 11/5/2019) 11/5/2009    Shingles Vaccine (1 of 2) 02/28/2020 (Originally 3/3/1999) ---    Lipid Panel 03/11/2020 3/11/2019    DEXA SCAN 10/26/2020 10/26/2017    Mammogram 08/05/2021 8/5/2019    Colonoscopy 10/04/2028 10/4/2018    Override on 4/4/2008: Done (Dr Guerrero   report in legacy   kb)    Override on 11/9/2005: Done ([facility]--Dr Patino--in legacy)        No orders of the defined types were placed in this encounter.    The following information is provided to all patients.  This information is to help you find resources for any of the problems found today that may be affecting your health:                Living healthy guide: www.[facility].[place].gov      Understanding Diabetes: www.diabetes.org      Eating healthy: www.cdc.gov/healthyweight      CDC home safety checklist: www.cdc.gov/steadi/patient.html      Agency on Aging: www.goea.[place].gov      Alcoholics anonymous (AA): www.aa.org      Physical Activity: www.katie.nih.gov/xv7lnza      Tobacco use: www.quitwithusla.org

## 2020-01-08 NOTE — TELEPHONE ENCOUNTER
----- Message from Mona Beckman NP sent at 1/8/2020 11:32 AM CST -----  I complete a home visit today with Mrs Andre and she states she needs refills on the following medications:    Carvedilol  Montelukast  Atorvastatin  Lisinopril  Ventolin    Can you have these refilled and/or contact the patient    Thank You

## 2020-01-08 NOTE — PROGRESS NOTES
"Loren Andre presented for a  Medicare AWV and comprehensive Health Risk Assessment today. The following components were reviewed and updated:    · Medical history  · Family History  · Social history  · Allergies and Current Medications  · Health Risk Assessment  · Health Maintenance  · Care Team     ** See Completed Assessments for Annual Wellness Visit within the encounter summary.**       The following assessments were completed:  · Living Situation  · CAGE  · Depression Screening  · Timed Get Up and Go  · Whisper Test  · Cognitive Function Screening  ·   · Nutrition Screening  · ADL Screening  · PAQ Screening    Vitals:    01/08/20 1059   BP: (!) 169/97   Pulse: 80   Weight: 69.4 kg (153 lb)   Height: 5' 5" (1.651 m)     Body mass index is 25.46 kg/m².  Physical Exam      Diagnoses and health risks identified today and associated recommendations/orders:    1. Encounter for preventive health examination  AWV Completed.    2. Sickle-cell trait  Stable, followed by Hem/Onc.    3. Mild intermittent chronic asthma without complication  Stable, followed by PCP.    4. Hyperlipidemia, unspecified hyperlipidemia type  Stable, followed by PCP.    5. Uncontrolled hypertension  Active, BP elevated, asymptomatic, instructed to keep blood pressure diary and notify PCP in reading remain elevated.    6. Chronic disease anemia  Stable, followed by Hem/Onc.    7. Rheumatoid factor positive  Stable, relatively asymptomatic, followed by PCP.    8. Anxiety  Stable, followed by PCP.    I offered to discuss end of life issues, including information on how to make advance directives that the patient could use to name someone who would make medical decisions on their behalf if they became too ill to make themselves.     ___Patient declined  _X_Patient is interested, I provided paper work and offered to discuss.      Provided Loren with a 5-10 year written screening schedule and personal prevention plan. Recommendations were developed " using the USPSTF age appropriate recommendations. Education, counseling, and referrals were provided as needed. After Visit Summary printed and given to patient which includes a list of additional screenings\tests needed.    Follow up in about 1 year (around 1/8/2021) for your next annual wellness visit.    Mona Beckman NP

## 2020-01-29 DIAGNOSIS — J45.20 ASTHMA, MILD INTERMITTENT, WELL-CONTROLLED: ICD-10-CM

## 2020-01-29 RX ORDER — FLUTICASONE PROPIONATE AND SALMETEROL 250; 50 UG/1; UG/1
POWDER RESPIRATORY (INHALATION)
Qty: 60 EACH | Refills: 2 | Status: SHIPPED | OUTPATIENT
Start: 2020-01-29 | End: 2020-07-08

## 2020-01-29 NOTE — TELEPHONE ENCOUNTER
----- Message from Boubacar Finley sent at 1/29/2020  8:20 AM CST -----  Type: Needs Medical Advice    Who Called:  Patient      Best Call Back Number: [phone]  Additional Information: Patient states that she would like a callback from [facility] regarding that she will be needing more refills for her fluticasone-salmeterol diskus inhaler 250-50 mcg as she has just picked up her last refill.

## 2020-02-19 NOTE — TELEPHONE ENCOUNTER
----- Message from Catherine Matthews sent at 2/19/2020 12:12 PM CST -----  Contact: Patient  Type:  RX Refill Request    Who Called:  Patient  Refill or New Rx:  refill  RX Name and Strength:  carvedilol (COREG) 12.5 MG tablet  How is the patient currently taking it? (ex. 1XDay):  As directed  Is this a 30 day or 90 day RX: 90  Preferred Pharmacy with phone number:    [id number] - [place], [place] - [address] AT [address] [facility]  [address]  [place] [address]  Phone: [phone] Fax: [phone]  Local or Mail Order:  Local  Ordering Provider:  Tejal Palm Call Back Number:  [phone]  Additional Information:  Per patient has 3 pills left-please advise-thank you

## 2020-02-20 NOTE — TELEPHONE ENCOUNTER
According to our records she is taking 4 times this amount, 25 mg twice a day using two 12.5's each dose    This is what happens when you do not bring your medicines in to clinic with you

## 2020-02-24 DIAGNOSIS — I10 HYPERTENSION, ESSENTIAL: ICD-10-CM

## 2020-02-24 RX ORDER — CARVEDILOL 12.5 MG/1
25 TABLET ORAL 2 TIMES DAILY WITH MEALS
Qty: 180 TABLET | Refills: 3 | Status: SHIPPED | OUTPATIENT
Start: 2020-02-24 | End: 2020-10-01 | Stop reason: SDUPTHER

## 2020-02-24 NOTE — TELEPHONE ENCOUNTER
----- Message from Dasha Ghosh sent at 2/24/2020  9:36 AM CST -----  Contact: Patient  Patient requesting a call back from [facility]- has questions about her medication.  Says she has been getting it from [facility] but in chart it says [employer] mail-in. Does not have refills at [facility] and is almost out    Medication: carvedilol (COREG) 12.5 MG tablet    Patient call back: [phone]

## 2020-02-26 ENCOUNTER — CLINICAL SUPPORT (OUTPATIENT)
Dept: URGENT CARE | Facility: CLINIC | Age: 70
End: 2020-02-26
Payer: MEDICARE

## 2020-02-26 VITALS
HEART RATE: 65 BPM | BODY MASS INDEX: 25.63 KG/M2 | WEIGHT: 154 LBS | TEMPERATURE: 97 F | RESPIRATION RATE: 16 BRPM | OXYGEN SATURATION: 97 % | SYSTOLIC BLOOD PRESSURE: 152 MMHG | DIASTOLIC BLOOD PRESSURE: 86 MMHG

## 2020-02-26 DIAGNOSIS — J32.9 CHRONIC SINUSITIS, UNSPECIFIED LOCATION: Primary | ICD-10-CM

## 2020-02-26 PROCEDURE — 99214 PR OFFICE/OUTPT VISIT, EST, LEVL IV, 30-39 MIN: ICD-10-PCS | Mod: S$GLB,,, | Performed by: NURSE PRACTITIONER

## 2020-02-26 PROCEDURE — 99214 OFFICE O/P EST MOD 30 MIN: CPT | Mod: S$GLB,,, | Performed by: NURSE PRACTITIONER

## 2020-02-26 RX ORDER — BENZONATATE 200 MG/1
200 CAPSULE ORAL 3 TIMES DAILY PRN
Qty: 30 CAPSULE | Refills: 0 | Status: SHIPPED | OUTPATIENT
Start: 2020-02-26 | End: 2020-03-07

## 2020-02-26 RX ORDER — FLUTICASONE PROPIONATE 50 MCG
1 SPRAY, SUSPENSION (ML) NASAL DAILY
Qty: 9.9 ML | Refills: 0 | Status: SHIPPED | OUTPATIENT
Start: 2020-02-26 | End: 2020-07-08

## 2020-02-26 RX ORDER — DOXYCYCLINE 100 MG/1
100 CAPSULE ORAL EVERY 12 HOURS
Qty: 14 CAPSULE | Refills: 0 | Status: SHIPPED | OUTPATIENT
Start: 2020-02-26 | End: 2020-03-04

## 2020-02-26 NOTE — PATIENT INSTRUCTIONS
Self-Care for Sinusitis     Drinking plenty of water can help sinuses drain.   Sinusitis can often be managed with self-care. Self-care can keep sinuses moist and make you feel more comfortable. Remember to follow your doctor's instructions closely. This can make a big difference in getting your sinus problem under control.  Drink fluids  Drinking extra fluids helps thin your mucus. This lets it drain from your sinuses more easily. Have a glass of water every hour or two. A humidifier helps in much the same way. Fluids can also offset the drying effects of certain medicines. If you use a humidifier, follow the product maker's instructions on how to use it. Clean it on a regular schedule.  Use saltwater rinses  Rinses help keep your sinuses and nose moist. Mix a teaspoon of salt in 8 ounces of fresh, warm water. Use a bulb syringe to gently squirt the water into your nose a few times a day. You can also buy ready-made saline nasal sprays.  Apply hot or cold packs  Applying heat to the area surrounding your sinuses may make you feel more comfortable. Use a hot water bottle or a hand towel dipped in hot water. Some people also find ice packs effective for relieving pain.  Medicines  Your doctor may prescribe medications to help treat your sinusitis. If you have an infection, antibiotics can help clear it up. If you are prescribed antibiotics, take all pills on schedule until they are gone, even if you feel better. Decongestants help relieve swelling. Use decongestant sprays for short periods only under the direction of your doctor. If you have allergies, your doctor may prescribe medications to help relieve them.   Date Last Reviewed: 10/1/2016  © 1394-7226 [employer]. [address] [place] [address]. All rights reserved. This information is not intended as a substitute for professional medical care. Always follow your healthcare professional's instructions.        Sinusitis (Antibiotic  Treatment)    The sinuses are air-filled spaces within the bones of the face. They connect to the inside of the nose. Sinusitis is an inflammation of the tissue lining the sinus cavity. Sinus inflammation can occur during a cold. It can also be due to allergies to pollens and other particles in the air. Sinusitis can cause symptoms of sinus congestion and fullness. A sinus infection causes fever, headache and facial pain. There is often green or yellow drainage from the nose or into the back of the throat (post-nasal drip). You have been given antibiotics to treat this condition.  Home care:  · Take the full course of antibiotics as instructed. Do not stop taking them, even if you feel better.  · Drink plenty of water, hot tea, and other liquids. This may help thin mucus. It also may promote sinus drainage.  · Heat may help soothe painful areas of the face. Use a towel soaked in hot water. Or,  the shower and direct the hot spray onto your face. Using a vaporizer along with a menthol rub at night may also help.   · An expectorant containing guaifenesin may help thin the mucus and promote drainage from the sinuses.  · Over-the-counter decongestants may be used unless a similar medicine was prescribed. Nasal sprays work the fastest. Use one that contains phenylephrine or oxymetazoline. First blow the nose gently. Then use the spray. Do not use these medicines more often than directed on the label or symptoms may get worse. You may also use tablets containing pseudoephedrine. Avoid products that combine ingredients, because side effects may be increased. Read labels. You can also ask the pharmacist for help. (NOTE: Persons with high blood pressure should not use decongestants. They can raise blood pressure.)  · Over-the-counter antihistamines may help if allergies contributed to your sinusitis.    · Do not use nasal rinses or irrigation during an acute sinus infection, unless told to by your health care  provider. Rinsing may spread the infection to other sinuses.  · Use acetaminophen or ibuprofen to control pain, unless another pain medicine was prescribed. (If you have chronic liver or kidney disease or ever had a stomach ulcer, talk with your doctor before using these medicines. Aspirin should never be used in anyone under 18 years of age who is ill with a fever. It may cause severe liver damage.)  · Don't smoke. This can worsen symptoms.  Follow-up care  Follow up with your healthcare provider or our staff if you are not improving within the next week.  When to seek medical advice  Call your healthcare provider if any of these occur:  · Facial pain or headache becoming more severe  · Stiff neck  · Unusual drowsiness or confusion  · Swelling of the forehead or eyelids  · Vision problems, including blurred or double vision  · Fever of 100.4ºF (38ºC) or higher, or as directed by your healthcare provider  · Seizure  · Breathing problems  · Symptoms not resolving within 10 days  Date Last Reviewed: 4/13/2015  © 0294-0946 [employer]. [address] [place] [address], [place] [address]. All rights reserved. This information is not intended as a substitute for professional medical care. Always follow your healthcare professional's instructions.  Please see Ear/Nose/Throat provider- referral made  Please return for any concerns or if symptoms fail to improve  Follow up with primary care provider

## 2020-02-26 NOTE — PROGRESS NOTES
Subjective: cough, HA, post nasal drip, sinus pressure, congestion       Patient ID: Loren Andre is a 69 y.o. female.    Vitals:  weight is 69.9 kg (154 lb). Her temperature is 97.2 °F (36.2 °C). Her blood pressure is 152/86 (abnormal) and her pulse is 65. Her respiration is 16 and oxygen saturation is 97%.     Chief Complaint: Sinus Problem (HA, sinus pressure, post nasal drip, cough)    PT presents sinus and headache. Onset 7 days ago. Has tried to treat with olive leaf extract, zinc, and vitamin C, ibuprofen, and (health food store) sinex. Up to date with flu shot. Headache 8 out of 10; constant aching headache. Ibuprofen helps for a little while. Reports 2 sinus infection within the last few months (December & September 2019); treated with cough medicine and antibiotic. Associated symptoms include cough (productive at times; clear sputum), congestion, sinus pressure. Denies nausea, vomiting, fever, chills. Cannot recall if she saw ENT provider in the past. Has not had to increase use of asthma inhalers; explains that she is not having any worsening asthma, describes as mainly sinus symptoms.    Sinus Problem   This is a new problem. The current episode started 1 to 4 weeks ago. There has been no fever. Associated symptoms include congestion, coughing, headaches and sinus pressure. Pertinent negatives include no chills, diaphoresis, ear pain, shortness of breath or sore throat.       Constitution: Negative for chills, sweating, fatigue and fever.   HENT: Positive for congestion and sinus pressure. Negative for ear pain, sinus pain, sore throat and voice change.    Neck: Negative for painful lymph nodes.   Eyes: Negative for eye redness.   Respiratory: Positive for cough. Negative for chest tightness, sputum production, bloody sputum, COPD, shortness of breath, stridor, wheezing and asthma.    Gastrointestinal: Negative for nausea and vomiting.   Musculoskeletal: Negative for muscle ache.   Skin: Negative for  rash.   Allergic/Immunologic: Negative for seasonal allergies and asthma.   Neurological: Positive for headaches.   Hematologic/Lymphatic: Negative for swollen lymph nodes.       Objective:      Physical Exam   Constitutional: She is oriented to person, place, and time. She appears well-developed and well-nourished. She is cooperative.  Non-toxic appearance. She does not have a sickly appearance. She does not appear ill. No distress.   HENT:   Head: Normocephalic and atraumatic.   Right Ear: Hearing, tympanic membrane, external ear and ear canal normal.   Left Ear: Hearing, tympanic membrane, external ear and ear canal normal.   Nose: Mucosal edema present. No rhinorrhea or nasal deformity. No epistaxis. Right sinus exhibits maxillary sinus tenderness and frontal sinus tenderness. Left sinus exhibits maxillary sinus tenderness and frontal sinus tenderness.   Mouth/Throat: Uvula is midline and mucous membranes are normal. No trismus in the jaw. Normal dentition. No uvula swelling. Posterior oropharyngeal erythema present. No oropharyngeal exudate or posterior oropharyngeal edema.   Eyes: Conjunctivae and lids are normal. No scleral icterus.   Neck: Trachea normal, full passive range of motion without pain and phonation normal. Neck supple. No neck rigidity. No edema and no erythema present.   Cardiovascular: Normal rate, regular rhythm, normal heart sounds, intact distal pulses and normal pulses.   Pulmonary/Chest: Effort normal and breath sounds normal. No stridor. No respiratory distress. She has no decreased breath sounds. She has no wheezes. She has no rhonchi.   Abdominal: Normal appearance.   Musculoskeletal: Normal range of motion. She exhibits no edema or deformity.   Neurological: She is alert and oriented to person, place, and time. She exhibits normal muscle tone. Coordination normal.   Skin: Skin is warm, dry, intact, not diaphoretic and not pale.   Psychiatric: She has a normal mood and affect. Her  speech is normal and behavior is normal. Judgment and thought content normal. Cognition and memory are normal.   Nursing note and vitals reviewed.        Assessment:       1. Chronic sinusitis, unspecified location        Plan:     Discussed plan of care; encouraged po fluids. Pt cannot recall if she followed up with ENT provider; referral made. Encourage to follow up with primary care provider. May return if symptoms fail to improve or any other concerns.     Chronic sinusitis, unspecified location    Other orders  -     doxycycline (VIBRAMYCIN) 100 MG Cap; Take 1 capsule (100 mg total) by mouth every 12 (twelve) hours. for 7 days  Dispense: 14 capsule; Refill: 0  -     benzonatate (TESSALON) 200 MG capsule; Take 1 capsule (200 mg total) by mouth 3 (three) times daily as needed for Cough.  Dispense: 30 capsule; Refill: 0  -     fluticasone propionate (FLONASE) 50 mcg/actuation nasal spray; 1 spray (50 mcg total) by Each Nostril route once daily.  Dispense: 9.9 mL; Refill: 0

## 2020-03-03 RX ORDER — CARVEDILOL 6.25 MG/1
TABLET ORAL
Qty: 180 TABLET | OUTPATIENT
Start: 2020-03-03

## 2020-03-19 RX ORDER — TRAZODONE HYDROCHLORIDE 100 MG/1
TABLET ORAL
Qty: 90 TABLET | Refills: 1 | Status: SHIPPED | OUTPATIENT
Start: 2020-03-19 | End: 2020-09-18

## 2020-03-30 ENCOUNTER — LAB VISIT (OUTPATIENT)
Dept: LAB | Facility: HOSPITAL | Age: 70
End: 2020-03-30
Attending: FAMILY MEDICINE
Payer: MEDICARE

## 2020-03-30 DIAGNOSIS — E78.5 HYPERLIPIDEMIA, UNSPECIFIED HYPERLIPIDEMIA TYPE: ICD-10-CM

## 2020-03-30 DIAGNOSIS — I10 HYPERTENSION, ESSENTIAL: ICD-10-CM

## 2020-03-30 LAB
ALBUMIN SERPL BCP-MCNC: 3.5 G/DL (ref 3.5–5.2)
ALP SERPL-CCNC: 72 U/L (ref 55–135)
ALT SERPL W/O P-5'-P-CCNC: 24 U/L (ref 10–44)
ANION GAP SERPL CALC-SCNC: 9 MMOL/L (ref 8–16)
AST SERPL-CCNC: 25 U/L (ref 10–40)
BILIRUB SERPL-MCNC: 0.6 MG/DL (ref 0.1–1)
BUN SERPL-MCNC: 18 MG/DL (ref 8–23)
CALCIUM SERPL-MCNC: 9.4 MG/DL (ref 8.7–10.5)
CHLORIDE SERPL-SCNC: 105 MMOL/L (ref 95–110)
CHOLEST SERPL-MCNC: 151 MG/DL (ref 120–199)
CHOLEST/HDLC SERPL: 2.3 {RATIO} (ref 2–5)
CO2 SERPL-SCNC: 26 MMOL/L (ref 23–29)
CREAT SERPL-MCNC: 1.2 MG/DL (ref 0.5–1.4)
EST. GFR  (AFRICAN AMERICAN): 52.5 ML/MIN/1.73 M^2
EST. GFR  (NON AFRICAN AMERICAN): 45.6 ML/MIN/1.73 M^2
GLUCOSE SERPL-MCNC: 96 MG/DL (ref 70–110)
HDLC SERPL-MCNC: 66 MG/DL (ref 40–75)
HDLC SERPL: 43.7 % (ref 20–50)
LDLC SERPL CALC-MCNC: 70 MG/DL (ref 63–159)
NONHDLC SERPL-MCNC: 85 MG/DL
POTASSIUM SERPL-SCNC: 4 MMOL/L (ref 3.5–5.1)
PROT SERPL-MCNC: 6.9 G/DL (ref 6–8.4)
SODIUM SERPL-SCNC: 140 MMOL/L (ref 136–145)
TRIGL SERPL-MCNC: 75 MG/DL (ref 30–150)

## 2020-03-30 PROCEDURE — 80061 LIPID PANEL: CPT | Mod: HCNC

## 2020-03-30 PROCEDURE — 36415 COLL VENOUS BLD VENIPUNCTURE: CPT | Mod: HCNC,PO

## 2020-03-30 PROCEDURE — 80053 COMPREHEN METABOLIC PANEL: CPT | Mod: HCNC

## 2020-07-08 ENCOUNTER — HOSPITAL ENCOUNTER (OUTPATIENT)
Facility: HOSPITAL | Age: 70
Discharge: HOME | End: 2020-07-09
Attending: EMERGENCY MEDICINE | Admitting: INTERNAL MEDICINE
Payer: MEDICARE

## 2020-07-08 ENCOUNTER — NURSE TRIAGE (OUTPATIENT)
Dept: ADMINISTRATIVE | Facility: CLINIC | Age: 70
End: 2020-07-08

## 2020-07-08 DIAGNOSIS — R73.03 PRE-DIABETES: ICD-10-CM

## 2020-07-08 DIAGNOSIS — I10 HYPERTENSION, UNSPECIFIED TYPE: ICD-10-CM

## 2020-07-08 DIAGNOSIS — J45.20 MILD INTERMITTENT CHRONIC ASTHMA WITHOUT COMPLICATION: ICD-10-CM

## 2020-07-08 DIAGNOSIS — M79.7 FIBROMYALGIA: ICD-10-CM

## 2020-07-08 DIAGNOSIS — R07.9 CHEST PAIN: Primary | ICD-10-CM

## 2020-07-08 DIAGNOSIS — E78.5 HYPERLIPIDEMIA, UNSPECIFIED HYPERLIPIDEMIA TYPE: ICD-10-CM

## 2020-07-08 PROBLEM — I16.0 HYPERTENSIVE URGENCY: Status: ACTIVE | Noted: 2020-07-08

## 2020-07-08 LAB
ALBUMIN SERPL BCP-MCNC: 3.7 G/DL (ref 3.5–5.2)
ALP SERPL-CCNC: 55 U/L (ref 55–135)
ALT SERPL W/O P-5'-P-CCNC: 22 U/L (ref 10–44)
ANION GAP SERPL CALC-SCNC: 10 MMOL/L (ref 8–16)
AST SERPL-CCNC: 25 U/L (ref 10–40)
BASOPHILS # BLD AUTO: 0.01 K/UL (ref 0–0.2)
BASOPHILS NFR BLD: 0.3 % (ref 0–1.9)
BILIRUB SERPL-MCNC: 0.8 MG/DL (ref 0.1–1)
BILIRUB UR QL STRIP: NEGATIVE
BNP SERPL-MCNC: 157 PG/ML (ref 0–99)
BUN SERPL-MCNC: 14 MG/DL (ref 8–23)
CALCIUM SERPL-MCNC: 9.2 MG/DL (ref 8.7–10.5)
CHLORIDE SERPL-SCNC: 103 MMOL/L (ref 95–110)
CLARITY UR: CLEAR
CO2 SERPL-SCNC: 27 MMOL/L (ref 23–29)
COLOR UR: YELLOW
CREAT SERPL-MCNC: 1.1 MG/DL (ref 0.5–1.4)
D DIMER PPP IA.FEU-MCNC: 0.58 UG/ML FEU
DIFFERENTIAL METHOD: ABNORMAL
EOSINOPHIL # BLD AUTO: 0.1 K/UL (ref 0–0.5)
EOSINOPHIL NFR BLD: 2.3 % (ref 0–8)
ERYTHROCYTE [DISTWIDTH] IN BLOOD BY AUTOMATED COUNT: 13.7 % (ref 11.5–14.5)
EST. GFR  (AFRICAN AMERICAN): 58.4 ML/MIN/1.73 M^2
EST. GFR  (NON AFRICAN AMERICAN): 50.6 ML/MIN/1.73 M^2
GLUCOSE SERPL-MCNC: 107 MG/DL (ref 70–110)
GLUCOSE UR QL STRIP: NEGATIVE
HCT VFR BLD AUTO: 37.9 % (ref 37–48.5)
HGB BLD-MCNC: 12.4 G/DL (ref 12–16)
HGB UR QL STRIP: NEGATIVE
IMM GRANULOCYTES # BLD AUTO: 0.01 K/UL (ref 0–0.04)
IMM GRANULOCYTES NFR BLD AUTO: 0.3 % (ref 0–0.5)
INR PPP: 1.1
KETONES UR QL STRIP: NEGATIVE
LEUKOCYTE ESTERASE UR QL STRIP: NEGATIVE
LYMPHOCYTES # BLD AUTO: 1 K/UL (ref 1–4.8)
LYMPHOCYTES NFR BLD: 24.5 % (ref 18–48)
MAGNESIUM SERPL-MCNC: 1.8 MG/DL (ref 1.6–2.6)
MCH RBC QN AUTO: 26.9 PG (ref 27–31)
MCHC RBC AUTO-ENTMCNC: 32.7 G/DL (ref 32–36)
MCV RBC AUTO: 82 FL (ref 82–98)
MONOCYTES # BLD AUTO: 0.6 K/UL (ref 0.3–1)
MONOCYTES NFR BLD: 15.1 % (ref 4–15)
NEUTROPHILS # BLD AUTO: 2.3 K/UL (ref 1.8–7.7)
NEUTROPHILS NFR BLD: 57.5 % (ref 38–73)
NITRITE UR QL STRIP: NEGATIVE
NRBC BLD-RTO: 0 /100 WBC
PH UR STRIP: 7 [PH] (ref 5–8)
PLATELET # BLD AUTO: 178 K/UL (ref 150–350)
PMV BLD AUTO: 10.8 FL (ref 9.2–12.9)
POTASSIUM SERPL-SCNC: 3.8 MMOL/L (ref 3.5–5.1)
PROT SERPL-MCNC: 7 G/DL (ref 6–8.4)
PROT UR QL STRIP: NEGATIVE
PROTHROMBIN TIME: 13.2 SEC (ref 10.6–14.8)
RBC # BLD AUTO: 4.61 M/UL (ref 4–5.4)
SARS-COV-2 RDRP RESP QL NAA+PROBE: NEGATIVE
SODIUM SERPL-SCNC: 140 MMOL/L (ref 136–145)
SP GR UR STRIP: 1.01 (ref 1–1.03)
TROPONIN I SERPL DL<=0.01 NG/ML-MCNC: <0.03 NG/ML
TROPONIN I SERPL DL<=0.01 NG/ML-MCNC: <0.03 NG/ML
TSH SERPL DL<=0.005 MIU/L-ACNC: 1.38 UIU/ML (ref 0.34–5.6)
URN SPEC COLLECT METH UR: NORMAL
UROBILINOGEN UR STRIP-ACNC: NEGATIVE EU/DL
WBC # BLD AUTO: 3.92 K/UL (ref 3.9–12.7)

## 2020-07-08 PROCEDURE — 99285 EMERGENCY DEPT VISIT HI MDM: CPT | Mod: 25

## 2020-07-08 PROCEDURE — 96372 THER/PROPH/DIAG INJ SC/IM: CPT | Mod: 59

## 2020-07-08 PROCEDURE — 83735 ASSAY OF MAGNESIUM: CPT

## 2020-07-08 PROCEDURE — G0378 HOSPITAL OBSERVATION PER HR: HCPCS

## 2020-07-08 PROCEDURE — 83880 ASSAY OF NATRIURETIC PEPTIDE: CPT

## 2020-07-08 PROCEDURE — G0378 HOSPITAL OBSERVATION PER HR: HCPCS | Mod: CS

## 2020-07-08 PROCEDURE — 96376 TX/PRO/DX INJ SAME DRUG ADON: CPT | Mod: 59

## 2020-07-08 PROCEDURE — 25000003 PHARM REV CODE 250: Performed by: EMERGENCY MEDICINE

## 2020-07-08 PROCEDURE — 36415 COLL VENOUS BLD VENIPUNCTURE: CPT

## 2020-07-08 PROCEDURE — 96374 THER/PROPH/DIAG INJ IV PUSH: CPT

## 2020-07-08 PROCEDURE — 85610 PROTHROMBIN TIME: CPT

## 2020-07-08 PROCEDURE — 63600175 PHARM REV CODE 636 W HCPCS: Performed by: INTERNAL MEDICINE

## 2020-07-08 PROCEDURE — 84484 ASSAY OF TROPONIN QUANT: CPT

## 2020-07-08 PROCEDURE — U0002 COVID-19 LAB TEST NON-CDC: HCPCS

## 2020-07-08 PROCEDURE — 85025 COMPLETE CBC W/AUTO DIFF WBC: CPT

## 2020-07-08 PROCEDURE — 25000003 PHARM REV CODE 250: Performed by: INTERNAL MEDICINE

## 2020-07-08 PROCEDURE — 84484 ASSAY OF TROPONIN QUANT: CPT | Mod: 91

## 2020-07-08 PROCEDURE — 85379 FIBRIN DEGRADATION QUANT: CPT

## 2020-07-08 PROCEDURE — 63600175 PHARM REV CODE 636 W HCPCS: Performed by: EMERGENCY MEDICINE

## 2020-07-08 PROCEDURE — 81003 URINALYSIS AUTO W/O SCOPE: CPT

## 2020-07-08 PROCEDURE — 80053 COMPREHEN METABOLIC PANEL: CPT

## 2020-07-08 PROCEDURE — 63700000 PHARM REV CODE 250 ALT 637 W/O HCPCS: Performed by: EMERGENCY MEDICINE

## 2020-07-08 PROCEDURE — 25500020 PHARM REV CODE 255: Performed by: EMERGENCY MEDICINE

## 2020-07-08 PROCEDURE — 84443 ASSAY THYROID STIM HORMONE: CPT

## 2020-07-08 PROCEDURE — 93005 ELECTROCARDIOGRAM TRACING: CPT | Performed by: INTERNAL MEDICINE

## 2020-07-08 RX ORDER — HYDRALAZINE HYDROCHLORIDE 20 MG/ML
10 INJECTION INTRAMUSCULAR; INTRAVENOUS EVERY 8 HOURS PRN
Status: DISCONTINUED | OUTPATIENT
Start: 2020-07-08 | End: 2020-07-09 | Stop reason: HOSPADM

## 2020-07-08 RX ORDER — LISINOPRIL 20 MG/1
40 TABLET ORAL DAILY
Status: DISCONTINUED | OUTPATIENT
Start: 2020-07-09 | End: 2020-07-09 | Stop reason: HOSPADM

## 2020-07-08 RX ORDER — MORPHINE SULFATE 2 MG/ML
2 INJECTION, SOLUTION INTRAMUSCULAR; INTRAVENOUS EVERY 4 HOURS PRN
Status: DISCONTINUED | OUTPATIENT
Start: 2020-07-08 | End: 2020-07-09 | Stop reason: HOSPADM

## 2020-07-08 RX ORDER — HYDRALAZINE HYDROCHLORIDE 25 MG/1
25 TABLET, FILM COATED ORAL EVERY 8 HOURS
Status: DISCONTINUED | OUTPATIENT
Start: 2020-07-08 | End: 2020-07-09

## 2020-07-08 RX ORDER — ATORVASTATIN CALCIUM 40 MG/1
40 TABLET, FILM COATED ORAL NIGHTLY
Status: DISCONTINUED | OUTPATIENT
Start: 2020-07-08 | End: 2020-07-09 | Stop reason: HOSPADM

## 2020-07-08 RX ORDER — NAPROXEN SODIUM 220 MG/1
81 TABLET, FILM COATED ORAL DAILY
Status: DISCONTINUED | OUTPATIENT
Start: 2020-07-09 | End: 2020-07-09 | Stop reason: HOSPADM

## 2020-07-08 RX ORDER — AZITHROMYCIN 250 MG/1
500 TABLET, FILM COATED ORAL
Status: COMPLETED | OUTPATIENT
Start: 2020-07-08 | End: 2020-07-08

## 2020-07-08 RX ORDER — HYDRALAZINE HYDROCHLORIDE 20 MG/ML
10 INJECTION INTRAMUSCULAR; INTRAVENOUS
Status: COMPLETED | OUTPATIENT
Start: 2020-07-08 | End: 2020-07-08

## 2020-07-08 RX ORDER — ASPIRIN 325 MG
325 TABLET ORAL
Status: COMPLETED | OUTPATIENT
Start: 2020-07-08 | End: 2020-07-08

## 2020-07-08 RX ORDER — ENOXAPARIN SODIUM 100 MG/ML
40 INJECTION SUBCUTANEOUS EVERY 24 HOURS
Status: DISCONTINUED | OUTPATIENT
Start: 2020-07-08 | End: 2020-07-09 | Stop reason: HOSPADM

## 2020-07-08 RX ORDER — CARVEDILOL 12.5 MG/1
25 TABLET ORAL 2 TIMES DAILY WITH MEALS
Status: DISCONTINUED | OUTPATIENT
Start: 2020-07-08 | End: 2020-07-09 | Stop reason: HOSPADM

## 2020-07-08 RX ORDER — SODIUM CHLORIDE 0.9 % (FLUSH) 0.9 %
10 SYRINGE (ML) INJECTION
Status: DISCONTINUED | OUTPATIENT
Start: 2020-07-08 | End: 2020-07-09 | Stop reason: HOSPADM

## 2020-07-08 RX ORDER — TRAZODONE HYDROCHLORIDE 50 MG/1
100 TABLET ORAL NIGHTLY
Status: DISCONTINUED | OUTPATIENT
Start: 2020-07-08 | End: 2020-07-09 | Stop reason: HOSPADM

## 2020-07-08 RX ORDER — MONTELUKAST SODIUM 10 MG/1
10 TABLET ORAL DAILY
Status: DISCONTINUED | OUTPATIENT
Start: 2020-07-09 | End: 2020-07-09 | Stop reason: HOSPADM

## 2020-07-08 RX ORDER — NITROGLYCERIN 0.4 MG/1
0.4 TABLET SUBLINGUAL EVERY 5 MIN PRN
Status: DISCONTINUED | OUTPATIENT
Start: 2020-07-08 | End: 2020-07-09 | Stop reason: HOSPADM

## 2020-07-08 RX ORDER — ACETAMINOPHEN 325 MG/1
650 TABLET ORAL EVERY 4 HOURS PRN
Status: DISCONTINUED | OUTPATIENT
Start: 2020-07-08 | End: 2020-07-09 | Stop reason: HOSPADM

## 2020-07-08 RX ORDER — IODIXANOL 320 MG/ML
100 INJECTION, SOLUTION INTRAVASCULAR
Status: COMPLETED | OUTPATIENT
Start: 2020-07-08 | End: 2020-07-08

## 2020-07-08 RX ORDER — ONDANSETRON 2 MG/ML
4 INJECTION INTRAMUSCULAR; INTRAVENOUS EVERY 8 HOURS PRN
Status: DISCONTINUED | OUTPATIENT
Start: 2020-07-08 | End: 2020-07-09 | Stop reason: HOSPADM

## 2020-07-08 RX ADMIN — ASPIRIN 325 MG ORAL TABLET 325 MG: 325 PILL ORAL at 09:07

## 2020-07-08 RX ADMIN — IODIXANOL 100 ML: 320 INJECTION, SOLUTION INTRAVASCULAR at 01:07

## 2020-07-08 RX ADMIN — AZITHROMYCIN MONOHYDRATE 500 MG: 250 TABLET ORAL at 12:07

## 2020-07-08 RX ADMIN — ATORVASTATIN CALCIUM 40 MG: 40 TABLET, FILM COATED ORAL at 08:07

## 2020-07-08 RX ADMIN — NITROGLYCERIN 1 INCH: 20 OINTMENT TOPICAL at 12:07

## 2020-07-08 RX ADMIN — HYDRALAZINE HYDROCHLORIDE 10 MG: 20 INJECTION INTRAMUSCULAR; INTRAVENOUS at 01:07

## 2020-07-08 RX ADMIN — HYDRALAZINE HYDROCHLORIDE 25 MG: 25 TABLET, FILM COATED ORAL at 10:07

## 2020-07-08 RX ADMIN — HYDRALAZINE HYDROCHLORIDE 10 MG: 20 INJECTION INTRAMUSCULAR; INTRAVENOUS at 05:07

## 2020-07-08 RX ADMIN — TRAZODONE HYDROCHLORIDE 100 MG: 50 TABLET ORAL at 08:07

## 2020-07-08 RX ADMIN — CARVEDILOL 25 MG: 12.5 TABLET, FILM COATED ORAL at 05:07

## 2020-07-08 RX ADMIN — ENOXAPARIN SODIUM 40 MG: 100 INJECTION SUBCUTANEOUS at 05:07

## 2020-07-08 NOTE — TELEPHONE ENCOUNTER
"Pt c/o intermittent pain "over breast" that began "through the night." Pt denies CP at this time. Pt advised per protocol to ED now and pt verbalizes understanding.     Reason for Disposition   SEVERE chest pain    Additional Information   Negative: Severe difficulty breathing (e.g., struggling for each breath, speaks in single words)   Negative: Passed out (i.e., fainted, collapsed and was not responding)   Negative: Chest pain lasting longer than 5 minutes and ANY of the following:* Over 50 years old* Over 30 years old and at least one cardiac risk factor (i.e., high blood pressure, diabetes, high cholesterol, obesity, smoker or strong family history of heart disease)* Pain is crushing, pressure-like, or heavy * Took nitroglycerin and chest pain was not relieved* History of heart disease (i.e., angina, heart attack, bypass surgery, angioplasty, CHF)   Negative: Visible sweat on face or sweat dripping down face   Negative: Sounds like a life-threatening emergency to the triager    Protocols used: CHEST PAIN-A-OH      "

## 2020-07-08 NOTE — H&P
[facility] Medicine  History & Physical    Patient Name: Loren Andre  MRN: [id number]  Admission Date: 7/8/2020  Attending Physician: Christ Haney MD  Primary Care Provider: Sanket Toure MD         Patient information was obtained from patient, past medical records and ER records.     Subjective:     Principal Problem:Chest pain    Chief Complaint:   Chief Complaint   Patient presents with    Chest Pain        HPI: Patient is a 70-year-old  female with known history of chronic asthma, fibromyalgia, essential hypertension and sickle cell trait who presents to the ED with chief complaint of acute onset chest pain.    Onset of chest pain was yesterday morning. It is sharp, intermittent, nonradiating and located over the anterior chest.  No associated shortness of breath, palpitations, nausea/vomiting or diaphoresis.  No exacerbating or relieving factors.  Each episode of chest pain lasts about few seconds before it resolves on its own.  Reports compliance with home medications.  No recent fever, chills.  Endorses chronic cough secondary to her asthma.  Last stress test was in fall of 2018 which is normal.    She is physically active and does pushups at least 5 days a week. Unsure is she pulled a muscle. Not a smoker.    In the ED:  Hemodynamically stable.  Vital signs notable for very high blood pressure with systolics greater than 220 mm Hg.  Labs revealed mildly elevated D-dimer and BNP but otherwise largely unremarkable.  EKG revealed normal sinus rhythm with nonspecific T-wave changes in anterolateral leads.  CTA chest negative for pulmonary embolism.    Rest of the 10 point review of systems is negative except as mentioned above.    Past Medical History:   Diagnosis Date    Allergy     Anemia     sickle cell trait    Anxiety     Arthritis     Asthma     Chronic disease anemia 10/5/2017    Colon polyps     Depression     Dermatitis 3/7/2013     Fibromyalgia     HEARING LOSS     Hyperlipidemia     Hypertension     Leucopenia 10/5/2017    Polyneuropathy     Rheumatoid arthritis(714.0)     Scoliosis     Sickle cell trait     Sickle-cell trait 10/5/2017    Trouble in sleeping     Urticaria        Past Surgical History:   Procedure Laterality Date    ADENOIDECTOMY      APPENDECTOMY      COLONOSCOPY  4/14/2008    Dr. Guerrero, 10 year recheck    COLONOSCOPY N/A 10/4/2018    Procedure: COLONOSCOPY;  Surgeon: Tam Kemp MD;  Location[facility];  Service: Endoscopy;  Laterality: N/A;    HYSTERECTOMY      OOPHORECTOMY      ROTATOR CUFF REPAIR Left 01/2016    ROTATOR CUFF REPAIR W/ DISTAL CLAVICLE EXCISION Right 12/04/2017    Dr. Eligio Timmons ( [facility] )    TONSILLECTOMY         Review of patient's allergies indicates:   Allergen Reactions    Doxepin Anaphylaxis     abd pain    Norvasc [amlodipine] Palpitations    Xyzal [levocetirizine] Other (See Comments)     Can not take with blood pressure medications - headaches, higher blood pressure, red hands     Penicillins Rash    Sulfa (sulfonamide antibiotics) Rash    Duloxetine      Other reaction(s): insomnia    Gabapentin Nausea Only    Levofloxacin      Other reaction(s): Headache    Milnacipran      Other reaction(s): stomach pain    Nitrofurantoin monohyd/m-cryst      Other reaction(s): Itching    Prednisone      Other reaction(s): Itching    Pseudoephedrine-guaifenesin      Other reaction(s): Stomach upset    Terbinafine      Other reaction(s): rash       No current facility-administered medications on file prior to encounter.      Current Outpatient Medications on File Prior to Encounter   Medication Sig    ascorbic acid, vitamin C, (VITAMIN C) 500 MG tablet Take 500 mg by mouth daily as needed.     atorvastatin (LIPITOR) 40 MG tablet Take 1 tablet (40 mg total) by mouth once daily. (Patient taking differently: Take 40 mg by mouth every evening. )    BLACK COHOSH  ORAL Take 1 capsule by mouth 2 (two) times daily. Female hormone blend sp - 7c Black Cohosh     CALCIUM CARBONATE/VITAMIN D3 (VITAMIN D-3 ORAL) Take 100 Int'l Units by mouth once daily.     carvediloL (COREG) 12.5 MG tablet Take 2 tablets (25 mg total) by mouth 2 (two) times daily with meals.    coenzyme Q10 (CO Q-10) 10 mg capsule Take 10 mg by mouth once daily.    folic acid (FOLVITE) 400 MCG tablet Take 400 mcg by mouth once daily.    GARLIC OIL ORAL Take 1 capsule by mouth once daily.     lisinopril (PRINIVIL,ZESTRIL) 40 MG tablet Take 1 tablet (40 mg total) by mouth once daily.    montelukast (SINGULAIR) 10 mg tablet Take 1 tablet (10 mg total) by mouth once daily.    NIACIN, INOSITOL NIACINATE, (NIACIN FLUSH FREE ORAL) Take 500 mg by mouth once daily.    OMEGA-3-DHA-EPA-FISH OIL ORAL Take 1 capsule by mouth once daily.     traZODone (DESYREL) 100 MG tablet TAKE 1 TABLET EVERY EVENING. (Patient taking differently: Take 100 mg by mouth every evening. )    vitamin E 100 UNIT capsule Take 100 Units by mouth once daily.    valACYclovir (VALTREX) 500 MG tablet TAKE 1 TABLET (500 MG TOTAL) BY MOUTH 2 (TWO) TIMES DAILY FOR 7 DAYS AS DIRECTED.  (Patient taking differently: Take 500 mg by mouth as needed. )    [DISCONTINUED] albuterol (VENTOLIN HFA) 90 mcg/actuation inhaler Inhale 2 puffs into the lungs every 4 (four) hours as needed. Rescue    [DISCONTINUED] azelastine (ASTELIN) 137 mcg (0.1 %) nasal spray 2 sprays (274 mcg total) by Nasal route 2 (two) times daily. (Patient taking differently: 2 sprays by Nasal route 2 (two) times daily. [employer] [facility] brand)    [DISCONTINUED] azithromycin (Z-MARISABEL) 250 MG tablet Take 2 tablets by mouth on day 1; Take 1 tablet by mouth on days 2-5 (Patient not taking: Reported on 1/8/2020)    [DISCONTINUED] betamethasone dipropionate (DIPROLENE) 0.05 % cream APPLY EXTERNALLY TO THE AFFECTED AREA TWICE DAILY    [DISCONTINUED] cetirizine (ZYRTEC) 10 MG tablet Take 10 mg by  mouth once daily.    [DISCONTINUED] diclofenac sodium (VOLTAREN) 1 % Gel Apply 2 g topically 4 (four) times daily.    [DISCONTINUED] fluocinonide 0.05% (LIDEX) 0.05 % cream Apply topically 2 (two) times daily. Prn rash.    [DISCONTINUED] fluticasone propionate (FLONASE) 50 mcg/actuation nasal spray 1 spray (50 mcg total) by Each Nostril route once daily.    [DISCONTINUED] fluticasone-salmeterol diskus inhaler 250-50 mcg INHALE 1 PUFF INTO THE LUNGS TWICE DAILY    [DISCONTINUED] methylPREDNISolone (MEDROL DOSEPACK) 4 mg tablet use as directed (Patient not taking: Reported on 1/8/2020)    [DISCONTINUED] mometasone 0.1% (ELOCON) 0.1 % cream APPLY 1 APPLICATION TOPICALLY ONCE A DAY prn     Family History     Problem Relation (Age of Onset)    Alcohol abuse Brother    Allergies Son    Alzheimer's disease Maternal Grandfather    Anemia Daughter    Arthritis Daughter    Asthma Paternal Aunt, Brother    Cancer Father    Diabetes Maternal Aunt, Maternal Uncle, Maternal Grandmother, Sister    Early death Brother    Hyperlipidemia Son    Hypertension Son    Mental illness Maternal Grandfather    Miscarriages / Stillbirths Daughter, Daughter    Multiple sclerosis Mother    No Known Problems Sister, Sister, Sister, Brother, Daughter    Seizures Mother    Sickle cell anemia Paternal Aunt, Paternal Uncle    Thyroid disease Daughter        Tobacco Use    Smoking status: Never Smoker    Smokeless tobacco: Never Used   Substance and Sexual Activity    Alcohol use: Yes     Comment: occasionally - wine    Drug use: No    Sexual activity: Not Currently     Birth control/protection: Surgical       Objective:     Vital Signs (Most Recent):  Temp: 97.8 °F (36.6 °C) (07/08/20 1500)  Pulse: 87 (07/08/20 1500)  Resp: 20 (07/08/20 1500)  BP: (!) 179/96 (07/08/20 1500)  SpO2: 98 % (07/08/20 1500) Vital Signs (24h Range):  Temp:  [97.8 °F (36.6 °C)] 97.8 °F (36.6 °C)  Pulse:  [58-87] 87  Resp:  [12-33] 20  SpO2:  [94 %-100 %] 98  %  BP: (179-248)/() 179/96     Weight: 68.9 kg (152 lb)  Body mass index is 24.53 kg/m².    Physical Exam  Vitals signs and nursing note reviewed.   Constitutional:       General: She is not in acute distress.     Appearance: Normal appearance. She is well-developed.   HENT:      Head: Normocephalic and atraumatic.      Nose: Nose normal. No congestion.      Mouth/Throat:      Mouth: Mucous membranes are moist.   Eyes:      General: No scleral icterus.     Conjunctiva/sclera: Conjunctivae normal.      Pupils: Pupils are equal, round, and reactive to light.   Neck:      Musculoskeletal: Neck supple.      Thyroid: No thyromegaly.   Cardiovascular:      Rate and Rhythm: Normal rate and regular rhythm.      Pulses: Normal pulses.      Heart sounds: Normal heart sounds. No murmur.   Pulmonary:      Effort: Pulmonary effort is normal. No respiratory distress.      Breath sounds: Normal breath sounds.   Abdominal:      General: Bowel sounds are normal. There is no distension.      Palpations: Abdomen is soft.      Tenderness: There is no abdominal tenderness.   Musculoskeletal:         General: No deformity.      Right lower leg: No edema.      Left lower leg: No edema.   Skin:     General: Skin is warm and dry.      Capillary Refill: Capillary refill takes less than 2 seconds.      Findings: No erythema.   Neurological:      General: No focal deficit present.      Mental Status: She is alert and oriented to person, place, and time. Mental status is at baseline.   Psychiatric:         Mood and Affect: Mood is anxious.         Behavior: Behavior normal.           CRANIAL NERVES     CN III, IV, VI   Pupils are equal, round, and reactive to light.       Significant Labs:   CBC:   Recent Labs   Lab 07/08/20  0945   WBC 3.92   HGB 12.4   HCT 37.9        CMP:   Recent Labs   Lab 07/08/20  0945      K 3.8      CO2 27      BUN 14   CREATININE 1.1   CALCIUM 9.2   PROT 7.0   ALBUMIN 3.7   BILITOT 0.8    ALKPHOS 55   AST 25   ALT 22   ANIONGAP 10   EGFRNONAA 50.6*     Cardiac Markers:   Recent Labs   Lab 07/08/20  0945   *     Troponin:   Recent Labs   Lab 07/08/20 0945   TROPONINI <0.030     TSH:   Recent Labs   Lab 07/08/20 0945   TSH 1.380       Significant Imaging: I have reviewed all pertinent imaging results/findings within the past 24 hours.     Imaging Results          CTA Chest Non-Coronary - PE Study (Final result)  Result time 07/08/20 13:21:37    Final result by Jocelyne Mcnally MD (07/08/20 13:21:37)                 Impression:      No CT evidence pulmonary emboli    Small hiatal hernia    Multiple hypodense lesions in the liver compatible with hepatic cysts.      Electronically signed by: Jocelyne Mcnally MD  Date:    07/08/2020  Time:    13:21             Narrative:      CMS MANDATED QUALITY DATA - CT RADIATION - 436    All CT scans at this facility utilize dose modulation, iterative reconstruction, and/or weight based dosing when appropriate to reduce radiation dose to as low as reasonably achievable.    EXAMINATION:  CTA CHEST NON CORONARY    CLINICAL HISTORY:  PE suspected, intermediate prob, positive D-dimer;    TECHNIQUE:  CT angiography of thorax with 100 mL Omnipaque 350. Maximum intensity projection coronal reformations were created at a separate workstation and stored in the patient's permanent medical record.    COMPARISON:  None    FINDINGS:  CTA THORAX:    This is a high quality study for the evaluation pulmonary emboli.  There is no CT evidence of pulmonary emboli.  The heart and great vessels are normal.  The thoracic aorta is normal in caliber without evidence of aneurysm or dissection.    There is no mediastinal, hilar or axillary adenopathy.    There are no pulmonary nodules, infiltrates or pleural effusions.    The visualized portion of the upper abdomen demonstrates multiple hypodense lesions in liver compatible with  hepatic cysts.  There is a small hiatal hernia.   There are no acute osseous abnormalities.                               X-Ray Chest AP Portable (Final result)  Result time 07/08/20 09:30:06    Final result by James Holley MD (07/08/20 09:30:06)                 Narrative:    CLINICAL HISTORY:  70 years (1950) Female chest pain Chest pain    TECHNIQUE:  Portable AP radiograph the chest.    COMPARISON:  Most recent radiograph from  9.21.18    FINDINGS:  The lungs are clear. Costophrenic angles are seen without effusion. No  pneumothorax is identified. The heart is mildly enlarged. Atheromatous  calcifications are seen at the aortic arch. Osseous structures appear  within normal limits. The visualized upper abdomen is unremarkable.    IMPRESSION:  No acute cardiac or pulmonary process.                  .            Electronically Signed by ALEX Lawrence on 7/8/2020 9:32 AM                              Results for orders placed or performed during the hospital encounter of 07/08/20   EKG 12-lead    Collection Time: 07/08/20  9:32 AM    Narrative    Test Reason : R07.9,    Vent. Rate : 060 BPM     Atrial Rate : 060 BPM     P-R Int : 140 ms          QRS Dur : 072 ms      QT Int : 404 ms       P-R-T Axes : 065 008 012 degrees     QTc Int : 404 ms    Normal sinus rhythm  Possible Left atrial enlargement  Nonspecific T wave abnormality  Abnormal ECG  When compared with ECG of 16-OCT-2018 10:06,  Criteria for Septal infarct are no longer Present    Referred By: [email or web address]   SELF           Confirmed By:          Assessment/Plan:   70-year-old  female with known history of essential hypertension, chronic asthma and fibromyalgia presents with acute onset of chest pain and was found to be in hypertensive urgency on admission.    Active Hospital Problems    Diagnosis  POA    *Chest pain [R07.9]  Yes    Hypertensive urgency [I16.0]  Yes    Hyperlipidemia [E78.5]  Yes    Chronic disease anemia [D63.8]  Yes    Asthma, chronic [J45.909]  Yes     Fibromyalgia [M79.7]  Yes      Resolved Hospital Problems   No resolved problems to display.     Plan:  Admit to telemetry unit for monitoring   Ddx:  Chest pain secondary to hypertensive urgency vs anxiety vs ACS vs musculoskeletal etiologies  High risk considering risk factors  Serial troponin monitoring   Consult Cardiology for further evaluation; Dr. Grayson  Blood pressure control by restarting home meds and prn hydralazine.  Target BP to a goal of around 160/90 mm Hg over the next 24 hr   Start daily aspirin. Continue home statin, beta-blocker and ACE-inhibitor  Obtain treadmill stress echocardiogram  Lipid panel from March 2020 with LDL at goal.  TSH is within normal limits.  Obtain hemoglobin A1c      VTE Risk Mitigation (From admission, onward)         Ordered     enoxaparin injection 40 mg  Every 24 hours      07/08/20 1522     IP VTE HIGH RISK PATIENT  Once      07/08/20 1522     Place sequential compression device  Until discontinued      07/08/20 1522                   Christ Haney MD  Department of Hospital Medicine   [facility]

## 2020-07-08 NOTE — HPI
Patient is a 70-year-old  female with known history of chronic asthma, fibromyalgia, essential hypertension and sickle cell trait who presents to the ED with chief complaint of acute onset chest pain.    Onset of chest pain was yesterday morning. It is sharp, intermittent, nonradiating and located over the anterior chest.  No associated shortness of breath, palpitations, nausea/vomiting or diaphoresis.  No exacerbating or relieving factors.  Each episode of chest pain lasts about few seconds before it resolves on its own.  Reports compliance with home medications.  No recent fever, chills.  Endorses chronic cough secondary to her asthma.  Last stress test was in fall of 2018 which is normal.    She is physically active and does pushups at least 5 days a week. Unsure is she pulled a muscle. Not a smoker.    In the ED:  Hemodynamically stable.  Vital signs notable for very high blood pressure with systolics greater than 220 mm Hg.  Labs revealed mildly elevated D-dimer and BNP but otherwise largely unremarkable.  EKG revealed normal sinus rhythm with nonspecific T-wave changes in anterolateral leads.  CTA chest negative for pulmonary embolism.    Rest of the 10 point review of systems is negative except as mentioned above.

## 2020-07-08 NOTE — ED PROVIDER NOTES
Encounter Date: 7/8/2020       History     Chief Complaint   Patient presents with    Chest Pain     70-year-old female presents complaining of chest pain patient reports chest pain started yesterday and has been intermittent since then, she hoped that it would go away however pain has continued to recur.  She describes pain as sharp and intermittent located along her left precordium, patient denies shortness of breath patient denies fever patient denies cough patient has no other acute complaints at this time.  Patient reports history of stress test approximately a year ago which was normal, patient has no other complaints at this time she reports no current chest pain.        Review of patient's allergies indicates:   Allergen Reactions    Doxepin Anaphylaxis     abd pain    Norvasc [amlodipine] Palpitations    Xyzal [levocetirizine] Other (See Comments)     Can not take with blood pressure medications - headaches, higher blood pressure, red hands     Penicillins Rash    Sulfa (sulfonamide antibiotics) Rash    Duloxetine      Other reaction(s): insomnia    Gabapentin Nausea Only    Levofloxacin      Other reaction(s): Headache    Milnacipran      Other reaction(s): stomach pain    Nitrofurantoin monohyd/m-cryst      Other reaction(s): Itching    Prednisone      Other reaction(s): Itching    Pseudoephedrine-guaifenesin      Other reaction(s): Stomach upset    Terbinafine      Other reaction(s): rash     Past Medical History:   Diagnosis Date    Allergy     Anemia     sickle cell trait    Anxiety     Arthritis     Asthma     Chronic disease anemia 10/5/2017    Colon polyps     Depression     Dermatitis 3/7/2013    Fibromyalgia     HEARING LOSS     Hyperlipidemia     Hypertension     Leucopenia 10/5/2017    Polyneuropathy     Rheumatoid arthritis(714.0)     Scoliosis     Sickle cell trait     Sickle-cell trait 10/5/2017    Trouble in sleeping     Urticaria      Past Surgical  History:   Procedure Laterality Date    ADENOIDECTOMY      APPENDECTOMY      COLONOSCOPY  4/14/2008    Dr. Guerrero, 10 year recheck    COLONOSCOPY N/A 10/4/2018    Procedure: COLONOSCOPY;  Surgeon: Tam Kemp MD;  Location: [facility];  Service: Endoscopy;  Laterality: N/A;    HYSTERECTOMY      OOPHORECTOMY      ROTATOR CUFF REPAIR Left 01/2016    ROTATOR CUFF REPAIR W/ DISTAL CLAVICLE EXCISION Right 12/04/2017    Dr. Eligio Timmons ( [facility] )    TONSILLECTOMY       Family History   Problem Relation Age of Onset    Multiple sclerosis Mother     Seizures Mother     Cancer Father         lung    Alcohol abuse Brother     Early death Brother     Diabetes Maternal Aunt     Diabetes Maternal Uncle     Diabetes Maternal Grandmother     Mental illness Maternal Grandfather     Alzheimer's disease Maternal Grandfather     Asthma Paternal Aunt     Sickle cell anemia Paternal Aunt     Diabetes Sister     Arthritis Daughter     Miscarriages / Stillbirths Daughter     Anemia Daughter     Hyperlipidemia Son     Hypertension Son     Allergies Son     Sickle cell anemia Paternal Uncle     Asthma Brother     No Known Problems Sister     No Known Problems Sister     No Known Problems Sister     No Known Problems Brother     Miscarriages / Stillbirths Daughter     Thyroid disease Daughter     No Known Problems Daughter     Angioedema Neg Hx     Eczema Neg Hx     Immunodeficiency Neg Hx     Rheum arthritis Neg Hx     Psoriasis Neg Hx     Lupus Neg Hx      Social History     Tobacco Use    Smoking status: Never Smoker    Smokeless tobacco: Never Used   Substance Use Topics    Alcohol use: Yes     Comment: occasionally - wine    Drug use: No     Review of Systems   Constitutional: Negative for fever.   HENT: Negative for congestion, rhinorrhea, sore throat and trouble swallowing.    Eyes: Negative for visual disturbance.   Respiratory: Negative for cough, chest tightness, shortness  of breath and wheezing.    Cardiovascular: Positive for chest pain. Negative for palpitations and leg swelling.   Gastrointestinal: Negative for abdominal distention, abdominal pain, constipation, diarrhea, nausea and vomiting.   Genitourinary: Negative for difficulty urinating, dysuria, flank pain and frequency.   Musculoskeletal: Negative for arthralgias, back pain, joint swelling and neck pain.   Skin: Negative for color change and rash.   Neurological: Negative for dizziness, syncope, speech difficulty, weakness, numbness and headaches.   All other systems reviewed and are negative.      Physical Exam     Initial Vitals [07/08/20 0852]   BP Pulse Resp Temp SpO2   (!) 216/102 74 18 97.8 °F (36.6 °C) 99 %      MAP       --         Physical Exam    Nursing note and vitals reviewed.  Constitutional: She appears well-developed and well-nourished. She is not diaphoretic. No distress.   HENT:   Head: Normocephalic and atraumatic.   Right Ear: External ear normal.   Left Ear: External ear normal.   Nose: Nose normal.   Mouth/Throat: Oropharynx is clear and moist. No oropharyngeal exudate.   Eyes: Conjunctivae and EOM are normal. Pupils are equal, round, and reactive to light. Right eye exhibits no discharge. Left eye exhibits no discharge. No scleral icterus.   Neck: Normal range of motion. Neck supple. No thyromegaly present. No tracheal deviation present. No JVD present.   Cardiovascular: Normal rate, regular rhythm, normal heart sounds and intact distal pulses. Exam reveals no gallop and no friction rub.    No murmur heard.  Pulmonary/Chest: Breath sounds normal. No stridor. No respiratory distress. She has no wheezes. She has no rhonchi. She has no rales. She exhibits no tenderness.   Abdominal: Soft. Bowel sounds are normal. She exhibits no distension and no mass. There is no abdominal tenderness. There is no rebound and no guarding.   Musculoskeletal: Normal range of motion. No tenderness or edema.    Lymphadenopathy:     She has no cervical adenopathy.   Neurological: She is alert and oriented to person, place, and time. She has normal strength. She displays normal reflexes. No cranial nerve deficit or sensory deficit.   Skin: Skin is warm and dry. No rash and no abscess noted. No erythema. No pallor.         ED Course   Procedures  Labs Reviewed   B-TYPE NATRIURETIC PEPTIDE - Abnormal; Notable for the following components:       Result Value     (*)     All other components within normal limits   CBC W/ AUTO DIFFERENTIAL - Abnormal; Notable for the following components:    Mean Corpuscular Hemoglobin 26.9 (*)     Mono% 15.1 (*)     All other components within normal limits   COMPREHENSIVE METABOLIC PANEL - Abnormal; Notable for the following components:    eGFR if  58.4 (*)     eGFR if non  50.6 (*)     All other components within normal limits   D DIMER, QUANTITATIVE - Abnormal; Notable for the following components:    D-Dimer 0.58 (*)     All other components within normal limits   MAGNESIUM   PROTIME-INR   TROPONIN I   TSH   URINALYSIS   SARS-COV-2 RNA AMPLIFICATION, QUAL        ECG Results          EKG 12-lead (In process)  Result time 07/08/20 09:44:46    In process by Interface, Lab In [facility] (07/08/20 09:44:46)                 Narrative:    Test Reason : R07.9,    Vent. Rate : 060 BPM     Atrial Rate : 060 BPM     P-R Int : 140 ms          QRS Dur : 072 ms      QT Int : 404 ms       P-R-T Axes : 065 008 012 degrees     QTc Int : 404 ms    Normal sinus rhythm  Possible Left atrial enlargement  Nonspecific T wave abnormality  Abnormal ECG  When compared with ECG of 16-OCT-2018 10:06,  Criteria for Septal infarct are no longer Present    Referred By: [email or web address]   SELF           Confirmed By:                             Imaging Results          CTA Chest Non-Coronary - PE Study (Final result)  Result time 07/08/20 13:21:37    Final result by Jocelyne Mcnally MD  (07/08/20 13:21:37)                 Impression:      No CT evidence pulmonary emboli    Small hiatal hernia    Multiple hypodense lesions in the liver compatible with hepatic cysts.      Electronically signed by: Jocelyne Mcnally MD  Date:    07/08/2020  Time:    13:21             Narrative:      CMS MANDATED QUALITY DATA - CT RADIATION - 436    All CT scans at this facility utilize dose modulation, iterative reconstruction, and/or weight based dosing when appropriate to reduce radiation dose to as low as reasonably achievable.    EXAMINATION:  CTA CHEST NON CORONARY    CLINICAL HISTORY:  PE suspected, intermediate prob, positive D-dimer;    TECHNIQUE:  CT angiography of thorax with 100 mL Omnipaque 350. Maximum intensity projection coronal reformations were created at a separate workstation and stored in the patient's permanent medical record.    COMPARISON:  None    FINDINGS:  CTA THORAX:    This is a high quality study for the evaluation pulmonary emboli.  There is no CT evidence of pulmonary emboli.  The heart and great vessels are normal.  The thoracic aorta is normal in caliber without evidence of aneurysm or dissection.    There is no mediastinal, hilar or axillary adenopathy.    There are no pulmonary nodules, infiltrates or pleural effusions.    The visualized portion of the upper abdomen demonstrates multiple hypodense lesions in liver compatible with  hepatic cysts.  There is a small hiatal hernia.  There are no acute osseous abnormalities.                               X-Ray Chest AP Portable (Final result)  Result time 07/08/20 09:30:06    Final result by James Holley MD (07/08/20 09:30:06)                 Narrative:    CLINICAL HISTORY:  70 years (1950) Female chest pain Chest pain    TECHNIQUE:  Portable AP radiograph the chest.    COMPARISON:  Most recent radiograph from  9.21.18    FINDINGS:  The lungs are clear. Costophrenic angles are seen without effusion. No  pneumothorax is  identified. The heart is mildly enlarged. Atheromatous  calcifications are seen at the aortic arch. Osseous structures appear  within normal limits. The visualized upper abdomen is unremarkable.    IMPRESSION:  No acute cardiac or pulmonary process.                  .            Electronically Signed by ALEX Lawrence on 7/8/2020 9:32 AM                               Medical Decision Making:   History:   Old Medical Records: I decided to obtain old medical records.  Initial Assessment:   Emergent evaluation a 70-year-old female presenting with chest pain, unable to reproduce patient's chest pain to palpation, differential diagnosis includes ACS, electrolyte abnormality, endocrine dysfunction, infection              Attending Attestation:             Attending ED Notes:   While patient's chest pain has resolved, blood pressure remains elevated, this is despite her home medication, nitroglycerin paste, hydralazine IV, patient consulted for admission to Internal Medicine for blood pressure management and trending of cardiac enzymes.                        Clinical Impression:       ICD-10-CM ICD-9-CM   1. Chest pain  R07.9 786.50   2. Hypertension, unspecified type  I10 401.9             ED Disposition Condition    Observation                           Vik Milligan MD  07/08/20 [id number]

## 2020-07-08 NOTE — SUBJECTIVE & OBJECTIVE
Past Medical History:   Diagnosis Date    Allergy     Anemia     sickle cell trait    Anxiety     Arthritis     Asthma     Chronic disease anemia 10/5/2017    Colon polyps     Depression     Dermatitis 3/7/2013    Fibromyalgia     HEARING LOSS     Hyperlipidemia     Hypertension     Leucopenia 10/5/2017    Polyneuropathy     Rheumatoid arthritis(714.0)     Scoliosis     Sickle cell trait     Sickle-cell trait 10/5/2017    Trouble in sleeping     Urticaria        Past Surgical History:   Procedure Laterality Date    ADENOIDECTOMY      APPENDECTOMY      COLONOSCOPY  4/14/2008    Dr. Guerrero, 10 year recheck    COLONOSCOPY N/A 10/4/2018    Procedure: COLONOSCOPY;  Surgeon: Tam Kemp MD;  Location: [facility];  Service: Endoscopy;  Laterality: N/A;    HYSTERECTOMY      OOPHORECTOMY      ROTATOR CUFF REPAIR Left 01/2016    ROTATOR CUFF REPAIR W/ DISTAL CLAVICLE EXCISION Right 12/04/2017    Dr. Eligio Timmons ( [facility] )    TONSILLECTOMY         Review of patient's allergies indicates:   Allergen Reactions    Doxepin Anaphylaxis     abd pain    Norvasc [amlodipine] Palpitations    Xyzal [levocetirizine] Other (See Comments)     Can not take with blood pressure medications - headaches, higher blood pressure, red hands     Penicillins Rash    Sulfa (sulfonamide antibiotics) Rash    Duloxetine      Other reaction(s): insomnia    Gabapentin Nausea Only    Levofloxacin      Other reaction(s): Headache    Milnacipran      Other reaction(s): stomach pain    Nitrofurantoin monohyd/m-cryst      Other reaction(s): Itching    Prednisone      Other reaction(s): Itching    Pseudoephedrine-guaifenesin      Other reaction(s): Stomach upset    Terbinafine      Other reaction(s): rash       No current facility-administered medications on file prior to encounter.      Current Outpatient Medications on File Prior to Encounter   Medication Sig    ascorbic acid, vitamin C, (VITAMIN C) 500  MG tablet Take 500 mg by mouth daily as needed.     atorvastatin (LIPITOR) 40 MG tablet Take 1 tablet (40 mg total) by mouth once daily. (Patient taking differently: Take 40 mg by mouth every evening. )    BLACK COHOSH ORAL Take 1 capsule by mouth 2 (two) times daily. Female hormone blend sp - 7c Black Cohosh     CALCIUM CARBONATE/VITAMIN D3 (VITAMIN D-3 ORAL) Take 100 Int'l Units by mouth once daily.     carvediloL (COREG) 12.5 MG tablet Take 2 tablets (25 mg total) by mouth 2 (two) times daily with meals.    coenzyme Q10 (CO Q-10) 10 mg capsule Take 10 mg by mouth once daily.    folic acid (FOLVITE) 400 MCG tablet Take 400 mcg by mouth once daily.    GARLIC OIL ORAL Take 1 capsule by mouth once daily.     lisinopril (PRINIVIL,ZESTRIL) 40 MG tablet Take 1 tablet (40 mg total) by mouth once daily.    montelukast (SINGULAIR) 10 mg tablet Take 1 tablet (10 mg total) by mouth once daily.    NIACIN, INOSITOL NIACINATE, (NIACIN FLUSH FREE ORAL) Take 500 mg by mouth once daily.    OMEGA-3-DHA-EPA-FISH OIL ORAL Take 1 capsule by mouth once daily.     traZODone (DESYREL) 100 MG tablet TAKE 1 TABLET EVERY EVENING. (Patient taking differently: Take 100 mg by mouth every evening. )    vitamin E 100 UNIT capsule Take 100 Units by mouth once daily.    valACYclovir (VALTREX) 500 MG tablet TAKE 1 TABLET (500 MG TOTAL) BY MOUTH 2 (TWO) TIMES DAILY FOR 7 DAYS AS DIRECTED.  (Patient taking differently: Take 500 mg by mouth as needed. )    [DISCONTINUED] albuterol (VENTOLIN HFA) 90 mcg/actuation inhaler Inhale 2 puffs into the lungs every 4 (four) hours as needed. Rescue    [DISCONTINUED] azelastine (ASTELIN) 137 mcg (0.1 %) nasal spray 2 sprays (274 mcg total) by Nasal route 2 (two) times daily. (Patient taking differently: 2 sprays by Nasal route 2 (two) times daily. [place] brand)    [DISCONTINUED] azithromycin (Z-MARISABEL) 250 MG tablet Take 2 tablets by mouth on day 1; Take 1 tablet by mouth on days 2-5 (Patient not  taking: Reported on 1/8/2020)    [DISCONTINUED] betamethasone dipropionate (DIPROLENE) 0.05 % cream APPLY EXTERNALLY TO THE AFFECTED AREA TWICE DAILY    [DISCONTINUED] cetirizine (ZYRTEC) 10 MG tablet Take 10 mg by mouth once daily.    [DISCONTINUED] diclofenac sodium (VOLTAREN) 1 % Gel Apply 2 g topically 4 (four) times daily.    [DISCONTINUED] fluocinonide 0.05% (LIDEX) 0.05 % cream Apply topically 2 (two) times daily. Prn rash.    [DISCONTINUED] fluticasone propionate (FLONASE) 50 mcg/actuation nasal spray 1 spray (50 mcg total) by Each Nostril route once daily.    [DISCONTINUED] fluticasone-salmeterol diskus inhaler 250-50 mcg INHALE 1 PUFF INTO THE LUNGS TWICE DAILY    [DISCONTINUED] methylPREDNISolone (MEDROL DOSEPACK) 4 mg tablet use as directed (Patient not taking: Reported on 1/8/2020)    [DISCONTINUED] mometasone 0.1% (ELOCON) 0.1 % cream APPLY 1 APPLICATION TOPICALLY ONCE A DAY prn     Family History     Problem Relation (Age of Onset)    Alcohol abuse Brother    Allergies Son    Alzheimer's disease Maternal Grandfather    Anemia Daughter    Arthritis Daughter    Asthma Paternal Aunt, Brother    Cancer Father    Diabetes Maternal Aunt, Maternal Uncle, Maternal Grandmother, Sister    Early death Brother    Hyperlipidemia Son    Hypertension Son    Mental illness Maternal Grandfather    Miscarriages / Stillbirths Daughter, Daughter    Multiple sclerosis Mother    No Known Problems Sister, Sister, Sister, Brother, Daughter    Seizures Mother    Sickle cell anemia Paternal Aunt, Paternal Uncle    Thyroid disease Daughter        Tobacco Use    Smoking status: Never Smoker    Smokeless tobacco: Never Used   Substance and Sexual Activity    Alcohol use: Yes     Comment: occasionally - wine    Drug use: No    Sexual activity: Not Currently     Birth control/protection: Surgical       Objective:     Vital Signs (Most Recent):  Temp: 97.8 °F (36.6 °C) (07/08/20 1500)  Pulse: 87 (07/08/20 1500)  Resp:  20 (07/08/20 1500)  BP: (!) 179/96 (07/08/20 1500)  SpO2: 98 % (07/08/20 1500) Vital Signs (24h Range):  Temp:  [97.8 °F (36.6 °C)] 97.8 °F (36.6 °C)  Pulse:  [58-87] 87  Resp:  [12-33] 20  SpO2:  [94 %-100 %] 98 %  BP: (179-248)/() 179/96     Weight: 68.9 kg (152 lb)  Body mass index is 24.53 kg/m².    Physical Exam  Vitals signs and nursing note reviewed.   Constitutional:       General: She is not in acute distress.     Appearance: Normal appearance. She is well-developed.   HENT:      Head: Normocephalic and atraumatic.      Nose: Nose normal. No congestion.      Mouth/Throat:      Mouth: Mucous membranes are moist.   Eyes:      General: No scleral icterus.     Conjunctiva/sclera: Conjunctivae normal.      Pupils: Pupils are equal, round, and reactive to light.   Neck:      Musculoskeletal: Neck supple.      Thyroid: No thyromegaly.   Cardiovascular:      Rate and Rhythm: Normal rate and regular rhythm.      Pulses: Normal pulses.      Heart sounds: Normal heart sounds. No murmur.   Pulmonary:      Effort: Pulmonary effort is normal. No respiratory distress.      Breath sounds: Normal breath sounds.   Abdominal:      General: Bowel sounds are normal. There is no distension.      Palpations: Abdomen is soft.      Tenderness: There is no abdominal tenderness.   Musculoskeletal:         General: No deformity.      Right lower leg: No edema.      Left lower leg: No edema.   Skin:     General: Skin is warm and dry.      Capillary Refill: Capillary refill takes less than 2 seconds.      Findings: No erythema.   Neurological:      General: No focal deficit present.      Mental Status: She is alert and oriented to person, place, and time. Mental status is at baseline.   Psychiatric:         Mood and Affect: Mood is anxious.         Behavior: Behavior normal.           CRANIAL NERVES     CN III, IV, VI   Pupils are equal, round, and reactive to light.       Significant Labs:   CBC:   Recent Labs   Lab  07/08/20 0945   WBC 3.92   HGB 12.4   HCT 37.9        CMP:   Recent Labs   Lab 07/08/20 0945      K 3.8      CO2 27      BUN 14   CREATININE 1.1   CALCIUM 9.2   PROT 7.0   ALBUMIN 3.7   BILITOT 0.8   ALKPHOS 55   AST 25   ALT 22   ANIONGAP 10   EGFRNONAA 50.6*     Cardiac Markers:   Recent Labs   Lab 07/08/20 0945   *     Troponin:   Recent Labs   Lab 07/08/20 0945   TROPONINI <0.030     TSH:   Recent Labs   Lab 07/08/20 0945   TSH 1.380       Significant Imaging: I have reviewed all pertinent imaging results/findings within the past 24 hours.     Imaging Results          CTA Chest Non-Coronary - PE Study (Final result)  Result time 07/08/20 13:21:37    Final result by Jocelyne Mcnally MD (07/08/20 13:21:37)                 Impression:      No CT evidence pulmonary emboli    Small hiatal hernia    Multiple hypodense lesions in the liver compatible with hepatic cysts.      Electronically signed by: Jocelyne Mcnally MD  Date:    07/08/2020  Time:    13:21             Narrative:      CMS MANDATED QUALITY DATA - CT RADIATION - 436    All CT scans at this facility utilize dose modulation, iterative reconstruction, and/or weight based dosing when appropriate to reduce radiation dose to as low as reasonably achievable.    EXAMINATION:  CTA CHEST NON CORONARY    CLINICAL HISTORY:  PE suspected, intermediate prob, positive D-dimer;    TECHNIQUE:  CT angiography of thorax with 100 mL Omnipaque 350. Maximum intensity projection coronal reformations were created at a separate workstation and stored in the patient's permanent medical record.    COMPARISON:  None    FINDINGS:  CTA THORAX:    This is a high quality study for the evaluation pulmonary emboli.  There is no CT evidence of pulmonary emboli.  The heart and great vessels are normal.  The thoracic aorta is normal in caliber without evidence of aneurysm or dissection.    There is no mediastinal, hilar or axillary adenopathy.    There  are no pulmonary nodules, infiltrates or pleural effusions.    The visualized portion of the upper abdomen demonstrates multiple hypodense lesions in liver compatible with  hepatic cysts.  There is a small hiatal hernia.  There are no acute osseous abnormalities.                               X-Ray Chest AP Portable (Final result)  Result time 07/08/20 09:30:06    Final result by James Holley MD (07/08/20 09:30:06)                 Narrative:    CLINICAL HISTORY:  70 years (1950) Female chest pain Chest pain    TECHNIQUE:  Portable AP radiograph the chest.    COMPARISON:  Most recent radiograph from  9.21.18    FINDINGS:  The lungs are clear. Costophrenic angles are seen without effusion. No  pneumothorax is identified. The heart is mildly enlarged. Atheromatous  calcifications are seen at the aortic arch. Osseous structures appear  within normal limits. The visualized upper abdomen is unremarkable.    IMPRESSION:  No acute cardiac or pulmonary process.                  .            Electronically Signed by ALEX Lawrence on 7/8/2020 9:32 AM                              Results for orders placed or performed during the hospital encounter of 07/08/20   EKG 12-lead    Collection Time: 07/08/20  9:32 AM    Narrative    Test Reason : R07.9,    Vent. Rate : 060 BPM     Atrial Rate : 060 BPM     P-R Int : 140 ms          QRS Dur : 072 ms      QT Int : 404 ms       P-R-T Axes : 065 008 012 degrees     QTc Int : 404 ms    Normal sinus rhythm  Possible Left atrial enlargement  Nonspecific T wave abnormality  Abnormal ECG  When compared with ECG of 16-OCT-2018 10:06,  Criteria for Septal infarct are no longer Present    Referred By: [email or web address]   SELF           Confirmed By:

## 2020-07-09 ENCOUNTER — CLINICAL SUPPORT (OUTPATIENT)
Dept: CARDIOLOGY | Facility: HOSPITAL | Age: 70
End: 2020-07-09
Attending: INTERNAL MEDICINE
Payer: MEDICARE

## 2020-07-09 VITALS
HEIGHT: 66 IN | SYSTOLIC BLOOD PRESSURE: 160 MMHG | DIASTOLIC BLOOD PRESSURE: 80 MMHG | RESPIRATION RATE: 20 BRPM | OXYGEN SATURATION: 97 % | WEIGHT: 152 LBS | HEART RATE: 79 BPM | BODY MASS INDEX: 24.43 KG/M2 | TEMPERATURE: 98 F

## 2020-07-09 PROBLEM — I16.0 HYPERTENSIVE URGENCY: Status: RESOLVED | Noted: 2020-07-08 | Resolved: 2020-07-09

## 2020-07-09 PROBLEM — R07.9 CHEST PAIN: Status: RESOLVED | Noted: 2020-07-08 | Resolved: 2020-07-09

## 2020-07-09 LAB
ANION GAP SERPL CALC-SCNC: 8 MMOL/L (ref 8–16)
BSA FOR ECHO PROCEDURE: 1.79 M2
BUN SERPL-MCNC: 20 MG/DL (ref 8–23)
CALCIUM SERPL-MCNC: 9.2 MG/DL (ref 8.7–10.5)
CHLORIDE SERPL-SCNC: 105 MMOL/L (ref 95–110)
CO2 SERPL-SCNC: 27 MMOL/L (ref 23–29)
CREAT SERPL-MCNC: 1.6 MG/DL (ref 0.5–1.4)
CV STRESS BASE HR: 99 BPM
DIASTOLIC BLOOD PRESSURE: 73 MMHG
ERYTHROCYTE [DISTWIDTH] IN BLOOD BY AUTOMATED COUNT: 13.4 % (ref 11.5–14.5)
EST. GFR  (AFRICAN AMERICAN): 37.1 ML/MIN/1.73 M^2
EST. GFR  (NON AFRICAN AMERICAN): 32.2 ML/MIN/1.73 M^2
ESTIMATED AVG GLUCOSE: 131 MG/DL (ref 68–131)
GLUCOSE SERPL-MCNC: 109 MG/DL (ref 70–110)
HBA1C MFR BLD HPLC: 6.2 % (ref 4.5–6.2)
HCT VFR BLD AUTO: 38.2 % (ref 37–48.5)
HGB BLD-MCNC: 13.1 G/DL (ref 12–16)
MAGNESIUM SERPL-MCNC: 2 MG/DL (ref 1.6–2.6)
MCH RBC QN AUTO: 27.9 PG (ref 27–31)
MCHC RBC AUTO-ENTMCNC: 34.3 G/DL (ref 32–36)
MCV RBC AUTO: 81 FL (ref 82–98)
OHS CV CPX 1 MINUTE RECOVERY HEART RATE: 120 BPM
OHS CV CPX 85 PERCENT MAX PREDICTED HEART RATE MALE: 122
OHS CV CPX ESTIMATED METS: 5
OHS CV CPX MAX PREDICTED HEART RATE: 144
OHS CV CPX PATIENT IS FEMALE: 1
OHS CV CPX PATIENT IS MALE: 0
OHS CV CPX PEAK DIASTOLIC BLOOD PRESSURE: 64 MMHG
OHS CV CPX PEAK HEAR RATE: 132 BPM
OHS CV CPX PEAK RATE PRESSURE PRODUCT: NORMAL
OHS CV CPX PEAK SYSTOLIC BLOOD PRESSURE: 223 MMHG
OHS CV CPX PERCENT MAX PREDICTED HEART RATE ACHIEVED: 92
OHS CV CPX RATE PRESSURE PRODUCT PRESENTING: NORMAL
PLATELET # BLD AUTO: 199 K/UL (ref 150–350)
PMV BLD AUTO: 10.8 FL (ref 9.2–12.9)
POTASSIUM SERPL-SCNC: 4.1 MMOL/L (ref 3.5–5.1)
RBC # BLD AUTO: 4.7 M/UL (ref 4–5.4)
SODIUM SERPL-SCNC: 140 MMOL/L (ref 136–145)
STRESS ANGINA INDEX: 0
STRESS DUKE TREADMILL SCORE: -2
STRESS ECHO POST EXERCISE DUR MIN: 3 MINUTES
STRESS ECHO POST EXERCISE DUR SEC: 0 SECONDS
STRESS ST DEPRESSION: 1 MM
SYSTOLIC BLOOD PRESSURE: 138 MMHG
TROPONIN I SERPL DL<=0.01 NG/ML-MCNC: <0.03 NG/ML
WBC # BLD AUTO: 6.86 K/UL (ref 3.9–12.7)

## 2020-07-09 PROCEDURE — 83735 ASSAY OF MAGNESIUM: CPT

## 2020-07-09 PROCEDURE — 84484 ASSAY OF TROPONIN QUANT: CPT

## 2020-07-09 PROCEDURE — 80048 BASIC METABOLIC PNL TOTAL CA: CPT

## 2020-07-09 PROCEDURE — 83036 HEMOGLOBIN GLYCOSYLATED A1C: CPT

## 2020-07-09 PROCEDURE — 25000003 PHARM REV CODE 250: Performed by: INTERNAL MEDICINE

## 2020-07-09 PROCEDURE — 96372 THER/PROPH/DIAG INJ SC/IM: CPT | Mod: 59

## 2020-07-09 PROCEDURE — 36415 COLL VENOUS BLD VENIPUNCTURE: CPT

## 2020-07-09 PROCEDURE — 63600175 PHARM REV CODE 636 W HCPCS: Performed by: INTERNAL MEDICINE

## 2020-07-09 PROCEDURE — 93351 STRESS TTE COMPLETE: CPT

## 2020-07-09 PROCEDURE — G0378 HOSPITAL OBSERVATION PER HR: HCPCS | Mod: CS

## 2020-07-09 PROCEDURE — 85027 COMPLETE CBC AUTOMATED: CPT

## 2020-07-09 RX ORDER — HYDRALAZINE HYDROCHLORIDE 50 MG/1
50 TABLET, FILM COATED ORAL EVERY 8 HOURS
Qty: 90 TABLET | Refills: 3 | Status: SHIPPED | OUTPATIENT
Start: 2020-07-09 | End: 2020-10-01

## 2020-07-09 RX ORDER — NITROGLYCERIN 0.4 MG/1
0.4 TABLET SUBLINGUAL EVERY 5 MIN PRN
Qty: 90 TABLET | Refills: 0 | Status: SHIPPED | OUTPATIENT
Start: 2020-07-09 | End: 2020-10-22

## 2020-07-09 RX ORDER — NAPROXEN SODIUM 220 MG/1
81 TABLET, FILM COATED ORAL DAILY
Refills: 0 | COMMUNITY
Start: 2020-07-10 | End: 2023-10-04

## 2020-07-09 RX ORDER — HYDRALAZINE HYDROCHLORIDE 25 MG/1
50 TABLET, FILM COATED ORAL EVERY 8 HOURS
Status: DISCONTINUED | OUTPATIENT
Start: 2020-07-09 | End: 2020-07-09 | Stop reason: HOSPADM

## 2020-07-09 RX ADMIN — CARVEDILOL 25 MG: 12.5 TABLET, FILM COATED ORAL at 11:07

## 2020-07-09 RX ADMIN — LISINOPRIL 40 MG: 20 TABLET ORAL at 11:07

## 2020-07-09 RX ADMIN — CARVEDILOL 25 MG: 12.5 TABLET, FILM COATED ORAL at 05:07

## 2020-07-09 RX ADMIN — ENOXAPARIN SODIUM 40 MG: 100 INJECTION SUBCUTANEOUS at 05:07

## 2020-07-09 RX ADMIN — HYDRALAZINE HYDROCHLORIDE 50 MG: 25 TABLET, FILM COATED ORAL at 01:07

## 2020-07-09 RX ADMIN — ASPIRIN 81 MG 81 MG: 81 TABLET ORAL at 11:07

## 2020-07-09 RX ADMIN — MONTELUKAST SODIUM 10 MG: 10 TABLET, COATED ORAL at 11:07

## 2020-07-09 RX ADMIN — HYDRALAZINE HYDROCHLORIDE 25 MG: 25 TABLET, FILM COATED ORAL at 06:07

## 2020-07-09 NOTE — DISCHARGE SUMMARY
[facility]  Discharge Summary      Patient Name: Loren Andre  MRN: [id number]  Admission Date: 7/8/2020  Hospital Length of Stay: 0 days  Discharge Date and Time:  07/09/2020 4:46 PM  Attending Physician: Monroe Owens MD   Discharging Provider: Monroe Owens MD  Primary Care Provider: Sanket Toure MD      HPI:   Patient is a 70-year-old  female with known history of chronic asthma, fibromyalgia, essential hypertension and sickle cell trait who presents to the ED with chief complaint of acute onset chest pain.    Onset of chest pain was yesterday morning. It is sharp, intermittent, nonradiating and located over the anterior chest.  No associated shortness of breath, palpitations, nausea/vomiting or diaphoresis.  No exacerbating or relieving factors.  Each episode of chest pain lasts about few seconds before it resolves on its own.  Reports compliance with home medications.  No recent fever, chills.  Endorses chronic cough secondary to her asthma.  Last stress test was in fall of 2018 which is normal.    She is physically active and does pushups at least 5 days a week. Unsure is she pulled a muscle. Not a smoker.    In the ED:  Hemodynamically stable.  Vital signs notable for very high blood pressure with systolics greater than 220 mm Hg.  Labs revealed mildly elevated D-dimer and BNP but otherwise largely unremarkable.  EKG revealed normal sinus rhythm with nonspecific T-wave changes in anterolateral leads.  CTA chest negative for pulmonary embolism.    Rest of the 10 point review of systems is negative except as mentioned above.    * No surgery found *      Hospital Course:   70-year-old  female with known past medical history of chronic asthma, fibromyalgia, essential hypertension, sickle cell trait was admitted 07/08/2020 with chest pain, hypertensive urgency.  Differential diagnosis included chest pain due to hypertensive urgency versus anxiety  versus ACS versus musculoskeletal.  Patient was admitted to telemetry.  Troponins were ordered x3, BNP was low.  Patient was restarted on her home medications and p.r.n. IV hydralazine.  Cardiology was consulted.  Stress echo was ordered for the morning.  Aspirin was started.  Patient remained asymptomatic throughout the hospital stay.  Troponins were negative x3.  Patient had stress echo done in the morning which was negative for reversible ischemia.  The ECG portion of the surgery was abnormal but not diagnostic for ischemia.  Her EF was 65% with normal left ventricular systolic and diastolic functions and normal right ventricular diet systolic function  Cardiologist Dr. ANIBAL Andrew came to see the patient but patient was in cardiac/stress test department undergoing her stress test.  I communicated with Dr. Andrew is nurse practitioner Liliam Argueta who reviewed patient's chart and recommended patient can be discharged home after optimal blood pressure control and patient can follow-up in outpatient setting with Dr. Bey in 2 weeks  New prescription of hydralazine 50 mg t.i.d. sent to her pharmacy along with nitroglycerin for p.r.n. use.  Aspirin 81 mg daily was started    Patient was seen and examined on the day of discharge.  For complete physical exam, see the note from earlier today     Consults:   Consults (From admission, onward)        Status Ordering Provider     Inpatient consult to Cardiology  Once     Provider:  Ajith Andrew MD    Acknowledged ROGERIO GRULLON     Inpatient consult to Internal Medicine  Once     Provider:  MD Twila Zepeda RAJIV     Inpatient consult to Internal Medicine  Once     Provider:  MD Twila Zepeda RAJIV          No new Assessment & Plan notes have been filed under this hospital service since the last note was generated.  Service: Hospital Medicine    Final Active Diagnoses:    Diagnosis Date Noted POA     Hyperlipidemia [E78.5]  Yes    Chronic disease anemia [D63.8] 10/05/2017 Yes    HTN (hypertension) [I10] 08/25/2014 Yes    Asthma, chronic [J45.909] 06/03/2013 Yes    Fibromyalgia [M79.7] 06/07/2012 Yes      Problems Resolved During this Admission:    Diagnosis Date Noted Date Resolved POA    PRINCIPAL PROBLEM:  Chest pain [R07.9] 07/08/2020 07/09/2020 Yes    Hypertensive urgency [I16.0] 07/08/2020 07/09/2020 Yes       Discharged Condition: good    Disposition: Home or Self Care    Follow Up:  Follow-up Information     Sanket Toure MD. Schedule an appointment as soon as possible for a visit in 2 weeks.    Specialty: Family Medicine  Why: Post hospital discharge follow-up for blood pressure control  Contact information:  [address]  [place] [address]  [phone]             Naren Bey MD. Schedule an appointment as soon as possible for a visit in 2 weeks.    Specialties: Cardiovascular Disease, Cardiology, INTERVENTIONAL CARDIOLOGY  Why: Post hospital discharge follow-up for chest pain  Contact information:  [address]  [address]  [place] [address]  [phone]                 Patient Instructions:      Diet diabetic   Order Comments: Low carb diet as patient is prediabetic     Diet Cardiac   Order Comments: Low-salt diet     Activity as tolerated       Significant Diagnostic Studies: Labs:   BMP:   Recent Labs   Lab 07/08/20  0945 07/09/20  0421    109    140   K 3.8 4.1    105   CO2 27 27   BUN 14 20   CREATININE 1.1 1.6*   CALCIUM 9.2 9.2   MG 1.8 2.0   , CBC   Recent Labs   Lab 07/08/20  0945 07/09/20  0421   WBC 3.92 6.86   HGB 12.4 13.1   HCT 37.9 38.2    199   , INR   Lab Results   Component Value Date    INR 1.1 07/08/2020   , Lipid Panel   Lab Results   Component Value Date    CHOL 151 03/30/2020    HDL 66 03/30/2020    LDLCALC 70.0 03/30/2020    TRIG 75 03/30/2020    CHOLHDL 43.7 03/30/2020   , Troponin   Recent Labs   Lab 07/09/20  0015    TROPONINI <0.030    and A1C:   Recent Labs   Lab 07/09/20  0421   HGBA1C 6.2       Pending Diagnostic Studies:     None         Medications:  Reconciled Home Medications:      Medication List      START taking these medications    aspirin 81 MG Chew  Take 1 tablet (81 mg total) by mouth once daily.  Start taking on: July 10, 2020     hydrALAZINE 50 MG tablet  Commonly known as: APRESOLINE  Take 1 tablet (50 mg total) by mouth every 8 (eight) hours.     nitroGLYCERIN 0.4 MG SL tablet  Commonly known as: NITROSTAT  Place 1 tablet (0.4 mg total) under the tongue every 5 (five) minutes as needed for Chest pain.        CHANGE how you take these medications    atorvastatin 40 MG tablet  Commonly known as: LIPITOR  Take 1 tablet (40 mg total) by mouth once daily.  What changed: when to take this     valACYclovir 500 MG tablet  Commonly known as: VALTREX  TAKE 1 TABLET (500 MG TOTAL) BY MOUTH 2 (TWO) TIMES DAILY FOR 7 DAYS AS DIRECTED.  What changed: See the new instructions.        CONTINUE taking these medications    carvediloL 12.5 MG tablet  Commonly known as: COREG  Take 2 tablets (25 mg total) by mouth 2 (two) times daily with meals.     CO Q-10 10 mg capsule  Generic drug: coenzyme Q10  Take 10 mg by mouth once daily.     folic acid 400 MCG tablet  Commonly known as: FOLVITE  Take 400 mcg by mouth once daily.     GARLIC OIL ORAL  Take 1 capsule by mouth once daily.     lisinopriL 40 MG tablet  Commonly known as: PRINIVIL,ZESTRIL  Take 1 tablet (40 mg total) by mouth once daily.     montelukast 10 mg tablet  Commonly known as: SINGULAIR  Take 1 tablet (10 mg total) by mouth once daily.     traZODone 100 MG tablet  Commonly known as: DESYREL  TAKE 1 TABLET EVERY EVENING.     VITAMIN C 500 MG tablet  Generic drug: ascorbic acid (vitamin C)  Take 500 mg by mouth daily as needed.     VITAMIN D-3 ORAL  Take 100 Int'l Units by mouth once daily.     vitamin E 100 UNIT capsule  Take 100 Units by mouth once daily.         STOP taking these medications    albuterol 90 mcg/actuation inhaler  Commonly known as: VENTOLIN HFA     azelastine 137 mcg (0.1 %) nasal spray  Commonly known as: ASTELIN     azithromycin 250 MG tablet  Commonly known as: Z-MARISABEL     betamethasone dipropionate 0.05 % cream  Commonly known as: DIPROLENE     BLACK COHOSH ORAL     diclofenac sodium 1 % Gel  Commonly known as: VOLTAREN     fluocinonide 0.05% 0.05 % cream  Commonly known as: LIDEX     fluticasone propionate 50 mcg/actuation nasal spray  Commonly known as: FLONASE     fluticasone-salmeterol 250-50 mcg/dose 250-50 mcg/dose diskus inhaler  Commonly known as: ADVAIR DISKUS     methylPREDNISolone 4 mg tablet  Commonly known as: MEDROL DOSEPACK     mometasone 0.1% 0.1 % cream  Commonly known as: ELOCON     NIACIN FLUSH FREE ORAL     OMEGA-3-DHA-EPA-FISH OIL ORAL            Indwelling Lines/Drains at time of discharge:   Lines/Drains/Airways     None                 Time spent on the discharge of patient: 30 minutes  Patient was seen and examined on the date of discharge and determined to be suitable for discharge.         Monroe Owens MD  Department of Hospital Medicine  [facility]

## 2020-07-09 NOTE — PLAN OF CARE
07/09/20 1426   VALENTE Message   Medicare Outpatient and Observation Notification regarding financial responsibility Given to patient/caregiver;Explained to patient/caregiver;Signed/date by patient/caregiver   Date VALENTE was signed 07/09/20   Time VALENTE was signed 1419

## 2020-07-09 NOTE — PLAN OF CARE
07/09/20 1501   Discharge Assessment   Assessment Type Discharge Planning Assessment   Confirmed/corrected address and phone number on facesheet? Yes   Assessment information obtained from? Patient;Caregiver   Communicated expected length of stay with patient/caregiver no   Prior to hospitilization cognitive status: Alert/Oriented   Prior to hospitalization functional status: Independent   Current cognitive status: Alert/Oriented   Current Functional Status: Independent   Facility Arrived From: home   Lives With alone   Able to Return to Prior Arrangements yes   Is patient able to care for self after discharge? Yes   Who are your caregiver(s) and their phone number(s)? lissett black [phone]   Patient's perception of discharge disposition home or selfcare   Readmission Within the Last 30 Days no previous admission in last 30 days   Patient currently being followed by outpatient case management? No   Patient currently receives any other outside agency services? No   Do you have any problems affording any of your prescribed medications? No   Is the patient taking medications as prescribed? yes   Does the patient have transportation home? Yes   Transportation Anticipated family or friend will provide   Dialysis Name and Scheduled days n/a   Does the patient receive services at the [facility]mad[facility] Clinic? No   Discharge Plan A Home   Discharge Plan B Home with family   DME Needed Upon Discharge  nebulizer   Patient/Family in Agreement with Plan yes   Introduced self to patient asked if ok to take a few min of their time for short interview for D/C planning and ok with patient      Pt needs a new nebulizer.

## 2020-07-09 NOTE — PROGRESS NOTES
[facility]    Progress Note    Patient Name: Loren Andre  MRN: [id number]  Patient Class: OP- Observation   Admission Date: 7/8/2020  8:54 AM  Length of Stay: 0  Attending Physician: ROCÍO FAUSTIN MD  Primary Care Provider: Sanket Toure MD  Face-to-Face encounter date: 07/09/2020  Chief Complaint: Chest Pain         Patient ID: Loren Andre is a 70 y.o. female admitted for   Active Hospital Problems    Diagnosis  POA    *Chest pain [R07.9]  Yes    Hypertensive urgency [I16.0]  Yes    Hyperlipidemia [E78.5]  Yes    Chronic disease anemia [D63.8]  Yes    Asthma, chronic [J45.909]  Yes    Fibromyalgia [M79.7]  Yes      Resolved Hospital Problems   No resolved problems to display.      HPI: Patient is a 70-year-old  female with known history of chronic asthma, fibromyalgia, essential hypertension and sickle cell trait who presents to the ED with chief complaint of acute onset chest pain.     Onset of chest pain was yesterday morning. It is sharp, intermittent, nonradiating and located over the anterior chest.  No associated shortness of breath, palpitations, nausea/vomiting or diaphoresis.  No exacerbating or relieving factors.  Each episode of chest pain lasts about few seconds before it resolves on its own.  Reports compliance with home medications.  No recent fever, chills.  Endorses chronic cough secondary to her asthma.  Last stress test was in fall of 2018 which is normal.     She is physically active and does pushups at least 5 days a week. Unsure is she pulled a muscle. Not a smoker.     In the ED:  Hemodynamically stable.  Vital signs notable for very high blood pressure with systolics greater than 220 mm Hg.  Labs revealed mildly elevated D-dimer and BNP but otherwise largely unremarkable.  EKG revealed normal sinus rhythm with nonspecific T-wave changes in anterolateral leads.  CTA chest negative for pulmonary embolism.    Subjective:    Interval  History: Patient seen and examined this morning.  Sitting in bed.  Reports she is doing well.  Denies any chest pain since in the hospital.  Reports she had her treadmill stress test this morning and Dr. Grayson was there.  Discussed Dr. Grayson will evaluate her and then will decide upon discharge planning.   Patient informed that she is known to Dr. Bey, had stress test 2 years ago which was negative  No Family present at bedside.  No concerns/issues overnight reported by the patient or the nursing staff.    Review of Systems All other Review of Systems were found to be negative expect for that mentioned already in HPI.     Objective:     Physical Exam  Vitals:    07/09/20 0730   BP: (!) 145/68   Pulse: 78   Resp: 18   Temp: 97.6 °F (36.4 °C)     Wt Readings from Last 1 Encounters:   07/08/20 68.9 kg (152 lb 0.1 oz)      Body mass index is 24.53 kg/m².    Vitals reviewed.  Constitutional: No distress.   HENT: NC  Head: Atraumatic.   Cardiovascular: Normal rate, regular rhythm and normal heart sounds.   Pulmonary/Chest: Effort normal. No wheezes.   Abdominal: Soft. Bowel sounds are normal. No distension and no mass. No tenderness  Neurological: Alert.   Skin: Skin is warm and dry.   Psych: Appropriate mood and affect    Following labs were Reviewed   CBC:  Recent Labs   Lab 07/09/20  0421   WBC 6.86   HGB 13.1   HCT 38.2        CMP:  Recent Labs   Lab 07/09/20  0421   CALCIUM 9.2      K 4.1   CO2 27      BUN 20   CREATININE 1.6*     Last 72 hour POCT GLUCOSE  No results found for: POCTGLUCOSE     Microbiology Results (last 7 days)     ** No results found for the last 168 hours. **            CTA Chest Non-Coronary - PE Study   Final Result      No CT evidence pulmonary emboli      Small hiatal hernia      Multiple hypodense lesions in the liver compatible with hepatic cysts.         Electronically signed by: Jocelyne Mcnally MD   Date:    07/08/2020   Time:    13:21      X-Ray Chest AP  Portable   Final Result            Scheduled Meds:   aspirin  81 mg Oral Daily    atorvastatin  40 mg Oral QHS    carvediloL  25 mg Oral BID WM    enoxaparin  40 mg Subcutaneous Q24H    hydrALAZINE  25 mg Oral Q8H    lisinopriL  40 mg Oral Daily    montelukast  10 mg Oral Daily    traZODone  100 mg Oral QHS     Continuous Infusions:  PRN Meds:.acetaminophen, hydrALAZINE, morphine, nitroGLYCERIN, ondansetron, sodium chloride 0.9%    07/09/2020: Stress Echo  · The patient reached the end of the protocol.  · Moderate concentric left ventricular hypertrophy.  · Normal left ventricular systolic function. The estimated ejection fraction is 65%.  · Normal LV diastolic function.  · Normal right ventricular systolic function.  · Mild left atrial enlargement.  · The stress echo portion of this study is negative for myocardial ischemia.  · There were no arrhythmias during stress.  · The patient's exercise capacity was mildly impaired.  · The ECG portion of this study is abnormal but not diagnostic for ischemia.       Assessment & Plan:     Chest pain  Hypertensive urgency- resolved  Hyperlipidemia  Differential diagnosis includes secondary to hypertensive urgency vs anxiety vs ACS vs musculoskeletal  Admit to telemetry unit for monitoring   High risk considering risk factors  Troponin negative x3    Optimal blood pressure control by restarting home meds and prn hydralazine.    Continue home statin, beta-blocker and ACE-inhibitor  Increase Hydralazine to 50 mg TID for better BP contro  Treadmill stress echocardiogram reported above, noted abnormal EKG portion but not diagnostic  Cardiology Dr. Grayson consulted for further eval and recommendations  Started daily aspirin.   Lipid panel from March 2020 with LDL at goal.    TSH 1.38, within normal range    Hemoglobin A1c 6.2, prediabetic    Hx Anemia of chronic disease  H&H 13.1/38.2    Slightly elevated D-dimer  CTA negative for PE    Discharge Planning:   No  mobility needs.   Possible d/c tomorrow    Above encounter included review of the medical records, interviewing and examining the patient face-to-face, discussion with family and other health care providers, ordering and interpreting lab/test results and formulating a plan of care.     Medical Decision Making:      [_] Low Complexity  [_] Moderate Complexity  [x] High Complexity      Monroe Owens MD  Department of Hospital Medicine   [facility]

## 2020-07-10 ENCOUNTER — TELEPHONE (OUTPATIENT)
Dept: FAMILY MEDICINE | Facility: CLINIC | Age: 70
End: 2020-07-10

## 2020-07-10 NOTE — TELEPHONE ENCOUNTER
----- Message from Ray White sent at 7/10/2020  8:09 AM CDT -----  Contact: pt  Type:  Sooner Apoointment Request    Caller is requesting a sooner appointment.  Caller declined first available appointment listed below.  Caller will not accept being placed on the waitlist and is requesting a message be sent to doctor.    Name of Caller:  pt  When is the first available appointment?  08/10/2020  Best Call Back Number:  [phone]  Additional Information:  pt was admitted for chest pain and high BP. pt discharged from [facility] 07/09/2020. Pt needs a 2 week hospital follow with Dr. Toure. Does not want to see anyone else. Please call to advise.

## 2020-07-13 ENCOUNTER — OFFICE VISIT (OUTPATIENT)
Dept: FAMILY MEDICINE | Facility: CLINIC | Age: 70
End: 2020-07-13
Payer: MEDICARE

## 2020-07-13 VITALS
SYSTOLIC BLOOD PRESSURE: 138 MMHG | DIASTOLIC BLOOD PRESSURE: 78 MMHG | BODY MASS INDEX: 24.66 KG/M2 | HEART RATE: 68 BPM | OXYGEN SATURATION: 98 % | HEIGHT: 66 IN | TEMPERATURE: 97 F | WEIGHT: 153.44 LBS

## 2020-07-13 DIAGNOSIS — I10 ESSENTIAL HYPERTENSION: ICD-10-CM

## 2020-07-13 DIAGNOSIS — L23.1 ALLERGIC CONTACT DERMATITIS DUE TO ADHESIVES: ICD-10-CM

## 2020-07-13 DIAGNOSIS — Z09 HOSPITAL DISCHARGE FOLLOW-UP: Primary | ICD-10-CM

## 2020-07-13 DIAGNOSIS — N18.30 STAGE 3 CHRONIC KIDNEY DISEASE: ICD-10-CM

## 2020-07-13 PROCEDURE — 99213 OFFICE O/P EST LOW 20 MIN: CPT | Mod: HCNC,S$GLB,, | Performed by: PHYSICIAN ASSISTANT

## 2020-07-13 PROCEDURE — 99499 UNLISTED E&M SERVICE: CPT | Mod: HCNC,S$GLB,, | Performed by: PHYSICIAN ASSISTANT

## 2020-07-13 PROCEDURE — 99499 RISK ADDL DX/OHS AUDIT: ICD-10-PCS | Mod: HCNC,S$GLB,, | Performed by: PHYSICIAN ASSISTANT

## 2020-07-13 PROCEDURE — 99999 PR PBB SHADOW E&M-EST. PATIENT-LVL IV: ICD-10-PCS | Mod: PBBFAC,HCNC,, | Performed by: PHYSICIAN ASSISTANT

## 2020-07-13 PROCEDURE — 99213 PR OFFICE/OUTPT VISIT, EST, LEVL III, 20-29 MIN: ICD-10-PCS | Mod: HCNC,S$GLB,, | Performed by: PHYSICIAN ASSISTANT

## 2020-07-13 PROCEDURE — 99999 PR PBB SHADOW E&M-EST. PATIENT-LVL IV: CPT | Mod: PBBFAC,HCNC,, | Performed by: PHYSICIAN ASSISTANT

## 2020-07-13 RX ORDER — BETAMETHASONE DIPROPIONATE 0.5 MG/G
CREAM TOPICAL 2 TIMES DAILY
Qty: 15 G | Refills: 2 | OUTPATIENT
Start: 2020-07-13 | End: 2020-07-13 | Stop reason: SDUPTHER

## 2020-07-13 RX ORDER — BETAMETHASONE DIPROPIONATE 0.5 MG/G
CREAM TOPICAL 2 TIMES DAILY
Qty: 15 G | Refills: 2 | OUTPATIENT
Start: 2020-07-13 | End: 2023-10-04

## 2020-07-13 RX ORDER — BETAMETHASONE DIPROPIONATE 0.5 MG/G
CREAM TOPICAL 2 TIMES DAILY
Qty: 15 G | Refills: 2 | Status: SHIPPED | OUTPATIENT
Start: 2020-07-13 | End: 2020-07-13 | Stop reason: SDUPTHER

## 2020-07-13 NOTE — PROGRESS NOTES
Subjective:       Patient ID: Loren Andre is a 70 y.o. female.    Chief Complaint: No chief complaint on file.    [facility]   Hospital f/u  Malignant hypertension  BP now good  Feels well  Fatigue has improved  Rash itchy where where monitor pads attached to skin  Has f/u with Cardiology Dr. Weinstein   Review of Systems   Constitutional: Negative.  Negative for activity change, appetite change, chills, diaphoresis, fatigue, fever and unexpected weight change.   HENT: Negative.    Eyes: Negative.    Respiratory: Negative.  Negative for cough and shortness of breath.    Cardiovascular: Negative.  Negative for chest pain and leg swelling.   Gastrointestinal: Negative.    Endocrine: Negative.    Genitourinary: Negative.    Integumentary:  Positive for rash.   Neurological: Negative.          Objective:      Physical Exam  Vitals signs reviewed.   Constitutional:       General: She is not in acute distress.     Appearance: Normal appearance. She is normal weight. She is not ill-appearing, toxic-appearing or diaphoretic.   HENT:      Head: Normocephalic and atraumatic.      Right Ear: Tympanic membrane, ear canal and external ear normal. There is no impacted cerumen.      Left Ear: Tympanic membrane, ear canal and external ear normal. There is no impacted cerumen.      Nose: Nose normal.      Mouth/Throat:      Mouth: Mucous membranes are moist.      Pharynx: Oropharynx is clear. No oropharyngeal exudate or posterior oropharyngeal erythema.   Eyes:      General: No scleral icterus.     Conjunctiva/sclera: Conjunctivae normal.   Neck:      Musculoskeletal: Normal range of motion and neck supple. No neck rigidity or muscular tenderness.      Vascular: No carotid bruit.   Cardiovascular:      Rate and Rhythm: Normal rate and regular rhythm.      Pulses: Normal pulses.      Heart sounds: Normal heart sounds. No murmur. No friction rub. No gallop.    Pulmonary:      Effort: Pulmonary effort is normal. No respiratory distress.       Breath sounds: Normal breath sounds. No stridor. No wheezing, rhonchi or rales.   Abdominal:      General: Abdomen is flat. Bowel sounds are normal. There is no distension.      Palpations: Abdomen is soft. There is no mass.      Tenderness: There is no abdominal tenderness. There is no guarding or rebound.      Hernia: No hernia is present.   Musculoskeletal:         General: No swelling.      Right lower leg: No edema.      Left lower leg: No edema.   Lymphadenopathy:      Cervical: No cervical adenopathy.   Skin:     General: Skin is warm and dry.      Findings: Rash present.      Comments: Contact rash at lead sites   Neurological:      General: No focal deficit present.      Mental Status: She is alert and oriented to person, place, and time.   Psychiatric:         Mood and Affect: Mood normal.         Behavior: Behavior normal.         Thought Content: Thought content normal.         Judgment: Judgment normal.         Assessment:       1. Hospital discharge follow-up    2. Essential hypertension    3. Allergic contact dermatitis due to adhesives    4. Stage 3 chronic kidney disease        Plan:       Diagnoses and all orders for this visit:    Hospital discharge follow-up  -     Comprehensive metabolic panel; Future  -     CBC auto differential; Future    Essential hypertension  -     Comprehensive metabolic panel; Future  -     CBC auto differential; Future    Allergic contact dermatitis due to adhesives  -     Comprehensive metabolic panel; Future  -     CBC auto differential; Future    Stage 3 chronic kidney disease  -     Comprehensive metabolic panel; Future  -     CBC auto differential; Future    salt restriction discussed  hydrate  F/u 2 or 3 wks  BID BP checks and log BP and pulse  Hold off on starting apresoline until pt. See cardiology  Will recheck labs and if GFR remains low will consult Nephrology

## 2020-07-15 ENCOUNTER — TELEPHONE (OUTPATIENT)
Dept: FAMILY MEDICINE | Facility: CLINIC | Age: 70
End: 2020-07-15

## 2020-07-15 ENCOUNTER — OFFICE VISIT (OUTPATIENT)
Dept: FAMILY MEDICINE | Facility: CLINIC | Age: 70
End: 2020-07-15
Payer: MEDICARE

## 2020-07-15 VITALS
SYSTOLIC BLOOD PRESSURE: 148 MMHG | HEART RATE: 66 BPM | TEMPERATURE: 98 F | BODY MASS INDEX: 24.7 KG/M2 | HEIGHT: 66 IN | DIASTOLIC BLOOD PRESSURE: 72 MMHG | WEIGHT: 153.69 LBS

## 2020-07-15 DIAGNOSIS — J01.10 ACUTE NON-RECURRENT FRONTAL SINUSITIS: Primary | ICD-10-CM

## 2020-07-15 DIAGNOSIS — J45.909 ASTHMA, UNSPECIFIED ASTHMA SEVERITY, UNSPECIFIED WHETHER COMPLICATED, UNSPECIFIED WHETHER PERSISTENT: ICD-10-CM

## 2020-07-15 DIAGNOSIS — B34.9 ACUTE VIRAL SYNDROME: ICD-10-CM

## 2020-07-15 DIAGNOSIS — I10 ESSENTIAL HYPERTENSION: ICD-10-CM

## 2020-07-15 PROCEDURE — 3078F PR MOST RECENT DIASTOLIC BLOOD PRESSURE < 80 MM HG: ICD-10-PCS | Mod: HCNC,CPTII,S$GLB, | Performed by: NURSE PRACTITIONER

## 2020-07-15 PROCEDURE — 99213 OFFICE O/P EST LOW 20 MIN: CPT | Mod: HCNC,S$GLB,, | Performed by: NURSE PRACTITIONER

## 2020-07-15 PROCEDURE — 1101F PR PT FALLS ASSESS DOC 0-1 FALLS W/OUT INJ PAST YR: ICD-10-PCS | Mod: HCNC,CPTII,S$GLB, | Performed by: NURSE PRACTITIONER

## 2020-07-15 PROCEDURE — 1101F PT FALLS ASSESS-DOCD LE1/YR: CPT | Mod: HCNC,CPTII,S$GLB, | Performed by: NURSE PRACTITIONER

## 2020-07-15 PROCEDURE — 99999 PR PBB SHADOW E&M-EST. PATIENT-LVL III: ICD-10-PCS | Mod: PBBFAC,HCNC,, | Performed by: NURSE PRACTITIONER

## 2020-07-15 PROCEDURE — 3078F DIAST BP <80 MM HG: CPT | Mod: HCNC,CPTII,S$GLB, | Performed by: NURSE PRACTITIONER

## 2020-07-15 PROCEDURE — 1126F AMNT PAIN NOTED NONE PRSNT: CPT | Mod: HCNC,S$GLB,, | Performed by: NURSE PRACTITIONER

## 2020-07-15 PROCEDURE — 1159F PR MEDICATION LIST DOCUMENTED IN MEDICAL RECORD: ICD-10-PCS | Mod: HCNC,S$GLB,, | Performed by: NURSE PRACTITIONER

## 2020-07-15 PROCEDURE — 3008F BODY MASS INDEX DOCD: CPT | Mod: HCNC,CPTII,S$GLB, | Performed by: NURSE PRACTITIONER

## 2020-07-15 PROCEDURE — 1159F MED LIST DOCD IN RCRD: CPT | Mod: HCNC,S$GLB,, | Performed by: NURSE PRACTITIONER

## 2020-07-15 PROCEDURE — 99999 PR PBB SHADOW E&M-EST. PATIENT-LVL III: CPT | Mod: PBBFAC,HCNC,, | Performed by: NURSE PRACTITIONER

## 2020-07-15 PROCEDURE — 3077F SYST BP >= 140 MM HG: CPT | Mod: HCNC,CPTII,S$GLB, | Performed by: NURSE PRACTITIONER

## 2020-07-15 PROCEDURE — 3077F PR MOST RECENT SYSTOLIC BLOOD PRESSURE >= 140 MM HG: ICD-10-PCS | Mod: HCNC,CPTII,S$GLB, | Performed by: NURSE PRACTITIONER

## 2020-07-15 PROCEDURE — 1126F PR PAIN SEVERITY QUANTIFIED, NO PAIN PRESENT: ICD-10-PCS | Mod: HCNC,S$GLB,, | Performed by: NURSE PRACTITIONER

## 2020-07-15 PROCEDURE — 3008F PR BODY MASS INDEX (BMI) DOCUMENTED: ICD-10-PCS | Mod: HCNC,CPTII,S$GLB, | Performed by: NURSE PRACTITIONER

## 2020-07-15 PROCEDURE — 99213 PR OFFICE/OUTPT VISIT, EST, LEVL III, 20-29 MIN: ICD-10-PCS | Mod: HCNC,S$GLB,, | Performed by: NURSE PRACTITIONER

## 2020-07-15 RX ORDER — AZITHROMYCIN 250 MG/1
TABLET, FILM COATED ORAL
Qty: 6 TABLET | Refills: 0 | Status: SHIPPED | OUTPATIENT
Start: 2020-07-15 | End: 2020-07-15

## 2020-07-15 RX ORDER — AZITHROMYCIN 250 MG/1
TABLET, FILM COATED ORAL
Qty: 6 TABLET | Refills: 0 | Status: SHIPPED | OUTPATIENT
Start: 2020-07-15 | End: 2020-07-20

## 2020-07-15 RX ORDER — AZELASTINE 1 MG/ML
1 SPRAY, METERED NASAL 2 TIMES DAILY
Qty: 30 ML | Refills: 0 | Status: SHIPPED | OUTPATIENT
Start: 2020-07-15 | End: 2021-10-07

## 2020-07-15 RX ORDER — AZELASTINE 1 MG/ML
1 SPRAY, METERED NASAL 2 TIMES DAILY
Qty: 30 ML | Refills: 0 | Status: SHIPPED | OUTPATIENT
Start: 2020-07-15 | End: 2020-07-15

## 2020-07-15 NOTE — PATIENT INSTRUCTIONS
Sinusitis (Antibiotic Treatment)    The sinuses are air-filled spaces within the bones of the face. They connect to the inside of the nose. Sinusitis is an inflammation of the tissue lining the sinus cavity. Sinus inflammation can occur during a cold. It can also be due to allergies to pollens and other particles in the air. Sinusitis can cause symptoms of sinus congestion and fullness. A sinus infection causes fever, headache and facial pain. There is often green or yellow drainage from the nose or into the back of the throat (post-nasal drip). You have been given antibiotics to treat this condition.  Home care:  · Take the full course of antibiotics as instructed. Do not stop taking them, even if you feel better.  · Drink plenty of water, hot tea, and other liquids. This may help thin mucus. It also may promote sinus drainage.  · Heat may help soothe painful areas of the face. Use a towel soaked in hot water. Or,  the shower and direct the hot spray onto your face. Using a vaporizer along with a menthol rub at night may also help.   · An expectorant containing guaifenesin may help thin the mucus and promote drainage from the sinuses.  · Over-the-counter decongestants may be used unless a similar medicine was prescribed. Nasal sprays work the fastest. Use one that contains phenylephrine or oxymetazoline. First blow the nose gently. Then use the spray. Do not use these medicines more often than directed on the label or symptoms may get worse. You may also use tablets containing pseudoephedrine. Avoid products that combine ingredients, because side effects may be increased. Read labels. You can also ask the pharmacist for help. (NOTE: Persons with high blood pressure should not use decongestants. They can raise blood pressure.)  · Over-the-counter antihistamines may help if allergies contributed to your sinusitis.    · Do not use nasal rinses or irrigation during an acute sinus infection, unless told to by  your health care provider. Rinsing may spread the infection to other sinuses.  · Use acetaminophen or ibuprofen to control pain, unless another pain medicine was prescribed. (If you have chronic liver or kidney disease or ever had a stomach ulcer, talk with your doctor before using these medicines. Aspirin should never be used in anyone under 18 years of age who is ill with a fever. It may cause severe liver damage.)  · Don't smoke. This can worsen symptoms.  Follow-up care  Follow up with your healthcare provider or our staff if you are not improving within the next week.  When to seek medical advice  Call your healthcare provider if any of these occur:  · Facial pain or headache becoming more severe  · Stiff neck  · Unusual drowsiness or confusion  · Swelling of the forehead or eyelids  · Vision problems, including blurred or double vision  · Fever of 100.4ºF (38ºC) or higher, or as directed by your healthcare provider  · Seizure  · Breathing problems  · Symptoms not resolving within 10 days  Date Last Reviewed: 4/13/2015  © 8925-6289 [employer]. [address] [place] [address], [place] [address]. All rights reserved. This information is not intended as a substitute for professional medical care. Always follow your healthcare professional's instructions.

## 2020-07-15 NOTE — TELEPHONE ENCOUNTER
----- Message from Dasha Ghosh sent at 7/15/2020  8:02 AM CDT -----  Regarding: Patient  Contact: Patient  Type: Needs Medical Advice    Who Called:  Patient  Symptoms (please be specific):  Sinus pressure, face & under both eyes. Cough, sneezing,   How long has patient had these symptoms:    Pharmacy name and phone #:  [id number] - [place], [place] - [address] AT [facility] & [facility] [phone] (Phone)   Best Call Back Number: [phone]  Additional Information:   Patient has appt 7/13 & hoping she will not need to come in again. Requesting a z-pro be sent in for her.  Please call to advise, thank you!

## 2020-07-15 NOTE — PROGRESS NOTES
Patient ID: Loren Andre is a 70 y.o. female.    Chief Complaint:   Sinus Problem    Sinus Problem  This is a new problem. The current episode started 1 to 4 weeks ago. The problem has been gradually worsening since onset. There has been no fever. Associated symptoms include congestion, coughing and sinus pressure. Pertinent negatives include no chills, ear pain, headaches, hoarse voice, shortness of breath, sneezing or sore throat. Past treatments include saline sprays. The treatment provided mild relief.      Patient is new to me. Reviewed past medical and social history.    Past Medical History:   Diagnosis Date    Allergy     Anemia     sickle cell trait    Anxiety     Arthritis     Asthma     Chronic disease anemia 10/5/2017    Colon polyps     Depression     Dermatitis 3/7/2013    Fibromyalgia     HEARING LOSS     Hyperlipidemia     Hypertension     Leucopenia 10/5/2017    Polyneuropathy     Rheumatoid arthritis(714.0)     Scoliosis     Sickle cell trait     Sickle-cell trait 10/5/2017    Trouble in sleeping     Urticaria      Past Surgical History:   Procedure Laterality Date    ADENOIDECTOMY      APPENDECTOMY      COLONOSCOPY  4/14/2008    Dr. Guerrero, 10 year recheck    COLONOSCOPY N/A 10/4/2018    Procedure: COLONOSCOPY;  Surgeon: Tam Kemp MD;  Location: [facility];  Service: Endoscopy;  Laterality: N/A;    HYSTERECTOMY      OOPHORECTOMY      ROTATOR CUFF REPAIR Left 01/2016    ROTATOR CUFF REPAIR W/ DISTAL CLAVICLE EXCISION Right 12/04/2017    Dr. Eligio Timmons ( [facility] )    TONSILLECTOMY       Current Outpatient Medications on File Prior to Visit   Medication Sig Dispense Refill    ascorbic acid, vitamin C, (VITAMIN C) 500 MG tablet Take 500 mg by mouth daily as needed.       aspirin 81 MG Chew Take 1 tablet (81 mg total) by mouth once daily.  0    atorvastatin (LIPITOR) 40 MG tablet TAKE 1 TABLET EVERY DAY 90 tablet 2    betamethasone dipropionate  (DIPROLENE) 0.05 % cream Apply topically 2 (two) times daily. 15 g 2    CALCIUM CARBONATE/VITAMIN D3 (VITAMIN D-3 ORAL) Take 100 Int'l Units by mouth once daily.       carvediloL (COREG) 12.5 MG tablet Take 2 tablets (25 mg total) by mouth 2 (two) times daily with meals. 180 tablet 3    coenzyme Q10 (CO Q-10) 10 mg capsule Take 10 mg by mouth once daily.      folic acid (FOLVITE) 400 MCG tablet Take 400 mcg by mouth once daily.      GARLIC OIL ORAL Take 1 capsule by mouth once daily.       hydrALAZINE (APRESOLINE) 50 MG tablet Take 1 tablet (50 mg total) by mouth every 8 (eight) hours. 90 tablet 3    lisinopril (PRINIVIL,ZESTRIL) 40 MG tablet Take 1 tablet (40 mg total) by mouth once daily. 90 tablet 2    montelukast (SINGULAIR) 10 mg tablet Take 1 tablet (10 mg total) by mouth once daily. 90 tablet 3    nitroGLYCERIN (NITROSTAT) 0.4 MG SL tablet Place 1 tablet (0.4 mg total) under the tongue every 5 (five) minutes as needed for Chest pain. 90 tablet 0    traZODone (DESYREL) 100 MG tablet TAKE 1 TABLET EVERY EVENING. (Patient taking differently: Take 100 mg by mouth every evening. ) 90 tablet 1    valACYclovir (VALTREX) 500 MG tablet TAKE 1 TABLET (500 MG TOTAL) BY MOUTH 2 (TWO) TIMES DAILY FOR 7 DAYS AS DIRECTED.  (Patient taking differently: Take 500 mg by mouth as needed. ) 60 tablet 3    vitamin E 100 UNIT capsule Take 100 Units by mouth once daily.      [DISCONTINUED] cetirizine (ZYRTEC) 10 MG tablet Take 10 mg by mouth once daily.       No current facility-administered medications on file prior to visit.        Review of Systems   Constitutional: Negative for chills and fever.   HENT: Positive for congestion, postnasal drip, sinus pressure and sinus pain. Negative for ear pain, hoarse voice, rhinorrhea, sneezing and sore throat.    Eyes: Negative for visual disturbance.   Respiratory: Positive for cough. Negative for chest tightness, shortness of breath and wheezing.    Gastrointestinal: Negative  for diarrhea, nausea and vomiting.   Neurological: Negative for headaches.       Objective:      Physical Exam  Vitals signs reviewed.   Constitutional:       Appearance: Normal appearance. She is well-developed.   HENT:      Head: Normocephalic and atraumatic.      Right Ear: Hearing, tympanic membrane, ear canal and external ear normal.      Left Ear: Hearing, tympanic membrane, ear canal and external ear normal.      Nose: Congestion present.      Right Turbinates: Enlarged.      Left Turbinates: Enlarged.      Right Sinus: Maxillary sinus tenderness and frontal sinus tenderness present.      Left Sinus: Maxillary sinus tenderness and frontal sinus tenderness present.      Mouth/Throat:      Pharynx: Uvula midline. Posterior oropharyngeal erythema present. No oropharyngeal exudate or uvula swelling.   Eyes:      Conjunctiva/sclera: Conjunctivae normal.      Pupils: Pupils are equal, round, and reactive to light.   Neck:      Musculoskeletal: Normal range of motion and neck supple.   Cardiovascular:      Rate and Rhythm: Normal rate and regular rhythm.      Heart sounds: Normal heart sounds.   Pulmonary:      Effort: Pulmonary effort is normal.      Breath sounds: Normal breath sounds.   Abdominal:      General: Bowel sounds are normal.      Palpations: Abdomen is soft.   Musculoskeletal: Normal range of motion.   Skin:     General: Skin is warm and dry.   Neurological:      Mental Status: She is alert and oriented to person, place, and time.   Psychiatric:         Mood and Affect: Mood normal.         Behavior: Behavior normal.         Assessment:       1. Acute non-recurrent frontal sinusitis    2. Acute viral syndrome    3. Asthma, unspecified asthma severity, unspecified whether complicated, unspecified whether persistent    4. Essential hypertension        Plan:       Loren was seen today for sinus problem.    Diagnoses and all orders for this visit:    Acute non-recurrent frontal sinusitis  -      azithromycin (Z-MARISABEL) 250 MG tablet; Take 2 tablets by mouth on day 1; Take 1 tablet by mouth on days 2-5    Acute viral syndrome  -     azelastine (ASTELIN) 137 mcg (0.1 %) nasal spray; 1 spray (137 mcg total) by Nasal route 2 (two) times daily.    Asthma, unspecified asthma severity, unspecified whether complicated, unspecified whether persistent    Essential hypertension      OTC cough and cold medications  Increase fluids  Good hand hygiene  Humidifier   Patient education provided.  All questions and concerns addressed  RTC PRN and if symptoms worsen or fail to improve  Patient verbalizes understanding        Patient Instructions     Sinusitis (Antibiotic Treatment)    The sinuses are air-filled spaces within the bones of the face. They connect to the inside of the nose. Sinusitis is an inflammation of the tissue lining the sinus cavity. Sinus inflammation can occur during a cold. It can also be due to allergies to pollens and other particles in the air. Sinusitis can cause symptoms of sinus congestion and fullness. A sinus infection causes fever, headache and facial pain. There is often green or yellow drainage from the nose or into the back of the throat (post-nasal drip). You have been given antibiotics to treat this condition.  Home care:  · Take the full course of antibiotics as instructed. Do not stop taking them, even if you feel better.  · Drink plenty of water, hot tea, and other liquids. This may help thin mucus. It also may promote sinus drainage.  · Heat may help soothe painful areas of the face. Use a towel soaked in hot water. Or,  the shower and direct the hot spray onto your face. Using a vaporizer along with a menthol rub at night may also help.   · An expectorant containing guaifenesin may help thin the mucus and promote drainage from the sinuses.  · Over-the-counter decongestants may be used unless a similar medicine was prescribed. Nasal sprays work the fastest. Use one that contains  phenylephrine or oxymetazoline. First blow the nose gently. Then use the spray. Do not use these medicines more often than directed on the label or symptoms may get worse. You may also use tablets containing pseudoephedrine. Avoid products that combine ingredients, because side effects may be increased. Read labels. You can also ask the pharmacist for help. (NOTE: Persons with high blood pressure should not use decongestants. They can raise blood pressure.)  · Over-the-counter antihistamines may help if allergies contributed to your sinusitis.    · Do not use nasal rinses or irrigation during an acute sinus infection, unless told to by your health care provider. Rinsing may spread the infection to other sinuses.  · Use acetaminophen or ibuprofen to control pain, unless another pain medicine was prescribed. (If you have chronic liver or kidney disease or ever had a stomach ulcer, talk with your doctor before using these medicines. Aspirin should never be used in anyone under 18 years of age who is ill with a fever. It may cause severe liver damage.)  · Don't smoke. This can worsen symptoms.  Follow-up care  Follow up with your healthcare provider or our staff if you are not improving within the next week.  When to seek medical advice  Call your healthcare provider if any of these occur:  · Facial pain or headache becoming more severe  · Stiff neck  · Unusual drowsiness or confusion  · Swelling of the forehead or eyelids  · Vision problems, including blurred or double vision  · Fever of 100.4ºF (38ºC) or higher, or as directed by your healthcare provider  · Seizure  · Breathing problems  · Symptoms not resolving within 10 days  Date Last Reviewed: 4/13/2015  © 8009-2755 [employer]. [address] [place] [address], [place] [address]. All rights reserved. This information is not intended as a substitute for professional medical care. Always follow your healthcare professional's instructions.

## 2020-07-17 ENCOUNTER — LAB VISIT (OUTPATIENT)
Dept: LAB | Facility: HOSPITAL | Age: 70
End: 2020-07-17
Attending: PHYSICIAN ASSISTANT
Payer: MEDICARE

## 2020-07-17 DIAGNOSIS — I10 ESSENTIAL HYPERTENSION: ICD-10-CM

## 2020-07-17 DIAGNOSIS — L23.1 ALLERGIC CONTACT DERMATITIS DUE TO ADHESIVES: ICD-10-CM

## 2020-07-17 DIAGNOSIS — Z09 HOSPITAL DISCHARGE FOLLOW-UP: ICD-10-CM

## 2020-07-17 DIAGNOSIS — N18.30 STAGE 3 CHRONIC KIDNEY DISEASE: ICD-10-CM

## 2020-07-17 LAB
BASOPHILS # BLD AUTO: 0.03 K/UL (ref 0–0.2)
BASOPHILS NFR BLD: 0.6 % (ref 0–1.9)
DIFFERENTIAL METHOD: NORMAL
EOSINOPHIL # BLD AUTO: 0.1 K/UL (ref 0–0.5)
EOSINOPHIL NFR BLD: 2.5 % (ref 0–8)
ERYTHROCYTE [DISTWIDTH] IN BLOOD BY AUTOMATED COUNT: 14.3 % (ref 11.5–14.5)
HCT VFR BLD AUTO: 37.3 % (ref 37–48.5)
HGB BLD-MCNC: 12.1 G/DL (ref 12–16)
IMM GRANULOCYTES # BLD AUTO: 0.01 K/UL (ref 0–0.04)
IMM GRANULOCYTES NFR BLD AUTO: 0.2 % (ref 0–0.5)
LYMPHOCYTES # BLD AUTO: 1.5 K/UL (ref 1–4.8)
LYMPHOCYTES NFR BLD: 30.8 % (ref 18–48)
MCH RBC QN AUTO: 27.9 PG (ref 27–31)
MCHC RBC AUTO-ENTMCNC: 32.4 G/DL (ref 32–36)
MCV RBC AUTO: 86 FL (ref 82–98)
MONOCYTES # BLD AUTO: 0.6 K/UL (ref 0.3–1)
MONOCYTES NFR BLD: 13.2 % (ref 4–15)
NEUTROPHILS # BLD AUTO: 2.5 K/UL (ref 1.8–7.7)
NEUTROPHILS NFR BLD: 52.7 % (ref 38–73)
NRBC BLD-RTO: 0 /100 WBC
PLATELET # BLD AUTO: 226 K/UL (ref 150–350)
PMV BLD AUTO: 10.9 FL (ref 9.2–12.9)
RBC # BLD AUTO: 4.34 M/UL (ref 4–5.4)
WBC # BLD AUTO: 4.71 K/UL (ref 3.9–12.7)

## 2020-07-17 PROCEDURE — 80053 COMPREHEN METABOLIC PANEL: CPT | Mod: HCNC

## 2020-07-17 PROCEDURE — 36415 COLL VENOUS BLD VENIPUNCTURE: CPT | Mod: HCNC,PO

## 2020-07-17 PROCEDURE — 85025 COMPLETE CBC W/AUTO DIFF WBC: CPT | Mod: HCNC

## 2020-07-18 LAB
ALBUMIN SERPL BCP-MCNC: 4 G/DL (ref 3.5–5.2)
ALP SERPL-CCNC: 70 U/L (ref 55–135)
ALT SERPL W/O P-5'-P-CCNC: 25 U/L (ref 10–44)
ANION GAP SERPL CALC-SCNC: 7 MMOL/L (ref 8–16)
AST SERPL-CCNC: 25 U/L (ref 10–40)
BILIRUB SERPL-MCNC: 0.4 MG/DL (ref 0.1–1)
BUN SERPL-MCNC: 21 MG/DL (ref 8–23)
CALCIUM SERPL-MCNC: 9.7 MG/DL (ref 8.7–10.5)
CHLORIDE SERPL-SCNC: 103 MMOL/L (ref 95–110)
CO2 SERPL-SCNC: 28 MMOL/L (ref 23–29)
CREAT SERPL-MCNC: 1.5 MG/DL (ref 0.5–1.4)
EST. GFR  (AFRICAN AMERICAN): 40.1 ML/MIN/1.73 M^2
EST. GFR  (NON AFRICAN AMERICAN): 34.8 ML/MIN/1.73 M^2
GLUCOSE SERPL-MCNC: 91 MG/DL (ref 70–110)
POTASSIUM SERPL-SCNC: 4.7 MMOL/L (ref 3.5–5.1)
PROT SERPL-MCNC: 7.2 G/DL (ref 6–8.4)
SODIUM SERPL-SCNC: 138 MMOL/L (ref 136–145)

## 2020-07-20 ENCOUNTER — TELEPHONE (OUTPATIENT)
Dept: FAMILY MEDICINE | Facility: CLINIC | Age: 70
End: 2020-07-20

## 2020-07-20 NOTE — TELEPHONE ENCOUNTER
Spoke with patient regarding her results, patient was pleased, but she said she did a little research on the blood pressure medication she is taking Carvedilol , the side effects mention possible kidney damage so she would like you to give her a call to discuss it with her to ease her mind. Patient has been informed that Mr. George is still in clinic and she may not receive a call until after clinic, she agreed, patient can be reached at   [phone]

## 2020-07-20 NOTE — TELEPHONE ENCOUNTER
Please call and notify pt. Her kidney function tests have improved  Continue to hydrate well  Recheck lab tests in 6 to 8 wks

## 2020-08-06 ENCOUNTER — TELEPHONE (OUTPATIENT)
Dept: FAMILY MEDICINE | Facility: CLINIC | Age: 70
End: 2020-08-06

## 2020-08-06 DIAGNOSIS — Z12.31 VISIT FOR SCREENING MAMMOGRAM: Primary | ICD-10-CM

## 2020-08-06 NOTE — TELEPHONE ENCOUNTER
----- Message from Felicia Kumar sent at 8/5/2020 12:11 PM CDT -----  Regarding: orders  Contact: pt  Orders     Patient called and asked if you will put orders in the system for her annual Mammogram.  Please call back   [phone]

## 2020-08-10 ENCOUNTER — HOSPITAL ENCOUNTER (OUTPATIENT)
Dept: RADIOLOGY | Facility: CLINIC | Age: 70
Discharge: HOME | End: 2020-08-10
Attending: FAMILY MEDICINE
Payer: MEDICARE

## 2020-08-10 DIAGNOSIS — Z12.31 VISIT FOR SCREENING MAMMOGRAM: ICD-10-CM

## 2020-08-10 PROCEDURE — 77067 SCR MAMMO BI INCL CAD: CPT | Mod: TC,HCNC,PO

## 2020-08-10 PROCEDURE — 77067 SCR MAMMO BI INCL CAD: CPT | Mod: 26,HCNC,, | Performed by: RADIOLOGY

## 2020-08-10 PROCEDURE — 77067 MAMMO DIGITAL SCREENING BILAT WITH TOMOSYNTHESIS_CAD: ICD-10-PCS | Mod: 26,HCNC,, | Performed by: RADIOLOGY

## 2020-08-10 PROCEDURE — 77063 MAMMO DIGITAL SCREENING BILAT WITH TOMOSYNTHESIS_CAD: ICD-10-PCS | Mod: 26,HCNC,, | Performed by: RADIOLOGY

## 2020-08-10 PROCEDURE — 77063 BREAST TOMOSYNTHESIS BI: CPT | Mod: 26,HCNC,, | Performed by: RADIOLOGY

## 2020-09-09 DIAGNOSIS — I10 HYPERTENSION, ESSENTIAL: ICD-10-CM

## 2020-09-09 NOTE — TELEPHONE ENCOUNTER
No new care gaps identified.  Powered by [employer]. Reference number: [id number]. 9/09/2020 9:11:22 AM MIHAELAT

## 2020-09-10 RX ORDER — LISINOPRIL 40 MG/1
40 TABLET ORAL DAILY
Qty: 90 TABLET | Refills: 0 | Status: SHIPPED | OUTPATIENT
Start: 2020-09-10 | End: 2020-10-01 | Stop reason: SDUPTHER

## 2020-09-11 NOTE — TELEPHONE ENCOUNTER
Refill Authorization Note    is requesting a refill authorization.    Brief assessment and rationale for refill: APPROVE: prr          Medication Therapy Plan: CDMR. FOVS    Medication reconciliation completed: No                    Comments:      Orders Placed This Encounter    lisinopriL (PRINIVIL,ZESTRIL) 40 MG tablet      Requested Prescriptions   Signed Prescriptions Disp Refills    lisinopriL (PRINIVIL,ZESTRIL) 40 MG tablet 90 tablet 0     Sig: Take 1 tablet (40 mg total) by mouth once daily.       Cardiovascular:  ACE Inhibitors Failed - 9/9/2020  9:10 AM        Failed - Last BP in normal range within 360 days.     BP Readings from Last 3 Encounters:   07/15/20 (!) 148/72   07/13/20 138/78   07/09/20 (!) 160/80              Failed - Cr is 1.3 or below and within 360 days     Creatinine   Date Value Ref Range Status   07/17/2020 1.5 (H) 0.5 - 1.4 mg/dL Final   07/09/2020 1.6 (H) 0.5 - 1.4 mg/dL Final   07/08/2020 1.1 0.5 - 1.4 mg/dL Final              Passed - Patient is at least 18 years old        Passed - Office visit in past 12 months or future 90 days.     Recent Outpatient Visits            1 month ago Acute non-recurrent frontal sinusitis    [facility] - [facility] Linda Christian DNP    1 month ago Hospital discharge follow-up    [facility] - [facility] Mane George PA-C    8 months ago Encounter for preventive health examination    [facility] - Home Visits Mona Beckman NP    11 months ago Sickle-cell trait    [facility] - Hematology Oncology Denilson Salgado MD    11 months ago Encounter for preventive health examination    [facility] Sanket Toure MD          Future Appointments              In 3 weeks MD [facility]    In 3 weeks Koteswara R. Pothineni, MD [facility]    In 4 weeks Denilson Salgado MD [facility] - Hematology Oncology, [facility] Sanket CERNA in  normal range and within 360 days     Potassium   Date Value Ref Range Status   07/17/2020 4.7 3.5 - 5.1 mmol/L Final   07/09/2020 4.1 3.5 - 5.1 mmol/L Final   07/08/2020 3.8 3.5 - 5.1 mmol/L Final              Passed - eGFR within 360 days     GFR   Date Value Ref Range Status   09/24/2019 49.6 (L) >=60.0 mL/min/1.73m2 Final     Comment:     For  Americans, Multiply the GFR by 1.21  Result based on MDRD calculation.  Below shows the five stages of CKD and GFR for each stage:  Stage 1 ----with normal or high GFR (GFR > 90 mL/min)  Stage 2 ----Mild CKD (GFR = 60-89 mL/min)  Stage 3A----Moderate CKD (GFR = 45-59 mL/min)  Stage 3B----Moderate CKD (GFR = 30-44 mL/min)  Stage 4 ----Severe CKD (GFR = 15-29 mL/min)  Stage 5 ----End Stage CKD (GFR <15 mL/min)       eGFR if non    Date Value Ref Range Status   07/17/2020 34.8 (A) >60 mL/min/1.73 m^2 Final     Comment:     Calculation used to obtain the estimated glomerular filtration  rate (eGFR) is the CKD-EPI equation.      07/09/2020 32.2 (A) >60 mL/min/1.73 m^2 Final     Comment:     Calculation used to obtain the estimated glomerular filtration  rate (eGFR) is the CKD-EPI equation.      07/08/2020 50.6 (A) >60 mL/min/1.73 m^2 Final     Comment:     Calculation used to obtain the estimated glomerular filtration  rate (eGFR) is the CKD-EPI equation.        eGFR if    Date Value Ref Range Status   07/17/2020 40.1 (A) >60 mL/min/1.73 m^2 Final   07/09/2020 37.1 (A) >60 mL/min/1.73 m^2 Final   07/08/2020 58.4 (A) >60 mL/min/1.73 m^2 Final                  Appointments  past 12m or future 3m with PCP    Date Provider   Last Visit   10/3/2019 Sanket Toure MD   Next Visit   10/1/2020 Sanket Toure MD   ED visits in past 90 days: [unfilled]     Note composed:7:45 PM 09/10/2020

## 2020-09-17 ENCOUNTER — PATIENT OUTREACH (OUTPATIENT)
Dept: ADMINISTRATIVE | Facility: HOSPITAL | Age: 70
End: 2020-09-17

## 2020-09-17 DIAGNOSIS — Z78.0 ASYMPTOMATIC MENOPAUSAL STATE: Primary | ICD-10-CM

## 2020-09-17 LAB
ALBUMIN SERPL-MCNC: 3.9 G/DL (ref 3.6–5.1)
ALBUMIN/GLOB SERPL: 1.6 (CALC) (ref 1–2.5)
ALP SERPL-CCNC: 62 U/L (ref 37–153)
ALT SERPL-CCNC: 16 U/L (ref 6–29)
AST SERPL-CCNC: 18 U/L (ref 10–35)
BASOPHILS # BLD AUTO: 21 CELLS/UL (ref 0–200)
BASOPHILS NFR BLD AUTO: 0.5 %
BILIRUB SERPL-MCNC: 0.6 MG/DL (ref 0.2–1.2)
BUN SERPL-MCNC: 17 MG/DL (ref 7–25)
BUN/CREAT SERPL: 14 (CALC) (ref 6–22)
CALCIUM SERPL-MCNC: 9.2 MG/DL (ref 8.6–10.4)
CHLORIDE SERPL-SCNC: 109 MMOL/L (ref 98–110)
CO2 SERPL-SCNC: 27 MMOL/L (ref 20–32)
CREAT SERPL-MCNC: 1.18 MG/DL (ref 0.6–0.93)
EOSINOPHIL # BLD AUTO: 103 CELLS/UL (ref 15–500)
EOSINOPHIL NFR BLD AUTO: 2.5 %
ERYTHROCYTE [DISTWIDTH] IN BLOOD BY AUTOMATED COUNT: 14.3 % (ref 11–15)
GFRSERPLBLD MDRD-ARVRAT: 46 ML/MIN/1.73M2
GLOBULIN SER CALC-MCNC: 2.5 G/DL (CALC) (ref 1.9–3.7)
GLUCOSE SERPL-MCNC: 109 MG/DL (ref 65–99)
HCT VFR BLD AUTO: 37.6 % (ref 35–45)
HGB BLD-MCNC: 12 G/DL (ref 11.7–15.5)
LYMPHOCYTES # BLD AUTO: 1472 CELLS/UL (ref 850–3900)
LYMPHOCYTES NFR BLD AUTO: 35.9 %
MCH RBC QN AUTO: 27.4 PG (ref 27–33)
MCHC RBC AUTO-ENTMCNC: 31.9 G/DL (ref 32–36)
MCV RBC AUTO: 85.8 FL (ref 80–100)
MONOCYTES # BLD AUTO: 533 CELLS/UL (ref 200–950)
MONOCYTES NFR BLD AUTO: 13 %
NEUTROPHILS # BLD AUTO: 1972 CELLS/UL (ref 1500–7800)
NEUTROPHILS NFR BLD AUTO: 48.1 %
PLATELET # BLD AUTO: 191 THOUSAND/UL (ref 140–400)
PMV BLD REES-ECKER: 11.5 FL (ref 7.5–12.5)
POTASSIUM SERPL-SCNC: 4.2 MMOL/L (ref 3.5–5.3)
PROT SERPL-MCNC: 6.4 G/DL (ref 6.1–8.1)
RBC # BLD AUTO: 4.38 MILLION/UL (ref 3.8–5.1)
SODIUM SERPL-SCNC: 144 MMOL/L (ref 135–146)
WBC # BLD AUTO: 4.1 THOUSAND/UL (ref 3.8–10.8)

## 2020-09-17 NOTE — PROGRESS NOTES
Chart review completed 2020.  Care Everywhere updates requested and reviewed.  Immunizations reconciled. Media reports reviewed.  Duplicate HM overrides and  orders removed.  Overdue HM topic chart audit and/or requested.  Overdue lab testing linked to upcoming lab appointments if applies.    Lab windy, and quest reviewed   Pap Smear and Lab testing     DIS reviewed, no results  WOG orders placed for upcoming DEXA      Health Maintenance Due   Topic Date Due    Shingles Vaccine (1 of 2) 1999    TETANUS VACCINE  2019    Influenza Vaccine (1) 2020

## 2020-09-17 NOTE — TELEPHONE ENCOUNTER
No new care gaps identified.  Powered by [id number]. Reference number: [id number]. 9/17/2020 2:08:26 PM CDT

## 2020-09-18 RX ORDER — TRAZODONE HYDROCHLORIDE 100 MG/1
TABLET ORAL
Qty: 90 TABLET | Refills: 0 | Status: SHIPPED | OUTPATIENT
Start: 2020-09-18 | End: 2020-10-01 | Stop reason: SDUPTHER

## 2020-09-18 NOTE — PROGRESS NOTES
Refill Routing Note   Medication(s) are not appropriate for processing by [facility]:       - Patient has been hospitalized since the last PCP visit  - Unclear Indication for Medication        Medication Therapy Plan: CDMR; FOVS; HOSPITALIZATION(7/8/20-CHEST PAIN); WILL NOT SCHEDULE A HOSPITAL F/U PT. HAS A FOVS(10/1/20); UNCLEAR INDICATION(INSOMNIA OR ANXIETY), DEFER TO YOU  Medication reconciliation completed: No      Automatic Epic Generated Protocol Data:    Orders Placed This Encounter    traZODone (DESYREL) 100 MG tablet      Requested Prescriptions   Signed Prescriptions Disp Refills    traZODone (DESYREL) 100 MG tablet 90 tablet 0     Sig: TAKE 1 TABLET EVERY EVENING.       Psychiatry: Antidepressants - Serotonin Modulator Passed - 9/18/2020  6:54 AM        Passed - Patient is at least 18 years old        Passed - Office visit in past 6 months or future 90 days.     Recent Outpatient Visits            2 months ago Acute non-recurrent frontal sinusitis    [facility] Linda Christian DNP    2 months ago Hospital discharge follow-up    [facility] Mane George PA-C    8 months ago Encounter for preventive health examination    [facility] - Home Visits Mona Beckman NP    11 months ago Sickle-cell trait    [facility] - Hematology Oncology Denilson Salgado MD    11 months ago Encounter for preventive health examination    [facility] Sanket Toure MD          Future Appointments              In 1 week [facility]    In 2 weeks Koteswara R. Pothineni, MD [facility]    In 2 weeks Denilson Salgado MD [facility] - Hematology Oncology, [facility] Sanket                      Appointments  past 12m or future 3m with PCP    Date Provider   Last Visit   10/3/2019 Sanket Toure MD   Next Visit   10/1/2020 Sanket Toure MD   ED visits in past 90 days: 0     Note composed:6:52  AM 09/18/2020

## 2020-10-01 ENCOUNTER — HOSPITAL ENCOUNTER (OUTPATIENT)
Dept: RADIOLOGY | Facility: CLINIC | Age: 70
Discharge: HOME | End: 2020-10-01
Attending: FAMILY MEDICINE
Payer: MEDICARE

## 2020-10-01 ENCOUNTER — OFFICE VISIT (OUTPATIENT)
Dept: FAMILY MEDICINE | Facility: CLINIC | Age: 70
End: 2020-10-01
Attending: FAMILY MEDICINE
Payer: MEDICARE

## 2020-10-01 VITALS
WEIGHT: 153.44 LBS | SYSTOLIC BLOOD PRESSURE: 134 MMHG | BODY MASS INDEX: 24.66 KG/M2 | DIASTOLIC BLOOD PRESSURE: 76 MMHG | HEIGHT: 66 IN | HEART RATE: 61 BPM | OXYGEN SATURATION: 98 % | TEMPERATURE: 97 F

## 2020-10-01 DIAGNOSIS — A60.9 HSV (HERPES SIMPLEX VIRUS) ANOGENITAL INFECTION: ICD-10-CM

## 2020-10-01 DIAGNOSIS — M79.7 FIBROMYALGIA: ICD-10-CM

## 2020-10-01 DIAGNOSIS — I10 HYPERTENSION, ESSENTIAL: ICD-10-CM

## 2020-10-01 DIAGNOSIS — Z00.00 ENCOUNTER FOR PREVENTIVE HEALTH EXAMINATION: Primary | ICD-10-CM

## 2020-10-01 DIAGNOSIS — E78.5 HYPERLIPIDEMIA, UNSPECIFIED HYPERLIPIDEMIA TYPE: ICD-10-CM

## 2020-10-01 DIAGNOSIS — R76.8 RHEUMATOID FACTOR POSITIVE: ICD-10-CM

## 2020-10-01 DIAGNOSIS — D57.3 SICKLE-CELL TRAIT: ICD-10-CM

## 2020-10-01 DIAGNOSIS — I10 ESSENTIAL HYPERTENSION: ICD-10-CM

## 2020-10-01 DIAGNOSIS — Z78.0 ASYMPTOMATIC MENOPAUSAL STATE: ICD-10-CM

## 2020-10-01 DIAGNOSIS — R73.03 PREDIABETES: ICD-10-CM

## 2020-10-01 DIAGNOSIS — D72.819 LEUKOPENIA, UNSPECIFIED TYPE: ICD-10-CM

## 2020-10-01 DIAGNOSIS — D63.8 CHRONIC DISEASE ANEMIA: ICD-10-CM

## 2020-10-01 PROCEDURE — 90662 IIV NO PRSV INCREASED AG IM: CPT | Mod: HCNC,S$GLB,, | Performed by: FAMILY MEDICINE

## 2020-10-01 PROCEDURE — 3078F DIAST BP <80 MM HG: CPT | Mod: HCNC,CPTII,S$GLB, | Performed by: FAMILY MEDICINE

## 2020-10-01 PROCEDURE — G0008 ADMIN INFLUENZA VIRUS VAC: HCPCS | Mod: HCNC,S$GLB,, | Performed by: FAMILY MEDICINE

## 2020-10-01 PROCEDURE — 99397 PR PREVENTIVE VISIT,EST,65 & OVER: ICD-10-PCS | Mod: 25,HCNC,S$GLB, | Performed by: FAMILY MEDICINE

## 2020-10-01 PROCEDURE — 99397 PER PM REEVAL EST PAT 65+ YR: CPT | Mod: 25,HCNC,S$GLB, | Performed by: FAMILY MEDICINE

## 2020-10-01 PROCEDURE — 99499 UNLISTED E&M SERVICE: CPT | Mod: HCNC,S$GLB,, | Performed by: FAMILY MEDICINE

## 2020-10-01 PROCEDURE — 99499 RISK ADDL DX/OHS AUDIT: ICD-10-PCS | Mod: HCNC,S$GLB,, | Performed by: FAMILY MEDICINE

## 2020-10-01 PROCEDURE — 3075F SYST BP GE 130 - 139MM HG: CPT | Mod: HCNC,CPTII,S$GLB, | Performed by: FAMILY MEDICINE

## 2020-10-01 PROCEDURE — 77080 DXA BONE DENSITY AXIAL: CPT | Mod: 26,HCNC,, | Performed by: RADIOLOGY

## 2020-10-01 PROCEDURE — 77080 DXA BONE DENSITY AXIAL: CPT | Mod: TC,HCNC,PO

## 2020-10-01 PROCEDURE — G0008 FLU VACCINE - HIGH DOSE (65+) PRESERVATIVE FREE IM: ICD-10-PCS | Mod: HCNC,S$GLB,, | Performed by: FAMILY MEDICINE

## 2020-10-01 PROCEDURE — 90662 FLU VACCINE - HIGH DOSE (65+) PRESERVATIVE FREE IM: ICD-10-PCS | Mod: HCNC,S$GLB,, | Performed by: FAMILY MEDICINE

## 2020-10-01 PROCEDURE — 3075F PR MOST RECENT SYSTOLIC BLOOD PRESS GE 130-139MM HG: ICD-10-PCS | Mod: HCNC,CPTII,S$GLB, | Performed by: FAMILY MEDICINE

## 2020-10-01 PROCEDURE — 3078F PR MOST RECENT DIASTOLIC BLOOD PRESSURE < 80 MM HG: ICD-10-PCS | Mod: HCNC,CPTII,S$GLB, | Performed by: FAMILY MEDICINE

## 2020-10-01 PROCEDURE — 77080 DEXA BONE DENSITY SPINE HIP: ICD-10-PCS | Mod: 26,HCNC,, | Performed by: RADIOLOGY

## 2020-10-01 PROCEDURE — 99999 PR PBB SHADOW E&M-EST. PATIENT-LVL V: CPT | Mod: PBBFAC,HCNC,, | Performed by: FAMILY MEDICINE

## 2020-10-01 PROCEDURE — 99999 PR PBB SHADOW E&M-EST. PATIENT-LVL V: ICD-10-PCS | Mod: PBBFAC,HCNC,, | Performed by: FAMILY MEDICINE

## 2020-10-01 RX ORDER — TRAZODONE HYDROCHLORIDE 100 MG/1
100 TABLET ORAL NIGHTLY
Qty: 90 TABLET | Refills: 1 | Status: SHIPPED | OUTPATIENT
Start: 2020-10-01 | End: 2021-08-16

## 2020-10-01 RX ORDER — CARVEDILOL 12.5 MG/1
25 TABLET ORAL 2 TIMES DAILY WITH MEALS
Qty: 180 TABLET | Refills: 3 | Status: SHIPPED | OUTPATIENT
Start: 2020-10-01 | End: 2021-04-07

## 2020-10-01 RX ORDER — LISINOPRIL 40 MG/1
40 TABLET ORAL DAILY
Qty: 90 TABLET | Refills: 3 | Status: SHIPPED | OUTPATIENT
Start: 2020-10-01 | End: 2021-10-04 | Stop reason: SDUPTHER

## 2020-10-01 RX ORDER — VALACYCLOVIR HYDROCHLORIDE 500 MG/1
TABLET, FILM COATED ORAL
Qty: 60 TABLET | Refills: 3 | Status: SHIPPED | OUTPATIENT
Start: 2020-10-01 | End: 2022-03-07 | Stop reason: SDUPTHER

## 2020-10-01 NOTE — PROGRESS NOTES
Subjective:       Patient ID: Loren Andre is a 70 y.o. female.    Chief Complaint: Annual Exam    70-year-old female comes in for an annual exam.  She was in the hospital briefly in July with chest pains and had a cardiac evaluation resulting in an acute kidney injury secondary to contrast dye.  On a follow-up visit creatinine had improved but not back to normal and she is concerned and once this rechecked again.  It is noted that she had an elevated A1c in the prediabetic range at that same time and I would like to re-evaluate that.  She has no symptoms of diabetes, no polyuria, polydipsia, or slow healing.  She states she feels well.  She has a history of sickle cell trait, leukopenia, anemia of chronic disease, asthma, hypertension, hyperlipidemia, pre diabetes and fibromyalgia followed by Dr. Branham.  She is noted to have had a mildly positive rheumatoid factor with a negative CCP and no signs of inflammation probably a false-positive rheumatoid factor.  She states the fibromyalgia is under fairly good control but she does occasionally use an Advil.  We discussed the problems of anti inflammatories and chronic kidney disease and she was instructed to avoid anti inflammatories as much as possible.  For localized pain she might try the Voltaren gel which would be safer than an oral agent.    Past Medical History:  No date: Allergy  No date: Anemia      Comment:  sickle cell trait  No date: Anxiety  No date: Arthritis  No date: Asthma  10/5/2017: Chronic disease anemia  No date: Colon polyps  No date: Depression  3/7/2013: Dermatitis  No date: Fibromyalgia  No date: HEARING LOSS  No date: Hyperlipidemia  No date: Hypertension  10/5/2017: Leucopenia  No date: Polyneuropathy  No date: Scoliosis  No date: Sickle cell trait  10/5/2017: Sickle-cell trait  No date: Trouble in sleeping  No date: Urticaria    Past Surgical History:  No date: ADENOIDECTOMY  No date: APPENDECTOMY  4/14/2008: COLONOSCOPY      Comment:    Yolanda, 10 year recheck  10/4/2018: COLONOSCOPY; N/A      Comment:  Procedure: COLONOSCOPY;  Surgeon: Tam Kemp MD;                Location: [facility];  Service: Endoscopy;  Laterality:                N/A;  No date: HYSTERECTOMY  No date: OOPHORECTOMY  01/2016: ROTATOR CUFF REPAIR; Left  12/04/2017: ROTATOR CUFF REPAIR W/ DISTAL CLAVICLE EXCISION; Right      Comment:  Dr. Eligio Timmons ( [facility] )  No date: TONSILLECTOMY    Review of patient's family history indicates:  Problem: Multiple sclerosis      Relation: Mother          Age of Onset: (Not Specified)  Problem: Seizures      Relation: Mother          Age of Onset: (Not Specified)  Problem: Cancer      Relation: Father          Age of Onset: (Not Specified)          Comment: lung  Problem: Alcohol abuse      Relation: Brother          Age of Onset: (Not Specified)  Problem: Early death      Relation: Brother          Age of Onset: (Not Specified)  Problem: Diabetes      Relation: Maternal Aunt          Age of Onset: (Not Specified)  Problem: Diabetes      Relation: Maternal Uncle          Age of Onset: (Not Specified)  Problem: Diabetes      Relation: Maternal Grandmother          Age of Onset: (Not Specified)  Problem: Mental illness      Relation: Maternal Grandfather          Age of Onset: (Not Specified)  Problem: Alzheimer's disease      Relation: Maternal Grandfather          Age of Onset: (Not Specified)  Problem: Asthma      Relation: Paternal Aunt          Age of Onset: (Not Specified)  Problem: Sickle cell anemia      Relation: Paternal Aunt          Age of Onset: (Not Specified)  Problem: Diabetes      Relation: Sister          Age of Onset: (Not Specified)  Problem: Arthritis      Relation: Daughter          Age of Onset: (Not Specified)  Problem: Miscarriages / Stillbirths      Relation: Daughter          Age of Onset: (Not Specified)  Problem: Anemia      Relation: Daughter          Age of Onset: (Not Specified)  Problem:  Hyperlipidemia      Relation: Son          Age of Onset: (Not Specified)  Problem: Hypertension      Relation: Son          Age of Onset: (Not Specified)  Problem: Allergies      Relation: Son          Age of Onset: (Not Specified)  Problem: Sickle cell anemia      Relation: Paternal Uncle          Age of Onset: (Not Specified)  Problem: Asthma      Relation: Brother          Age of Onset: (Not Specified)  Problem: No Known Problems      Relation: Sister          Age of Onset: (Not Specified)  Problem: No Known Problems      Relation: Sister          Age of Onset: (Not Specified)  Problem: No Known Problems      Relation: Sister          Age of Onset: (Not Specified)  Problem: No Known Problems      Relation: Brother          Age of Onset: (Not Specified)  Problem: Miscarriages / Stillbirths      Relation: Daughter          Age of Onset: (Not Specified)  Problem: Thyroid disease      Relation: Daughter          Age of Onset: (Not Specified)  Problem: No Known Problems      Relation: Daughter          Age of Onset: (Not Specified)  Problem: Angioedema      Relation: Neg Hx          Age of Onset: (Not Specified)  Problem: Eczema      Relation: Neg Hx          Age of Onset: (Not Specified)  Problem: Immunodeficiency      Relation: Neg Hx          Age of Onset: (Not Specified)  Problem: Rheum arthritis      Relation: Neg Hx          Age of Onset: (Not Specified)  Problem: Psoriasis      Relation: Neg Hx          Age of Onset: (Not Specified)  Problem: Lupus      Relation: Neg Hx          Age of Onset: (Not Specified)    Social History    Tobacco Use      Smoking status: Never Smoker      Smokeless tobacco: Never Used    Alcohol use: Yes      Comment: occasionally - wine    Drug use: No    Current Outpatient Medications on File Prior to Visit:  ascorbic acid, vitamin C, (VITAMIN C) 500 MG tablet, Take 500 mg by mouth daily as needed. , Disp: , Rfl:   aspirin 81 MG Chew, Take 1 tablet (81 mg total) by mouth once daily.,  Disp: , Rfl: 0  atorvastatin (LIPITOR) 40 MG tablet, TAKE 1 TABLET EVERY DAY, Disp: 90 tablet, Rfl: 2  azelastine (ASTELIN) 137 mcg (0.1 %) nasal spray, 1 spray (137 mcg total) by Nasal route 2 (two) times daily., Disp: 30 mL, Rfl: 0  betamethasone dipropionate (DIPROLENE) 0.05 % cream, Apply topically 2 (two) times daily., Disp: 15 g, Rfl: 2  CALCIUM CARBONATE/VITAMIN D3 (VITAMIN D-3 ORAL), Take 100 Int'l Units by mouth once daily. , Disp: , Rfl:   coenzyme Q10 (CO Q-10) 10 mg capsule, Take 10 mg by mouth once daily., Disp: , Rfl:   folic acid (FOLVITE) 400 MCG tablet, Take 400 mcg by mouth once daily., Disp: , Rfl:   GARLIC OIL ORAL, Take 1 capsule by mouth once daily. , Disp: , Rfl:   montelukast (SINGULAIR) 10 mg tablet, Take 1 tablet (10 mg total) by mouth once daily., Disp: 90 tablet, Rfl: 3  nitroGLYCERIN (NITROSTAT) 0.4 MG SL tablet, Place 1 tablet (0.4 mg total) under the tongue every 5 (five) minutes as needed for Chest pain., Disp: 90 tablet, Rfl: 0  vitamin E 100 UNIT capsule, Take 100 Units by mouth once daily., Disp: , Rfl:   (DISCONTINUED) carvediloL (COREG) 12.5 MG tablet, Take 2 tablets (25 mg total) by mouth 2 (two) times daily with meals., Disp: 180 tablet, Rfl: 3  (DISCONTINUED) lisinopriL (PRINIVIL,ZESTRIL) 40 MG tablet, Take 1 tablet (40 mg total) by mouth once daily., Disp: 90 tablet, Rfl: 0  (DISCONTINUED) traZODone (DESYREL) 100 MG tablet, TAKE 1 TABLET EVERY EVENING., Disp: 90 tablet, Rfl: 0  (DISCONTINUED) valACYclovir (VALTREX) 500 MG tablet, TAKE 1 TABLET (500 MG TOTAL) BY MOUTH 2 (TWO) TIMES DAILY FOR 7 DAYS AS DIRECTED.  (Patient taking differently: Take 500 mg by mouth as needed. ), Disp: 60 tablet, Rfl: 3  (DISCONTINUED) cetirizine (ZYRTEC) 10 MG tablet, Take 10 mg by mouth once daily., Disp: , Rfl:   (DISCONTINUED) hydrALAZINE (APRESOLINE) 50 MG tablet, Take 1 tablet (50 mg total) by mouth every 8 (eight) hours., Disp: 90 tablet, Rfl: 3    No current facility-administered  medications on file prior to visit.     Shingles Vaccine(1 of 2) due on 03/03/1999  TETANUS VACCINE due on 11/05/2019  Influenza Vaccine(1) due on 08/01/2020  DEXA SCAN due on 10/26/2020      Review of Systems   Constitutional: Negative for chills, diaphoresis, fatigue, fever and unexpected weight change.   HENT: Negative for congestion, ear pain, hearing loss, postnasal drip and sinus pressure.    Eyes: Negative for itching and visual disturbance.   Respiratory: Negative for cough, chest tightness, shortness of breath and wheezing.    Cardiovascular: Negative for chest pain, palpitations and leg swelling.   Gastrointestinal: Negative for abdominal pain, blood in stool, constipation, diarrhea, nausea and vomiting.   Genitourinary: Negative for dysuria, frequency and hematuria.   Musculoskeletal: Negative for arthralgias, back pain, joint swelling and myalgias (Occasional flare up but asymptomatic now).   Skin: Negative for color change and rash.   Neurological: Negative for dizziness and headaches.   Hematological: Negative for adenopathy.   Psychiatric/Behavioral: Negative for sleep disturbance. The patient is not nervous/anxious.        Objective:      Physical Exam  Vitals signs and nursing note reviewed.   Constitutional:       General: She is not in acute distress.     Appearance: Normal appearance. She is well-developed and normal weight. She is not ill-appearing, toxic-appearing or diaphoretic.      Comments: Fair blood pressure control  Normal weight with a BMI of 24.8 she is up 2.4 lb from her last visit with me October 3, 2019   HENT:      Head: Normocephalic and atraumatic.      Right Ear: Tympanic membrane, ear canal and external ear normal. There is no impacted cerumen.      Left Ear: Tympanic membrane, ear canal and external ear normal. There is no impacted cerumen.      Nose: Nose normal. No congestion or rhinorrhea.      Mouth/Throat:      Mouth: Mucous membranes are moist.      Pharynx: Oropharynx  is clear. No oropharyngeal exudate or posterior oropharyngeal erythema.   Eyes:      General: No scleral icterus.        Right eye: No discharge.         Left eye: No discharge.      Extraocular Movements: Extraocular movements intact.      Conjunctiva/sclera: Conjunctivae normal.      Pupils: Pupils are equal, round, and reactive to light.   Neck:      Musculoskeletal: Normal range of motion and neck supple. No neck rigidity or muscular tenderness.      Thyroid: No thyromegaly.      Vascular: No carotid bruit or JVD.   Cardiovascular:      Rate and Rhythm: Normal rate and regular rhythm.      Pulses: Normal pulses.      Heart sounds: Normal heart sounds. No murmur. No friction rub. No gallop.    Pulmonary:      Effort: Pulmonary effort is normal. No respiratory distress.      Breath sounds: Normal breath sounds. No stridor. No wheezing, rhonchi or rales.   Chest:      Chest wall: No tenderness.   Abdominal:      General: Bowel sounds are normal. There is no distension.      Palpations: Abdomen is soft. There is no mass.      Tenderness: There is no abdominal tenderness. There is no guarding or rebound.      Hernia: No hernia is present.   Musculoskeletal: Normal range of motion.         General: No swelling, tenderness, deformity or signs of injury.      Right lower leg: No edema.      Left lower leg: No edema.   Lymphadenopathy:      Cervical: No cervical adenopathy.   Skin:     General: Skin is warm and dry.      Coloration: Skin is not jaundiced or pale.      Findings: No bruising, erythema, lesion or rash.   Neurological:      General: No focal deficit present.      Mental Status: She is alert and oriented to person, place, and time.      Cranial Nerves: No cranial nerve deficit.      Sensory: No sensory deficit.      Motor: No weakness.      Coordination: Coordination normal.      Deep Tendon Reflexes: Reflexes are normal and symmetric. Reflexes normal.   Psychiatric:         Mood and Affect: Mood normal.          Behavior: Behavior normal.         Thought Content: Thought content normal.         Judgment: Judgment normal.         Assessment:       1. Encounter for preventive health examination    2. Essential hypertension    3. Hyperlipidemia, unspecified hyperlipidemia type    4. Rheumatoid factor positive    5. Sickle-cell trait    6. Leukopenia, unspecified type    7. Chronic disease anemia    8. Fibromyalgia    9. Prediabetes    10. Hypertension, essential    11. HSV (herpes simplex virus) anogenital infection    12. BMI 24.0-24.9, adult        Plan:       1. Encounter for preventive health examination  - Comprehensive metabolic panel; Future  - Lipid Panel; Future  - CBC auto differential; Future    2. Essential hypertension  Fair control, need to monitor  - Comprehensive metabolic panel; Future  - Lipid Panel; Future  - CBC auto differential; Future    3. Hyperlipidemia, unspecified hyperlipidemia type  Await lab results  - Comprehensive metabolic panel; Future  - Lipid Panel; Future    4. Rheumatoid factor positive  Believed to be a false-positive with a negative CCP, no signs of inflammation.  Rheumatology did not call it rheumatoid arthritis    5. Sickle-cell trait  Await lab  - CBC auto differential; Future    6. Leukopenia, unspecified type  Await lab results  - CBC auto differential; Future    7. Chronic disease anemia  Await lab results  - CBC auto differential; Future    8. Fibromyalgia  Asymptomatic currently    9. Prediabetes  Await lab results  - Hemoglobin A1C; Future    10. Hypertension, essential  Refill sent to pharmacy  - lisinopriL (PRINIVIL,ZESTRIL) 40 MG tablet; Take 1 tablet (40 mg total) by mouth once daily.  Dispense: 90 tablet; Refill: 3  - carvediloL (COREG) 12.5 MG tablet; Take 2 tablets (25 mg total) by mouth 2 (two) times daily with meals.  Dispense: 180 tablet; Refill: 3    11. HSV (herpes simplex virus) anogenital infection  Refill sent to pharmacy  - valACYclovir (VALTREX) 500 MG  tablet; TAKE 1 TABLET (500 MG TOTAL) BY MOUTH 2 (TWO) TIMES DAILY FOR 7 DAYS AS DIRECTED.  Dispense: 60 tablet; Refill: 3    12. BMI 24.0-24.9, adult  Good weight with no changes needed

## 2020-10-06 ENCOUNTER — TELEPHONE (OUTPATIENT)
Dept: CARDIOLOGY | Facility: CLINIC | Age: 70
End: 2020-10-06

## 2020-10-06 NOTE — PROGRESS NOTES
[facility] Hematology/Oncology  PROGRESS NOTE      Subjective:       Patient ID:   NAME: Loren Andre : 1950     70 y.o. female    Referring Doc: ALEX Alanis  Other Physicians: Shanelle Pena Dibuono    Chief Complaint:  Anemia/leucopenia f/u    History of Present Illness:     Patient returns today for a regularly scheduled follow-up visit.  She is otherwise doing ok. No CP, SOB,HA's or N/V. No recent rectal bleeding. She is here by herself. BP has been under control but she was hospitalized in 2020 with HTN. She saw Dr Toure recently.    Discussed covid19 precautions              ROS:   GEN: normal without any fever, night sweats or weight loss  HEENT: normal with no HA's, sore throat, stiff neck, changes in vision  CV: normal with no CP, SOB, PND, NAJERA or orthopnea  PULM: normal with no SOB, cough, hemoptysis, sputum or pleuritic pain  GI: normal with no abdominal pain, nausea, vomiting, constipation, diarrhea, melanotic stools, BRBPR, or hematemesis  : normal with no hematuria, dysuria  BREAST: normal with no mass, discharge, pain  SKIN: normal with no rash, erythema, bruising, or swelling    Allergies:  Review of patient's allergies indicates:   Allergen Reactions    Doxepin Anaphylaxis     abd pain    Penicillins Rash    Sulfa (sulfonamide antibiotics) Rash    Duloxetine      Other reaction(s): insomnia    Gabapentin Nausea Only    Levofloxacin      Other reaction(s): Headache    Milnacipran      Other reaction(s): stomach pain    Nitrofurantoin monohyd/m-cryst      Other reaction(s): Itching    Prednisone      Other reaction(s): Itching    Pseudoephedrine-guaifenesin      Other reaction(s): Stomach upset    Terbinafine      Other reaction(s): rash    Trazodone      Other reaction(s): heart racing       Medications:    Current Outpatient Medications:     ascorbic acid, vitamin C, (VITAMIN C) 500 MG tablet, Take 500 mg by mouth daily as needed. , Disp: , Rfl:     aspirin 81 MG  Chew, Take 1 tablet (81 mg total) by mouth once daily., Disp: , Rfl: 0    atorvastatin (LIPITOR) 40 MG tablet, TAKE 1 TABLET EVERY DAY, Disp: 90 tablet, Rfl: 2    azelastine (ASTELIN) 137 mcg (0.1 %) nasal spray, 1 spray (137 mcg total) by Nasal route 2 (two) times daily., Disp: 30 mL, Rfl: 0    betamethasone dipropionate (DIPROLENE) 0.05 % cream, Apply topically 2 (two) times daily., Disp: 15 g, Rfl: 2    CALCIUM CARBONATE/VITAMIN D3 (VITAMIN D-3 ORAL), Take 100 Int'l Units by mouth once daily. , Disp: , Rfl:     carvediloL (COREG) 12.5 MG tablet, Take 2 tablets (25 mg total) by mouth 2 (two) times daily with meals., Disp: 180 tablet, Rfl: 3    coenzyme Q10 (CO Q-10) 10 mg capsule, Take 10 mg by mouth once daily., Disp: , Rfl:     folic acid (FOLVITE) 400 MCG tablet, Take 400 mcg by mouth once daily., Disp: , Rfl:     GARLIC OIL ORAL, Take 1 capsule by mouth once daily. , Disp: , Rfl:     lisinopriL (PRINIVIL,ZESTRIL) 40 MG tablet, Take 1 tablet (40 mg total) by mouth once daily., Disp: 90 tablet, Rfl: 3    montelukast (SINGULAIR) 10 mg tablet, Take 1 tablet (10 mg total) by mouth once daily., Disp: 90 tablet, Rfl: 3    nitroGLYCERIN (NITROSTAT) 0.4 MG SL tablet, Place 1 tablet (0.4 mg total) under the tongue every 5 (five) minutes as needed for Chest pain., Disp: 90 tablet, Rfl: 0    traZODone (DESYREL) 100 MG tablet, Take 1 tablet (100 mg total) by mouth every evening., Disp: 90 tablet, Rfl: 1    valACYclovir (VALTREX) 500 MG tablet, TAKE 1 TABLET (500 MG TOTAL) BY MOUTH 2 (TWO) TIMES DAILY FOR 7 DAYS AS DIRECTED., Disp: 60 tablet, Rfl: 3    vitamin E 100 UNIT capsule, Take 100 Units by mouth once daily., Disp: , Rfl:     PMHx/PSHx Updates:  See patient's last visit with me on 10/10/2019  See H&P on 12/1/2015        Pathology:  Cancer Staging  No matching staging information was found for the patient.          Objective:     Vitals:  Blood pressure 132/81, pulse 67, temperature 97.8 °F (36.6 °C),  resp. rate 18, weight 69.5 kg (153 lb 4.8 oz).    Physical Examination:   GEN: no apparent distress, comfortable; AAOx3  HEAD: atraumatic and normocephalic  EYES: no pallor, no icterus, PERRLA  ENT: OMM, no pharyngeal erythema, external ears WNL; no nasal discharge; no thrush  NECK: no masses, thyroid normal, trachea midline, no LAD/LN's, supple  CV: RRR with no murmur; normal pulse; normal S1 and S2; no pedal edema  CHEST: Normal respiratory effort; CTAB; normal breath sounds; no wheeze or crackles  ABDOM: nontender and nondistended; soft; normal bowel sounds; no rebound/guarding  MUSC/Skeletal: ROM normal; no crepitus; joints normal; no deformities or arthropathy  EXTREM: no clubbing, cyanosis, inflammation or swelling  SKIN: no rashes, lesions, ulcers, petechiae or subcutaneous nodules  : no [place]  NEURO: grossly intact; motor/sensory WNL; AAOx3; no tremors  PSYCH: normal mood, affect and behavior  LYMPH: normal cervical, supraclavicular, axillary and groin LN's            Labs:        Lab Results   Component Value Date    WBC 3.93 10/01/2020    HGB 12.9 10/01/2020    HCT 39.6 10/01/2020    MCV 84 10/01/2020     10/01/2020     BMP  Lab Results   Component Value Date     10/01/2020    K 4.4 10/01/2020     10/01/2020    CO2 26 10/01/2020    BUN 20 10/01/2020    CREATININE 1.2 10/01/2020    CALCIUM 9.5 10/01/2020    ANIONGAP 10 10/01/2020    ESTGFRAFRICA 52.5 (A) 10/01/2020    EGFRNONAA 45.6 (A) 10/01/2020     Lab Results   Component Value Date    ALT 22 10/01/2020    AST 25 10/01/2020    ALKPHOS 74 10/01/2020    BILITOT 0.7 10/01/2020         Radiology/Diagnostic Studies:    mammo  8/5/2019 negative    I have reviewed all available lab results and radiology reports.    Assessment/Plan:   (1) 70 y.o. 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 Chronic disease anemia     2. Leukopenia, unspecified type     3. Sickle-cell trait           PLAN:  1. Check labs again in 12 months or as per PCP's directives  2. RTC 12 months PRN  3. F/u with PCP, GI and rheum etc  4. Fax note to  Sanket Toure MD, CHARLEEN. Eloisa Alanis and Shanelle    Discussion:       COVID-19 Discussion:    I had long discussion with patient and any applicable family about the COVID-19 coronavirus epidemic and the recommended precautions with regard to cancer and/or hematology patients. I have re-iterated the CDC recommendations for adequate hand washing, use of hand -like products, and coughing into elbow, etc. In addition, especially for our patients who are on chemotherapy and/or our otherwise immunocompromised patients, I have recommended avoidance of crowds, including movie theaters, restaurants, churches, etc. I have recommended avoidance of any sick or symptomatic family members and/or friends. I have also recommended avoidance of any raw and unwashed food products, and general avoidance of food items that have not been prepared by themselves. The patient has been asked to call us immediately with any symptom developments, issues, questions or other general concerns.     I have explained all of the above in detail and the patient understands all of the current recommendation(s). I have answered all of their questions to the best of my ability and to their complete satisfaction.   The patient is to continue with the current management plan.            Electronically signed by Denilson Salgado MD

## 2020-10-08 ENCOUNTER — OFFICE VISIT (OUTPATIENT)
Dept: HEMATOLOGY/ONCOLOGY | Facility: CLINIC | Age: 70
End: 2020-10-08
Payer: MEDICARE

## 2020-10-08 VITALS
DIASTOLIC BLOOD PRESSURE: 81 MMHG | HEART RATE: 67 BPM | WEIGHT: 153.31 LBS | SYSTOLIC BLOOD PRESSURE: 132 MMHG | BODY MASS INDEX: 24.74 KG/M2 | RESPIRATION RATE: 18 BRPM | TEMPERATURE: 98 F

## 2020-10-08 DIAGNOSIS — D63.8 CHRONIC DISEASE ANEMIA: Primary | ICD-10-CM

## 2020-10-08 DIAGNOSIS — D57.3 SICKLE-CELL TRAIT: ICD-10-CM

## 2020-10-08 DIAGNOSIS — D72.819 LEUKOPENIA, UNSPECIFIED TYPE: ICD-10-CM

## 2020-10-08 PROCEDURE — 3079F DIAST BP 80-89 MM HG: CPT | Mod: S$GLB,,, | Performed by: INTERNAL MEDICINE

## 2020-10-08 PROCEDURE — 3008F PR BODY MASS INDEX (BMI) DOCUMENTED: ICD-10-PCS | Mod: S$GLB,,, | Performed by: INTERNAL MEDICINE

## 2020-10-08 PROCEDURE — 3075F SYST BP GE 130 - 139MM HG: CPT | Mod: S$GLB,,, | Performed by: INTERNAL MEDICINE

## 2020-10-08 PROCEDURE — 3079F PR MOST RECENT DIASTOLIC BLOOD PRESSURE 80-89 MM HG: ICD-10-PCS | Mod: S$GLB,,, | Performed by: INTERNAL MEDICINE

## 2020-10-08 PROCEDURE — 1126F PR PAIN SEVERITY QUANTIFIED, NO PAIN PRESENT: ICD-10-PCS | Mod: S$GLB,,, | Performed by: INTERNAL MEDICINE

## 2020-10-08 PROCEDURE — 99213 PR OFFICE/OUTPT VISIT, EST, LEVL III, 20-29 MIN: ICD-10-PCS | Mod: S$GLB,,, | Performed by: INTERNAL MEDICINE

## 2020-10-08 PROCEDURE — 1159F PR MEDICATION LIST DOCUMENTED IN MEDICAL RECORD: ICD-10-PCS | Mod: S$GLB,,, | Performed by: INTERNAL MEDICINE

## 2020-10-08 PROCEDURE — 1159F MED LIST DOCD IN RCRD: CPT | Mod: S$GLB,,, | Performed by: INTERNAL MEDICINE

## 2020-10-08 PROCEDURE — 1126F AMNT PAIN NOTED NONE PRSNT: CPT | Mod: S$GLB,,, | Performed by: INTERNAL MEDICINE

## 2020-10-08 PROCEDURE — 99213 OFFICE O/P EST LOW 20 MIN: CPT | Mod: S$GLB,,, | Performed by: INTERNAL MEDICINE

## 2020-10-08 PROCEDURE — 1101F PT FALLS ASSESS-DOCD LE1/YR: CPT | Mod: S$GLB,,, | Performed by: INTERNAL MEDICINE

## 2020-10-08 PROCEDURE — 3075F PR MOST RECENT SYSTOLIC BLOOD PRESS GE 130-139MM HG: ICD-10-PCS | Mod: S$GLB,,, | Performed by: INTERNAL MEDICINE

## 2020-10-08 PROCEDURE — 1101F PR PT FALLS ASSESS DOC 0-1 FALLS W/OUT INJ PAST YR: ICD-10-PCS | Mod: S$GLB,,, | Performed by: INTERNAL MEDICINE

## 2020-10-08 PROCEDURE — 3008F BODY MASS INDEX DOCD: CPT | Mod: S$GLB,,, | Performed by: INTERNAL MEDICINE

## 2020-10-22 ENCOUNTER — OFFICE VISIT (OUTPATIENT)
Dept: FAMILY MEDICINE | Facility: CLINIC | Age: 70
End: 2020-10-22
Payer: MEDICARE

## 2020-10-22 VITALS
HEART RATE: 65 BPM | TEMPERATURE: 98 F | DIASTOLIC BLOOD PRESSURE: 76 MMHG | HEIGHT: 66 IN | OXYGEN SATURATION: 97 % | WEIGHT: 155.19 LBS | SYSTOLIC BLOOD PRESSURE: 130 MMHG | BODY MASS INDEX: 24.94 KG/M2

## 2020-10-22 DIAGNOSIS — J01.10 ACUTE NON-RECURRENT FRONTAL SINUSITIS: Primary | ICD-10-CM

## 2020-10-22 DIAGNOSIS — B34.9 ACUTE VIRAL SYNDROME: ICD-10-CM

## 2020-10-22 PROCEDURE — 3008F PR BODY MASS INDEX (BMI) DOCUMENTED: ICD-10-PCS | Mod: HCNC,CPTII,S$GLB, | Performed by: NURSE PRACTITIONER

## 2020-10-22 PROCEDURE — 1101F PR PT FALLS ASSESS DOC 0-1 FALLS W/OUT INJ PAST YR: ICD-10-PCS | Mod: HCNC,CPTII,S$GLB, | Performed by: NURSE PRACTITIONER

## 2020-10-22 PROCEDURE — 1125F AMNT PAIN NOTED PAIN PRSNT: CPT | Mod: HCNC,S$GLB,, | Performed by: NURSE PRACTITIONER

## 2020-10-22 PROCEDURE — 99213 PR OFFICE/OUTPT VISIT, EST, LEVL III, 20-29 MIN: ICD-10-PCS | Mod: HCNC,S$GLB,, | Performed by: NURSE PRACTITIONER

## 2020-10-22 PROCEDURE — 1159F PR MEDICATION LIST DOCUMENTED IN MEDICAL RECORD: ICD-10-PCS | Mod: HCNC,S$GLB,, | Performed by: NURSE PRACTITIONER

## 2020-10-22 PROCEDURE — 3075F PR MOST RECENT SYSTOLIC BLOOD PRESS GE 130-139MM HG: ICD-10-PCS | Mod: HCNC,CPTII,S$GLB, | Performed by: NURSE PRACTITIONER

## 2020-10-22 PROCEDURE — 3008F BODY MASS INDEX DOCD: CPT | Mod: HCNC,CPTII,S$GLB, | Performed by: NURSE PRACTITIONER

## 2020-10-22 PROCEDURE — 99213 OFFICE O/P EST LOW 20 MIN: CPT | Mod: HCNC,S$GLB,, | Performed by: NURSE PRACTITIONER

## 2020-10-22 PROCEDURE — 1159F MED LIST DOCD IN RCRD: CPT | Mod: HCNC,S$GLB,, | Performed by: NURSE PRACTITIONER

## 2020-10-22 PROCEDURE — 1101F PT FALLS ASSESS-DOCD LE1/YR: CPT | Mod: HCNC,CPTII,S$GLB, | Performed by: NURSE PRACTITIONER

## 2020-10-22 PROCEDURE — 99999 PR PBB SHADOW E&M-EST. PATIENT-LVL V: CPT | Mod: PBBFAC,HCNC,, | Performed by: NURSE PRACTITIONER

## 2020-10-22 PROCEDURE — 1170F PR FUNCTIONAL STATUS ASSESSED: ICD-10-PCS | Mod: HCNC,S$GLB,, | Performed by: NURSE PRACTITIONER

## 2020-10-22 PROCEDURE — 1125F PR PAIN SEVERITY QUANTIFIED, PAIN PRESENT: ICD-10-PCS | Mod: HCNC,S$GLB,, | Performed by: NURSE PRACTITIONER

## 2020-10-22 PROCEDURE — 3078F PR MOST RECENT DIASTOLIC BLOOD PRESSURE < 80 MM HG: ICD-10-PCS | Mod: HCNC,CPTII,S$GLB, | Performed by: NURSE PRACTITIONER

## 2020-10-22 PROCEDURE — 1170F FXNL STATUS ASSESSED: CPT | Mod: HCNC,S$GLB,, | Performed by: NURSE PRACTITIONER

## 2020-10-22 PROCEDURE — 3078F DIAST BP <80 MM HG: CPT | Mod: HCNC,CPTII,S$GLB, | Performed by: NURSE PRACTITIONER

## 2020-10-22 PROCEDURE — 3075F SYST BP GE 130 - 139MM HG: CPT | Mod: HCNC,CPTII,S$GLB, | Performed by: NURSE PRACTITIONER

## 2020-10-22 PROCEDURE — 99999 PR PBB SHADOW E&M-EST. PATIENT-LVL V: ICD-10-PCS | Mod: PBBFAC,HCNC,, | Performed by: NURSE PRACTITIONER

## 2020-10-22 RX ORDER — FLUTICASONE PROPIONATE AND SALMETEROL 250; 50 UG/1; UG/1
1 POWDER RESPIRATORY (INHALATION) 2 TIMES DAILY
Qty: 60 EACH | Refills: 11 | Status: SHIPPED | OUTPATIENT
Start: 2020-10-22 | End: 2021-10-04

## 2020-10-22 RX ORDER — AZITHROMYCIN 250 MG/1
TABLET, FILM COATED ORAL
Qty: 6 TABLET | Refills: 0 | Status: SHIPPED | OUTPATIENT
Start: 2020-10-22 | End: 2020-10-27

## 2020-10-22 NOTE — PROGRESS NOTES
Patient ID: Loren Andre is a 70 y.o. female.    Chief Complaint:   Sinus Problem    Sinus Problem  This is a new problem. The current episode started in the past 7 days. The problem has been gradually worsening since onset. There has been no fever. The pain is mild. Associated symptoms include congestion, coughing, headaches, sinus pressure and a sore throat. Pertinent negatives include no chills, diaphoresis, ear pain, hoarse voice, neck pain, shortness of breath, sneezing or swollen glands. Past treatments include saline sprays. The treatment provided no relief.      Reviewed past medical and social history.    Past Medical History:   Diagnosis Date    Allergy     Anemia     sickle cell trait    Anxiety     Arthritis     Asthma     Chronic disease anemia 10/5/2017    Colon polyps     Depression     Dermatitis 3/7/2013    Fibromyalgia     HEARING LOSS     Hyperlipidemia     Hypertension     Leucopenia 10/5/2017    Polyneuropathy     Scoliosis     Sickle cell trait     Sickle-cell trait 10/5/2017    Trouble in sleeping     Urticaria      Past Surgical History:   Procedure Laterality Date    ADENOIDECTOMY      APPENDECTOMY      COLONOSCOPY  4/14/2008    Dr. Guerrero, 10 year recheck    COLONOSCOPY N/A 10/4/2018    Procedure: COLONOSCOPY;  Surgeon: Tam Kemp MD;  Location[facility];  Service: Endoscopy;  Laterality: N/A;    HYSTERECTOMY      OOPHORECTOMY      ROTATOR CUFF REPAIR Left 01/2016    ROTATOR CUFF REPAIR W/ DISTAL CLAVICLE EXCISION Right 12/04/2017    Dr. Eligio Timmons ( [facility] )    TONSILLECTOMY       Current Outpatient Medications on File Prior to Visit   Medication Sig Dispense Refill    ascorbic acid, vitamin C, (VITAMIN C) 500 MG tablet Take 500 mg by mouth daily as needed.       aspirin 81 MG Chew Take 1 tablet (81 mg total) by mouth once daily.  0    atorvastatin (LIPITOR) 40 MG tablet TAKE 1 TABLET EVERY DAY 90 tablet 2    azelastine (ASTELIN) 137  mcg (0.1 %) nasal spray 1 spray (137 mcg total) by Nasal route 2 (two) times daily. 30 mL 0    betamethasone dipropionate (DIPROLENE) 0.05 % cream Apply topically 2 (two) times daily. 15 g 2    CALCIUM CARBONATE/VITAMIN D3 (VITAMIN D-3 ORAL) Take 100 Int'l Units by mouth once daily.       carvediloL (COREG) 12.5 MG tablet Take 2 tablets (25 mg total) by mouth 2 (two) times daily with meals. 180 tablet 3    coenzyme Q10 (CO Q-10) 10 mg capsule Take 10 mg by mouth once daily.      folic acid (FOLVITE) 400 MCG tablet Take 400 mcg by mouth once daily.      GARLIC OIL ORAL Take 1 capsule by mouth once daily.       lisinopriL (PRINIVIL,ZESTRIL) 40 MG tablet Take 1 tablet (40 mg total) by mouth once daily. 90 tablet 3    montelukast (SINGULAIR) 10 mg tablet Take 1 tablet (10 mg total) by mouth once daily. 90 tablet 3    traZODone (DESYREL) 100 MG tablet Take 1 tablet (100 mg total) by mouth every evening. 90 tablet 1    valACYclovir (VALTREX) 500 MG tablet TAKE 1 TABLET (500 MG TOTAL) BY MOUTH 2 (TWO) TIMES DAILY FOR 7 DAYS AS DIRECTED. 60 tablet 3    vitamin E 100 UNIT capsule Take 100 Units by mouth once daily.      [DISCONTINUED] cetirizine (ZYRTEC) 10 MG tablet Take 10 mg by mouth once daily.      [DISCONTINUED] nitroGLYCERIN (NITROSTAT) 0.4 MG SL tablet Place 1 tablet (0.4 mg total) under the tongue every 5 (five) minutes as needed for Chest pain. (Patient not taking: Reported on 10/22/2020) 90 tablet 0     No current facility-administered medications on file prior to visit.        Review of Systems   Constitutional: Negative for chills and diaphoresis.   HENT: Positive for congestion, postnasal drip, sinus pressure and sore throat. Negative for ear pain, hoarse voice and sneezing.    Respiratory: Positive for cough. Negative for shortness of breath.    Musculoskeletal: Negative for neck pain.   Skin: Negative for rash.   Neurological: Positive for headaches.   All other systems reviewed and are  negative.      Objective:      Physical Exam  Vitals signs reviewed.   Constitutional:       Appearance: Normal appearance. She is well-developed.   HENT:      Head: Normocephalic and atraumatic.      Right Ear: Hearing, tympanic membrane, ear canal and external ear normal.      Left Ear: Tympanic membrane, ear canal and external ear normal.      Nose: Congestion present.      Right Sinus: Maxillary sinus tenderness present. No frontal sinus tenderness.      Left Sinus: Maxillary sinus tenderness present. No frontal sinus tenderness.      Mouth/Throat:      Pharynx: Uvula midline. Posterior oropharyngeal erythema present. No oropharyngeal exudate.   Eyes:      Conjunctiva/sclera: Conjunctivae normal.      Pupils: Pupils are equal, round, and reactive to light.   Neck:      Musculoskeletal: Normal range of motion and neck supple.   Cardiovascular:      Rate and Rhythm: Normal rate and regular rhythm.      Heart sounds: Normal heart sounds.   Pulmonary:      Effort: Pulmonary effort is normal.      Breath sounds: Normal breath sounds.   Abdominal:      General: Bowel sounds are normal.      Palpations: Abdomen is soft.   Musculoskeletal: Normal range of motion.   Skin:     General: Skin is warm and dry.   Neurological:      Mental Status: She is alert and oriented to person, place, and time.   Psychiatric:         Mood and Affect: Mood normal.         Behavior: Behavior normal.         Assessment:       1. Acute non-recurrent frontal sinusitis    2. Acute viral syndrome        Plan:       Loren was seen today for sinus problem.    Diagnoses and all orders for this visit:    Acute non-recurrent frontal sinusitis  -     azithromycin (Z-MARISABEL) 250 MG tablet; Take 2 tablets by mouth on day 1; Take 1 tablet by mouth on days 2-5    Acute viral syndrome  -     fluticasone-salmeterol diskus inhaler 250-50 mcg; Inhale 1 puff into the lungs 2 (two) times daily. Controller    Patient education provided.  All questions and  concerns addressed  RTC PRN and if symptoms worsen or fail to improve  Patient verbalizes understanding      Patient Instructions     Sinusitis (Antibiotic Treatment)    The sinuses are air-filled spaces within the bones of the face. They connect to the inside of the nose. Sinusitis is an inflammation of the tissue lining the sinus cavity. Sinus inflammation can occur during a cold. It can also be due to allergies to pollens and other particles in the air. Sinusitis can cause symptoms of sinus congestion and fullness. A sinus infection causes fever, headache and facial pain. There is often green or yellow drainage from the nose or into the back of the throat (post-nasal drip). You have been given antibiotics to treat this condition.  Home care:  · Take the full course of antibiotics as instructed. Do not stop taking them, even if you feel better.  · Drink plenty of water, hot tea, and other liquids. This may help thin mucus. It also may promote sinus drainage.  · Heat may help soothe painful areas of the face. Use a towel soaked in hot water. Or,  the shower and direct the hot spray onto your face. Using a vaporizer along with a menthol rub at night may also help.   · An expectorant containing guaifenesin may help thin the mucus and promote drainage from the sinuses.  · Over-the-counter decongestants may be used unless a similar medicine was prescribed. Nasal sprays work the fastest. Use one that contains phenylephrine or oxymetazoline. First blow the nose gently. Then use the spray. Do not use these medicines more often than directed on the label or symptoms may get worse. You may also use tablets containing pseudoephedrine. Avoid products that combine ingredients, because side effects may be increased. Read labels. You can also ask the pharmacist for help. (NOTE: Persons with high blood pressure should not use decongestants. They can raise blood pressure.)  · Over-the-counter antihistamines may help if  allergies contributed to your sinusitis.    · Do not use nasal rinses or irrigation during an acute sinus infection, unless told to by your health care provider. Rinsing may spread the infection to other sinuses.  · Use acetaminophen or ibuprofen to control pain, unless another pain medicine was prescribed. (If you have chronic liver or kidney disease or ever had a stomach ulcer, talk with your doctor before using these medicines. Aspirin should never be used in anyone under 18 years of age who is ill with a fever. It may cause severe liver damage.)  · Don't smoke. This can worsen symptoms.  Follow-up care  Follow up with your healthcare provider or our staff if you are not improving within the next week.  When to seek medical advice  Call your healthcare provider if any of these occur:  · Facial pain or headache becoming more severe  · Stiff neck  · Unusual drowsiness or confusion  · Swelling of the forehead or eyelids  · Vision problems, including blurred or double vision  · Fever of 100.4ºF (38ºC) or higher, or as directed by your healthcare provider  · Seizure  · Breathing problems  · Symptoms not resolving within 10 days  Date Last Reviewed: 4/13/2015  © 6851-4454 [employer]. [address] [place] [address], [place] [address]. All rights reserved. This information is not intended as a substitute for professional medical care. Always follow your healthcare professional's instructions.

## 2020-10-22 NOTE — PATIENT INSTRUCTIONS
Sinusitis (Antibiotic Treatment)    The sinuses are air-filled spaces within the bones of the face. They connect to the inside of the nose. Sinusitis is an inflammation of the tissue lining the sinus cavity. Sinus inflammation can occur during a cold. It can also be due to allergies to pollens and other particles in the air. Sinusitis can cause symptoms of sinus congestion and fullness. A sinus infection causes fever, headache and facial pain. There is often green or yellow drainage from the nose or into the back of the throat (post-nasal drip). You have been given antibiotics to treat this condition.  Home care:  · Take the full course of antibiotics as instructed. Do not stop taking them, even if you feel better.  · Drink plenty of water, hot tea, and other liquids. This may help thin mucus. It also may promote sinus drainage.  · Heat may help soothe painful areas of the face. Use a towel soaked in hot water. Or,  the shower and direct the hot spray onto your face. Using a vaporizer along with a menthol rub at night may also help.   · An expectorant containing guaifenesin may help thin the mucus and promote drainage from the sinuses.  · Over-the-counter decongestants may be used unless a similar medicine was prescribed. Nasal sprays work the fastest. Use one that contains phenylephrine or oxymetazoline. First blow the nose gently. Then use the spray. Do not use these medicines more often than directed on the label or symptoms may get worse. You may also use tablets containing pseudoephedrine. Avoid products that combine ingredients, because side effects may be increased. Read labels. You can also ask the pharmacist for help. (NOTE: Persons with high blood pressure should not use decongestants. They can raise blood pressure.)  · Over-the-counter antihistamines may help if allergies contributed to your sinusitis.    · Do not use nasal rinses or irrigation during an acute sinus infection, unless told to by  your health care provider. Rinsing may spread the infection to other sinuses.  · Use acetaminophen or ibuprofen to control pain, unless another pain medicine was prescribed. (If you have chronic liver or kidney disease or ever had a stomach ulcer, talk with your doctor before using these medicines. Aspirin should never be used in anyone under 18 years of age who is ill with a fever. It may cause severe liver damage.)  · Don't smoke. This can worsen symptoms.  Follow-up care  Follow up with your healthcare provider or our staff if you are not improving within the next week.  When to seek medical advice  Call your healthcare provider if any of these occur:  · Facial pain or headache becoming more severe  · Stiff neck  · Unusual drowsiness or confusion  · Swelling of the forehead or eyelids  · Vision problems, including blurred or double vision  · Fever of 100.4ºF (38ºC) or higher, or as directed by your healthcare provider  · Seizure  · Breathing problems  · Symptoms not resolving within 10 days  Date Last Reviewed: 4/13/2015  © 2141-9972 [employer], [employer]. [address] [place] [address], [place] [address]. All rights reserved. This information is not intended as a substitute for professional medical care. Always follow your healthcare professional's instructions.

## 2020-12-07 ENCOUNTER — PES CALL (OUTPATIENT)
Dept: ADMINISTRATIVE | Facility: CLINIC | Age: 70
End: 2020-12-07

## 2021-01-04 RX ORDER — ALBUTEROL SULFATE 90 UG/1
2 AEROSOL, METERED RESPIRATORY (INHALATION) EVERY 6 HOURS PRN
Qty: 6.7 G | Refills: 3 | Status: SHIPPED | OUTPATIENT
Start: 2021-01-04 | End: 2021-10-04 | Stop reason: SDUPTHER

## 2021-01-06 ENCOUNTER — OFFICE VISIT (OUTPATIENT)
Dept: CARDIOLOGY | Facility: CLINIC | Age: 71
End: 2021-01-06
Payer: MEDICARE

## 2021-01-06 VITALS
HEIGHT: 66 IN | OXYGEN SATURATION: 98 % | WEIGHT: 152 LBS | DIASTOLIC BLOOD PRESSURE: 74 MMHG | HEART RATE: 68 BPM | BODY MASS INDEX: 24.43 KG/M2 | SYSTOLIC BLOOD PRESSURE: 128 MMHG | RESPIRATION RATE: 16 BRPM

## 2021-01-06 DIAGNOSIS — R94.31 ABNORMAL ECG: ICD-10-CM

## 2021-01-06 DIAGNOSIS — E78.5 HYPERLIPIDEMIA, UNSPECIFIED HYPERLIPIDEMIA TYPE: ICD-10-CM

## 2021-01-06 DIAGNOSIS — I10 ESSENTIAL HYPERTENSION: Primary | ICD-10-CM

## 2021-01-06 PROCEDURE — 99213 PR OFFICE/OUTPT VISIT, EST, LEVL III, 20-29 MIN: ICD-10-PCS | Mod: S$GLB,,, | Performed by: INTERNAL MEDICINE

## 2021-01-06 PROCEDURE — 99213 OFFICE O/P EST LOW 20 MIN: CPT | Mod: S$GLB,,, | Performed by: INTERNAL MEDICINE

## 2021-01-11 ENCOUNTER — TELEPHONE (OUTPATIENT)
Dept: FAMILY MEDICINE | Facility: CLINIC | Age: 71
End: 2021-01-11

## 2021-02-03 ENCOUNTER — OFFICE VISIT (OUTPATIENT)
Dept: FAMILY MEDICINE | Facility: CLINIC | Age: 71
End: 2021-02-03
Payer: MEDICARE

## 2021-02-03 VITALS
TEMPERATURE: 98 F | WEIGHT: 150.81 LBS | RESPIRATION RATE: 18 BRPM | DIASTOLIC BLOOD PRESSURE: 76 MMHG | HEIGHT: 66 IN | SYSTOLIC BLOOD PRESSURE: 142 MMHG | OXYGEN SATURATION: 98 % | BODY MASS INDEX: 24.24 KG/M2 | HEART RATE: 60 BPM

## 2021-02-03 DIAGNOSIS — J01.90 ACUTE RHINOSINUSITIS: ICD-10-CM

## 2021-02-03 DIAGNOSIS — R05.9 COUGH: ICD-10-CM

## 2021-02-03 DIAGNOSIS — B34.9 VIRAL SYNDROME: Primary | ICD-10-CM

## 2021-02-03 LAB
INFLUENZA A, MOLECULAR: NEGATIVE
INFLUENZA B, MOLECULAR: NEGATIVE
SPECIMEN SOURCE: NORMAL

## 2021-02-03 PROCEDURE — 99214 OFFICE O/P EST MOD 30 MIN: CPT | Mod: S$GLB,,, | Performed by: STUDENT IN AN ORGANIZED HEALTH CARE EDUCATION/TRAINING PROGRAM

## 2021-02-03 PROCEDURE — 87502 INFLUENZA DNA AMP PROBE: CPT | Mod: PO

## 2021-02-03 PROCEDURE — 99999 PR PBB SHADOW E&M-EST. PATIENT-LVL V: CPT | Mod: PBBFAC,,, | Performed by: STUDENT IN AN ORGANIZED HEALTH CARE EDUCATION/TRAINING PROGRAM

## 2021-02-03 PROCEDURE — 1125F AMNT PAIN NOTED PAIN PRSNT: CPT | Mod: S$GLB,,, | Performed by: STUDENT IN AN ORGANIZED HEALTH CARE EDUCATION/TRAINING PROGRAM

## 2021-02-03 PROCEDURE — 3008F BODY MASS INDEX DOCD: CPT | Mod: CPTII,S$GLB,, | Performed by: STUDENT IN AN ORGANIZED HEALTH CARE EDUCATION/TRAINING PROGRAM

## 2021-02-03 PROCEDURE — 1101F PT FALLS ASSESS-DOCD LE1/YR: CPT | Mod: CPTII,S$GLB,, | Performed by: STUDENT IN AN ORGANIZED HEALTH CARE EDUCATION/TRAINING PROGRAM

## 2021-02-03 PROCEDURE — 99214 PR OFFICE/OUTPT VISIT, EST, LEVL IV, 30-39 MIN: ICD-10-PCS | Mod: S$GLB,,, | Performed by: STUDENT IN AN ORGANIZED HEALTH CARE EDUCATION/TRAINING PROGRAM

## 2021-02-03 PROCEDURE — 1125F PR PAIN SEVERITY QUANTIFIED, PAIN PRESENT: ICD-10-PCS | Mod: S$GLB,,, | Performed by: STUDENT IN AN ORGANIZED HEALTH CARE EDUCATION/TRAINING PROGRAM

## 2021-02-03 PROCEDURE — 3288F PR FALLS RISK ASSESSMENT DOCUMENTED: ICD-10-PCS | Mod: CPTII,S$GLB,, | Performed by: STUDENT IN AN ORGANIZED HEALTH CARE EDUCATION/TRAINING PROGRAM

## 2021-02-03 PROCEDURE — 3288F FALL RISK ASSESSMENT DOCD: CPT | Mod: CPTII,S$GLB,, | Performed by: STUDENT IN AN ORGANIZED HEALTH CARE EDUCATION/TRAINING PROGRAM

## 2021-02-03 PROCEDURE — 3077F PR MOST RECENT SYSTOLIC BLOOD PRESSURE >= 140 MM HG: ICD-10-PCS | Mod: CPTII,S$GLB,, | Performed by: STUDENT IN AN ORGANIZED HEALTH CARE EDUCATION/TRAINING PROGRAM

## 2021-02-03 PROCEDURE — 1159F PR MEDICATION LIST DOCUMENTED IN MEDICAL RECORD: ICD-10-PCS | Mod: S$GLB,,, | Performed by: STUDENT IN AN ORGANIZED HEALTH CARE EDUCATION/TRAINING PROGRAM

## 2021-02-03 PROCEDURE — 3078F DIAST BP <80 MM HG: CPT | Mod: CPTII,S$GLB,, | Performed by: STUDENT IN AN ORGANIZED HEALTH CARE EDUCATION/TRAINING PROGRAM

## 2021-02-03 PROCEDURE — 3008F PR BODY MASS INDEX (BMI) DOCUMENTED: ICD-10-PCS | Mod: CPTII,S$GLB,, | Performed by: STUDENT IN AN ORGANIZED HEALTH CARE EDUCATION/TRAINING PROGRAM

## 2021-02-03 PROCEDURE — 1101F PR PT FALLS ASSESS DOC 0-1 FALLS W/OUT INJ PAST YR: ICD-10-PCS | Mod: CPTII,S$GLB,, | Performed by: STUDENT IN AN ORGANIZED HEALTH CARE EDUCATION/TRAINING PROGRAM

## 2021-02-03 PROCEDURE — 1159F MED LIST DOCD IN RCRD: CPT | Mod: S$GLB,,, | Performed by: STUDENT IN AN ORGANIZED HEALTH CARE EDUCATION/TRAINING PROGRAM

## 2021-02-03 PROCEDURE — 3077F SYST BP >= 140 MM HG: CPT | Mod: CPTII,S$GLB,, | Performed by: STUDENT IN AN ORGANIZED HEALTH CARE EDUCATION/TRAINING PROGRAM

## 2021-02-03 PROCEDURE — U0003 INFECTIOUS AGENT DETECTION BY NUCLEIC ACID (DNA OR RNA); SEVERE ACUTE RESPIRATORY SYNDROME CORONAVIRUS 2 (SARS-COV-2) (CORONAVIRUS DISEASE [COVID-19]), AMPLIFIED PROBE TECHNIQUE, MAKING USE OF HIGH THROUGHPUT TECHNOLOGIES AS DESCRIBED BY CMS-2020-01-R: HCPCS

## 2021-02-03 PROCEDURE — 99499 RISK ADDL DX/OHS AUDIT: ICD-10-PCS | Mod: HCNC,S$GLB,, | Performed by: STUDENT IN AN ORGANIZED HEALTH CARE EDUCATION/TRAINING PROGRAM

## 2021-02-03 PROCEDURE — 99499 UNLISTED E&M SERVICE: CPT | Mod: HCNC,S$GLB,, | Performed by: STUDENT IN AN ORGANIZED HEALTH CARE EDUCATION/TRAINING PROGRAM

## 2021-02-03 PROCEDURE — 99999 PR PBB SHADOW E&M-EST. PATIENT-LVL V: ICD-10-PCS | Mod: PBBFAC,,, | Performed by: STUDENT IN AN ORGANIZED HEALTH CARE EDUCATION/TRAINING PROGRAM

## 2021-02-03 PROCEDURE — 3078F PR MOST RECENT DIASTOLIC BLOOD PRESSURE < 80 MM HG: ICD-10-PCS | Mod: CPTII,S$GLB,, | Performed by: STUDENT IN AN ORGANIZED HEALTH CARE EDUCATION/TRAINING PROGRAM

## 2021-02-03 RX ORDER — PROMETHAZINE HYDROCHLORIDE 6.25 MG/5ML
6.25 SYRUP ORAL NIGHTLY PRN
Qty: 473 ML | Refills: 0 | Status: SHIPPED | OUTPATIENT
Start: 2021-02-03 | End: 2021-02-03

## 2021-02-03 RX ORDER — MAGNESIUM 250 MG
250 TABLET ORAL 2 TIMES DAILY
COMMUNITY

## 2021-02-03 RX ORDER — PROMETHAZINE HYDROCHLORIDE 6.25 MG/5ML
6.25 SYRUP ORAL NIGHTLY PRN
Qty: 473 ML | Refills: 0 | Status: SHIPPED | OUTPATIENT
Start: 2021-02-03 | End: 2021-10-04

## 2021-02-03 RX ORDER — DOXYCYCLINE 100 MG/1
100 CAPSULE ORAL EVERY 12 HOURS
Qty: 14 CAPSULE | Refills: 0 | Status: SHIPPED | OUTPATIENT
Start: 2021-02-06 | End: 2021-10-04

## 2021-02-03 RX ORDER — DOXYCYCLINE 100 MG/1
100 CAPSULE ORAL EVERY 12 HOURS
Qty: 14 CAPSULE | Refills: 0 | Status: SHIPPED | OUTPATIENT
Start: 2021-02-06 | End: 2021-02-03

## 2021-02-04 ENCOUNTER — TELEPHONE (OUTPATIENT)
Dept: FAMILY MEDICINE | Facility: CLINIC | Age: 71
End: 2021-02-04

## 2021-02-04 LAB — SARS-COV-2 RNA RESP QL NAA+PROBE: NOT DETECTED

## 2021-02-09 ENCOUNTER — IMMUNIZATION (OUTPATIENT)
Dept: PRIMARY CARE CLINIC | Facility: CLINIC | Age: 71
End: 2021-02-09
Payer: MEDICARE

## 2021-02-09 DIAGNOSIS — Z23 NEED FOR VACCINATION: Primary | ICD-10-CM

## 2021-02-09 PROBLEM — R94.31 ABNORMAL ECG: Status: ACTIVE | Noted: 2021-02-09

## 2021-02-09 PROCEDURE — 91300 COVID-19, MRNA, LNP-S, PF, 30 MCG/0.3 ML DOSE VACCINE: CPT | Mod: S$GLB,,, | Performed by: FAMILY MEDICINE

## 2021-02-09 PROCEDURE — 0001A COVID-19, MRNA, LNP-S, PF, 30 MCG/0.3 ML DOSE VACCINE: ICD-10-PCS | Mod: S$GLB,,, | Performed by: FAMILY MEDICINE

## 2021-02-09 PROCEDURE — 91300 COVID-19, MRNA, LNP-S, PF, 30 MCG/0.3 ML DOSE VACCINE: ICD-10-PCS | Mod: S$GLB,,, | Performed by: FAMILY MEDICINE

## 2021-02-09 PROCEDURE — 0001A COVID-19, MRNA, LNP-S, PF, 30 MCG/0.3 ML DOSE VACCINE: CPT | Mod: S$GLB,,, | Performed by: FAMILY MEDICINE

## 2021-03-03 ENCOUNTER — IMMUNIZATION (OUTPATIENT)
Dept: PRIMARY CARE CLINIC | Facility: CLINIC | Age: 71
End: 2021-03-03
Payer: MEDICARE

## 2021-03-03 DIAGNOSIS — Z23 NEED FOR VACCINATION: Primary | ICD-10-CM

## 2021-03-03 PROCEDURE — 0002A COVID-19, MRNA, LNP-S, PF, 30 MCG/0.3 ML DOSE VACCINE: ICD-10-PCS | Mod: CV19,S$GLB,, | Performed by: FAMILY MEDICINE

## 2021-03-03 PROCEDURE — 0002A COVID-19, MRNA, LNP-S, PF, 30 MCG/0.3 ML DOSE VACCINE: CPT | Mod: CV19,S$GLB,, | Performed by: FAMILY MEDICINE

## 2021-03-03 PROCEDURE — 91300 COVID-19, MRNA, LNP-S, PF, 30 MCG/0.3 ML DOSE VACCINE: ICD-10-PCS | Mod: S$GLB,,, | Performed by: FAMILY MEDICINE

## 2021-03-03 PROCEDURE — 91300 COVID-19, MRNA, LNP-S, PF, 30 MCG/0.3 ML DOSE VACCINE: CPT | Mod: S$GLB,,, | Performed by: FAMILY MEDICINE

## 2021-03-04 ENCOUNTER — PES CALL (OUTPATIENT)
Dept: ADMINISTRATIVE | Facility: CLINIC | Age: 71
End: 2021-03-04

## 2021-03-17 DIAGNOSIS — E78.5 HYPERLIPIDEMIA, UNSPECIFIED HYPERLIPIDEMIA TYPE: ICD-10-CM

## 2021-03-19 RX ORDER — ATORVASTATIN CALCIUM 40 MG/1
TABLET, FILM COATED ORAL
Qty: 90 TABLET | Refills: 2 | Status: SHIPPED | OUTPATIENT
Start: 2021-03-19 | End: 2021-10-04 | Stop reason: SDUPTHER

## 2021-03-19 RX ORDER — MONTELUKAST SODIUM 10 MG/1
TABLET ORAL
Qty: 90 TABLET | Refills: 2 | Status: SHIPPED | OUTPATIENT
Start: 2021-03-19 | End: 2021-10-04 | Stop reason: SDUPTHER

## 2021-03-26 ENCOUNTER — PES CALL (OUTPATIENT)
Dept: ADMINISTRATIVE | Facility: CLINIC | Age: 71
End: 2021-03-26

## 2021-04-07 DIAGNOSIS — I10 HYPERTENSION, ESSENTIAL: ICD-10-CM

## 2021-04-08 RX ORDER — CARVEDILOL 25 MG/1
25 TABLET ORAL 2 TIMES DAILY WITH MEALS
Qty: 180 TABLET | Refills: 3 | Status: SHIPPED | OUTPATIENT
Start: 2021-04-08 | End: 2021-10-04 | Stop reason: SDUPTHER

## 2021-04-27 ENCOUNTER — TELEPHONE (OUTPATIENT)
Dept: FAMILY MEDICINE | Facility: CLINIC | Age: 71
End: 2021-04-27

## 2021-04-28 ENCOUNTER — PES CALL (OUTPATIENT)
Dept: ADMINISTRATIVE | Facility: CLINIC | Age: 71
End: 2021-04-28

## 2021-05-03 ENCOUNTER — LAB VISIT (OUTPATIENT)
Dept: LAB | Facility: HOSPITAL | Age: 71
End: 2021-05-03
Attending: NURSE PRACTITIONER
Payer: MEDICARE

## 2021-05-03 ENCOUNTER — OFFICE VISIT (OUTPATIENT)
Dept: FAMILY MEDICINE | Facility: CLINIC | Age: 71
End: 2021-05-03
Payer: MEDICARE

## 2021-05-03 VITALS
SYSTOLIC BLOOD PRESSURE: 150 MMHG | BODY MASS INDEX: 24.17 KG/M2 | WEIGHT: 150.38 LBS | DIASTOLIC BLOOD PRESSURE: 70 MMHG | HEART RATE: 60 BPM | OXYGEN SATURATION: 98 % | HEIGHT: 66 IN

## 2021-05-03 DIAGNOSIS — I10 ESSENTIAL HYPERTENSION: ICD-10-CM

## 2021-05-03 DIAGNOSIS — Z01.818 PREOPERATIVE EXAMINATION: ICD-10-CM

## 2021-05-03 DIAGNOSIS — Z01.818 PREOPERATIVE EXAMINATION: Primary | ICD-10-CM

## 2021-05-03 DIAGNOSIS — N18.30 STAGE 3 CHRONIC KIDNEY DISEASE, UNSPECIFIED WHETHER STAGE 3A OR 3B CKD: ICD-10-CM

## 2021-05-03 DIAGNOSIS — H26.9 CATARACT, UNSPECIFIED CATARACT TYPE, UNSPECIFIED LATERALITY: ICD-10-CM

## 2021-05-03 PROCEDURE — 1101F PT FALLS ASSESS-DOCD LE1/YR: CPT | Mod: HCNC,CPTII,S$GLB, | Performed by: NURSE PRACTITIONER

## 2021-05-03 PROCEDURE — 1159F MED LIST DOCD IN RCRD: CPT | Mod: HCNC,S$GLB,, | Performed by: NURSE PRACTITIONER

## 2021-05-03 PROCEDURE — 85025 COMPLETE CBC W/AUTO DIFF WBC: CPT | Mod: HCNC | Performed by: NURSE PRACTITIONER

## 2021-05-03 PROCEDURE — 99999 PR PBB SHADOW E&M-EST. PATIENT-LVL V: CPT | Mod: PBBFAC,HCNC,, | Performed by: NURSE PRACTITIONER

## 2021-05-03 PROCEDURE — 99214 OFFICE O/P EST MOD 30 MIN: CPT | Mod: HCNC,S$GLB,, | Performed by: NURSE PRACTITIONER

## 2021-05-03 PROCEDURE — 3288F PR FALLS RISK ASSESSMENT DOCUMENTED: ICD-10-PCS | Mod: HCNC,CPTII,S$GLB, | Performed by: NURSE PRACTITIONER

## 2021-05-03 PROCEDURE — 1126F AMNT PAIN NOTED NONE PRSNT: CPT | Mod: HCNC,S$GLB,, | Performed by: NURSE PRACTITIONER

## 2021-05-03 PROCEDURE — 3288F FALL RISK ASSESSMENT DOCD: CPT | Mod: HCNC,CPTII,S$GLB, | Performed by: NURSE PRACTITIONER

## 2021-05-03 PROCEDURE — 3008F PR BODY MASS INDEX (BMI) DOCUMENTED: ICD-10-PCS | Mod: HCNC,CPTII,S$GLB, | Performed by: NURSE PRACTITIONER

## 2021-05-03 PROCEDURE — 1126F PR PAIN SEVERITY QUANTIFIED, NO PAIN PRESENT: ICD-10-PCS | Mod: HCNC,S$GLB,, | Performed by: NURSE PRACTITIONER

## 2021-05-03 PROCEDURE — 36415 COLL VENOUS BLD VENIPUNCTURE: CPT | Mod: HCNC,PO | Performed by: NURSE PRACTITIONER

## 2021-05-03 PROCEDURE — 80053 COMPREHEN METABOLIC PANEL: CPT | Mod: HCNC | Performed by: NURSE PRACTITIONER

## 2021-05-03 PROCEDURE — 1159F PR MEDICATION LIST DOCUMENTED IN MEDICAL RECORD: ICD-10-PCS | Mod: HCNC,S$GLB,, | Performed by: NURSE PRACTITIONER

## 2021-05-03 PROCEDURE — 1101F PR PT FALLS ASSESS DOC 0-1 FALLS W/OUT INJ PAST YR: ICD-10-PCS | Mod: HCNC,CPTII,S$GLB, | Performed by: NURSE PRACTITIONER

## 2021-05-03 PROCEDURE — 1170F PR FUNCTIONAL STATUS ASSESSED: ICD-10-PCS | Mod: HCNC,S$GLB,, | Performed by: NURSE PRACTITIONER

## 2021-05-03 PROCEDURE — 99214 PR OFFICE/OUTPT VISIT, EST, LEVL IV, 30-39 MIN: ICD-10-PCS | Mod: HCNC,S$GLB,, | Performed by: NURSE PRACTITIONER

## 2021-05-03 PROCEDURE — 3008F BODY MASS INDEX DOCD: CPT | Mod: HCNC,CPTII,S$GLB, | Performed by: NURSE PRACTITIONER

## 2021-05-03 PROCEDURE — 99999 PR PBB SHADOW E&M-EST. PATIENT-LVL V: ICD-10-PCS | Mod: PBBFAC,HCNC,, | Performed by: NURSE PRACTITIONER

## 2021-05-03 PROCEDURE — 1170F FXNL STATUS ASSESSED: CPT | Mod: HCNC,S$GLB,, | Performed by: NURSE PRACTITIONER

## 2021-05-03 RX ORDER — PREDNISOLONE ACETATE 10 MG/ML
1 SUSPENSION/ DROPS OPHTHALMIC 4 TIMES DAILY
COMMUNITY
Start: 2021-04-27 | End: 2022-10-04

## 2021-05-03 RX ORDER — DUREZOL 0.5 MG/ML
1 EMULSION OPHTHALMIC 2 TIMES DAILY
COMMUNITY
Start: 2021-04-27 | End: 2022-10-04

## 2021-05-03 RX ORDER — NEPAFENAC 3 MG/ML
SUSPENSION/ DROPS OPHTHALMIC
COMMUNITY
Start: 2021-04-27 | End: 2022-10-04

## 2021-05-03 RX ORDER — KETOROLAC TROMETHAMINE 5 MG/ML
1 SOLUTION OPHTHALMIC 4 TIMES DAILY
COMMUNITY
Start: 2021-04-27 | End: 2022-10-04

## 2021-05-03 RX ORDER — OFLOXACIN 3 MG/ML
1 SOLUTION/ DROPS OPHTHALMIC 4 TIMES DAILY
COMMUNITY
Start: 2021-04-27 | End: 2022-10-04

## 2021-05-04 ENCOUNTER — TELEPHONE (OUTPATIENT)
Dept: FAMILY MEDICINE | Facility: CLINIC | Age: 71
End: 2021-05-04

## 2021-05-04 LAB
ALBUMIN SERPL BCP-MCNC: 3.8 G/DL (ref 3.5–5.2)
ALP SERPL-CCNC: 72 U/L (ref 55–135)
ALT SERPL W/O P-5'-P-CCNC: 29 U/L (ref 10–44)
ANION GAP SERPL CALC-SCNC: 15 MMOL/L (ref 8–16)
AST SERPL-CCNC: 34 U/L (ref 10–40)
BASOPHILS # BLD AUTO: 0.03 K/UL (ref 0–0.2)
BASOPHILS NFR BLD: 0.7 % (ref 0–1.9)
BILIRUB SERPL-MCNC: 0.5 MG/DL (ref 0.1–1)
BUN SERPL-MCNC: 19 MG/DL (ref 8–23)
CALCIUM SERPL-MCNC: 10 MG/DL (ref 8.7–10.5)
CHLORIDE SERPL-SCNC: 105 MMOL/L (ref 95–110)
CO2 SERPL-SCNC: 19 MMOL/L (ref 23–29)
CREAT SERPL-MCNC: 1.3 MG/DL (ref 0.5–1.4)
DIFFERENTIAL METHOD: ABNORMAL
EOSINOPHIL # BLD AUTO: 0.1 K/UL (ref 0–0.5)
EOSINOPHIL NFR BLD: 3.1 % (ref 0–8)
ERYTHROCYTE [DISTWIDTH] IN BLOOD BY AUTOMATED COUNT: 15.6 % (ref 11.5–14.5)
EST. GFR  (AFRICAN AMERICAN): 47.4 ML/MIN/1.73 M^2
EST. GFR  (NON AFRICAN AMERICAN): 41.1 ML/MIN/1.73 M^2
GLUCOSE SERPL-MCNC: 94 MG/DL (ref 70–110)
HCT VFR BLD AUTO: 38.2 % (ref 37–48.5)
HGB BLD-MCNC: 12.5 G/DL (ref 12–16)
IMM GRANULOCYTES # BLD AUTO: 0 K/UL (ref 0–0.04)
IMM GRANULOCYTES NFR BLD AUTO: 0 % (ref 0–0.5)
LYMPHOCYTES # BLD AUTO: 1.4 K/UL (ref 1–4.8)
LYMPHOCYTES NFR BLD: 30.8 % (ref 18–48)
MCH RBC QN AUTO: 27 PG (ref 27–31)
MCHC RBC AUTO-ENTMCNC: 32.7 G/DL (ref 32–36)
MCV RBC AUTO: 83 FL (ref 82–98)
MONOCYTES # BLD AUTO: 0.7 K/UL (ref 0.3–1)
MONOCYTES NFR BLD: 15.7 % (ref 4–15)
NEUTROPHILS # BLD AUTO: 2.3 K/UL (ref 1.8–7.7)
NEUTROPHILS NFR BLD: 49.7 % (ref 38–73)
NRBC BLD-RTO: 0 /100 WBC
PLATELET # BLD AUTO: 196 K/UL (ref 150–450)
PMV BLD AUTO: 11.6 FL (ref 9.2–12.9)
POTASSIUM SERPL-SCNC: 4 MMOL/L (ref 3.5–5.1)
PROT SERPL-MCNC: 7.4 G/DL (ref 6–8.4)
RBC # BLD AUTO: 4.63 M/UL (ref 4–5.4)
SODIUM SERPL-SCNC: 139 MMOL/L (ref 136–145)
WBC # BLD AUTO: 4.52 K/UL (ref 3.9–12.7)

## 2021-06-23 ENCOUNTER — PES CALL (OUTPATIENT)
Dept: ADMINISTRATIVE | Facility: CLINIC | Age: 71
End: 2021-06-23

## 2021-06-23 ENCOUNTER — TELEPHONE (OUTPATIENT)
Dept: CARDIOLOGY | Facility: CLINIC | Age: 71
End: 2021-06-23

## 2021-08-04 ENCOUNTER — TELEPHONE (OUTPATIENT)
Dept: FAMILY MEDICINE | Facility: CLINIC | Age: 71
End: 2021-08-04

## 2021-08-04 DIAGNOSIS — Z12.31 BREAST CANCER SCREENING BY MAMMOGRAM: Primary | ICD-10-CM

## 2021-08-10 ENCOUNTER — OFFICE VISIT (OUTPATIENT)
Dept: CARDIOLOGY | Facility: CLINIC | Age: 71
End: 2021-08-10
Payer: MEDICARE

## 2021-08-10 VITALS
SYSTOLIC BLOOD PRESSURE: 148 MMHG | HEART RATE: 60 BPM | WEIGHT: 151 LBS | HEIGHT: 66 IN | DIASTOLIC BLOOD PRESSURE: 98 MMHG | BODY MASS INDEX: 24.27 KG/M2

## 2021-08-10 DIAGNOSIS — I10 HYPERTENSION, UNSPECIFIED TYPE: Primary | ICD-10-CM

## 2021-08-10 DIAGNOSIS — E78.5 HYPERLIPIDEMIA, UNSPECIFIED HYPERLIPIDEMIA TYPE: ICD-10-CM

## 2021-08-10 DIAGNOSIS — R94.31 ABNORMAL ECG: ICD-10-CM

## 2021-08-10 PROCEDURE — 3288F PR FALLS RISK ASSESSMENT DOCUMENTED: ICD-10-PCS | Mod: CPTII,S$GLB,, | Performed by: NURSE PRACTITIONER

## 2021-08-10 PROCEDURE — 3288F FALL RISK ASSESSMENT DOCD: CPT | Mod: CPTII,S$GLB,, | Performed by: NURSE PRACTITIONER

## 2021-08-10 PROCEDURE — 3080F PR MOST RECENT DIASTOLIC BLOOD PRESSURE >= 90 MM HG: ICD-10-PCS | Mod: CPTII,S$GLB,, | Performed by: NURSE PRACTITIONER

## 2021-08-10 PROCEDURE — 3008F PR BODY MASS INDEX (BMI) DOCUMENTED: ICD-10-PCS | Mod: CPTII,S$GLB,, | Performed by: NURSE PRACTITIONER

## 2021-08-10 PROCEDURE — 1101F PR PT FALLS ASSESS DOC 0-1 FALLS W/OUT INJ PAST YR: ICD-10-PCS | Mod: CPTII,S$GLB,, | Performed by: NURSE PRACTITIONER

## 2021-08-10 PROCEDURE — 99499 UNLISTED E&M SERVICE: CPT | Mod: S$GLB,,, | Performed by: NURSE PRACTITIONER

## 2021-08-10 PROCEDURE — 93010 EKG 12-LEAD: ICD-10-PCS | Mod: S$GLB,,, | Performed by: GENERAL PRACTICE

## 2021-08-10 PROCEDURE — 93005 EKG 12-LEAD: ICD-10-PCS | Mod: S$GLB,,, | Performed by: NURSE PRACTITIONER

## 2021-08-10 PROCEDURE — 1160F PR REVIEW ALL MEDS BY PRESCRIBER/CLIN PHARMACIST DOCUMENTED: ICD-10-PCS | Mod: CPTII,S$GLB,, | Performed by: NURSE PRACTITIONER

## 2021-08-10 PROCEDURE — 1160F RVW MEDS BY RX/DR IN RCRD: CPT | Mod: CPTII,S$GLB,, | Performed by: NURSE PRACTITIONER

## 2021-08-10 PROCEDURE — 93005 ELECTROCARDIOGRAM TRACING: CPT | Mod: S$GLB,,, | Performed by: NURSE PRACTITIONER

## 2021-08-10 PROCEDURE — 99214 PR OFFICE/OUTPT VISIT, EST, LEVL IV, 30-39 MIN: ICD-10-PCS | Mod: S$GLB,,, | Performed by: NURSE PRACTITIONER

## 2021-08-10 PROCEDURE — 99499 RISK ADDL DX/OHS AUDIT: ICD-10-PCS | Mod: S$GLB,,, | Performed by: NURSE PRACTITIONER

## 2021-08-10 PROCEDURE — 3077F PR MOST RECENT SYSTOLIC BLOOD PRESSURE >= 140 MM HG: ICD-10-PCS | Mod: CPTII,S$GLB,, | Performed by: NURSE PRACTITIONER

## 2021-08-10 PROCEDURE — 93010 ELECTROCARDIOGRAM REPORT: CPT | Mod: S$GLB,,, | Performed by: GENERAL PRACTICE

## 2021-08-10 PROCEDURE — 3008F BODY MASS INDEX DOCD: CPT | Mod: CPTII,S$GLB,, | Performed by: NURSE PRACTITIONER

## 2021-08-10 PROCEDURE — 3077F SYST BP >= 140 MM HG: CPT | Mod: CPTII,S$GLB,, | Performed by: NURSE PRACTITIONER

## 2021-08-10 PROCEDURE — 99214 OFFICE O/P EST MOD 30 MIN: CPT | Mod: S$GLB,,, | Performed by: NURSE PRACTITIONER

## 2021-08-10 PROCEDURE — 3080F DIAST BP >= 90 MM HG: CPT | Mod: CPTII,S$GLB,, | Performed by: NURSE PRACTITIONER

## 2021-08-10 PROCEDURE — 1159F MED LIST DOCD IN RCRD: CPT | Mod: CPTII,S$GLB,, | Performed by: NURSE PRACTITIONER

## 2021-08-10 PROCEDURE — 1101F PT FALLS ASSESS-DOCD LE1/YR: CPT | Mod: CPTII,S$GLB,, | Performed by: NURSE PRACTITIONER

## 2021-08-10 PROCEDURE — 1159F PR MEDICATION LIST DOCUMENTED IN MEDICAL RECORD: ICD-10-PCS | Mod: CPTII,S$GLB,, | Performed by: NURSE PRACTITIONER

## 2021-08-10 PROCEDURE — 1126F PR PAIN SEVERITY QUANTIFIED, NO PAIN PRESENT: ICD-10-PCS | Mod: CPTII,S$GLB,, | Performed by: NURSE PRACTITIONER

## 2021-08-10 PROCEDURE — 1126F AMNT PAIN NOTED NONE PRSNT: CPT | Mod: CPTII,S$GLB,, | Performed by: NURSE PRACTITIONER

## 2021-08-10 RX ORDER — TERAZOSIN 1 MG/1
1 CAPSULE ORAL NIGHTLY
Qty: 30 CAPSULE | Refills: 11 | Status: SHIPPED | OUTPATIENT
Start: 2021-08-10 | End: 2021-10-04

## 2021-08-13 ENCOUNTER — HOSPITAL ENCOUNTER (OUTPATIENT)
Dept: RADIOLOGY | Facility: CLINIC | Age: 71
Discharge: HOME | End: 2021-08-13
Attending: FAMILY MEDICINE
Payer: MEDICARE

## 2021-08-13 DIAGNOSIS — Z12.31 BREAST CANCER SCREENING BY MAMMOGRAM: ICD-10-CM

## 2021-08-13 PROCEDURE — 77067 MAMMO DIGITAL SCREENING BILAT WITH TOMO: ICD-10-PCS | Mod: 26,HCNC,, | Performed by: RADIOLOGY

## 2021-08-13 PROCEDURE — 77067 SCR MAMMO BI INCL CAD: CPT | Mod: 26,HCNC,, | Performed by: RADIOLOGY

## 2021-08-13 PROCEDURE — 77063 BREAST TOMOSYNTHESIS BI: CPT | Mod: 26,HCNC,, | Performed by: RADIOLOGY

## 2021-08-13 PROCEDURE — 77063 MAMMO DIGITAL SCREENING BILAT WITH TOMO: ICD-10-PCS | Mod: 26,HCNC,, | Performed by: RADIOLOGY

## 2021-08-13 PROCEDURE — 77067 SCR MAMMO BI INCL CAD: CPT | Mod: TC,HCNC,PO

## 2021-08-19 ENCOUNTER — TELEPHONE (OUTPATIENT)
Dept: FAMILY MEDICINE | Facility: CLINIC | Age: 71
End: 2021-08-19

## 2021-08-20 ENCOUNTER — TELEPHONE (OUTPATIENT)
Dept: FAMILY MEDICINE | Facility: CLINIC | Age: 71
End: 2021-08-20

## 2021-08-24 ENCOUNTER — TELEPHONE (OUTPATIENT)
Dept: FAMILY MEDICINE | Facility: CLINIC | Age: 71
End: 2021-08-24

## 2021-08-24 DIAGNOSIS — J30.9 CHRONIC ALLERGIC RHINITIS: Primary | ICD-10-CM

## 2021-10-04 ENCOUNTER — LAB VISIT (OUTPATIENT)
Dept: LAB | Facility: HOSPITAL | Age: 71
End: 2021-10-04
Attending: FAMILY MEDICINE
Payer: MEDICARE

## 2021-10-04 ENCOUNTER — OFFICE VISIT (OUTPATIENT)
Dept: FAMILY MEDICINE | Facility: CLINIC | Age: 71
End: 2021-10-04
Attending: FAMILY MEDICINE
Payer: MEDICARE

## 2021-10-04 VITALS
BODY MASS INDEX: 24.41 KG/M2 | DIASTOLIC BLOOD PRESSURE: 88 MMHG | TEMPERATURE: 98 F | HEART RATE: 53 BPM | SYSTOLIC BLOOD PRESSURE: 136 MMHG | HEIGHT: 66 IN | OXYGEN SATURATION: 97 % | WEIGHT: 151.88 LBS

## 2021-10-04 DIAGNOSIS — F41.9 ANXIETY: ICD-10-CM

## 2021-10-04 DIAGNOSIS — E78.5 HYPERLIPIDEMIA, UNSPECIFIED HYPERLIPIDEMIA TYPE: ICD-10-CM

## 2021-10-04 DIAGNOSIS — R73.03 PREDIABETES: ICD-10-CM

## 2021-10-04 DIAGNOSIS — D72.819 LEUKOPENIA, UNSPECIFIED TYPE: ICD-10-CM

## 2021-10-04 DIAGNOSIS — R76.8 RHEUMATOID FACTOR POSITIVE: ICD-10-CM

## 2021-10-04 DIAGNOSIS — D57.3 SICKLE-CELL TRAIT: ICD-10-CM

## 2021-10-04 DIAGNOSIS — I10 HYPERTENSION, ESSENTIAL: ICD-10-CM

## 2021-10-04 DIAGNOSIS — N18.31 STAGE 3A CHRONIC KIDNEY DISEASE: ICD-10-CM

## 2021-10-04 DIAGNOSIS — Z00.00 ENCOUNTER FOR PREVENTIVE HEALTH EXAMINATION: Primary | ICD-10-CM

## 2021-10-04 DIAGNOSIS — J45.909 ASTHMA, UNSPECIFIED ASTHMA SEVERITY, UNSPECIFIED WHETHER COMPLICATED, UNSPECIFIED WHETHER PERSISTENT: ICD-10-CM

## 2021-10-04 LAB
CHOLEST SERPL-MCNC: 120 MG/DL (ref 120–199)
CHOLEST/HDLC SERPL: 1.9 {RATIO} (ref 2–5)
HDLC SERPL-MCNC: 63 MG/DL (ref 40–75)
HDLC SERPL: 52.5 % (ref 20–50)
LDLC SERPL CALC-MCNC: 49 MG/DL (ref 63–159)
NONHDLC SERPL-MCNC: 57 MG/DL
TRIGL SERPL-MCNC: 40 MG/DL (ref 30–150)

## 2021-10-04 PROCEDURE — 3079F DIAST BP 80-89 MM HG: CPT | Mod: HCNC,CPTII,S$GLB, | Performed by: FAMILY MEDICINE

## 2021-10-04 PROCEDURE — 99999 PR PBB SHADOW E&M-EST. PATIENT-LVL III: CPT | Mod: PBBFAC,HCNC,, | Performed by: FAMILY MEDICINE

## 2021-10-04 PROCEDURE — 90694 FLU VACCINE - QUADRIVALENT - ADJUVANTED: ICD-10-PCS | Mod: HCNC,S$GLB,, | Performed by: FAMILY MEDICINE

## 2021-10-04 PROCEDURE — 3288F FALL RISK ASSESSMENT DOCD: CPT | Mod: HCNC,CPTII,S$GLB, | Performed by: FAMILY MEDICINE

## 2021-10-04 PROCEDURE — 99397 PER PM REEVAL EST PAT 65+ YR: CPT | Mod: 25,HCNC,S$GLB, | Performed by: FAMILY MEDICINE

## 2021-10-04 PROCEDURE — 1101F PT FALLS ASSESS-DOCD LE1/YR: CPT | Mod: HCNC,CPTII,S$GLB, | Performed by: FAMILY MEDICINE

## 2021-10-04 PROCEDURE — 1126F PR PAIN SEVERITY QUANTIFIED, NO PAIN PRESENT: ICD-10-PCS | Mod: HCNC,CPTII,S$GLB, | Performed by: FAMILY MEDICINE

## 2021-10-04 PROCEDURE — 1160F PR REVIEW ALL MEDS BY PRESCRIBER/CLIN PHARMACIST DOCUMENTED: ICD-10-PCS | Mod: HCNC,CPTII,S$GLB, | Performed by: FAMILY MEDICINE

## 2021-10-04 PROCEDURE — G0008 FLU VACCINE - QUADRIVALENT - ADJUVANTED: ICD-10-PCS | Mod: HCNC,S$GLB,, | Performed by: FAMILY MEDICINE

## 2021-10-04 PROCEDURE — 3288F PR FALLS RISK ASSESSMENT DOCUMENTED: ICD-10-PCS | Mod: HCNC,CPTII,S$GLB, | Performed by: FAMILY MEDICINE

## 2021-10-04 PROCEDURE — 1126F AMNT PAIN NOTED NONE PRSNT: CPT | Mod: HCNC,CPTII,S$GLB, | Performed by: FAMILY MEDICINE

## 2021-10-04 PROCEDURE — 99397 PR PREVENTIVE VISIT,EST,65 & OVER: ICD-10-PCS | Mod: 25,HCNC,S$GLB, | Performed by: FAMILY MEDICINE

## 2021-10-04 PROCEDURE — 1101F PR PT FALLS ASSESS DOC 0-1 FALLS W/OUT INJ PAST YR: ICD-10-PCS | Mod: HCNC,CPTII,S$GLB, | Performed by: FAMILY MEDICINE

## 2021-10-04 PROCEDURE — 3075F PR MOST RECENT SYSTOLIC BLOOD PRESS GE 130-139MM HG: ICD-10-PCS | Mod: HCNC,CPTII,S$GLB, | Performed by: FAMILY MEDICINE

## 2021-10-04 PROCEDURE — 1159F MED LIST DOCD IN RCRD: CPT | Mod: HCNC,CPTII,S$GLB, | Performed by: FAMILY MEDICINE

## 2021-10-04 PROCEDURE — 4010F PR ACE/ARB THEARPY RXD/TAKEN: ICD-10-PCS | Mod: HCNC,CPTII,S$GLB, | Performed by: FAMILY MEDICINE

## 2021-10-04 PROCEDURE — 99499 UNLISTED E&M SERVICE: CPT | Mod: HCNC,S$GLB,, | Performed by: FAMILY MEDICINE

## 2021-10-04 PROCEDURE — 90694 VACC AIIV4 NO PRSRV 0.5ML IM: CPT | Mod: HCNC,S$GLB,, | Performed by: FAMILY MEDICINE

## 2021-10-04 PROCEDURE — 3079F PR MOST RECENT DIASTOLIC BLOOD PRESSURE 80-89 MM HG: ICD-10-PCS | Mod: HCNC,CPTII,S$GLB, | Performed by: FAMILY MEDICINE

## 2021-10-04 PROCEDURE — 80061 LIPID PANEL: CPT | Mod: HCNC | Performed by: FAMILY MEDICINE

## 2021-10-04 PROCEDURE — G0008 ADMIN INFLUENZA VIRUS VAC: HCPCS | Mod: HCNC,S$GLB,, | Performed by: FAMILY MEDICINE

## 2021-10-04 PROCEDURE — 99999 PR PBB SHADOW E&M-EST. PATIENT-LVL III: ICD-10-PCS | Mod: PBBFAC,HCNC,, | Performed by: FAMILY MEDICINE

## 2021-10-04 PROCEDURE — 3008F PR BODY MASS INDEX (BMI) DOCUMENTED: ICD-10-PCS | Mod: HCNC,CPTII,S$GLB, | Performed by: FAMILY MEDICINE

## 2021-10-04 PROCEDURE — 3008F BODY MASS INDEX DOCD: CPT | Mod: HCNC,CPTII,S$GLB, | Performed by: FAMILY MEDICINE

## 2021-10-04 PROCEDURE — 36415 COLL VENOUS BLD VENIPUNCTURE: CPT | Mod: HCNC | Performed by: FAMILY MEDICINE

## 2021-10-04 PROCEDURE — 99499 RISK ADDL DX/OHS AUDIT: ICD-10-PCS | Mod: HCNC,S$GLB,, | Performed by: FAMILY MEDICINE

## 2021-10-04 PROCEDURE — 4010F ACE/ARB THERAPY RXD/TAKEN: CPT | Mod: HCNC,CPTII,S$GLB, | Performed by: FAMILY MEDICINE

## 2021-10-04 PROCEDURE — 1159F PR MEDICATION LIST DOCUMENTED IN MEDICAL RECORD: ICD-10-PCS | Mod: HCNC,CPTII,S$GLB, | Performed by: FAMILY MEDICINE

## 2021-10-04 PROCEDURE — 1160F RVW MEDS BY RX/DR IN RCRD: CPT | Mod: HCNC,CPTII,S$GLB, | Performed by: FAMILY MEDICINE

## 2021-10-04 PROCEDURE — 3075F SYST BP GE 130 - 139MM HG: CPT | Mod: HCNC,CPTII,S$GLB, | Performed by: FAMILY MEDICINE

## 2021-10-04 RX ORDER — MONTELUKAST SODIUM 10 MG/1
10 TABLET ORAL DAILY
Qty: 90 TABLET | Refills: 3 | Status: SHIPPED | OUTPATIENT
Start: 2021-10-04 | End: 2022-10-04 | Stop reason: SDUPTHER

## 2021-10-04 RX ORDER — LISINOPRIL 40 MG/1
40 TABLET ORAL DAILY
Qty: 90 TABLET | Refills: 3 | Status: SHIPPED | OUTPATIENT
Start: 2021-10-04 | End: 2022-10-04 | Stop reason: SDUPTHER

## 2021-10-04 RX ORDER — ATORVASTATIN CALCIUM 40 MG/1
40 TABLET, FILM COATED ORAL DAILY
Qty: 90 TABLET | Refills: 3 | Status: SHIPPED | OUTPATIENT
Start: 2021-10-04 | End: 2022-02-14

## 2021-10-04 RX ORDER — FLUTICASONE PROPIONATE AND SALMETEROL 250; 50 UG/1; UG/1
1 POWDER RESPIRATORY (INHALATION) 2 TIMES DAILY
Qty: 60 EACH | Refills: 11 | Status: SHIPPED | OUTPATIENT
Start: 2021-10-04 | End: 2023-02-23

## 2021-10-04 RX ORDER — CARVEDILOL 25 MG/1
25 TABLET ORAL 2 TIMES DAILY WITH MEALS
Qty: 180 TABLET | Refills: 3 | Status: SHIPPED | OUTPATIENT
Start: 2021-10-04 | End: 2022-10-04 | Stop reason: SDUPTHER

## 2021-10-04 RX ORDER — ALBUTEROL SULFATE 90 UG/1
2 AEROSOL, METERED RESPIRATORY (INHALATION) EVERY 6 HOURS PRN
Qty: 6.7 G | Refills: 3 | Status: SHIPPED | OUTPATIENT
Start: 2021-10-04 | End: 2022-09-22 | Stop reason: SDUPTHER

## 2021-10-05 ENCOUNTER — PATIENT OUTREACH (OUTPATIENT)
Dept: ADMINISTRATIVE | Facility: OTHER | Age: 71
End: 2021-10-05

## 2021-10-07 ENCOUNTER — LAB VISIT (OUTPATIENT)
Dept: LAB | Facility: HOSPITAL | Age: 71
End: 2021-10-07
Attending: ALLERGY & IMMUNOLOGY
Payer: MEDICARE

## 2021-10-07 ENCOUNTER — OFFICE VISIT (OUTPATIENT)
Dept: ALLERGY | Facility: CLINIC | Age: 71
End: 2021-10-07
Attending: FAMILY MEDICINE
Payer: MEDICARE

## 2021-10-07 VITALS — BODY MASS INDEX: 24.27 KG/M2 | HEIGHT: 66 IN | WEIGHT: 151 LBS

## 2021-10-07 DIAGNOSIS — L30.9 DERMATITIS: Primary | ICD-10-CM

## 2021-10-07 DIAGNOSIS — J30.9 CHRONIC ALLERGIC RHINITIS: ICD-10-CM

## 2021-10-07 DIAGNOSIS — J31.0 CHRONIC RHINITIS: ICD-10-CM

## 2021-10-07 DIAGNOSIS — J31.0 RHINITIS MEDICAMENTOSA: ICD-10-CM

## 2021-10-07 DIAGNOSIS — T48.5X5A RHINITIS MEDICAMENTOSA: ICD-10-CM

## 2021-10-07 PROCEDURE — 3288F PR FALLS RISK ASSESSMENT DOCUMENTED: ICD-10-PCS | Mod: HCNC,CPTII,S$GLB, | Performed by: ALLERGY & IMMUNOLOGY

## 2021-10-07 PROCEDURE — 86003 ALLG SPEC IGE CRUDE XTRC EA: CPT | Mod: HCNC | Performed by: ALLERGY & IMMUNOLOGY

## 2021-10-07 PROCEDURE — 99999 PR PBB SHADOW E&M-EST. PATIENT-LVL IV: ICD-10-PCS | Mod: PBBFAC,HCNC,, | Performed by: ALLERGY & IMMUNOLOGY

## 2021-10-07 PROCEDURE — 1160F RVW MEDS BY RX/DR IN RCRD: CPT | Mod: HCNC,CPTII,S$GLB, | Performed by: ALLERGY & IMMUNOLOGY

## 2021-10-07 PROCEDURE — 86003 ALLG SPEC IGE CRUDE XTRC EA: CPT | Mod: 59,HCNC | Performed by: ALLERGY & IMMUNOLOGY

## 2021-10-07 PROCEDURE — 3008F BODY MASS INDEX DOCD: CPT | Mod: HCNC,CPTII,S$GLB, | Performed by: ALLERGY & IMMUNOLOGY

## 2021-10-07 PROCEDURE — 1160F PR REVIEW ALL MEDS BY PRESCRIBER/CLIN PHARMACIST DOCUMENTED: ICD-10-PCS | Mod: HCNC,CPTII,S$GLB, | Performed by: ALLERGY & IMMUNOLOGY

## 2021-10-07 PROCEDURE — 1159F PR MEDICATION LIST DOCUMENTED IN MEDICAL RECORD: ICD-10-PCS | Mod: HCNC,CPTII,S$GLB, | Performed by: ALLERGY & IMMUNOLOGY

## 2021-10-07 PROCEDURE — 99204 OFFICE O/P NEW MOD 45 MIN: CPT | Mod: HCNC,S$GLB,, | Performed by: ALLERGY & IMMUNOLOGY

## 2021-10-07 PROCEDURE — 99204 PR OFFICE/OUTPT VISIT, NEW, LEVL IV, 45-59 MIN: ICD-10-PCS | Mod: HCNC,S$GLB,, | Performed by: ALLERGY & IMMUNOLOGY

## 2021-10-07 PROCEDURE — 99999 PR PBB SHADOW E&M-EST. PATIENT-LVL IV: CPT | Mod: PBBFAC,HCNC,, | Performed by: ALLERGY & IMMUNOLOGY

## 2021-10-07 PROCEDURE — 3288F FALL RISK ASSESSMENT DOCD: CPT | Mod: HCNC,CPTII,S$GLB, | Performed by: ALLERGY & IMMUNOLOGY

## 2021-10-07 PROCEDURE — 4010F PR ACE/ARB THEARPY RXD/TAKEN: ICD-10-PCS | Mod: HCNC,CPTII,S$GLB, | Performed by: ALLERGY & IMMUNOLOGY

## 2021-10-07 PROCEDURE — 1101F PT FALLS ASSESS-DOCD LE1/YR: CPT | Mod: HCNC,CPTII,S$GLB, | Performed by: ALLERGY & IMMUNOLOGY

## 2021-10-07 PROCEDURE — 1159F MED LIST DOCD IN RCRD: CPT | Mod: HCNC,CPTII,S$GLB, | Performed by: ALLERGY & IMMUNOLOGY

## 2021-10-07 PROCEDURE — 1126F PR PAIN SEVERITY QUANTIFIED, NO PAIN PRESENT: ICD-10-PCS | Mod: HCNC,CPTII,S$GLB, | Performed by: ALLERGY & IMMUNOLOGY

## 2021-10-07 PROCEDURE — 1126F AMNT PAIN NOTED NONE PRSNT: CPT | Mod: HCNC,CPTII,S$GLB, | Performed by: ALLERGY & IMMUNOLOGY

## 2021-10-07 PROCEDURE — 36415 COLL VENOUS BLD VENIPUNCTURE: CPT | Mod: HCNC,PO | Performed by: ALLERGY & IMMUNOLOGY

## 2021-10-07 PROCEDURE — 1101F PR PT FALLS ASSESS DOC 0-1 FALLS W/OUT INJ PAST YR: ICD-10-PCS | Mod: HCNC,CPTII,S$GLB, | Performed by: ALLERGY & IMMUNOLOGY

## 2021-10-07 PROCEDURE — 4010F ACE/ARB THERAPY RXD/TAKEN: CPT | Mod: HCNC,CPTII,S$GLB, | Performed by: ALLERGY & IMMUNOLOGY

## 2021-10-07 PROCEDURE — 3008F PR BODY MASS INDEX (BMI) DOCUMENTED: ICD-10-PCS | Mod: HCNC,CPTII,S$GLB, | Performed by: ALLERGY & IMMUNOLOGY

## 2021-10-07 RX ORDER — CLOBETASOL PROPIONATE 0.5 MG/G
AEROSOL, FOAM TOPICAL 2 TIMES DAILY
Qty: 100 G | Refills: 3 | Status: SHIPPED | OUTPATIENT
Start: 2021-10-07 | End: 2023-01-03 | Stop reason: SDUPTHER

## 2021-10-07 RX ORDER — FLUTICASONE PROPIONATE 50 MCG
1 SPRAY, SUSPENSION (ML) NASAL 2 TIMES DAILY
Qty: 16 G | Refills: 11 | Status: SHIPPED | OUTPATIENT
Start: 2021-10-07 | End: 2022-10-04

## 2021-10-07 RX ORDER — AZELASTINE 1 MG/ML
1 SPRAY, METERED NASAL 2 TIMES DAILY
Qty: 30 ML | Refills: 11 | Status: SHIPPED | OUTPATIENT
Start: 2021-10-07 | End: 2022-09-22 | Stop reason: SDUPTHER

## 2021-10-07 RX ORDER — METHYLPREDNISOLONE 4 MG/1
TABLET ORAL
Qty: 1 PACKAGE | Refills: 0 | Status: SHIPPED | OUTPATIENT
Start: 2021-10-07 | End: 2021-10-28

## 2021-10-11 LAB
A ALTERNATA IGE QN: 0.3 KU/L
A FUMIGATUS IGE QN: <0.1 KU/L
BAHIA GRASS IGE QN: <0.1 KU/L
BERMUDA GRASS IGE QN: <0.1 KU/L
CAT DANDER IGE QN: <0.1 KU/L
CEDAR IGE QN: <0.1 KU/L
D FARINAE IGE QN: <0.1 KU/L
D PTERONYSS IGE QN: <0.1 KU/L
DEPRECATED A ALTERNATA IGE RAST QL: ABNORMAL
DEPRECATED A FUMIGATUS IGE RAST QL: NORMAL
DEPRECATED BAHIA GRASS IGE RAST QL: NORMAL
DEPRECATED BERMUDA GRASS IGE RAST QL: NORMAL
DEPRECATED CAT DANDER IGE RAST QL: NORMAL
DEPRECATED CEDAR IGE RAST QL: NORMAL
DEPRECATED D FARINAE IGE RAST QL: NORMAL
DEPRECATED D PTERONYSS IGE RAST QL: NORMAL
DEPRECATED DOG DANDER IGE RAST QL: NORMAL
DEPRECATED ELDER IGE RAST QL: NORMAL
DEPRECATED ENGL PLANTAIN IGE RAST QL: NORMAL
DEPRECATED JOHNSON GRASS IGE RAST QL: NORMAL
DEPRECATED PECAN/HICK TREE IGE RAST QL: NORMAL
DEPRECATED ROACH IGE RAST QL: NORMAL
DEPRECATED TIMOTHY IGE RAST QL: NORMAL
DEPRECATED WEST RAGWEED IGE RAST QL: NORMAL
DEPRECATED WHITE OAK IGE RAST QL: NORMAL
DOG DANDER IGE QN: <0.1 KU/L
ELDER IGE QN: <0.1 KU/L
ENGL PLANTAIN IGE QN: <0.1 KU/L
JOHNSON GRASS IGE QN: <0.1 KU/L
PECAN/HICK TREE IGE QN: <0.1 KU/L
ROACH IGE QN: <0.1 KU/L
TIMOTHY IGE QN: <0.1 KU/L
WEST RAGWEED IGE QN: <0.1 KU/L
WHITE OAK IGE QN: <0.1 KU/L

## 2021-10-12 ENCOUNTER — TELEPHONE (OUTPATIENT)
Dept: ALLERGY | Facility: CLINIC | Age: 71
End: 2021-10-12

## 2021-10-21 ENCOUNTER — IMMUNIZATION (OUTPATIENT)
Dept: PRIMARY CARE CLINIC | Facility: CLINIC | Age: 71
End: 2021-10-21
Payer: MEDICARE

## 2021-10-21 DIAGNOSIS — Z23 NEED FOR VACCINATION: Primary | ICD-10-CM

## 2021-10-21 PROCEDURE — 0003A COVID-19, MRNA, LNP-S, PF, 30 MCG/0.3 ML DOSE VACCINE: CPT | Mod: S$GLB,,, | Performed by: FAMILY MEDICINE

## 2021-10-21 PROCEDURE — 0003A COVID-19, MRNA, LNP-S, PF, 30 MCG/0.3 ML DOSE VACCINE: ICD-10-PCS | Mod: S$GLB,,, | Performed by: FAMILY MEDICINE

## 2021-10-21 PROCEDURE — 91300 COVID-19, MRNA, LNP-S, PF, 30 MCG/0.3 ML DOSE VACCINE: CPT | Mod: S$GLB,,, | Performed by: FAMILY MEDICINE

## 2021-10-21 PROCEDURE — 91300 COVID-19, MRNA, LNP-S, PF, 30 MCG/0.3 ML DOSE VACCINE: ICD-10-PCS | Mod: S$GLB,,, | Performed by: FAMILY MEDICINE

## 2021-11-17 ENCOUNTER — TELEPHONE (OUTPATIENT)
Dept: FAMILY MEDICINE | Facility: CLINIC | Age: 71
End: 2021-11-17
Payer: MEDICARE

## 2021-12-28 ENCOUNTER — TELEPHONE (OUTPATIENT)
Dept: FAMILY MEDICINE | Facility: CLINIC | Age: 71
End: 2021-12-28
Payer: MEDICARE

## 2021-12-28 ENCOUNTER — TELEPHONE (OUTPATIENT)
Dept: ALLERGY | Facility: CLINIC | Age: 71
End: 2021-12-28
Payer: MEDICARE

## 2021-12-29 ENCOUNTER — OFFICE VISIT (OUTPATIENT)
Dept: URGENT CARE | Facility: CLINIC | Age: 71
End: 2021-12-29
Payer: MEDICARE

## 2021-12-29 VITALS
HEIGHT: 66 IN | SYSTOLIC BLOOD PRESSURE: 147 MMHG | OXYGEN SATURATION: 98 % | BODY MASS INDEX: 24.81 KG/M2 | DIASTOLIC BLOOD PRESSURE: 80 MMHG | WEIGHT: 154.38 LBS | TEMPERATURE: 97 F | HEART RATE: 59 BPM | RESPIRATION RATE: 16 BRPM

## 2021-12-29 DIAGNOSIS — R05.9 COUGH: ICD-10-CM

## 2021-12-29 DIAGNOSIS — B96.89 ACUTE BACTERIAL RHINOSINUSITIS: Primary | ICD-10-CM

## 2021-12-29 DIAGNOSIS — R09.82 POSTNASAL DRIP: ICD-10-CM

## 2021-12-29 DIAGNOSIS — Z20.822 COVID-19 VIRUS NOT DETECTED: ICD-10-CM

## 2021-12-29 DIAGNOSIS — J01.90 ACUTE BACTERIAL RHINOSINUSITIS: Primary | ICD-10-CM

## 2021-12-29 PROCEDURE — 1159F MED LIST DOCD IN RCRD: CPT | Mod: CPTII,S$GLB,, | Performed by: NURSE PRACTITIONER

## 2021-12-29 PROCEDURE — 87811 SARS-COV-2 COVID19 W/OPTIC: CPT | Mod: S$GLB,,, | Performed by: NURSE PRACTITIONER

## 2021-12-29 PROCEDURE — 1159F PR MEDICATION LIST DOCUMENTED IN MEDICAL RECORD: ICD-10-PCS | Mod: CPTII,S$GLB,, | Performed by: NURSE PRACTITIONER

## 2021-12-29 PROCEDURE — 1160F PR REVIEW ALL MEDS BY PRESCRIBER/CLIN PHARMACIST DOCUMENTED: ICD-10-PCS | Mod: CPTII,S$GLB,, | Performed by: NURSE PRACTITIONER

## 2021-12-29 PROCEDURE — 3008F BODY MASS INDEX DOCD: CPT | Mod: CPTII,S$GLB,, | Performed by: NURSE PRACTITIONER

## 2021-12-29 PROCEDURE — 4010F PR ACE/ARB THEARPY RXD/TAKEN: ICD-10-PCS | Mod: CPTII,S$GLB,, | Performed by: NURSE PRACTITIONER

## 2021-12-29 PROCEDURE — 4010F ACE/ARB THERAPY RXD/TAKEN: CPT | Mod: CPTII,S$GLB,, | Performed by: NURSE PRACTITIONER

## 2021-12-29 PROCEDURE — 3077F PR MOST RECENT SYSTOLIC BLOOD PRESSURE >= 140 MM HG: ICD-10-PCS | Mod: CPTII,S$GLB,, | Performed by: NURSE PRACTITIONER

## 2021-12-29 PROCEDURE — 99213 OFFICE O/P EST LOW 20 MIN: CPT | Mod: S$GLB,,, | Performed by: NURSE PRACTITIONER

## 2021-12-29 PROCEDURE — 87811 PR SARS-COV-2 COVID-19 IMMUNOASSAY W/DIRECT OPTICAL OBS: ICD-10-PCS | Mod: S$GLB,,, | Performed by: NURSE PRACTITIONER

## 2021-12-29 PROCEDURE — 3077F SYST BP >= 140 MM HG: CPT | Mod: CPTII,S$GLB,, | Performed by: NURSE PRACTITIONER

## 2021-12-29 PROCEDURE — 1160F RVW MEDS BY RX/DR IN RCRD: CPT | Mod: CPTII,S$GLB,, | Performed by: NURSE PRACTITIONER

## 2021-12-29 PROCEDURE — 3079F PR MOST RECENT DIASTOLIC BLOOD PRESSURE 80-89 MM HG: ICD-10-PCS | Mod: CPTII,S$GLB,, | Performed by: NURSE PRACTITIONER

## 2021-12-29 PROCEDURE — 3008F PR BODY MASS INDEX (BMI) DOCUMENTED: ICD-10-PCS | Mod: CPTII,S$GLB,, | Performed by: NURSE PRACTITIONER

## 2021-12-29 PROCEDURE — 3079F DIAST BP 80-89 MM HG: CPT | Mod: CPTII,S$GLB,, | Performed by: NURSE PRACTITIONER

## 2021-12-29 PROCEDURE — 99213 PR OFFICE/OUTPT VISIT, EST, LEVL III, 20-29 MIN: ICD-10-PCS | Mod: S$GLB,,, | Performed by: NURSE PRACTITIONER

## 2021-12-29 RX ORDER — BENZONATATE 200 MG/1
200 CAPSULE ORAL 3 TIMES DAILY PRN
Qty: 21 CAPSULE | Refills: 0 | Status: SHIPPED | OUTPATIENT
Start: 2021-12-29 | End: 2022-10-04

## 2021-12-29 RX ORDER — DOXYCYCLINE 100 MG/1
100 CAPSULE ORAL EVERY 12 HOURS
Qty: 14 CAPSULE | Refills: 0 | Status: SHIPPED | OUTPATIENT
Start: 2021-12-29 | End: 2022-01-05

## 2022-01-03 ENCOUNTER — TELEPHONE (OUTPATIENT)
Dept: ALLERGY | Facility: CLINIC | Age: 72
End: 2022-01-03
Payer: MEDICARE

## 2022-01-03 NOTE — TELEPHONE ENCOUNTER
Left message telling pt we need more information. For her to call PCP -told her Dr Leija is not in the office today but will return tomorrow.           ----- Message from Caitlin Cisneros sent at 1/3/2022  8:21 AM CST -----  Type: Needs Medical Advice  Who Called:  pt  Symptoms (please be specific):  went to  and was given antibiotics. They are not agreeing with her and she is needing to know what to do.   How long has patient had these symptoms:    Pharmacy name and phone #:      [id number] - [place], [place] - [address] [facility]  [address]  [place] [address]  Phone: [phone] Fax: [phone]        Best Call Back Number: [phone] (home)     Additional Information

## 2022-01-07 DIAGNOSIS — J45.909 ASTHMA, UNSPECIFIED ASTHMA SEVERITY, UNSPECIFIED WHETHER COMPLICATED, UNSPECIFIED WHETHER PERSISTENT: ICD-10-CM

## 2022-01-07 RX ORDER — FLUTICASONE PROPIONATE AND SALMETEROL 250; 50 UG/1; UG/1
1 POWDER RESPIRATORY (INHALATION) 2 TIMES DAILY
Qty: 60 EACH | Refills: 11 | OUTPATIENT
Start: 2022-01-07 | End: 2023-01-07

## 2022-01-07 NOTE — TELEPHONE ENCOUNTER
No new care gaps identified.  Powered by [employer] by [employer]. Reference number: [id number].   1/07/2022 2:58:37 PM CST

## 2022-01-07 NOTE — TELEPHONE ENCOUNTER
----- Message from Sammi Baxter sent at 1/7/2022  1:23 PM CST -----  Contact: Patient  Type:  RX Refill Request    Who Called:  Patient     Refill or New Rx: Refill    RX Name and Strength: fluticasone-salmeterol diskus inhaler 250-50 mcg      How is the patient currently taking it? (ex. 1XDay):    Is this a 30 day or 90 day RX:    Preferred Pharmacy with phone number:     [facility] [address] [id number]      Local or Mail Order: Local     Ordering Provider: Dr Toure   Would the patient rather a call back or a response via [facility]?      Best Call Back Number:     Additional Information:

## 2022-02-10 ENCOUNTER — OFFICE VISIT (OUTPATIENT)
Dept: CARDIOLOGY | Facility: CLINIC | Age: 72
End: 2022-02-10
Payer: MEDICARE

## 2022-02-10 ENCOUNTER — TELEPHONE (OUTPATIENT)
Dept: FAMILY MEDICINE | Facility: CLINIC | Age: 72
End: 2022-02-10
Payer: MEDICARE

## 2022-02-10 VITALS
SYSTOLIC BLOOD PRESSURE: 180 MMHG | HEIGHT: 66 IN | WEIGHT: 157 LBS | DIASTOLIC BLOOD PRESSURE: 80 MMHG | BODY MASS INDEX: 25.23 KG/M2 | HEART RATE: 64 BPM

## 2022-02-10 DIAGNOSIS — I10 ESSENTIAL HYPERTENSION: Primary | ICD-10-CM

## 2022-02-10 DIAGNOSIS — E78.5 HYPERLIPIDEMIA, UNSPECIFIED HYPERLIPIDEMIA TYPE: ICD-10-CM

## 2022-02-10 PROCEDURE — 1125F AMNT PAIN NOTED PAIN PRSNT: CPT | Mod: CPTII,S$GLB,, | Performed by: NURSE PRACTITIONER

## 2022-02-10 PROCEDURE — 3008F BODY MASS INDEX DOCD: CPT | Mod: CPTII,S$GLB,, | Performed by: NURSE PRACTITIONER

## 2022-02-10 PROCEDURE — 1160F RVW MEDS BY RX/DR IN RCRD: CPT | Mod: CPTII,S$GLB,, | Performed by: NURSE PRACTITIONER

## 2022-02-10 PROCEDURE — 3288F FALL RISK ASSESSMENT DOCD: CPT | Mod: CPTII,S$GLB,, | Performed by: NURSE PRACTITIONER

## 2022-02-10 PROCEDURE — 3077F PR MOST RECENT SYSTOLIC BLOOD PRESSURE >= 140 MM HG: ICD-10-PCS | Mod: CPTII,S$GLB,, | Performed by: NURSE PRACTITIONER

## 2022-02-10 PROCEDURE — 1101F PT FALLS ASSESS-DOCD LE1/YR: CPT | Mod: CPTII,S$GLB,, | Performed by: NURSE PRACTITIONER

## 2022-02-10 PROCEDURE — 1159F PR MEDICATION LIST DOCUMENTED IN MEDICAL RECORD: ICD-10-PCS | Mod: CPTII,S$GLB,, | Performed by: NURSE PRACTITIONER

## 2022-02-10 PROCEDURE — 3008F PR BODY MASS INDEX (BMI) DOCUMENTED: ICD-10-PCS | Mod: CPTII,S$GLB,, | Performed by: NURSE PRACTITIONER

## 2022-02-10 PROCEDURE — 3288F PR FALLS RISK ASSESSMENT DOCUMENTED: ICD-10-PCS | Mod: CPTII,S$GLB,, | Performed by: NURSE PRACTITIONER

## 2022-02-10 PROCEDURE — 99213 OFFICE O/P EST LOW 20 MIN: CPT | Mod: S$GLB,,, | Performed by: NURSE PRACTITIONER

## 2022-02-10 PROCEDURE — 3077F SYST BP >= 140 MM HG: CPT | Mod: CPTII,S$GLB,, | Performed by: NURSE PRACTITIONER

## 2022-02-10 PROCEDURE — 1101F PR PT FALLS ASSESS DOC 0-1 FALLS W/OUT INJ PAST YR: ICD-10-PCS | Mod: CPTII,S$GLB,, | Performed by: NURSE PRACTITIONER

## 2022-02-10 PROCEDURE — 1159F MED LIST DOCD IN RCRD: CPT | Mod: CPTII,S$GLB,, | Performed by: NURSE PRACTITIONER

## 2022-02-10 PROCEDURE — 3079F DIAST BP 80-89 MM HG: CPT | Mod: CPTII,S$GLB,, | Performed by: NURSE PRACTITIONER

## 2022-02-10 PROCEDURE — 99213 PR OFFICE/OUTPT VISIT, EST, LEVL III, 20-29 MIN: ICD-10-PCS | Mod: S$GLB,,, | Performed by: NURSE PRACTITIONER

## 2022-02-10 PROCEDURE — 3079F PR MOST RECENT DIASTOLIC BLOOD PRESSURE 80-89 MM HG: ICD-10-PCS | Mod: CPTII,S$GLB,, | Performed by: NURSE PRACTITIONER

## 2022-02-10 PROCEDURE — 1160F PR REVIEW ALL MEDS BY PRESCRIBER/CLIN PHARMACIST DOCUMENTED: ICD-10-PCS | Mod: CPTII,S$GLB,, | Performed by: NURSE PRACTITIONER

## 2022-02-10 PROCEDURE — 1125F PR PAIN SEVERITY QUANTIFIED, PAIN PRESENT: ICD-10-PCS | Mod: CPTII,S$GLB,, | Performed by: NURSE PRACTITIONER

## 2022-02-10 NOTE — TELEPHONE ENCOUNTER
----- Message from Justine Bolden sent at 2/10/2022  9:50 AM CST -----  Type: Needs Medical Advice  Who Called: Patient  Symptoms (please be specific):   How long has patient had these symptoms:    Pharmacy name and phone #:    Best Call Back Number: [phone]  Additional Information: Pt requesting a call back from [place], concerning pain the patient is having and requesting advise.

## 2022-02-14 ENCOUNTER — OFFICE VISIT (OUTPATIENT)
Dept: FAMILY MEDICINE | Facility: CLINIC | Age: 72
End: 2022-02-14
Attending: FAMILY MEDICINE
Payer: MEDICARE

## 2022-02-14 VITALS
HEIGHT: 66 IN | HEART RATE: 66 BPM | OXYGEN SATURATION: 98 % | BODY MASS INDEX: 24.94 KG/M2 | SYSTOLIC BLOOD PRESSURE: 138 MMHG | TEMPERATURE: 98 F | RESPIRATION RATE: 18 BRPM | DIASTOLIC BLOOD PRESSURE: 86 MMHG | WEIGHT: 155.19 LBS

## 2022-02-14 DIAGNOSIS — E78.5 HYPERLIPIDEMIA, UNSPECIFIED HYPERLIPIDEMIA TYPE: ICD-10-CM

## 2022-02-14 DIAGNOSIS — R73.03 PREDIABETES: ICD-10-CM

## 2022-02-14 DIAGNOSIS — D57.3 SICKLE-CELL TRAIT: ICD-10-CM

## 2022-02-14 DIAGNOSIS — I10 PRIMARY HYPERTENSION: ICD-10-CM

## 2022-02-14 DIAGNOSIS — M79.2 NEUROPATHIC PAIN OF LOWER EXTREMITY, RIGHT: Primary | ICD-10-CM

## 2022-02-14 DIAGNOSIS — N18.31 STAGE 3A CHRONIC KIDNEY DISEASE: ICD-10-CM

## 2022-02-14 PROCEDURE — 99213 OFFICE O/P EST LOW 20 MIN: CPT | Mod: HCNC,S$GLB,, | Performed by: FAMILY MEDICINE

## 2022-02-14 PROCEDURE — 3075F SYST BP GE 130 - 139MM HG: CPT | Mod: HCNC,CPTII,S$GLB, | Performed by: FAMILY MEDICINE

## 2022-02-14 PROCEDURE — 3288F PR FALLS RISK ASSESSMENT DOCUMENTED: ICD-10-PCS | Mod: HCNC,CPTII,S$GLB, | Performed by: FAMILY MEDICINE

## 2022-02-14 PROCEDURE — 99999 PR PBB SHADOW E&M-EST. PATIENT-LVL V: CPT | Mod: PBBFAC,HCNC,, | Performed by: FAMILY MEDICINE

## 2022-02-14 PROCEDURE — 3008F BODY MASS INDEX DOCD: CPT | Mod: HCNC,CPTII,S$GLB, | Performed by: FAMILY MEDICINE

## 2022-02-14 PROCEDURE — 99499 RISK ADDL DX/OHS AUDIT: ICD-10-PCS | Mod: S$GLB,,, | Performed by: FAMILY MEDICINE

## 2022-02-14 PROCEDURE — 1160F PR REVIEW ALL MEDS BY PRESCRIBER/CLIN PHARMACIST DOCUMENTED: ICD-10-PCS | Mod: HCNC,CPTII,S$GLB, | Performed by: FAMILY MEDICINE

## 2022-02-14 PROCEDURE — 3288F FALL RISK ASSESSMENT DOCD: CPT | Mod: HCNC,CPTII,S$GLB, | Performed by: FAMILY MEDICINE

## 2022-02-14 PROCEDURE — 3079F PR MOST RECENT DIASTOLIC BLOOD PRESSURE 80-89 MM HG: ICD-10-PCS | Mod: HCNC,CPTII,S$GLB, | Performed by: FAMILY MEDICINE

## 2022-02-14 PROCEDURE — 1159F MED LIST DOCD IN RCRD: CPT | Mod: HCNC,CPTII,S$GLB, | Performed by: FAMILY MEDICINE

## 2022-02-14 PROCEDURE — 1101F PR PT FALLS ASSESS DOC 0-1 FALLS W/OUT INJ PAST YR: ICD-10-PCS | Mod: HCNC,CPTII,S$GLB, | Performed by: FAMILY MEDICINE

## 2022-02-14 PROCEDURE — 99999 PR PBB SHADOW E&M-EST. PATIENT-LVL V: ICD-10-PCS | Mod: PBBFAC,HCNC,, | Performed by: FAMILY MEDICINE

## 2022-02-14 PROCEDURE — 1159F PR MEDICATION LIST DOCUMENTED IN MEDICAL RECORD: ICD-10-PCS | Mod: HCNC,CPTII,S$GLB, | Performed by: FAMILY MEDICINE

## 2022-02-14 PROCEDURE — 1101F PT FALLS ASSESS-DOCD LE1/YR: CPT | Mod: HCNC,CPTII,S$GLB, | Performed by: FAMILY MEDICINE

## 2022-02-14 PROCEDURE — 99499 UNLISTED E&M SERVICE: CPT | Mod: S$GLB,,, | Performed by: FAMILY MEDICINE

## 2022-02-14 PROCEDURE — 1126F AMNT PAIN NOTED NONE PRSNT: CPT | Mod: HCNC,CPTII,S$GLB, | Performed by: FAMILY MEDICINE

## 2022-02-14 PROCEDURE — 99213 PR OFFICE/OUTPT VISIT, EST, LEVL III, 20-29 MIN: ICD-10-PCS | Mod: HCNC,S$GLB,, | Performed by: FAMILY MEDICINE

## 2022-02-14 PROCEDURE — 3008F PR BODY MASS INDEX (BMI) DOCUMENTED: ICD-10-PCS | Mod: HCNC,CPTII,S$GLB, | Performed by: FAMILY MEDICINE

## 2022-02-14 PROCEDURE — 1126F PR PAIN SEVERITY QUANTIFIED, NO PAIN PRESENT: ICD-10-PCS | Mod: HCNC,CPTII,S$GLB, | Performed by: FAMILY MEDICINE

## 2022-02-14 PROCEDURE — 3075F PR MOST RECENT SYSTOLIC BLOOD PRESS GE 130-139MM HG: ICD-10-PCS | Mod: HCNC,CPTII,S$GLB, | Performed by: FAMILY MEDICINE

## 2022-02-14 PROCEDURE — 1160F RVW MEDS BY RX/DR IN RCRD: CPT | Mod: HCNC,CPTII,S$GLB, | Performed by: FAMILY MEDICINE

## 2022-02-14 PROCEDURE — 3079F DIAST BP 80-89 MM HG: CPT | Mod: HCNC,CPTII,S$GLB, | Performed by: FAMILY MEDICINE

## 2022-02-14 RX ORDER — ATORVASTATIN CALCIUM 40 MG/1
40 TABLET, FILM COATED ORAL DAILY
Qty: 90 TABLET | Refills: 3
Start: 2022-02-14 | End: 2022-10-04 | Stop reason: SDUPTHER

## 2022-02-14 NOTE — PROGRESS NOTES
Subjective:       Patient ID: Loren Andre is a 71 y.o. female.    Chief Complaint: Foot Pain (Pt states that she has been having some pain and burning that radiates from her right foot up her leg and into her back)    71-year-old female coming in with pain in the right lower extremity.  She has been experiencing a shooting pain that radiates from the sole of the right foot up the posterior 0 lateral calf, thigh, and into the buttock intermittently for quite a long time.  She has a remote history of polyneuropathy and describes having a nerve conduction study although I could not located in epic it may pre date epic.  Recently she began experiencing a somewhat similar pain that was more burning in nature following the same pathway from foot to buttock.  This burning pain lasted about a day and then resolved and has not recurred.  She does not recall any change in activity or injury that could have accounted for the change and she has not seen a rash that would suggest zoster either during that pain or subsequently.  She does have a history of pre diabetes but the pain is unilateral.  She has a history of fibromyalgia and does describe generalized pains as well.  She does not recall using gabapentin or Lyrica in the past and there is no record of their use in epic.  History includes anxiety, asthma, hypertension, hyperlipidemia taking atorvastatin, prediabetes, genital HSV, seropositive rheumatoid arthritis, anemia of chronic disease as well as sickle cell trait and leukopenia, fibromyalgia and the polyneuropathy.  She has not identified any particular activity that produces the pain and states that her car has a very comfortable seating position that does not seem to bother her at all.    Past Medical History:  No date: Allergy  No date: Anemia      Comment:  sickle cell trait  No date: Anxiety  No date: Arthritis  No date: Asthma  10/5/2017: Chronic disease anemia  No date: Colon polyps  No date:  Depression  3/7/2013: Dermatitis  No date: Fibromyalgia  No date: HEARING LOSS  No date: Hyperlipidemia  No date: Hypertension  10/5/2017: Leucopenia  No date: Polyneuropathy  No date: Scoliosis  No date: Sickle cell trait  10/5/2017: Sickle-cell trait  No date: Trouble in sleeping  No date: Urticaria    Past Surgical History:  No date: ADENOIDECTOMY  No date: APPENDECTOMY  4/14/2008: COLONOSCOPY      Comment:  Dr. Guerrero, 10 year recheck  10/4/2018: COLONOSCOPY; N/A      Comment:  Procedure: COLONOSCOPY;  Surgeon: Tam Kemp MD;                Location: [facility];  Service: Endoscopy;  Laterality:                N/A;  No date: HYSTERECTOMY  No date: OOPHORECTOMY  01/2016: ROTATOR CUFF REPAIR; Left  12/04/2017: ROTATOR CUFF REPAIR W/ DISTAL CLAVICLE EXCISION; Right      Comment:  Dr. Eligio Timmons ( [facility] )  No date: TONSILLECTOMY    Current Outpatient Medications on File Prior to Visit:  albuterol (PROAIR HFA) 90 mcg/actuation inhaler, Inhale 2 puffs into the lungs every 6 (six) hours as needed for Wheezing. Rescue, Disp: 6.7 g, Rfl: 3  ascorbic acid, vitamin C, (VITAMIN C) 500 MG tablet, Take 500 mg by mouth daily as needed. , Disp: , Rfl:   azelastine (ASTELIN) 137 mcg (0.1 %) nasal spray, 1 spray (137 mcg total) by Nasal route 2 (two) times daily., Disp: 30 mL, Rfl: 11  benzonatate (TESSALON) 200 MG capsule, Take 1 capsule (200 mg total) by mouth 3 (three) times daily as needed for Cough., Disp: 21 capsule, Rfl: 0  betamethasone dipropionate (DIPROLENE) 0.05 % cream, Apply topically 2 (two) times daily., Disp: 15 g, Rfl: 2  CALCIUM CARBONATE/VITAMIN D3 (VITAMIN D-3 ORAL), Take 100 Int'l Units by mouth once daily. , Disp: , Rfl:   carvediloL (COREG) 25 MG tablet, Take 1 tablet (25 mg total) by mouth 2 (two) times daily with meals., Disp: 180 tablet, Rfl: 3  clobetasoL (OLUX) 0.05 % Foam, Apply topically 2 (two) times daily., Disp: 100 g, Rfl: 3  coenzyme Q10 10 mg capsule, Take 10 mg by mouth once  daily., Disp: , Rfl:   fluticasone propionate (FLONASE) 50 mcg/actuation nasal spray, 1 spray (50 mcg total) by Each Nostril route 2 (two) times daily., Disp: 16 g, Rfl: 11  fluticasone-salmeterol diskus inhaler 250-50 mcg, Inhale 1 puff into the lungs 2 (two) times daily. Controller, Disp: 60 each, Rfl: 11  folic acid (FOLVITE) 400 MCG tablet, Take 400 mcg by mouth once daily., Disp: , Rfl:   GARLIC OIL ORAL, Take 1 capsule by mouth once daily. , Disp: , Rfl:   lisinopriL (PRINIVIL,ZESTRIL) 40 MG tablet, Take 1 tablet (40 mg total) by mouth once daily., Disp: 90 tablet, Rfl: 3  magnesium 250 mg Tab, Take 250 mg by mouth once. , Disp: , Rfl:   montelukast (SINGULAIR) 10 mg tablet, Take 1 tablet (10 mg total) by mouth once daily., Disp: 90 tablet, Rfl: 3  traZODone (DESYREL) 100 MG tablet, TAKE 1 TABLET EVERY EVENING, Disp: 90 tablet, Rfl: 1  valACYclovir (VALTREX) 500 MG tablet, TAKE 1 TABLET (500 MG TOTAL) BY MOUTH 2 (TWO) TIMES DAILY FOR 7 DAYS AS DIRECTED., Disp: 60 tablet, Rfl: 3  vitamin E 100 UNIT capsule, Take 100 Units by mouth once daily., Disp: , Rfl:   (DISCONTINUED) atorvastatin (LIPITOR) 40 MG tablet, Take 1 tablet (40 mg total) by mouth once daily., Disp: 90 tablet, Rfl: 3  aspirin 81 MG Chew, Take 1 tablet (81 mg total) by mouth once daily. (Patient taking differently: Take 81 mg by mouth once daily. Pt states taking OTC.), Disp: , Rfl: 0  DUREZOL 0.05 % Drop ophthalmic solution, Place 1 drop into both eyes 2 (two) times daily., Disp: , Rfl:   ILEVRO 0.3 % DrpS, 1 drop in operated eye once a day, Disp: , Rfl:   ketorolac 0.5% (ACULAR) 0.5 % Drop, Place 1 drop into both eyes 4 (four) times daily., Disp: , Rfl:   ofloxacin (OCUFLOX) 0.3 % ophthalmic solution, Place 1 drop into both eyes 4 (four) times daily., Disp: , Rfl:   prednisoLONE acetate (PRED FORTE) 1 % DrpS, Place 1 drop into both eyes 4 (four) times daily., Disp: , Rfl:   (DISCONTINUED) cetirizine (ZYRTEC) 10 MG tablet, Take 10 mg by mouth  once daily., Disp: , Rfl:     No current facility-administered medications on file prior to visit.        Review of Systems   Constitutional: Negative for chills and fever.   Endocrine: Negative for polydipsia and polyuria.   Musculoskeletal: Positive for arthralgias, back pain and myalgias.   Skin: Negative for color change and rash.   Neurological: Negative for numbness (But she does have somewhat radicular sound Ng pain and burning).       Objective:      Physical Exam  Vitals and nursing note reviewed.   Constitutional:       Comments: Borderline blood pressure that 138/86 with a normal pulse  Normal weight with a BMI of 25.1 she is up 3.3 lb from her October 4, 2021 physical   Musculoskeletal:      Lumbar back: Tenderness (Tender in the sciatic notch on the right side but does not reproduce her pain) present. No swelling, edema, deformity, signs of trauma, lacerations or bony tenderness. Normal range of motion. Negative right straight leg raise test (Stretching sensation in both hamstrings but no true sciatic pain) and negative left straight leg raise test (Stretching sensation in both hamstrings but no true sciatic pain).      Right hip: Bony tenderness (Mildly tender over the greater trochanter but does not reproduce her pain) present. No deformity, tenderness or crepitus. Normal range of motion.      Left hip: No deformity, tenderness, bony tenderness or crepitus. Normal range of motion.   Skin:     General: Skin is warm and dry.      Findings: No erythema or rash.         Assessment:       1. Neuropathic pain of lower extremity, right    2. Prediabetes    3. Primary hypertension    4. Hyperlipidemia, unspecified hyperlipidemia type    5. Stage 3a chronic kidney disease    6. Sickle-cell trait    7. BMI 25.0-25.9,adult        Plan:       1. Neuropathic pain of lower extremity, right  Predominantly unilateral radicular type pain with generalized muscular pain more typical of fibromyalgia.    2.  Prediabetes  Lab Results   Component Value Date    HGBA1C 5.9 (H) 10/01/2020     Asymmetric pain would suggest against a diabetic neuropathy, metabolic,  or hereditary neuropathy.    3. Primary hypertension  Fair control, aggravated by pain    4. Hyperlipidemia, unspecified hyperlipidemia type  Lab Results   Component Value Date    CHOL 120 10/04/2021    CHOL 133 10/01/2020    CHOL 151 03/30/2020     Lab Results   Component Value Date    HDL 63 10/04/2021    HDL 73 10/01/2020    HDL 66 03/30/2020     Lab Results   Component Value Date    LDLCALC 49.0 (L) 10/04/2021    LDLCALC 49.6 (L) 10/01/2020    LDLCALC 70.0 03/30/2020     Lab Results   Component Value Date    TRIG 40 10/04/2021    TRIG 52 10/01/2020    TRIG 75 03/30/2020     Lab Results   Component Value Date    CHOLHDL 52.5 (H) 10/04/2021    CHOLHDL 54.9 (H) 10/01/2020    CHOLHDL 43.7 03/30/2020     Patient instructed to hold Lipitor for two weeks and determine whether her pain is improved.  If so she will resume it and watch for recurrence, if not just resume it and consider gabapentin or Lyrica trial.  If she does get recurrence we can try another statin such as Crestor  - atorvastatin (LIPITOR) 40 MG tablet; Take 1 tablet (40 mg total) by mouth once daily. Holding for two weeks for possible side effects  Dispense: 90 tablet; Refill: 3    5. Stage 3a chronic kidney disease  BMP  Lab Results   Component Value Date     05/03/2021    K 4.0 05/03/2021     05/03/2021    CO2 19 (L) 05/03/2021    BUN 19 05/03/2021    CREATININE 1.3 05/03/2021    CALCIUM 10.0 05/03/2021    ANIONGAP 15 05/03/2021    ESTGFRAFRICA 47.4 (A) 05/03/2021    EGFRNONAA 41.1 (A) 05/03/2021     Unlikely to be uremia related    6. Sickle-cell trait  Lab Results   Component Value Date    WBC 4.52 05/03/2021    HGB 12.5 05/03/2021    HCT 38.2 05/03/2021    MCV 83 05/03/2021     05/03/2021       No active anemia    7. BMI 25.0-25.9,adult  Good weight for age no changes needed

## 2022-02-15 ENCOUNTER — PATIENT OUTREACH (OUTPATIENT)
Dept: ADMINISTRATIVE | Facility: OTHER | Age: 72
End: 2022-02-15
Payer: MEDICARE

## 2022-02-15 NOTE — PROGRESS NOTES
Chart was reviewed for overdue Proactive [facility] Encounters (BRITTANY)  topics  Updates were requested from care everywhere  Health Maintenance has been updated  LINKS immunization registry triggered

## 2022-02-17 ENCOUNTER — OFFICE VISIT (OUTPATIENT)
Dept: ALLERGY | Facility: CLINIC | Age: 72
End: 2022-02-17
Payer: MEDICARE

## 2022-02-17 VITALS
OXYGEN SATURATION: 98 % | WEIGHT: 154.31 LBS | HEIGHT: 66 IN | HEART RATE: 66 BPM | BODY MASS INDEX: 24.8 KG/M2 | DIASTOLIC BLOOD PRESSURE: 60 MMHG | TEMPERATURE: 98 F | SYSTOLIC BLOOD PRESSURE: 142 MMHG

## 2022-02-17 DIAGNOSIS — J01.90 ACUTE NON-RECURRENT SINUSITIS, UNSPECIFIED LOCATION: Primary | ICD-10-CM

## 2022-02-17 DIAGNOSIS — Z87.09 PERSONAL HISTORY OF ASTHMA: ICD-10-CM

## 2022-02-17 DIAGNOSIS — J31.0 CHRONIC RHINITIS: ICD-10-CM

## 2022-02-17 DIAGNOSIS — Z88.0 HISTORY OF PENICILLIN ALLERGY: ICD-10-CM

## 2022-02-17 PROCEDURE — 99999 PR PBB SHADOW E&M-EST. PATIENT-LVL V: ICD-10-PCS | Mod: PBBFAC,HCNC,, | Performed by: ALLERGY & IMMUNOLOGY

## 2022-02-17 PROCEDURE — 3077F SYST BP >= 140 MM HG: CPT | Mod: HCNC,CPTII,S$GLB, | Performed by: ALLERGY & IMMUNOLOGY

## 2022-02-17 PROCEDURE — 3078F PR MOST RECENT DIASTOLIC BLOOD PRESSURE < 80 MM HG: ICD-10-PCS | Mod: HCNC,CPTII,S$GLB, | Performed by: ALLERGY & IMMUNOLOGY

## 2022-02-17 PROCEDURE — 3288F PR FALLS RISK ASSESSMENT DOCUMENTED: ICD-10-PCS | Mod: HCNC,CPTII,S$GLB, | Performed by: ALLERGY & IMMUNOLOGY

## 2022-02-17 PROCEDURE — 1160F PR REVIEW ALL MEDS BY PRESCRIBER/CLIN PHARMACIST DOCUMENTED: ICD-10-PCS | Mod: HCNC,CPTII,S$GLB, | Performed by: ALLERGY & IMMUNOLOGY

## 2022-02-17 PROCEDURE — 99999 PR PBB SHADOW E&M-EST. PATIENT-LVL V: CPT | Mod: PBBFAC,HCNC,, | Performed by: ALLERGY & IMMUNOLOGY

## 2022-02-17 PROCEDURE — 99214 OFFICE O/P EST MOD 30 MIN: CPT | Mod: HCNC,S$GLB,, | Performed by: ALLERGY & IMMUNOLOGY

## 2022-02-17 PROCEDURE — 1160F RVW MEDS BY RX/DR IN RCRD: CPT | Mod: HCNC,CPTII,S$GLB, | Performed by: ALLERGY & IMMUNOLOGY

## 2022-02-17 PROCEDURE — 3077F PR MOST RECENT SYSTOLIC BLOOD PRESSURE >= 140 MM HG: ICD-10-PCS | Mod: HCNC,CPTII,S$GLB, | Performed by: ALLERGY & IMMUNOLOGY

## 2022-02-17 PROCEDURE — 3008F PR BODY MASS INDEX (BMI) DOCUMENTED: ICD-10-PCS | Mod: HCNC,CPTII,S$GLB, | Performed by: ALLERGY & IMMUNOLOGY

## 2022-02-17 PROCEDURE — 1101F PR PT FALLS ASSESS DOC 0-1 FALLS W/OUT INJ PAST YR: ICD-10-PCS | Mod: HCNC,CPTII,S$GLB, | Performed by: ALLERGY & IMMUNOLOGY

## 2022-02-17 PROCEDURE — 1125F AMNT PAIN NOTED PAIN PRSNT: CPT | Mod: HCNC,CPTII,S$GLB, | Performed by: ALLERGY & IMMUNOLOGY

## 2022-02-17 PROCEDURE — 1159F MED LIST DOCD IN RCRD: CPT | Mod: HCNC,CPTII,S$GLB, | Performed by: ALLERGY & IMMUNOLOGY

## 2022-02-17 PROCEDURE — 3078F DIAST BP <80 MM HG: CPT | Mod: HCNC,CPTII,S$GLB, | Performed by: ALLERGY & IMMUNOLOGY

## 2022-02-17 PROCEDURE — 99214 PR OFFICE/OUTPT VISIT, EST, LEVL IV, 30-39 MIN: ICD-10-PCS | Mod: HCNC,S$GLB,, | Performed by: ALLERGY & IMMUNOLOGY

## 2022-02-17 PROCEDURE — 1101F PT FALLS ASSESS-DOCD LE1/YR: CPT | Mod: HCNC,CPTII,S$GLB, | Performed by: ALLERGY & IMMUNOLOGY

## 2022-02-17 PROCEDURE — 1125F PR PAIN SEVERITY QUANTIFIED, PAIN PRESENT: ICD-10-PCS | Mod: HCNC,CPTII,S$GLB, | Performed by: ALLERGY & IMMUNOLOGY

## 2022-02-17 PROCEDURE — 3288F FALL RISK ASSESSMENT DOCD: CPT | Mod: HCNC,CPTII,S$GLB, | Performed by: ALLERGY & IMMUNOLOGY

## 2022-02-17 PROCEDURE — 1159F PR MEDICATION LIST DOCUMENTED IN MEDICAL RECORD: ICD-10-PCS | Mod: HCNC,CPTII,S$GLB, | Performed by: ALLERGY & IMMUNOLOGY

## 2022-02-17 PROCEDURE — 3008F BODY MASS INDEX DOCD: CPT | Mod: HCNC,CPTII,S$GLB, | Performed by: ALLERGY & IMMUNOLOGY

## 2022-02-17 RX ORDER — DOXYCYCLINE 100 MG/1
100 CAPSULE ORAL 2 TIMES DAILY
Qty: 14 CAPSULE | Refills: 0 | Status: SHIPPED | OUTPATIENT
Start: 2022-02-17 | End: 2022-02-24

## 2022-02-17 NOTE — PROGRESS NOTES
"ALLERGY & IMMUNOLOGY CLINIC -  FOLLOW UP VISIT      HISTORY OF PRESENT ILLNESS      Patient ID: Loren Andre is a 71 y.o. female     CC: follow up     HPI: 72 yo woman presents for follow up visit, last seen by me four months ago. She has a personal history of asthma and non-allergic rhinitis, plus headaches.     January was a rough month. Asthma flared - she gets a "different type of headache" and chest tightness. She used her nebulizer twice a day for 3 weeks, which did help some.  She felt her symptoms were not bad enough to go to the ED. She takes advair twice a day and reports excellent adherence to that.     She developed facial pressure, pressure over forehead and eyes, sneeze, headache, green mucous, sensation of nasal obstruction, and is coughing up green mucous. No fevers. Sense of smell is intact. She is on a homemade tonic of whiskey, lemon juice, honey, janene, cough drops, vinegar. She microwaves this for a little bit and drinks it. She also takes vitamin C, uses the neti pot, and azelastine. She has been taking flonase and azelastine nasal sprays and has stopped the oxymetazoline that she had been on previously.     She reports an allergy to penicillin ~30 years ago that was a rash.             Initial evaluation: she is referred by Dr. Toure for possible allergies.      Rhinitis:   She recalls being an itchy sneezy kid, symptoms got worse as she got older. Since April 2021, she has had daily cough, congestion, rhinorrhea. Occasional headaches. No fevers. Sometimes itchy eyes. Nose feels itchy on the inside. Ears itch and feel full. Sense of smell is sometimes diminished. Taking allergy pill (pseudoephedine) once a day and nasal sprays constantly. Nasal spray is equate brand oxymetazoline. Perfume and scents are triggers. Rain seems to be a trigger as well.      Asthma:   Diagnosed as an adult. Main symptom is "a different type of headache" and chest tightness. No wheezing. On Advair. She takes " "this in the winter, because the cold air seems to trigger her breathing troubles. She does not recall systemic steroids in the past year. She is up to date with her flu vaccines and has had her covid vaccine.      Rash:   Never diagnosed with eczema, but does have a rash on arms, knees, neck that is clearing up with aveeno lotion. Occasional generalized pruritus.      Allergy testing:   She recalls a skin testing >10 years ago and was allergic to grass. She took allergy shots once weekly for a chunk of time but didn't really see much improvement.      Medication allergies:   Long list of medication allergies.   PCN: rash, in her 20s.  Sulfa: rash, 5-6 years ago.         REVIEW OF SYSTEMS      CONST: no F/C/NS, no unintentional weight changes  NEURO: no H/A, no weakness, no paresthesias  EYES: no discharge, + pruritus, no erythema  EARS: no hearing loss,+o sensation of fullness  NOSE: + congestion, + rhinorrhea, no itching, no sneezing  PULM: no SOB, no wheezing, no cough  CV: no CP, no palpitations, no leg swelling  GI: no dysphagia, no heartburn, no pain, no N/V/D  DERM: no rashes, no skin breaks, no scalp pruritus      MEDICAL HISTORY      MedHx: active problems reviewed  SurgHx: tonsillectomy and adenoidectomy at age 9  SocHx: never smoker  FamHx: brother and daughter had asthma, paternal aunts have asthma  Allergies: see HPI  Medications: MAR reviewed  Vaccines: got covid vaccines and flu vaccine     H/o Asthma: yes  H/o Eczema: ?  H/o Rhinitis: yes  Oral Allergy:  denies  Food Allergy: denies  Venom Allergy: denies  Latex Allergy: denies      PHYSICAL EXAM      VS: BP (!) 142/60 (BP Location: Left arm, Patient Position: Sitting, BP Method: Large (Manual))   Pulse 66   Temp 97.8 °F (36.6 °C) (Oral)   Ht 5' 6" (1.676 m)   Wt 70 kg (154 lb 5.2 oz)   SpO2 98%   BMI 24.91 kg/m²   GENERAL: alert, NAD, well-appearing, cooperative, +facial tenderness  EYES: PERRL, EOMI, mild conjunctival injection, no discharge, " no infraorbital shiners  NOSE: NT 2-3+ and pink B/L, + green stringing mucous, no polyps  ORAL: MMM, no ulcers, no thrush, no cobblestoning  LUNGS: CTAB, no w/r/c, no increased WOB  HEART: RRR, normal S1/S2, no m/g/r  DERM: xerosis on right extensor elbow, no skin breaks, no dystrophic fingernails      ALLERGEN TESTING      Skin Prick: done at least a decade ago, recalls grass being positive  Immunocaps 2021: negative to all aeroallergens tested      PULMONARY FUNCTION      PFTs: none in our system      IMAGING & OTHER DIAGNOSTICS      Sinus CT 2012: largely unremarkable for sinus disease  CT Chest 2020: lung fields normal      ASSESSMENT & PLAN      Loren Andre is a 71 y.o. female with      Acute sinusitis  Chronic nonallergic rhinitis  History of asthma  Antibiotic allergies   Pruritus and rash, possibly dermatitis vs eczema     Plan:   Continue flonase 1 SEN BID  Continue azelastine 1 SEN BID  Continue advair BID and nebs prn. Has had flu and covid vaccines.  Doxycycline x 7 days  RTC for penicillin challenge when she feels better     Follow up: summer 2022 for amoxicillin challenge     Sarahi Leija MD  Allergy/Immunology

## 2022-03-07 DIAGNOSIS — A60.9 HSV (HERPES SIMPLEX VIRUS) ANOGENITAL INFECTION: ICD-10-CM

## 2022-03-07 RX ORDER — VALACYCLOVIR HYDROCHLORIDE 500 MG/1
TABLET, FILM COATED ORAL
Qty: 60 TABLET | Refills: 3 | Status: SHIPPED | OUTPATIENT
Start: 2022-03-07 | End: 2023-10-10

## 2022-03-07 RX ORDER — TRAZODONE HYDROCHLORIDE 100 MG/1
100 TABLET ORAL NIGHTLY
Qty: 90 TABLET | Refills: 1 | Status: SHIPPED | OUTPATIENT
Start: 2022-03-07 | End: 2023-02-23

## 2022-03-07 NOTE — TELEPHONE ENCOUNTER
----- Message from Caitlin Cisneros sent at 3/7/2022  8:03 AM CST -----  Type: Needs Medical Advice  Who Called:  pt  Symptoms (please be specific):  pt was taken off of meds and Dr Toure requested a 2 week update. Pt states she is doing much better no longer having pain in legs  Best Call Back Number: [phone] (home)     Additional Information:

## 2022-03-07 NOTE — TELEPHONE ENCOUNTER
Care Due:                  Date            Visit Type   Department     Provider  --------------------------------------------------------------------------------                                EP -                              PRIMARY      [facility]  Last Visit: 02-      CARE ([facility])   PRACTICE       Sanket Toure                              EP -                              PRIMARY      [facility]  Next Visit: 10-      CARE ([facility])   PRACTICE       Sanket Toure                                                            Last  Test          Frequency    Reason                     Performed    Due Date  --------------------------------------------------------------------------------    CBC.........  12 months..  valACYclovir.............  05- 04-    CMP.........  12 months..  atorvastatin, lisinopriL,   05- 04-                             valACYclovir.............    Powered by [employer]. Reference number: [id number].   3/07/2022 9:21:10 AM CST

## 2022-03-07 NOTE — TELEPHONE ENCOUNTER
Called patient- she stopped the Atorvastatin and the pain in her foot/leg area resolved. She will resume medication as directed and let Dr. Toure know how she does.     Needs Trazodone and Valtrex for fever blisters sent to [facility].

## 2022-04-19 ENCOUNTER — OFFICE VISIT (OUTPATIENT)
Dept: URGENT CARE | Facility: CLINIC | Age: 72
End: 2022-04-19
Payer: MEDICARE

## 2022-04-19 VITALS
WEIGHT: 156 LBS | HEIGHT: 66 IN | DIASTOLIC BLOOD PRESSURE: 74 MMHG | HEART RATE: 55 BPM | OXYGEN SATURATION: 98 % | SYSTOLIC BLOOD PRESSURE: 180 MMHG | RESPIRATION RATE: 16 BRPM | TEMPERATURE: 97 F | BODY MASS INDEX: 25.07 KG/M2

## 2022-04-19 DIAGNOSIS — R09.81 SINUS CONGESTION: ICD-10-CM

## 2022-04-19 DIAGNOSIS — J01.90 ACUTE BACTERIAL SINUSITIS: Primary | ICD-10-CM

## 2022-04-19 DIAGNOSIS — R05.9 COUGH: ICD-10-CM

## 2022-04-19 DIAGNOSIS — B96.89 ACUTE BACTERIAL SINUSITIS: Primary | ICD-10-CM

## 2022-04-19 LAB
CTP QC/QA: YES
CTP QC/QA: YES
FLUAV AG NPH QL: NEGATIVE
FLUBV AG NPH QL: NEGATIVE
SARS-COV-2 RDRP RESP QL NAA+PROBE: NEGATIVE

## 2022-04-19 PROCEDURE — 3078F DIAST BP <80 MM HG: CPT | Mod: CPTII,S$GLB,, | Performed by: NURSE PRACTITIONER

## 2022-04-19 PROCEDURE — 3008F BODY MASS INDEX DOCD: CPT | Mod: CPTII,S$GLB,, | Performed by: NURSE PRACTITIONER

## 2022-04-19 PROCEDURE — 99214 PR OFFICE/OUTPT VISIT, EST, LEVL IV, 30-39 MIN: ICD-10-PCS | Mod: S$GLB,,, | Performed by: NURSE PRACTITIONER

## 2022-04-19 PROCEDURE — 1159F MED LIST DOCD IN RCRD: CPT | Mod: CPTII,S$GLB,, | Performed by: NURSE PRACTITIONER

## 2022-04-19 PROCEDURE — 99214 OFFICE O/P EST MOD 30 MIN: CPT | Mod: S$GLB,,, | Performed by: NURSE PRACTITIONER

## 2022-04-19 PROCEDURE — 3078F PR MOST RECENT DIASTOLIC BLOOD PRESSURE < 80 MM HG: ICD-10-PCS | Mod: CPTII,S$GLB,, | Performed by: NURSE PRACTITIONER

## 2022-04-19 PROCEDURE — 3077F SYST BP >= 140 MM HG: CPT | Mod: CPTII,S$GLB,, | Performed by: NURSE PRACTITIONER

## 2022-04-19 PROCEDURE — U0002 COVID-19 LAB TEST NON-CDC: HCPCS | Mod: QW,S$GLB,, | Performed by: NURSE PRACTITIONER

## 2022-04-19 PROCEDURE — 3008F PR BODY MASS INDEX (BMI) DOCUMENTED: ICD-10-PCS | Mod: CPTII,S$GLB,, | Performed by: NURSE PRACTITIONER

## 2022-04-19 PROCEDURE — 1159F PR MEDICATION LIST DOCUMENTED IN MEDICAL RECORD: ICD-10-PCS | Mod: CPTII,S$GLB,, | Performed by: NURSE PRACTITIONER

## 2022-04-19 PROCEDURE — 87804 POCT INFLUENZA A/B: ICD-10-PCS | Mod: 59,QW,, | Performed by: NURSE PRACTITIONER

## 2022-04-19 PROCEDURE — 87804 INFLUENZA ASSAY W/OPTIC: CPT | Mod: QW,,, | Performed by: NURSE PRACTITIONER

## 2022-04-19 PROCEDURE — U0002: ICD-10-PCS | Mod: QW,S$GLB,, | Performed by: NURSE PRACTITIONER

## 2022-04-19 PROCEDURE — 3077F PR MOST RECENT SYSTOLIC BLOOD PRESSURE >= 140 MM HG: ICD-10-PCS | Mod: CPTII,S$GLB,, | Performed by: NURSE PRACTITIONER

## 2022-04-19 RX ORDER — PROMETHAZINE HYDROCHLORIDE AND DEXTROMETHORPHAN HYDROBROMIDE 6.25; 15 MG/5ML; MG/5ML
5 SYRUP ORAL NIGHTLY PRN
Qty: 90 ML | Refills: 0 | Status: SHIPPED | OUTPATIENT
Start: 2022-04-19 | End: 2022-10-04

## 2022-04-19 RX ORDER — DOXYCYCLINE 100 MG/1
100 CAPSULE ORAL EVERY 12 HOURS
Qty: 14 CAPSULE | Refills: 0 | Status: SHIPPED | OUTPATIENT
Start: 2022-04-19 | End: 2022-04-26

## 2022-04-19 NOTE — PATIENT INSTRUCTIONS
"                                                          Sinusitis   If your condition worsens or fails to improve we recommend that you receive another evaluation at the ER immediately or contact your PCP to discuss your concerns or return here. You must understand that you've received an urgent care treatment only and that you may be released before all your medical problems are known or treated. You the patient will arrange for followup care as instructed.   If we discussed that I think your illness is viral it will not respond to antibiotics and it will last 10-14 days. However, if over the next few days the symptoms worsen start the antibiotics I have given you.   -  If we discussed that you require antibiotics start them now and take them to completion.   -  If you are female and on BCP and do take the antibiotics, use additional methods to prevent pregnancy while on the antibiotics and for one cycle after.   -  Flonase (fluticasone) is a nasal spray which is available over the counter and may help with your symptoms   -  Zyrtec D, Claritin D or allegra D can also help with symptoms of congestion and drainage.   -  If you have hypertension avoid using the "D" which is the decongestant. Instead, you can use Coricidin HBP for your cold and cough symptoms.     -  If you just have drainage you can take plain Zyrtec, Claritin or Allegra   -  If you just have a congested feeling you can take pseudoephedrine (unless you have high blood pressure) which you have to sign for behind the counter. Do not buy the phenylephrine which is on the shelf as it is not effective   -  Rest and fluids are also important.   -  Tylenol or ibuprofen can also be used as directed for pain unless you have an allergy to them or medical condition such as stomach ulcers, kidney or liver disease or blood thinners etc for which you should not be taking these type of medications.   -  If you are flying in the next few days Afrin nose drops for " the airplane flight upon take off and landing may help. Other than at those times refrain from using afrin.   -  If you were prescribed a narcotic do not drive or operate heavy machinery while taking these medications.

## 2022-04-19 NOTE — PROGRESS NOTES
"Subjective:       Patient ID: Loren Andre is a 72 y.o. female.    Vitals:  height is 5' 6" (1.676 m) and weight is 70.8 kg (156 lb). Her oral temperature is 97 °F (36.1 °C). Her blood pressure is 180/74 (abnormal) and her pulse is 55 (abnormal). Her respiration is 16 and oxygen saturation is 98%.     Chief Complaint: Sinus Problem    Sinus Problem  This is a new problem. Episode onset: a week ago. The problem has been gradually worsening since onset. Associated symptoms include congestion, coughing, headaches, sinus pressure and sneezing. (Itchy ears  ) Treatments tried: NSAIDS. The treatment provided no relief.       HENT: Positive for congestion and sinus pressure.    Respiratory: Positive for cough.    Allergic/Immunologic: Positive for sneezing.   Neurological: Positive for headaches.       Objective:      Physical Exam   HENT:   Head: Normocephalic and atraumatic.   Ears:   Right Ear: Tympanic membrane, external ear and ear canal normal.   Left Ear: Tympanic membrane, external ear and ear canal normal.   Nose: Right sinus exhibits frontal sinus tenderness.   Mouth/Throat: Uvula is midline, oropharynx is clear and moist and mucous membranes are normal. Mucous membranes are moist. Oropharynx is clear.   Eyes:      extraocular movement intact   Cardiovascular: Normal heart sounds.   Pulmonary/Chest: Effort normal and breath sounds normal.   Abdominal: Normal appearance.   Neurological: She is alert.   Skin: Skin is warm. Capillary refill takes less than 2 seconds.   Nursing note and vitals reviewed.        Results for orders placed or performed in visit on 04/19/22   POCT Influenza A/B   Result Value Ref Range    Rapid Influenza A Ag Negative Negative    Rapid Influenza B Ag Negative Negative     Acceptable Yes    POCT COVID-19 Rapid Screening   Result Value Ref Range    POC Rapid COVID Negative Negative     Acceptable Yes      Assessment:       1. Acute bacterial sinusitis    2. " Cough    3. Sinus congestion          Plan:     Covid negative  Influenza A & B negative   Pt states PCN allergy. Also that Tessalon pearles do not work for her and she would like Promethazine DM. She states she has taken in the past without difficulty or reaction. Advised to not take with Trazodone. She voiced understanding.     Acute bacterial sinusitis  -     doxycycline (VIBRAMYCIN) 100 MG Cap; Take 1 capsule (100 mg total) by mouth every 12 (twelve) hours. for 7 days  Dispense: 14 capsule; Refill: 0    Cough  -     promethazine-dextromethorphan (PROMETHAZINE-DM) 6.25-15 mg/5 mL Syrp; Take 5 mLs by mouth nightly as needed (cough).  Dispense: 90 mL; Refill: 0  -     POCT Influenza A/B  -     POCT COVID-19 Rapid Screening    Sinus congestion  -     POCT Influenza A/B  -     POCT COVID-19 Rapid Screening                  Patient Instructions                                                             Sinusitis   If your condition worsens or fails to improve we recommend that you receive another evaluation at the ER immediately or contact your PCP to discuss your concerns or return here. You must understand that you've received an urgent care treatment only and that you may be released before all your medical problems are known or treated. You the patient will arrange for followup care as instructed.   If we discussed that I think your illness is viral it will not respond to antibiotics and it will last 10-14 days. However, if over the next few days the symptoms worsen start the antibiotics I have given you.   -  If we discussed that you require antibiotics start them now and take them to completion.   -  If you are female and on BCP and do take the antibiotics, use additional methods to prevent pregnancy while on the antibiotics and for one cycle after.   -  Flonase (fluticasone) is a nasal spray which is available over the counter and may help with your symptoms   -  Zyrtec D, Claritin D or allegra D can also help  "with symptoms of congestion and drainage.   -  If you have hypertension avoid using the "D" which is the decongestant. Instead, you can use Coricidin HBP for your cold and cough symptoms.     -  If you just have drainage you can take plain Zyrtec, Claritin or Allegra   -  If you just have a congested feeling you can take pseudoephedrine (unless you have high blood pressure) which you have to sign for behind the counter. Do not buy the phenylephrine which is on the shelf as it is not effective   -  Rest and fluids are also important.   -  Tylenol or ibuprofen can also be used as directed for pain unless you have an allergy to them or medical condition such as stomach ulcers, kidney or liver disease or blood thinners etc for which you should not be taking these type of medications.   -  If you are flying in the next few days Afrin nose drops for the airplane flight upon take off and landing may help. Other than at those times refrain from using afrin.   -  If you were prescribed a narcotic do not drive or operate heavy machinery while taking these medications.        "

## 2022-04-26 ENCOUNTER — LAB VISIT (OUTPATIENT)
Dept: LAB | Facility: HOSPITAL | Age: 72
End: 2022-04-26
Attending: ALLERGY & IMMUNOLOGY
Payer: MEDICARE

## 2022-04-26 ENCOUNTER — OFFICE VISIT (OUTPATIENT)
Dept: ALLERGY | Facility: CLINIC | Age: 72
End: 2022-04-26
Payer: MEDICARE

## 2022-04-26 VITALS — BODY MASS INDEX: 25.18 KG/M2 | WEIGHT: 156 LBS | HEART RATE: 55 BPM | OXYGEN SATURATION: 97 %

## 2022-04-26 DIAGNOSIS — J32.9 FREQUENT SINUS INFECTIONS: ICD-10-CM

## 2022-04-26 DIAGNOSIS — Z87.09 PERSONAL HISTORY OF ASTHMA: ICD-10-CM

## 2022-04-26 DIAGNOSIS — Z88.0 HISTORY OF PENICILLIN ALLERGY: ICD-10-CM

## 2022-04-26 DIAGNOSIS — J31.0 CHRONIC RHINITIS: Primary | ICD-10-CM

## 2022-04-26 LAB
IGA SERPL-MCNC: 209 MG/DL (ref 40–350)
IGG SERPL-MCNC: 1230 MG/DL (ref 650–1600)
IGM SERPL-MCNC: 51 MG/DL (ref 50–300)

## 2022-04-26 PROCEDURE — 99214 PR OFFICE/OUTPT VISIT, EST, LEVL IV, 30-39 MIN: ICD-10-PCS | Mod: S$GLB,,, | Performed by: ALLERGY & IMMUNOLOGY

## 2022-04-26 PROCEDURE — 1159F MED LIST DOCD IN RCRD: CPT | Mod: CPTII,S$GLB,, | Performed by: ALLERGY & IMMUNOLOGY

## 2022-04-26 PROCEDURE — 82784 ASSAY IGA/IGD/IGG/IGM EACH: CPT | Performed by: ALLERGY & IMMUNOLOGY

## 2022-04-26 PROCEDURE — 86684 HEMOPHILUS INFLUENZA ANTIBDY: CPT | Performed by: ALLERGY & IMMUNOLOGY

## 2022-04-26 PROCEDURE — 99999 PR PBB SHADOW E&M-EST. PATIENT-LVL II: CPT | Mod: PBBFAC,,, | Performed by: ALLERGY & IMMUNOLOGY

## 2022-04-26 PROCEDURE — 36415 COLL VENOUS BLD VENIPUNCTURE: CPT | Mod: PO | Performed by: ALLERGY & IMMUNOLOGY

## 2022-04-26 PROCEDURE — 1160F RVW MEDS BY RX/DR IN RCRD: CPT | Mod: CPTII,S$GLB,, | Performed by: ALLERGY & IMMUNOLOGY

## 2022-04-26 PROCEDURE — 1160F PR REVIEW ALL MEDS BY PRESCRIBER/CLIN PHARMACIST DOCUMENTED: ICD-10-PCS | Mod: CPTII,S$GLB,, | Performed by: ALLERGY & IMMUNOLOGY

## 2022-04-26 PROCEDURE — 99999 PR PBB SHADOW E&M-EST. PATIENT-LVL II: ICD-10-PCS | Mod: PBBFAC,,, | Performed by: ALLERGY & IMMUNOLOGY

## 2022-04-26 PROCEDURE — 4010F PR ACE/ARB THEARPY RXD/TAKEN: ICD-10-PCS | Mod: CPTII,S$GLB,, | Performed by: ALLERGY & IMMUNOLOGY

## 2022-04-26 PROCEDURE — 3008F BODY MASS INDEX DOCD: CPT | Mod: CPTII,S$GLB,, | Performed by: ALLERGY & IMMUNOLOGY

## 2022-04-26 PROCEDURE — 99214 OFFICE O/P EST MOD 30 MIN: CPT | Mod: S$GLB,,, | Performed by: ALLERGY & IMMUNOLOGY

## 2022-04-26 PROCEDURE — 4010F ACE/ARB THERAPY RXD/TAKEN: CPT | Mod: CPTII,S$GLB,, | Performed by: ALLERGY & IMMUNOLOGY

## 2022-04-26 PROCEDURE — 3008F PR BODY MASS INDEX (BMI) DOCUMENTED: ICD-10-PCS | Mod: CPTII,S$GLB,, | Performed by: ALLERGY & IMMUNOLOGY

## 2022-04-26 PROCEDURE — 86774 TETANUS ANTIBODY: CPT | Performed by: ALLERGY & IMMUNOLOGY

## 2022-04-26 PROCEDURE — 1159F PR MEDICATION LIST DOCUMENTED IN MEDICAL RECORD: ICD-10-PCS | Mod: CPTII,S$GLB,, | Performed by: ALLERGY & IMMUNOLOGY

## 2022-04-26 NOTE — PROGRESS NOTES
"ALLERGY & IMMUNOLOGY CLINIC -  FOLLOW UP VISIT      HISTORY OF PRESENT ILLNESS      Patient ID: Loren Andre is a 72 y.o. female     CC: follow up     HPI: 73 yo woman presents for follow up visit, last seen by me two months ago.     She has a personal history of asthma, non-allergic rhinitis, headaches, and PCN allergy.     She was recently treated with doxycycline for sinus infection. Symptoms were headaches, soreness of the face, cough, "a little fever." She also had periocular swelling and ocular discharge. She applied allergy eye drops this morning. She is overall improved but symptoms have not resolved. She is still coughing and still has eye symptoms.     She has had antibiotics every 4-6 months for sinus infections.     She is taking flonase 1 SEN BID. She has stopped the azelastine 1 SEN BID.     She is taking advair as needed. She is taking albuterol as needed.     Skin pruritus is better.       -------------------------     Initial evaluation: she is referred by Dr. Toure for possible allergies.      Rhinitis:   She recalls being an itchy sneezy kid, symptoms got worse as she got older. Since April 2021, she has had daily cough, congestion, rhinorrhea. Occasional headaches. No fevers. Sometimes itchy eyes. Nose feels itchy on the inside. Ears itch and feel full. Sense of smell is sometimes diminished. Taking allergy pill (pseudoephedine) once a day and nasal sprays constantly. Nasal spray is equate brand oxymetazoline. Perfume and scents are triggers. Rain seems to be a trigger as well.      Asthma:   Diagnosed as an adult. Main symptom is "a different type of headache" and chest tightness. No wheezing. On Advair. She takes this in the winter, because the cold air seems to trigger her breathing troubles. She does not recall systemic steroids in the past year. She is up to date with her flu vaccines and has had her covid vaccine.      Rash:   Never diagnosed with eczema, but does have a rash on arms, " knees, neck that is clearing up with aveeno lotion. Occasional generalized pruritus.      Allergy testing:   She recalls a skin testing >10 years ago and was allergic to grass. She took allergy shots once weekly for a chunk of time but didn't really see much improvement.      Medication allergies:   Long list of medication allergies.   PCN: rash, in her 20s.  Sulfa: rash, 5-6 years ago.         REVIEW OF SYSTEMS      CONST: no F/C/NS, no unintentional weight changes  NEURO: no H/A, no weakness, no paresthesias  EYES: no discharge, + pruritus, no erythema  EARS: no hearing loss,+o sensation of fullness  NOSE: + congestion, + rhinorrhea, no itching, no sneezing  PULM: no SOB, no wheezing, no cough  CV: no CP, no palpitations, no leg swelling  GI: no dysphagia, no heartburn, no pain, no N/V/D  DERM: no rashes, no skin breaks, no scalp pruritus      MEDICAL HISTORY      MedHx: active problems reviewed  SurgHx: tonsillectomy and adenoidectomy at age 9  SocHx: never smoker  FamHx: brother and daughter had asthma, paternal aunts have asthma  Allergies: see HPI  Medications: MAR reviewed  Vaccines: got covid vaccines and flu vaccine     H/o Asthma: yes  H/o Eczema: ?  H/o Rhinitis: yes  Oral Allergy:  denies  Food Allergy: denies  Venom Allergy: denies  Latex Allergy: denies      PHYSICAL EXAM      VS: Pulse (!) 55   Wt 70.8 kg (156 lb)   SpO2 97%   BMI 25.18 kg/m²   GENERAL: alert, NAD, well-appearing, cooperative, +facial tenderness  EYES: PERRL, EOMI, mild periocular swelling, no discharge, no infraorbital shiners  NOSE: NT 2-3+ and pink B/L, + white stringing mucous, no polyps  ORAL: MMM, no ulcers, no thrush, no cobblestoning  LUNGS: CTAB, no w/r/c, no increased WOB  HEART: RRR, normal S1/S2, no m/g/r  DERM: xerosis on right extensor elbow, no skin breaks, no dystrophic fingernails      ALLERGEN TESTING      Skin Prick: done at least a decade ago, recalls grass being positive  Immunocaps 2021: negative to all  aeroallergens tested      PULMONARY FUNCTION      PFTs: none in our system      IMAGING & OTHER DIAGNOSTICS      Sinus CT 2012: largely unremarkable for sinus disease  CT Chest 2020: lung fields normal      ASSESSMENT & PLAN      Loren Andre is a 71 y.o. female with      Frequent sinus infections  Chronic nonallergic rhinitis  History of asthma  Antibiotic allergies      Plan:   Humoral immunodeficiency workup  Sinus CT when symptomatic next  Continue flonase 1 SEN BID  Restart azelastine 1 SEN BID  Take advair and albuterol prn.   Has had flu and covid vaccines.  RTC for penicillin challenge when she feels better     Follow up: summer 2022 for amoxicillin challenge     Sarahi Leija MD  Allergy/Immunology

## 2022-05-04 LAB
C DIPHTHERIAE AB SER IA-ACNC: 0.17 IU/ML
C TETANI TOXOID AB SER-ACNC: 0.37 IU/ML
DEPRECATED S PNEUM23 IGG SER-MCNC: 1.4 UG/ML
DEPRECATED S PNEUM4 IGG SER-MCNC: 3.1 UG/ML
HAEM INFLU B IGG SER IA-MCNC: 0.16 MCG/ML
S PN DA SERO 19F IGG SER-MCNC: 2.6 UG/ML
S PNEUM DA 1 IGG SER-MCNC: 1.8 UG/ML
S PNEUM DA 14 IGG SER-MCNC: 4.6
S PNEUM DA 18C IGG SER-MCNC: 18.8
S PNEUM DA 19A IGG SER-MCNC: 61.6 UG/ML
S PNEUM DA 3 IGG SER-MCNC: 0.6 UG/ML
S PNEUM DA 5 IGG SER-MCNC: 7.1 UG/ML
S PNEUM DA 6A IGG SER-MCNC: <0.3 UG/ML
S PNEUM DA 6B IGG SER-MCNC: 0.5 UG/ML
S PNEUM DA 7F IGG SER-MCNC: 2 UG/ML
S PNEUM DA 9V IGG SER-MCNC: 1.8 UG/ML

## 2022-05-10 ENCOUNTER — IMMUNIZATION (OUTPATIENT)
Dept: PRIMARY CARE CLINIC | Facility: CLINIC | Age: 72
End: 2022-05-10
Payer: MEDICARE

## 2022-05-10 DIAGNOSIS — Z23 NEED FOR VACCINATION: Primary | ICD-10-CM

## 2022-05-10 PROCEDURE — 0054A COVID-19, MRNA, LNP-S, PF, 30 MCG/0.3 ML DOSE VACCINE (PFIZER): ICD-10-PCS | Mod: S$GLB,,, | Performed by: FAMILY MEDICINE

## 2022-05-10 PROCEDURE — 91305 COVID-19, MRNA, LNP-S, PF, 30 MCG/0.3 ML DOSE VACCINE (PFIZER): CPT | Mod: S$GLB,,, | Performed by: FAMILY MEDICINE

## 2022-05-10 PROCEDURE — 91305 COVID-19, MRNA, LNP-S, PF, 30 MCG/0.3 ML DOSE VACCINE (PFIZER): ICD-10-PCS | Mod: S$GLB,,, | Performed by: FAMILY MEDICINE

## 2022-05-10 PROCEDURE — 0054A COVID-19, MRNA, LNP-S, PF, 30 MCG/0.3 ML DOSE VACCINE (PFIZER): CPT | Mod: S$GLB,,, | Performed by: FAMILY MEDICINE

## 2022-05-12 ENCOUNTER — TELEPHONE (OUTPATIENT)
Dept: ALLERGY | Facility: CLINIC | Age: 72
End: 2022-05-12
Payer: MEDICARE

## 2022-05-12 NOTE — TELEPHONE ENCOUNTER
----- Message from Sarahi Leija MD sent at 5/12/2022 11:48 AM CDT -----  Can you please call this patient to let her know:  Immune system looks like it is functioning well to fight off infections.   If she feels a sinus infection again, we recommend a CT scan of the sinuses.   She should follow up this summer for an amoxicillin challenge.     Thanks!

## 2022-05-18 ENCOUNTER — TELEPHONE (OUTPATIENT)
Dept: ALLERGY | Facility: CLINIC | Age: 72
End: 2022-05-18
Payer: MEDICARE

## 2022-05-18 NOTE — TELEPHONE ENCOUNTER
----- Message from Monique Rios RN sent at 5/13/2022  3:07 PM CDT -----  Contact: patient  Pt returning your call  ----- Message -----  From: Jaqui Chang  Sent: 5/13/2022  11:48 AM CDT  To: Liss Mcclain Staff    Type: Needs Medical Advice  Who Called:  patient for Violet  Best Call Back Number: [phone] (home)   Additional Information: requesting a call back to discuss call from yesterday. Please advise.

## 2022-06-27 ENCOUNTER — PES CALL (OUTPATIENT)
Dept: ADMINISTRATIVE | Facility: CLINIC | Age: 72
End: 2022-06-27
Payer: MEDICARE

## 2022-07-20 ENCOUNTER — OFFICE VISIT (OUTPATIENT)
Dept: HOME HEALTH SERVICES | Facility: CLINIC | Age: 72
End: 2022-07-20
Payer: MEDICARE

## 2022-07-20 VITALS
TEMPERATURE: 97 F | BODY MASS INDEX: 24.43 KG/M2 | OXYGEN SATURATION: 99 % | HEIGHT: 66 IN | HEART RATE: 71 BPM | WEIGHT: 152 LBS

## 2022-07-20 DIAGNOSIS — R76.8 RHEUMATOID FACTOR POSITIVE: ICD-10-CM

## 2022-07-20 DIAGNOSIS — E78.5 HYPERLIPIDEMIA, UNSPECIFIED HYPERLIPIDEMIA TYPE: ICD-10-CM

## 2022-07-20 DIAGNOSIS — F41.9 ANXIETY: ICD-10-CM

## 2022-07-20 DIAGNOSIS — D57.3 SICKLE-CELL TRAIT: ICD-10-CM

## 2022-07-20 DIAGNOSIS — Z00.00 ENCOUNTER FOR PREVENTIVE HEALTH EXAMINATION: Primary | ICD-10-CM

## 2022-07-20 DIAGNOSIS — J45.20 MILD INTERMITTENT CHRONIC ASTHMA WITHOUT COMPLICATION: ICD-10-CM

## 2022-07-20 DIAGNOSIS — N18.31 STAGE 3A CHRONIC KIDNEY DISEASE: ICD-10-CM

## 2022-07-20 DIAGNOSIS — R73.03 PREDIABETES: ICD-10-CM

## 2022-07-20 DIAGNOSIS — I10 PRIMARY HYPERTENSION: ICD-10-CM

## 2022-07-20 DIAGNOSIS — M79.7 FIBROMYALGIA: ICD-10-CM

## 2022-07-20 PROCEDURE — 4010F ACE/ARB THERAPY RXD/TAKEN: CPT | Mod: CPTII,S$GLB,, | Performed by: NURSE PRACTITIONER

## 2022-07-20 PROCEDURE — 3288F PR FALLS RISK ASSESSMENT DOCUMENTED: ICD-10-PCS | Mod: CPTII,S$GLB,, | Performed by: NURSE PRACTITIONER

## 2022-07-20 PROCEDURE — 1101F PT FALLS ASSESS-DOCD LE1/YR: CPT | Mod: CPTII,S$GLB,, | Performed by: NURSE PRACTITIONER

## 2022-07-20 PROCEDURE — G0439 PR MEDICARE ANNUAL WELLNESS SUBSEQUENT VISIT: ICD-10-PCS | Mod: S$GLB,,, | Performed by: NURSE PRACTITIONER

## 2022-07-20 PROCEDURE — 1159F PR MEDICATION LIST DOCUMENTED IN MEDICAL RECORD: ICD-10-PCS | Mod: CPTII,S$GLB,, | Performed by: NURSE PRACTITIONER

## 2022-07-20 PROCEDURE — 3008F PR BODY MASS INDEX (BMI) DOCUMENTED: ICD-10-PCS | Mod: CPTII,S$GLB,, | Performed by: NURSE PRACTITIONER

## 2022-07-20 PROCEDURE — 1159F MED LIST DOCD IN RCRD: CPT | Mod: CPTII,S$GLB,, | Performed by: NURSE PRACTITIONER

## 2022-07-20 PROCEDURE — 4010F PR ACE/ARB THEARPY RXD/TAKEN: ICD-10-PCS | Mod: CPTII,S$GLB,, | Performed by: NURSE PRACTITIONER

## 2022-07-20 PROCEDURE — 1160F PR REVIEW ALL MEDS BY PRESCRIBER/CLIN PHARMACIST DOCUMENTED: ICD-10-PCS | Mod: CPTII,S$GLB,, | Performed by: NURSE PRACTITIONER

## 2022-07-20 PROCEDURE — 1101F PR PT FALLS ASSESS DOC 0-1 FALLS W/OUT INJ PAST YR: ICD-10-PCS | Mod: CPTII,S$GLB,, | Performed by: NURSE PRACTITIONER

## 2022-07-20 PROCEDURE — 3288F FALL RISK ASSESSMENT DOCD: CPT | Mod: CPTII,S$GLB,, | Performed by: NURSE PRACTITIONER

## 2022-07-20 PROCEDURE — G0439 PPPS, SUBSEQ VISIT: HCPCS | Mod: S$GLB,,, | Performed by: NURSE PRACTITIONER

## 2022-07-20 PROCEDURE — 3008F BODY MASS INDEX DOCD: CPT | Mod: CPTII,S$GLB,, | Performed by: NURSE PRACTITIONER

## 2022-07-20 PROCEDURE — 1160F RVW MEDS BY RX/DR IN RCRD: CPT | Mod: CPTII,S$GLB,, | Performed by: NURSE PRACTITIONER

## 2022-07-20 RX ORDER — NIACIN 250 MG
250 TABLET ORAL NIGHTLY
COMMUNITY

## 2022-07-20 RX ORDER — AMOXICILLIN 500 MG
CAPSULE ORAL DAILY
COMMUNITY

## 2022-07-20 NOTE — PATIENT INSTRUCTIONS
Counseling and Referral of Other Preventative  (Italic type indicates deductible and co-insurance are waived)    Patient Name: Loren Andre  Today's Date: 7/20/2022    Health Maintenance       Date Due Completion Date    Shingles Vaccine (1 of 2) Never done ---    TETANUS VACCINE 11/05/2019 11/5/2009    Mammogram 08/13/2022 8/13/2021    Influenza Vaccine (1) 09/01/2022 10/4/2021    Lipid Panel 10/04/2022 10/4/2021    DEXA Scan 10/01/2023 10/1/2020    Colorectal Cancer Screening 10/04/2028 10/4/2018    Override on 4/4/2008: Done (Dr Guerrero   report in legacy   kb)    Override on 11/9/2005: Done ([facility]--Dr Patino--in legacy)        No orders of the defined types were placed in this encounter.    The following information is provided to all patients.  This information is to help you find resources for any of the problems found today that may be affecting your health:                Living healthy guide: www.[facility].[place].gov      Understanding Diabetes: www.diabetes.org      Eating healthy: www.cdc.gov/healthyweight      CDC home safety checklist: www.cdc.gov/steadi/patient.html      Agency on Aging: www.goea.[place].gov      Alcoholics anonymous (AA): www.aa.org      Physical Activity: www.katie.nih.gov/nb7lhed      Tobacco use: www.quitwithusla.org

## 2022-07-20 NOTE — PROGRESS NOTES
"  Loren Andre presented for a  Medicare AWV and comprehensive Health Risk Assessment today. The following components were reviewed and updated:    · Medical history  · Family History  · Social history  · Allergies and Current Medications  · Health Risk Assessment  · Health Maintenance  · Care Team         ** See Completed Assessments for Annual Wellness Visit within the encounter summary.**         The following assessments were completed:  · Living Situation  · CAGE  · Depression Screening  · Timed Get Up and Go  · Whisper Test  · Cognitive Function Screening  ·   · Nutrition Screening  · ADL Screening  · PAQ Screening        Vitals:    07/20/22 0817   Pulse: 71   Temp: 97.3 °F (36.3 °C)   SpO2: 99%   Weight: 68.9 kg (152 lb)   Height: 5' 6" (1.676 m)     Body mass index is 24.53 kg/m².  Physical Exam  Vitals reviewed.   HENT:      Head: Normocephalic.   Eyes:      Pupils: Pupils are equal, round, and reactive to light.   Cardiovascular:      Rate and Rhythm: Normal rate and regular rhythm.      Heart sounds: Normal heart sounds.   Pulmonary:      Effort: Pulmonary effort is normal.      Breath sounds: Normal breath sounds.   Abdominal:      General: Bowel sounds are normal.      Palpations: Abdomen is soft.   Musculoskeletal:         General: Normal range of motion.      Cervical back: Normal range of motion.      Right lower leg: No edema.      Left lower leg: No edema.   Skin:     General: Skin is warm and dry.   Neurological:      Mental Status: She is alert and oriented to person, place, and time.   Psychiatric:         Behavior: Behavior normal.               Diagnoses and health risks identified today and associated recommendations/orders:    1. Encounter for preventive health examination  - Above assessments completed. Preventive measures and health maintenance reviewed with patient.  -discussed/encouraged tetanus and shingles vaccine    2. Stage 3a chronic kidney disease  Stable, followed by " "PCP  -encouraged hydration and avoidance of NSAIDs    3. Sickle-cell trait  Stable, followed by PCP  -no anemia noted    4. Rheumatoid factor positive  Stable, followed by PCP    5. Fibromyalgia  Stable, followed by PCP    6. Prediabetes  Stable, followed by PCP  -A1C 5.9    7. Mild intermittent chronic asthma without complication  Stable, followed by PCP  -on albuterol and montelukast    8. Primary hypertension  -BP elevated during today's visit, patient had not taken bp medications this morning and also reports "white coat syndrome"  -asymptomatic, discussed s/s that would warrant ED visit  -encouraged patient to keep daily bp log and report readings to PCP    9. Hyperlipidemia, unspecified hyperlipidemia type  Stable, followed by PCP  -on statin    10. Anxiety  Stable, followed by PCP  -trazodone      Provided Loren with a 5-10 year written screening schedule and personal prevention plan. Recommendations were developed using the USPSTF age appropriate recommendations. Education, counseling, and referrals were provided as needed. After Visit Summary printed and given to patient which includes a list of additional screenings\tests needed.    Follow up in about 1 year (around 7/20/2023) for your next annual wellness visit.    Mona Beckman NP  I offered to discuss advanced care planning, including how to pick a person who would make decisions for you if you were unable to make them for yourself, called a health care power of , and what kind of decisions you might make such as use of life sustaining treatments such as ventilators and tube feeding when faced with a life limiting illness recorded on a living will that they will need to know. (How you want to be cared for as you near the end of your natural life)     X Patient is interested in learning more about how to make advanced directives.  I provided them paperwork and offered to discuss this with them.  "

## 2022-08-16 ENCOUNTER — OFFICE VISIT (OUTPATIENT)
Dept: URGENT CARE | Facility: CLINIC | Age: 72
End: 2022-08-16
Payer: MEDICARE

## 2022-08-16 VITALS
DIASTOLIC BLOOD PRESSURE: 76 MMHG | SYSTOLIC BLOOD PRESSURE: 173 MMHG | RESPIRATION RATE: 18 BRPM | HEIGHT: 66 IN | TEMPERATURE: 97 F | BODY MASS INDEX: 24.75 KG/M2 | WEIGHT: 154 LBS | OXYGEN SATURATION: 98 % | HEART RATE: 62 BPM

## 2022-08-16 DIAGNOSIS — B96.89 ACUTE BACTERIAL SINUSITIS: Primary | ICD-10-CM

## 2022-08-16 DIAGNOSIS — J01.90 ACUTE BACTERIAL SINUSITIS: Primary | ICD-10-CM

## 2022-08-16 DIAGNOSIS — R09.81 SINUS CONGESTION: ICD-10-CM

## 2022-08-16 LAB
CTP QC/QA: YES
SARS-COV-2 AG RESP QL IA.RAPID: NEGATIVE

## 2022-08-16 PROCEDURE — 4010F ACE/ARB THERAPY RXD/TAKEN: CPT | Mod: CPTII,S$GLB,, | Performed by: NURSE PRACTITIONER

## 2022-08-16 PROCEDURE — 3078F DIAST BP <80 MM HG: CPT | Mod: CPTII,S$GLB,, | Performed by: NURSE PRACTITIONER

## 2022-08-16 PROCEDURE — 4010F PR ACE/ARB THEARPY RXD/TAKEN: ICD-10-PCS | Mod: CPTII,S$GLB,, | Performed by: NURSE PRACTITIONER

## 2022-08-16 PROCEDURE — 3077F PR MOST RECENT SYSTOLIC BLOOD PRESSURE >= 140 MM HG: ICD-10-PCS | Mod: CPTII,S$GLB,, | Performed by: NURSE PRACTITIONER

## 2022-08-16 PROCEDURE — 3077F SYST BP >= 140 MM HG: CPT | Mod: CPTII,S$GLB,, | Performed by: NURSE PRACTITIONER

## 2022-08-16 PROCEDURE — 87811 SARS CORONAVIRUS 2 ANTIGEN POCT, MANUAL READ: ICD-10-PCS | Mod: QW,S$GLB,, | Performed by: NURSE PRACTITIONER

## 2022-08-16 PROCEDURE — 99213 PR OFFICE/OUTPT VISIT, EST, LEVL III, 20-29 MIN: ICD-10-PCS | Mod: S$GLB,,, | Performed by: NURSE PRACTITIONER

## 2022-08-16 PROCEDURE — 1159F MED LIST DOCD IN RCRD: CPT | Mod: CPTII,S$GLB,, | Performed by: NURSE PRACTITIONER

## 2022-08-16 PROCEDURE — 99213 OFFICE O/P EST LOW 20 MIN: CPT | Mod: S$GLB,,, | Performed by: NURSE PRACTITIONER

## 2022-08-16 PROCEDURE — 3008F BODY MASS INDEX DOCD: CPT | Mod: CPTII,S$GLB,, | Performed by: NURSE PRACTITIONER

## 2022-08-16 PROCEDURE — 1159F PR MEDICATION LIST DOCUMENTED IN MEDICAL RECORD: ICD-10-PCS | Mod: CPTII,S$GLB,, | Performed by: NURSE PRACTITIONER

## 2022-08-16 PROCEDURE — 3008F PR BODY MASS INDEX (BMI) DOCUMENTED: ICD-10-PCS | Mod: CPTII,S$GLB,, | Performed by: NURSE PRACTITIONER

## 2022-08-16 PROCEDURE — 87811 SARS-COV-2 COVID19 W/OPTIC: CPT | Mod: QW,S$GLB,, | Performed by: NURSE PRACTITIONER

## 2022-08-16 PROCEDURE — 3078F PR MOST RECENT DIASTOLIC BLOOD PRESSURE < 80 MM HG: ICD-10-PCS | Mod: CPTII,S$GLB,, | Performed by: NURSE PRACTITIONER

## 2022-08-16 RX ORDER — DOXYCYCLINE HYCLATE 100 MG
100 TABLET ORAL EVERY 12 HOURS
Qty: 14 TABLET | Refills: 0 | Status: SHIPPED | OUTPATIENT
Start: 2022-08-16 | End: 2022-08-16

## 2022-08-16 RX ORDER — DOXYCYCLINE HYCLATE 100 MG
100 TABLET ORAL EVERY 12 HOURS
Qty: 14 TABLET | Refills: 0 | Status: SHIPPED | OUTPATIENT
Start: 2022-08-16 | End: 2022-12-03

## 2022-08-16 NOTE — PROGRESS NOTES
"Subjective:       Patient ID: Loren Andre is a 72 y.o. female.    Vitals:  height is 5' 6" (1.676 m) and weight is 69.9 kg (154 lb). Her temperature is 97.1 °F (36.2 °C). Her blood pressure is 173/76 (abnormal) and her pulse is 62. Her respiration is 18 and oxygen saturation is 98%.     Chief Complaint: Sinus Problem    Sinus Problem  This is a recurrent problem. Episode onset: 7 days ago. The problem has been gradually worsening since onset. There has been no fever. Associated symptoms include ear pain, headaches and sinus pressure. Past treatments include nothing.       HENT: Positive for ear pain and sinus pressure.    Neurological: Positive for headaches.       Objective:      Physical Exam   HENT:   Head: Normocephalic and atraumatic.   Ears:   Right Ear: Tympanic membrane, external ear and ear canal normal.   Left Ear: Tympanic membrane, external ear and ear canal normal.   Nose: Right sinus exhibits maxillary sinus tenderness. Left sinus exhibits maxillary sinus tenderness.   Mouth/Throat: Mucous membranes are moist. Oropharynx is clear.   Eyes: Extraocular movement intact   Pulmonary/Chest: Effort normal and breath sounds normal. No stridor. No respiratory distress. She has no wheezes. She has no rhonchi. She has no rales. She exhibits no tenderness.   Abdominal: Normal appearance.   Neurological: no focal deficit. She is alert.   Nursing note and vitals reviewed.        Results for orders placed or performed in visit on 08/16/22   SARS Coronavirus 2 Antigen, POCT Manual Read   Result Value Ref Range    SARS Coronavirus 2 Antigen Negative Negative     Acceptable Yes      Assessment:       1. Acute bacterial sinusitis    2. Sinus congestion          Plan:     Covid negative       Acute bacterial sinusitis  -     Discontinue: doxycycline (VIBRA-TABS) 100 MG tablet; Take 1 tablet (100 mg total) by mouth every 12 (twelve) hours.  Dispense: 14 tablet; Refill: 0  -     doxycycline (VIBRA-TABS) " "100 MG tablet; Take 1 tablet (100 mg total) by mouth every 12 (twelve) hours.  Dispense: 14 tablet; Refill: 0    Sinus congestion  -     SARS Coronavirus 2 Antigen, POCT Manual Read                  Patient Instructions                                                             Sinusitis   If your condition worsens or fails to improve we recommend that you receive another evaluation at the ER immediately or contact your PCP to discuss your concerns or return here. You must understand that you've received an urgent care treatment only and that you may be released before all your medical problems are known or treated. You the patient will arrange for followup care as instructed.   If we discussed that I think your illness is viral it will not respond to antibiotics and it will last 10-14 days. However, if over the next few days the symptoms worsen start the antibiotics I have given you.   -  If we discussed that you require antibiotics start them now and take them to completion.   -  If you are female and on BCP and do take the antibiotics, use additional methods to prevent pregnancy while on the antibiotics and for one cycle after.   -  Flonase (fluticasone) is a nasal spray which is available over the counter and may help with your symptoms   -  Zyrtec D, Claritin D or allegra D can also help with symptoms of congestion and drainage.   -  If you have hypertension avoid using the "D" which is the decongestant. Instead, you can use Coricidin HBP for your cold and cough symptoms.     -  If you just have drainage you can take plain Zyrtec, Claritin or Allegra   -  If you just have a congested feeling you can take pseudoephedrine (unless you have high blood pressure) which you have to sign for behind the counter. Do not buy the phenylephrine which is on the shelf as it is not effective   -  Rest and fluids are also important.   -  Tylenol or ibuprofen can also be used as directed for pain unless you have an allergy to " them or medical condition such as stomach ulcers, kidney or liver disease or blood thinners etc for which you should not be taking these type of medications.   -  If you are flying in the next few days Afrin nose drops for the airplane flight upon take off and landing may help. Other than at those times refrain from using afrin.   -  If you were prescribed a narcotic do not drive or operate heavy machinery while taking these medications.

## 2022-08-23 ENCOUNTER — TELEPHONE (OUTPATIENT)
Dept: FAMILY MEDICINE | Facility: CLINIC | Age: 72
End: 2022-08-23
Payer: MEDICARE

## 2022-08-23 DIAGNOSIS — Z12.31 VISIT FOR SCREENING MAMMOGRAM: Primary | ICD-10-CM

## 2022-08-23 NOTE — TELEPHONE ENCOUNTER
----- Message from Payton Li sent at 8/23/2022  8:25 AM CDT -----  Type: Patient Call Back         Who called: Pt          What is the request in detail: pt called in regarding needing orders to schedule Mammogram         Can the clinic reply by [facility]?no          Would the patient rather a call back or a response via [facility]?call back         Best call back number:[phone] (mobile)          Additional Information:           Thank You

## 2022-09-01 ENCOUNTER — HOSPITAL ENCOUNTER (OUTPATIENT)
Dept: RADIOLOGY | Facility: CLINIC | Age: 72
Discharge: HOME | End: 2022-09-01
Attending: FAMILY MEDICINE
Payer: MEDICARE

## 2022-09-01 DIAGNOSIS — Z12.31 VISIT FOR SCREENING MAMMOGRAM: ICD-10-CM

## 2022-09-01 PROCEDURE — 77063 BREAST TOMOSYNTHESIS BI: CPT | Mod: 26,,, | Performed by: RADIOLOGY

## 2022-09-01 PROCEDURE — 77067 SCR MAMMO BI INCL CAD: CPT | Mod: 26,,, | Performed by: RADIOLOGY

## 2022-09-01 PROCEDURE — 77067 MAMMO DIGITAL SCREENING BILAT WITH TOMO: ICD-10-PCS | Mod: 26,,, | Performed by: RADIOLOGY

## 2022-09-01 PROCEDURE — 77067 SCR MAMMO BI INCL CAD: CPT | Mod: TC,PO

## 2022-09-01 PROCEDURE — 77063 MAMMO DIGITAL SCREENING BILAT WITH TOMO: ICD-10-PCS | Mod: 26,,, | Performed by: RADIOLOGY

## 2022-09-22 ENCOUNTER — OFFICE VISIT (OUTPATIENT)
Dept: ALLERGY | Facility: CLINIC | Age: 72
End: 2022-09-22
Payer: MEDICARE

## 2022-09-22 DIAGNOSIS — J30.9 CHRONIC ALLERGIC RHINITIS: ICD-10-CM

## 2022-09-22 DIAGNOSIS — J31.0 CHRONIC RHINITIS: ICD-10-CM

## 2022-09-22 DIAGNOSIS — J45.909 ASTHMA, UNSPECIFIED ASTHMA SEVERITY, UNSPECIFIED WHETHER COMPLICATED, UNSPECIFIED WHETHER PERSISTENT: ICD-10-CM

## 2022-09-22 PROCEDURE — 1159F MED LIST DOCD IN RCRD: CPT | Mod: CPTII,S$GLB,, | Performed by: STUDENT IN AN ORGANIZED HEALTH CARE EDUCATION/TRAINING PROGRAM

## 2022-09-22 PROCEDURE — 1160F RVW MEDS BY RX/DR IN RCRD: CPT | Mod: CPTII,S$GLB,, | Performed by: STUDENT IN AN ORGANIZED HEALTH CARE EDUCATION/TRAINING PROGRAM

## 2022-09-22 PROCEDURE — 1159F PR MEDICATION LIST DOCUMENTED IN MEDICAL RECORD: ICD-10-PCS | Mod: CPTII,S$GLB,, | Performed by: STUDENT IN AN ORGANIZED HEALTH CARE EDUCATION/TRAINING PROGRAM

## 2022-09-22 PROCEDURE — 99215 PR OFFICE/OUTPT VISIT, EST, LEVL V, 40-54 MIN: ICD-10-PCS | Mod: S$GLB,,, | Performed by: STUDENT IN AN ORGANIZED HEALTH CARE EDUCATION/TRAINING PROGRAM

## 2022-09-22 PROCEDURE — 99999 PR PBB SHADOW E&M-EST. PATIENT-LVL II: CPT | Mod: PBBFAC,,, | Performed by: STUDENT IN AN ORGANIZED HEALTH CARE EDUCATION/TRAINING PROGRAM

## 2022-09-22 PROCEDURE — 4010F ACE/ARB THERAPY RXD/TAKEN: CPT | Mod: CPTII,S$GLB,, | Performed by: STUDENT IN AN ORGANIZED HEALTH CARE EDUCATION/TRAINING PROGRAM

## 2022-09-22 PROCEDURE — 99999 PR PBB SHADOW E&M-EST. PATIENT-LVL II: ICD-10-PCS | Mod: PBBFAC,,, | Performed by: STUDENT IN AN ORGANIZED HEALTH CARE EDUCATION/TRAINING PROGRAM

## 2022-09-22 PROCEDURE — 99215 OFFICE O/P EST HI 40 MIN: CPT | Mod: S$GLB,,, | Performed by: STUDENT IN AN ORGANIZED HEALTH CARE EDUCATION/TRAINING PROGRAM

## 2022-09-22 PROCEDURE — 4010F PR ACE/ARB THEARPY RXD/TAKEN: ICD-10-PCS | Mod: CPTII,S$GLB,, | Performed by: STUDENT IN AN ORGANIZED HEALTH CARE EDUCATION/TRAINING PROGRAM

## 2022-09-22 PROCEDURE — 1160F PR REVIEW ALL MEDS BY PRESCRIBER/CLIN PHARMACIST DOCUMENTED: ICD-10-PCS | Mod: CPTII,S$GLB,, | Performed by: STUDENT IN AN ORGANIZED HEALTH CARE EDUCATION/TRAINING PROGRAM

## 2022-09-22 RX ORDER — AZELASTINE 1 MG/ML
1 SPRAY, METERED NASAL 2 TIMES DAILY
Qty: 30 ML | Refills: 11 | OUTPATIENT
Start: 2022-09-22 | End: 2022-10-13

## 2022-09-22 RX ORDER — ALBUTEROL SULFATE 90 UG/1
2 AEROSOL, METERED RESPIRATORY (INHALATION) EVERY 6 HOURS PRN
Qty: 6.7 G | Refills: 3 | Status: SHIPPED | OUTPATIENT
Start: 2022-09-22 | End: 2023-08-07 | Stop reason: SDUPTHER

## 2022-09-22 NOTE — PROGRESS NOTES
ALLERGY & IMMUNOLOGY CLINIC -  FOLLOW UP VISIT      HISTORY OF PRESENT ILLNESS      Patient ID: Loren Andre is a 72 y.o. female     Allergy Problem List  Persisten asthma  Chronic nonallergic rhinitis  Labelled PCN allergic  Frequent sinusitis  Multiple perceived drug intolerances     HPI: 71 yo woman with asthma and nonallergic rhinitis last saw Dr Leija 4/26/2022.      Related medications and other interventions  Flonase 1 BID-- daily  Astelin 1 BID -- rarely  Montelukast every morining  Advair --uses Nov - Feb BID  Albuterol MDI or ebas needed    09/22/2022:  Patient's chief complaint is cough which she localizes to her neck region.  It is associated with throat clearing and occasional mucus.  It occurs throughout the day and also in spells.  It is not been helped by multiple antihistamines.  It was helped by promethazine DM.  She describes postnasal drip sensation occasionally awakening her from sleep and in the early mornings.  She says that weather changes are a definite precipitant.  She denies history of heartburn.    Client has a diagnosis of seasonal asthma in the winter.  She says this is manifest by chest tightness and mild shortness of breath.  She denies wheezing.  She says it was associated with a headache.  She takes Advair from November through February only.  During the rest of the year she takes albuterol as needed.      Excerpted from Dr Leija's notes  4/26/22:She was recently treated with doxycycline for sinus infection. She has had antibiotics every 4-6 months for sinus infections.         Rhinitis:   She recalls being an itchy sneezy kid, symptoms got worse as she got older. Since April 2021, she has had daily cough, congestion, rhinorrhea. Occasional headaches. No fevers. Sometimes itchy eyes. Nose feels itchy on the inside. Ears itch and feel full. Sense of smell is sometimes diminished. Taking allergy pill (pseudoephedine) once a day and nasal sprays constantly. Nasal spray is  "equate brand oxymetazoline. Perfume and scents are triggers. Rain seems to be a trigger as well.      Asthma:   Diagnosed as an adult. Main symptom is "a different type of headache" and chest tightness. No wheezing. On Advair. She takes this in the winter, because the cold air seems to trigger her breathing troubles. She does not recall systemic steroids in the past year. She is up to date with her flu vaccines and has had her covid vaccine.      Rash:   Never diagnosed with eczema, but does have a rash on arms, knees, neck that is clearing up with aveeno lotion. Occasional generalized pruritus.      Allergy testing:   She recalls a skin testing >10 years ago and was allergic to grass. She took allergy shots once weekly for a chunk of time but didn't really see much improvement.      Medication allergies:   Long list of medication allergies.   PCN: rash, in her 20s.  Sulfa: rash, 5-6 years ago.         REVIEW OF SYSTEMS      CONST: no F/C/NS, no unintentional weight changes  NEURO: no H/A, no weakness, no paresthesias  EYES: no discharge, + pruritus, no erythema  EARS: no hearing loss,+o sensation of fullness  NOSE: + congestion, + rhinorrhea, no itching, no sneezing  PULM: no SOB, no wheezing, no cough  CV: no CP, no palpitations, no leg swelling  GI: no dysphagia, no heartburn, no pain, no N/V/D  DERM: no rashes, no skin breaks, no scalp pruritus      MEDICAL HISTORY      MedHx: active problems reviewed  SurgHx: tonsillectomy and adenoidectomy at age 9  SocHx: never smoker  FamHx: brother and daughter had asthma, paternal aunts have asthma  Allergies: see HPI  Medications: MAR reviewed  Vaccines: got covid vaccines and flu vaccine     H/o Asthma: yes  H/o Eczema: ?  H/o Rhinitis: yes  Oral Allergy:  denies  Food Allergy: denies  Venom Allergy: denies  Latex Allergy: denies      PHYSICAL EXAM      VS: There were no vitals taken for this visit.  GENERAL: alert, NAD, well-appearing, cooperative, +facial " tenderness  EYES:  mild periocular swelling, no discharge, no infraorbital shiners  NOSE: NT 2-3+ and pink B/L, no polyps  ORAL:  no ulcers, no thrush, no cobblestoning  LUNGS: CTA,  no increased WOB  HEART: RRR, normal S1/S2,   DERM:  no skin breaks, no dystrophic fingernails      ALLERGEN TESTING      Skin Prick: done at least a decade ago, recalls grass being positive  Immunocaps 2021: negative to all aeroallergens tested      Labs      Immune testing April 2022  Nl IgG/A/M  Low Hib  Normal Pneumo  12/14 w/  previous vaccine x3  Normal diptheria and tetanus    IMAGING & OTHER DIAGNOSTICS      Sinus CT 2012: largely unremarkable for sinus disease  CT Chest 2020: lung fields normal      ASSESSMENT & PLAN      Loren Andre is a 71 y.o. female with   Frequent sinus infections   Chronic nonallergic rhinitis  History of asthma  Antibiotic allergies      Plan:   Recheck pneumococcal titers  Increase azelastine to 2 squirts twice a day until next visit  Stop Flonase for now  Consider ENT endoscopy    Return 2 weeks to follow-up labs  Return October procedure today for amoxicillin challenge     Татьяна Porter MD  Allergy/Immunology       I spent a total of 47 minutes on the day of the visit.This includes face to face time and non-face to face time preparing to see the patient (eg, review of tests), obtaining and/or reviewing separately obtained history, documenting clinical information in the electronic or other health record, independently interpreting results and communicating results to the patient/family/caregiver, or care coordinator.

## 2022-09-22 NOTE — PATIENT INSTRUCTIONS
Stop fluticasone nasal spray     Increase Astelin = azelastine nasal spray:    2 squirts each side  twice a day   Do not snort (because it burns and tastes bad)        Return for follow up of cough September 27  and for penicillin challenge Oct 18 or 20

## 2022-09-30 ENCOUNTER — TELEPHONE (OUTPATIENT)
Dept: CARDIOLOGY | Facility: CLINIC | Age: 72
End: 2022-09-30
Payer: MEDICARE

## 2022-09-30 NOTE — TELEPHONE ENCOUNTER
----- Message from Justine Bolden sent at 9/30/2022  8:15 AM CDT -----  Type: Needs Medical Advice  Who Called: Pt   Symptoms (please be specific):   How long has patient had these symptoms:    Pharmacy name and phone #:    Best Call Back Number: [phone]  Additional Information: Pt requesting a call back for a same day appt. Pt states she had pain under the left breast.Pt took Ibuprofen and no longer has the pain.

## 2022-09-30 NOTE — TELEPHONE ENCOUNTER
Spoke with pt, states she feels better. Advised pt to go to ED if pain comes back and persists. Pt verbalized understanding.

## 2022-10-04 ENCOUNTER — OFFICE VISIT (OUTPATIENT)
Dept: FAMILY MEDICINE | Facility: CLINIC | Age: 72
End: 2022-10-04
Attending: FAMILY MEDICINE
Payer: MEDICARE

## 2022-10-04 ENCOUNTER — LAB VISIT (OUTPATIENT)
Dept: LAB | Facility: HOSPITAL | Age: 72
End: 2022-10-04
Attending: FAMILY MEDICINE
Payer: MEDICARE

## 2022-10-04 VITALS
DIASTOLIC BLOOD PRESSURE: 78 MMHG | HEIGHT: 66 IN | TEMPERATURE: 98 F | HEART RATE: 56 BPM | OXYGEN SATURATION: 98 % | BODY MASS INDEX: 24.8 KG/M2 | RESPIRATION RATE: 17 BRPM | SYSTOLIC BLOOD PRESSURE: 134 MMHG | WEIGHT: 154.31 LBS

## 2022-10-04 DIAGNOSIS — E78.5 HYPERLIPIDEMIA, UNSPECIFIED HYPERLIPIDEMIA TYPE: ICD-10-CM

## 2022-10-04 DIAGNOSIS — Z00.00 PREVENTATIVE HEALTH CARE: ICD-10-CM

## 2022-10-04 DIAGNOSIS — F51.04 CHRONIC INSOMNIA: ICD-10-CM

## 2022-10-04 DIAGNOSIS — I10 PRIMARY HYPERTENSION: ICD-10-CM

## 2022-10-04 DIAGNOSIS — D72.819 LEUKOPENIA, UNSPECIFIED TYPE: ICD-10-CM

## 2022-10-04 DIAGNOSIS — D57.3 SICKLE-CELL TRAIT: ICD-10-CM

## 2022-10-04 DIAGNOSIS — N18.31 STAGE 3A CHRONIC KIDNEY DISEASE: ICD-10-CM

## 2022-10-04 DIAGNOSIS — D63.8 CHRONIC DISEASE ANEMIA: ICD-10-CM

## 2022-10-04 DIAGNOSIS — R73.03 PREDIABETES: ICD-10-CM

## 2022-10-04 DIAGNOSIS — J45.909 ASTHMA, UNSPECIFIED ASTHMA SEVERITY, UNSPECIFIED WHETHER COMPLICATED, UNSPECIFIED WHETHER PERSISTENT: ICD-10-CM

## 2022-10-04 DIAGNOSIS — Z00.00 PREVENTATIVE HEALTH CARE: Primary | ICD-10-CM

## 2022-10-04 LAB
ALBUMIN SERPL BCP-MCNC: 3.6 G/DL (ref 3.5–5.2)
ALP SERPL-CCNC: 72 U/L (ref 55–135)
ALT SERPL W/O P-5'-P-CCNC: 30 U/L (ref 10–44)
ANION GAP SERPL CALC-SCNC: 10 MMOL/L (ref 8–16)
AST SERPL-CCNC: 30 U/L (ref 10–40)
BASOPHILS # BLD AUTO: 0.02 K/UL (ref 0–0.2)
BASOPHILS NFR BLD: 0.4 % (ref 0–1.9)
BILIRUB SERPL-MCNC: 0.7 MG/DL (ref 0.1–1)
BUN SERPL-MCNC: 20 MG/DL (ref 8–23)
CALCIUM SERPL-MCNC: 9.3 MG/DL (ref 8.7–10.5)
CHLORIDE SERPL-SCNC: 107 MMOL/L (ref 95–110)
CHOLEST SERPL-MCNC: 125 MG/DL (ref 120–199)
CHOLEST/HDLC SERPL: 1.8 {RATIO} (ref 2–5)
CO2 SERPL-SCNC: 25 MMOL/L (ref 23–29)
CREAT SERPL-MCNC: 1.3 MG/DL (ref 0.5–1.4)
DIFFERENTIAL METHOD: NORMAL
EOSINOPHIL # BLD AUTO: 0.1 K/UL (ref 0–0.5)
EOSINOPHIL NFR BLD: 2.7 % (ref 0–8)
ERYTHROCYTE [DISTWIDTH] IN BLOOD BY AUTOMATED COUNT: 14.1 % (ref 11.5–14.5)
EST. GFR  (NO RACE VARIABLE): 43 ML/MIN/1.73 M^2
ESTIMATED AVG GLUCOSE: 126 MG/DL (ref 68–131)
GLUCOSE SERPL-MCNC: 99 MG/DL (ref 70–110)
HBA1C MFR BLD: 6 % (ref 4–5.6)
HCT VFR BLD AUTO: 37.9 % (ref 37–48.5)
HDLC SERPL-MCNC: 69 MG/DL (ref 40–75)
HDLC SERPL: 55.2 % (ref 20–50)
HGB BLD-MCNC: 12.8 G/DL (ref 12–16)
IMM GRANULOCYTES # BLD AUTO: 0.01 K/UL (ref 0–0.04)
IMM GRANULOCYTES NFR BLD AUTO: 0.2 % (ref 0–0.5)
LDLC SERPL CALC-MCNC: 47.8 MG/DL (ref 63–159)
LYMPHOCYTES # BLD AUTO: 1.3 K/UL (ref 1–4.8)
LYMPHOCYTES NFR BLD: 29 % (ref 18–48)
MCH RBC QN AUTO: 28.3 PG (ref 27–31)
MCHC RBC AUTO-ENTMCNC: 33.8 G/DL (ref 32–36)
MCV RBC AUTO: 84 FL (ref 82–98)
MONOCYTES # BLD AUTO: 0.6 K/UL (ref 0.3–1)
MONOCYTES NFR BLD: 14.2 % (ref 4–15)
NEUTROPHILS # BLD AUTO: 2.4 K/UL (ref 1.8–7.7)
NEUTROPHILS NFR BLD: 53.5 % (ref 38–73)
NONHDLC SERPL-MCNC: 56 MG/DL
NRBC BLD-RTO: 0 /100 WBC
PLATELET # BLD AUTO: 180 K/UL (ref 150–450)
PMV BLD AUTO: 10.2 FL (ref 9.2–12.9)
POTASSIUM SERPL-SCNC: 4.1 MMOL/L (ref 3.5–5.1)
PROT SERPL-MCNC: 7 G/DL (ref 6–8.4)
RBC # BLD AUTO: 4.53 M/UL (ref 4–5.4)
SODIUM SERPL-SCNC: 142 MMOL/L (ref 136–145)
TRIGL SERPL-MCNC: 41 MG/DL (ref 30–150)
WBC # BLD AUTO: 4.45 K/UL (ref 3.9–12.7)

## 2022-10-04 PROCEDURE — 3288F PR FALLS RISK ASSESSMENT DOCUMENTED: ICD-10-PCS | Mod: CPTII,S$GLB,, | Performed by: FAMILY MEDICINE

## 2022-10-04 PROCEDURE — 1101F PT FALLS ASSESS-DOCD LE1/YR: CPT | Mod: CPTII,S$GLB,, | Performed by: FAMILY MEDICINE

## 2022-10-04 PROCEDURE — 99397 PR PREVENTIVE VISIT,EST,65 & OVER: ICD-10-PCS | Mod: S$GLB,,, | Performed by: FAMILY MEDICINE

## 2022-10-04 PROCEDURE — 3008F PR BODY MASS INDEX (BMI) DOCUMENTED: ICD-10-PCS | Mod: CPTII,S$GLB,, | Performed by: FAMILY MEDICINE

## 2022-10-04 PROCEDURE — 1160F RVW MEDS BY RX/DR IN RCRD: CPT | Mod: CPTII,S$GLB,, | Performed by: FAMILY MEDICINE

## 2022-10-04 PROCEDURE — 1101F PR PT FALLS ASSESS DOC 0-1 FALLS W/OUT INJ PAST YR: ICD-10-PCS | Mod: CPTII,S$GLB,, | Performed by: FAMILY MEDICINE

## 2022-10-04 PROCEDURE — 3078F DIAST BP <80 MM HG: CPT | Mod: CPTII,S$GLB,, | Performed by: FAMILY MEDICINE

## 2022-10-04 PROCEDURE — 1160F PR REVIEW ALL MEDS BY PRESCRIBER/CLIN PHARMACIST DOCUMENTED: ICD-10-PCS | Mod: CPTII,S$GLB,, | Performed by: FAMILY MEDICINE

## 2022-10-04 PROCEDURE — 3075F SYST BP GE 130 - 139MM HG: CPT | Mod: CPTII,S$GLB,, | Performed by: FAMILY MEDICINE

## 2022-10-04 PROCEDURE — 1159F MED LIST DOCD IN RCRD: CPT | Mod: CPTII,S$GLB,, | Performed by: FAMILY MEDICINE

## 2022-10-04 PROCEDURE — 99999 PR PBB SHADOW E&M-EST. PATIENT-LVL V: CPT | Mod: PBBFAC,,, | Performed by: FAMILY MEDICINE

## 2022-10-04 PROCEDURE — 85025 COMPLETE CBC W/AUTO DIFF WBC: CPT | Performed by: FAMILY MEDICINE

## 2022-10-04 PROCEDURE — 99397 PER PM REEVAL EST PAT 65+ YR: CPT | Mod: S$GLB,,, | Performed by: FAMILY MEDICINE

## 2022-10-04 PROCEDURE — 1126F PR PAIN SEVERITY QUANTIFIED, NO PAIN PRESENT: ICD-10-PCS | Mod: CPTII,S$GLB,, | Performed by: FAMILY MEDICINE

## 2022-10-04 PROCEDURE — 90694 VACC AIIV4 NO PRSRV 0.5ML IM: CPT | Mod: S$GLB,,, | Performed by: FAMILY MEDICINE

## 2022-10-04 PROCEDURE — G0008 ADMIN INFLUENZA VIRUS VAC: HCPCS | Mod: S$GLB,,, | Performed by: FAMILY MEDICINE

## 2022-10-04 PROCEDURE — G0008 FLU VACCINE - QUADRIVALENT - ADJUVANTED: ICD-10-PCS | Mod: S$GLB,,, | Performed by: FAMILY MEDICINE

## 2022-10-04 PROCEDURE — 3075F PR MOST RECENT SYSTOLIC BLOOD PRESS GE 130-139MM HG: ICD-10-PCS | Mod: CPTII,S$GLB,, | Performed by: FAMILY MEDICINE

## 2022-10-04 PROCEDURE — 80053 COMPREHEN METABOLIC PANEL: CPT | Performed by: FAMILY MEDICINE

## 2022-10-04 PROCEDURE — 4010F ACE/ARB THERAPY RXD/TAKEN: CPT | Mod: CPTII,S$GLB,, | Performed by: FAMILY MEDICINE

## 2022-10-04 PROCEDURE — 90694 FLU VACCINE - QUADRIVALENT - ADJUVANTED: ICD-10-PCS | Mod: S$GLB,,, | Performed by: FAMILY MEDICINE

## 2022-10-04 PROCEDURE — 1159F PR MEDICATION LIST DOCUMENTED IN MEDICAL RECORD: ICD-10-PCS | Mod: CPTII,S$GLB,, | Performed by: FAMILY MEDICINE

## 2022-10-04 PROCEDURE — 80061 LIPID PANEL: CPT | Performed by: FAMILY MEDICINE

## 2022-10-04 PROCEDURE — 1126F AMNT PAIN NOTED NONE PRSNT: CPT | Mod: CPTII,S$GLB,, | Performed by: FAMILY MEDICINE

## 2022-10-04 PROCEDURE — 99999 PR PBB SHADOW E&M-EST. PATIENT-LVL V: ICD-10-PCS | Mod: PBBFAC,,, | Performed by: FAMILY MEDICINE

## 2022-10-04 PROCEDURE — 4010F PR ACE/ARB THEARPY RXD/TAKEN: ICD-10-PCS | Mod: CPTII,S$GLB,, | Performed by: FAMILY MEDICINE

## 2022-10-04 PROCEDURE — 3288F FALL RISK ASSESSMENT DOCD: CPT | Mod: CPTII,S$GLB,, | Performed by: FAMILY MEDICINE

## 2022-10-04 PROCEDURE — 3008F BODY MASS INDEX DOCD: CPT | Mod: CPTII,S$GLB,, | Performed by: FAMILY MEDICINE

## 2022-10-04 PROCEDURE — 36415 COLL VENOUS BLD VENIPUNCTURE: CPT | Performed by: FAMILY MEDICINE

## 2022-10-04 PROCEDURE — 83036 HEMOGLOBIN GLYCOSYLATED A1C: CPT | Performed by: FAMILY MEDICINE

## 2022-10-04 PROCEDURE — 3078F PR MOST RECENT DIASTOLIC BLOOD PRESSURE < 80 MM HG: ICD-10-PCS | Mod: CPTII,S$GLB,, | Performed by: FAMILY MEDICINE

## 2022-10-04 RX ORDER — ESZOPICLONE 1 MG/1
1 TABLET, FILM COATED ORAL NIGHTLY
Qty: 30 TABLET | Refills: 0 | Status: SHIPPED | OUTPATIENT
Start: 2022-10-04 | End: 2022-11-01 | Stop reason: SDUPTHER

## 2022-10-04 RX ORDER — ATORVASTATIN CALCIUM 40 MG/1
40 TABLET, FILM COATED ORAL DAILY
Qty: 90 TABLET | Refills: 3 | Status: SHIPPED | OUTPATIENT
Start: 2022-10-04 | End: 2022-11-03 | Stop reason: SDUPTHER

## 2022-10-04 RX ORDER — LISINOPRIL 40 MG/1
40 TABLET ORAL DAILY
Qty: 90 TABLET | Refills: 3 | Status: SHIPPED | OUTPATIENT
Start: 2022-10-04 | End: 2023-10-04 | Stop reason: SDUPTHER

## 2022-10-04 RX ORDER — MONTELUKAST SODIUM 10 MG/1
10 TABLET ORAL DAILY
Qty: 90 TABLET | Refills: 3 | Status: SHIPPED | OUTPATIENT
Start: 2022-10-04 | End: 2022-12-28

## 2022-10-04 RX ORDER — CARVEDILOL 25 MG/1
25 TABLET ORAL 2 TIMES DAILY WITH MEALS
Qty: 180 TABLET | Refills: 3 | Status: SHIPPED | OUTPATIENT
Start: 2022-10-04 | End: 2022-10-17 | Stop reason: SDUPTHER

## 2022-10-04 NOTE — PROGRESS NOTES
Subjective:       Patient ID: Loren Andre is a 72 y.o. female.    Chief Complaint: Annual Exam (Pt states that she is here for her annual exam )    72-year-old female comes in for annual exam and follow-up on multiple medical problems.  Her blood pressure has usually been in the 130s over 70s at home.  She had a brief episode of some left-sided chest wall pain that has now resolved and does not recall any activity that may have produced it.  She has been doing some workouts with some hand weights but does not recall any strain or injury.  She is asymptomatic now and had no shortness of breath, diaphoresis, nausea or weakness.  She did talk to her cardiologist office after it was over and they told her that if it recurred to go to the emergency room and they will be seeing her in about a month.  She has chronic insomnia that started after her   in .  She has been using trazodone getting some good relief initially but lately even though she has increase the dose up to 100 mg it is not helpful on most nights.  She lies awake staring at the ceiling and her mind racing finding it hard to relax.  She does have a history of anxiety, chronic asthma, hypertension, hyperlipidemia, stage IIIA chronic kidney disease, rheumatoid factor positive with mild arthritic symptoms, leukopenia, sickle cell trait, anemia of chronic disease, prediabetes, fibromyalgia and colon polyps.  Her last colonoscopy was 2018 by Dr. Kemp with no significant findings and a 10 year recheck due in 2028.    She is not fasting today.    Past Medical History:  No date: Allergy  No date: Anemia      Comment:  sickle cell trait  No date: Anxiety  No date: Arthritis  No date: Asthma  10/5/2017: Chronic disease anemia  No date: Colon polyps  No date: Depression  3/7/2013: Dermatitis  No date: Fibromyalgia  No date: HEARING LOSS  No date: Hyperlipidemia  No date: Hypertension  10/5/2017: Leucopenia  No date:  Polyneuropathy  No date: Scoliosis  No date: Sickle cell trait  10/5/2017: Sickle-cell trait  No date: Trouble in sleeping  No date: Urticaria    Past Surgical History:  No date: ADENOIDECTOMY  No date: APPENDECTOMY  4/14/2008: COLONOSCOPY      Comment:  Dr. Guerrero, 10 year recheck  10/4/2018: COLONOSCOPY; N/A      Comment:  Procedure: COLONOSCOPY;  Surgeon: Tam Kemp MD;                Location: [facility];  Service: Endoscopy;  Laterality:                N/A;  No date: HYSTERECTOMY  No date: OOPHORECTOMY  01/2016: ROTATOR CUFF REPAIR; Left  12/04/2017: ROTATOR CUFF REPAIR W/ DISTAL CLAVICLE EXCISION; Right      Comment:  Dr. Eligio Timmons ( [facility] )  No date: TONSILLECTOMY    Review of patient's family history indicates:    Social History    Tobacco Use      Smoking status: Never      Smokeless tobacco: Never    Alcohol use: Yes      Comment: occasionally - wine    Drug use: No    Current Outpatient Medications on File Prior to Visit:  albuterol (PROAIR HFA) 90 mcg/actuation inhaler, Inhale 2 puffs into the lungs every 6 (six) hours as needed for Wheezing. Rescue, Disp: 6.7 g, Rfl: 3  ascorbic acid, vitamin C, (VITAMIN C) 500 MG tablet, Take 500 mg by mouth daily as needed. , Disp: , Rfl:   aspirin 81 MG Chew, Take 1 tablet (81 mg total) by mouth once daily. (Patient taking differently: Take 81 mg by mouth once daily. Pt states taking OTC.), Disp: , Rfl: 0  azelastine (ASTELIN) 137 mcg (0.1 %) nasal spray, 1 spray (137 mcg total) by Nasal route 2 (two) times daily., Disp: 30 mL, Rfl: 11  betamethasone dipropionate (DIPROLENE) 0.05 % cream, Apply topically 2 (two) times daily., Disp: 15 g, Rfl: 2  CALCIUM CARBONATE/VITAMIN D3 (VITAMIN D-3 ORAL), Take 100 Int'l Units by mouth once daily. , Disp: , Rfl:   clobetasoL (OLUX) 0.05 % Foam, Apply topically 2 (two) times daily., Disp: 100 g, Rfl: 3  coenzyme Q10 10 mg capsule, Take 10 mg by mouth once daily., Disp: , Rfl:   doxycycline (VIBRA-TABS) 100 MG  tablet, Take 1 tablet (100 mg total) by mouth every 12 (twelve) hours., Disp: 14 tablet, Rfl: 0  fluticasone-salmeterol diskus inhaler 250-50 mcg, Inhale 1 puff into the lungs 2 (two) times daily. Controller, Disp: 60 each, Rfl: 11  folic acid (FOLVITE) 400 MCG tablet, Take 400 mcg by mouth once daily., Disp: , Rfl:   GARLIC OIL ORAL, Take 1 capsule by mouth once daily. , Disp: , Rfl:   magnesium 250 mg Tab, Take 250 mg by mouth once. , Disp: , Rfl:   niacin 250 MG Tab, Take 250 mg by mouth nightly., Disp: , Rfl:   omega-3 fatty acids/fish oil (FISH OIL-OMEGA-3 FATTY ACIDS) 300-1,000 mg capsule, Take by mouth once daily., Disp: , Rfl:   traZODone (DESYREL) 100 MG tablet, Take 1 tablet (100 mg total) by mouth every evening., Disp: 90 tablet, Rfl: 1  UNABLE TO FIND, Always eye drop, Disp: , Rfl:   valACYclovir (VALTREX) 500 MG tablet, TAKE 1 TABLET (500 MG TOTAL) BY MOUTH 2 (TWO) TIMES DAILY FOR 7 DAYS AS DIRECTED., Disp: 60 tablet, Rfl: 3  vitamin E 100 UNIT capsule, Take 100 Units by mouth once daily., Disp: , Rfl:   [DISCONTINUED] atorvastatin (LIPITOR) 40 MG tablet, Take 1 tablet (40 mg total) by mouth once daily. Holding for two weeks for possible side effects, Disp: 90 tablet, Rfl: 3  [DISCONTINUED] carvediloL (COREG) 25 MG tablet, Take 1 tablet (25 mg total) by mouth 2 (two) times daily with meals., Disp: 180 tablet, Rfl: 3  [DISCONTINUED] lisinopriL (PRINIVIL,ZESTRIL) 40 MG tablet, Take 1 tablet (40 mg total) by mouth once daily., Disp: 90 tablet, Rfl: 3  [DISCONTINUED] montelukast (SINGULAIR) 10 mg tablet, Take 1 tablet (10 mg total) by mouth once daily., Disp: 90 tablet, Rfl: 3  [DISCONTINUED] benzonatate (TESSALON) 200 MG capsule, Take 1 capsule (200 mg total) by mouth 3 (three) times daily as needed for Cough. (Patient not taking: Reported on 10/4/2022), Disp: 21 capsule, Rfl: 0  [DISCONTINUED] cetirizine (ZYRTEC) 10 MG tablet, Take 10 mg by mouth once daily., Disp: , Rfl:   [DISCONTINUED] DUREZOL 0.05 %  Drop ophthalmic solution, Place 1 drop into both eyes 2 (two) times daily., Disp: , Rfl:   [DISCONTINUED] fluticasone propionate (FLONASE) 50 mcg/actuation nasal spray, 1 spray (50 mcg total) by Each Nostril route 2 (two) times daily. (Patient not taking: Reported on 10/4/2022), Disp: 16 g, Rfl: 11  [DISCONTINUED] ILEVRO 0.3 % DrpS, 1 drop in operated eye once a day, Disp: , Rfl:   [DISCONTINUED] ketorolac 0.5% (ACULAR) 0.5 % Drop, Place 1 drop into both eyes 4 (four) times daily., Disp: , Rfl:   [DISCONTINUED] ofloxacin (OCUFLOX) 0.3 % ophthalmic solution, Place 1 drop into both eyes 4 (four) times daily., Disp: , Rfl:   [DISCONTINUED] prednisoLONE acetate (PRED FORTE) 1 % DrpS, Place 1 drop into both eyes 4 (four) times daily., Disp: , Rfl:   [DISCONTINUED] promethazine-dextromethorphan (PROMETHAZINE-DM) 6.25-15 mg/5 mL Syrp, Take 5 mLs by mouth nightly as needed (cough)., Disp: 90 mL, Rfl: 0    No current facility-administered medications on file prior to visit.        Review of Systems   Constitutional:  Negative for chills, diaphoresis, fatigue, fever and unexpected weight change.   HENT:  Negative for congestion, ear pain, hearing loss, postnasal drip, sinus pressure, sneezing, sore throat, tinnitus and trouble swallowing.    Eyes:  Negative for itching and visual disturbance.   Respiratory:  Negative for cough, chest tightness, shortness of breath and wheezing.    Cardiovascular:  Positive for chest pain (Left axillary chest wall pain now resolved). Negative for palpitations and leg swelling.   Gastrointestinal:  Negative for abdominal pain, blood in stool, constipation, diarrhea, nausea and vomiting.   Genitourinary:  Negative for dysuria, frequency, hematuria, menstrual problem, pelvic pain, vaginal bleeding and vaginal discharge.   Musculoskeletal:  Negative for arthralgias, back pain, joint swelling and myalgias.   Neurological:  Negative for dizziness and headaches.   Hematological:  Negative for  adenopathy.   Psychiatric/Behavioral:  Positive for sleep disturbance. The patient is nervous/anxious.      Objective:      Physical Exam  Vitals and nursing note reviewed.   Constitutional:       General: She is not in acute distress.     Appearance: Normal appearance. She is well-developed and normal weight. She is not ill-appearing, toxic-appearing or diaphoretic.      Comments: Good blood pressure control   Normal weight with a BMI of 24.9 she is up 2.4 lb from her last physical October 4, 2021   HENT:      Head: Normocephalic and atraumatic.      Right Ear: Tympanic membrane, ear canal and external ear normal. There is no impacted cerumen.      Left Ear: Tympanic membrane, ear canal and external ear normal. There is no impacted cerumen.      Nose: Nose normal. No congestion or rhinorrhea.      Mouth/Throat:      Mouth: Mucous membranes are moist.      Pharynx: Oropharynx is clear. No oropharyngeal exudate or posterior oropharyngeal erythema.   Eyes:      General: No scleral icterus.        Right eye: No discharge.         Left eye: No discharge.      Extraocular Movements: Extraocular movements intact.      Conjunctiva/sclera: Conjunctivae normal.      Pupils: Pupils are equal, round, and reactive to light.   Neck:      Thyroid: No thyromegaly.      Vascular: No carotid bruit or JVD.   Cardiovascular:      Rate and Rhythm: Normal rate and regular rhythm.      Pulses: Normal pulses.      Heart sounds: Normal heart sounds. No murmur heard.    No friction rub. No gallop.   Pulmonary:      Effort: Pulmonary effort is normal. No respiratory distress.      Breath sounds: Normal breath sounds. No stridor. No wheezing, rhonchi or rales.   Chest:      Chest wall: No tenderness.   Abdominal:      General: Abdomen is flat. Bowel sounds are normal. There is no distension.      Palpations: Abdomen is soft. There is no mass.      Tenderness: There is no abdominal tenderness. There is no guarding or rebound.      Hernia: No  hernia is present.   Musculoskeletal:         General: No swelling, tenderness, deformity or signs of injury. Normal range of motion.      Cervical back: Normal range of motion and neck supple. No rigidity or tenderness.      Right lower leg: No edema (Trace only).      Left lower leg: No edema (Trace only).   Lymphadenopathy:      Cervical: No cervical adenopathy.   Skin:     General: Skin is warm and dry.      Coloration: Skin is not jaundiced or pale.      Findings: No bruising, erythema, lesion or rash.   Neurological:      General: No focal deficit present.      Mental Status: She is alert and oriented to person, place, and time. Mental status is at baseline.      Cranial Nerves: No cranial nerve deficit.      Sensory: No sensory deficit.      Motor: No weakness.      Coordination: Coordination normal.      Gait: Gait normal.      Deep Tendon Reflexes: Reflexes are normal and symmetric. Reflexes normal.   Psychiatric:         Mood and Affect: Mood normal.         Behavior: Behavior normal.         Thought Content: Thought content normal.         Judgment: Judgment normal.       Assessment:       1. Preventative health care    2. Primary hypertension    3. Hyperlipidemia, unspecified hyperlipidemia type    4. Stage 3a chronic kidney disease    5. Sickle-cell trait    6. Leukopenia, unspecified type    7. Chronic disease anemia    8. Prediabetes    9. Chronic insomnia    10. Asthma, unspecified asthma severity, unspecified whether complicated, unspecified whether persistent    11. BMI 24.0-24.9, adult          Plan:       1. Preventative health care  - Comprehensive Metabolic Panel; Future  - Lipid Panel; Future  - CBC Auto Differential; Future    2. Primary hypertension  Adequate control with no changes needed, also followed by Cardiology  - Comprehensive Metabolic Panel; Future  - Lipid Panel; Future  - CBC Auto Differential; Future  - carvediloL (COREG) 25 MG tablet; Take 1 tablet (25 mg total) by mouth 2  (two) times daily with meals.  Dispense: 180 tablet; Refill: 3  - lisinopriL (PRINIVIL,ZESTRIL) 40 MG tablet; Take 1 tablet (40 mg total) by mouth once daily.  Dispense: 90 tablet; Refill: 3    3. Hyperlipidemia, unspecified hyperlipidemia type  Await lipid panel results for possible adjustment to Lipitor  - Comprehensive Metabolic Panel; Future  - Lipid Panel; Future  - atorvastatin (LIPITOR) 40 MG tablet; Take 1 tablet (40 mg total) by mouth once daily. Holding for two weeks for possible side effects  Dispense: 90 tablet; Refill: 3    4. Stage 3a chronic kidney disease  Await chemistry panel results  - Comprehensive Metabolic Panel; Future    5. Sickle-cell trait  Await CBC result  - CBC Auto Differential; Future    6. Leukopenia, unspecified type  Await CBC results  - CBC Auto Differential; Future    7. Chronic disease anemia  Await CBC results  - CBC Auto Differential; Future    8. Prediabetes  Await chemistry panel and A1c results  - Comprehensive Metabolic Panel; Future  - Hemoglobin A1C; Future    9. Chronic insomnia  Trial low-dose Lunesta  - eszopiclone (LUNESTA) 1 MG Tab; Take 1 tablet (1 mg total) by mouth nightly.  Dispense: 30 tablet; Refill: 0    10. Asthma, unspecified asthma severity, unspecified whether complicated, unspecified whether persistent  Asymptomatic  - montelukast (SINGULAIR) 10 mg tablet; Take 1 tablet (10 mg total) by mouth once daily.  Dispense: 90 tablet; Refill: 3    11. BMI 24.0-24.9, adult  Good weight no changes needed

## 2022-10-17 DIAGNOSIS — I10 PRIMARY HYPERTENSION: ICD-10-CM

## 2022-10-17 NOTE — TELEPHONE ENCOUNTER
No new care gaps identified.  [facility] Embedded Care Gaps. Reference number: [id number]. 10/17/2022   7:47:28 AM MIHAELAT

## 2022-10-18 RX ORDER — CARVEDILOL 25 MG/1
25 TABLET ORAL 2 TIMES DAILY WITH MEALS
Qty: 180 TABLET | Refills: 3 | Status: SHIPPED | OUTPATIENT
Start: 2022-10-18 | End: 2023-10-04 | Stop reason: SDUPTHER

## 2022-10-28 DIAGNOSIS — F51.04 CHRONIC INSOMNIA: ICD-10-CM

## 2022-10-28 NOTE — TELEPHONE ENCOUNTER
No new care gaps identified.  [facility] Embedded Care Gaps. Reference number: [id number]. 10/28/2022   9:58:21 AM MIHAELAT

## 2022-10-28 NOTE — TELEPHONE ENCOUNTER
----- Message from Kayla Turner sent at 10/28/2022  8:31 AM CDT -----  Regarding: refill  Contact: patient  Type:  RX Refill Request    Who Called:  patient  Refill or New Rx:  refil  RX Name and Strength:  EsZoticlone 1 mg  How is the patient currently taking it? (ex. 1XDay):  1xday  Is this a 30 day or 90 day RX:  90  Preferred Pharmacy with phone number:    [facility][id number] [address] [address]  Phone: [phone] Fax: [phone]  Local or Mail Order:  local  Ordering Provider:  Dr Tejal Palm Call Back Number:  [phone] (home)   Additional Information:  Please call patient if any questions. Thanks!

## 2022-10-31 ENCOUNTER — TELEPHONE (OUTPATIENT)
Dept: FAMILY MEDICINE | Facility: CLINIC | Age: 72
End: 2022-10-31
Payer: MEDICARE

## 2022-10-31 DIAGNOSIS — F51.04 CHRONIC INSOMNIA: ICD-10-CM

## 2022-10-31 RX ORDER — ESZOPICLONE 1 MG/1
1 TABLET, FILM COATED ORAL NIGHTLY
Qty: 90 TABLET | Refills: 0 | OUTPATIENT
Start: 2022-10-31 | End: 2022-11-30

## 2022-10-31 RX ORDER — ESZOPICLONE 1 MG/1
1 TABLET, FILM COATED ORAL NIGHTLY
Qty: 30 TABLET | Refills: 2 | Status: CANCELLED | OUTPATIENT
Start: 2022-10-31 | End: 2023-01-29

## 2022-10-31 NOTE — TELEPHONE ENCOUNTER
----- Message from Payton Li sent at 10/31/2022  8:09 AM CDT -----  Type:  RX Refill Request    Who Called: Pt     Refill or New Rx:Refill     RX Name and Strength:eszopiclone (LUNESTA) 1 MG Tab    How is the patient currently taking it? Sig - Route: Take 1 tablet (1 mg total) by mouth nightly. - Oral    Is this a 30 day or 90 day RX:    Preferred Pharmacy with phone number:[facility] #[id number] - [facility] [address] [facility]    Local or Mail Order:Local     Ordering Provider:Sanket Toure MD    Would the patient rather a call back or a response via [facility]?  Call back     Best Call Back Number:  (ileotj). [phone]    Additional Information: Pt would like to have Coby Domingo LPN to call her

## 2022-10-31 NOTE — TELEPHONE ENCOUNTER
Call placed to [facility] ([facility]) to enquire if patient has an available refill on file/quantity of last refill. No answer after an extensive hold time. Will try again later.

## 2022-10-31 NOTE — TELEPHONE ENCOUNTER
----- Message from Garima Dia LPN sent at 10/31/2022 12:26 PM CDT -----  Looks as if rx printed need send to [facility] [address]

## 2022-10-31 NOTE — TELEPHONE ENCOUNTER
Patient reports Lunesta is helping her sleep. Requesting refill; preferred pharmacy is [email or web address] ([facility]). Order noted to have been pended in a separate encounter by ISAC Dia who routed request to Dr. Toure.

## 2022-11-01 RX ORDER — ESZOPICLONE 1 MG/1
1 TABLET, FILM COATED ORAL NIGHTLY PRN
Qty: 30 TABLET | Refills: 1 | Status: SHIPPED | OUTPATIENT
Start: 2022-11-01 | End: 2022-12-01

## 2022-11-01 NOTE — TELEPHONE ENCOUNTER
These are habit-forming  She will developed tolerance to them and if she takes them every night it will developed faster and they will stop working just like the others did  They are on the [facility] list and she is not supposed to be taking them at all    Try to keep them every other night or twice a week to avoid building up tolerance.  I am not happy with her using these

## 2022-11-03 DIAGNOSIS — E78.5 HYPERLIPIDEMIA, UNSPECIFIED HYPERLIPIDEMIA TYPE: ICD-10-CM

## 2022-11-03 RX ORDER — ATORVASTATIN CALCIUM 40 MG/1
40 TABLET, FILM COATED ORAL DAILY
Qty: 90 TABLET | Refills: 3 | Status: SHIPPED | OUTPATIENT
Start: 2022-11-03 | End: 2023-10-04 | Stop reason: SDUPTHER

## 2022-11-03 NOTE — TELEPHONE ENCOUNTER
No new care gaps identified.  [facility] Embedded Care Gaps. Reference number: [id number]. 11/03/2022   6:45:48 AM CDT

## 2022-11-28 ENCOUNTER — OFFICE VISIT (OUTPATIENT)
Dept: CARDIOLOGY | Facility: CLINIC | Age: 72
End: 2022-11-28
Payer: MEDICARE

## 2022-11-28 VITALS
BODY MASS INDEX: 24.8 KG/M2 | DIASTOLIC BLOOD PRESSURE: 100 MMHG | WEIGHT: 154.31 LBS | HEART RATE: 56 BPM | SYSTOLIC BLOOD PRESSURE: 170 MMHG | HEIGHT: 66 IN

## 2022-11-28 DIAGNOSIS — N18.31 STAGE 3A CHRONIC KIDNEY DISEASE: ICD-10-CM

## 2022-11-28 DIAGNOSIS — R94.31 NONSPECIFIC ABNORMAL ELECTROCARDIOGRAM (ECG) (EKG): ICD-10-CM

## 2022-11-28 DIAGNOSIS — I10 PRIMARY HYPERTENSION: ICD-10-CM

## 2022-11-28 DIAGNOSIS — R94.31 ABNORMAL ECG: ICD-10-CM

## 2022-11-28 DIAGNOSIS — E78.5 HYPERLIPIDEMIA, UNSPECIFIED HYPERLIPIDEMIA TYPE: Primary | ICD-10-CM

## 2022-11-28 PROCEDURE — 99214 PR OFFICE/OUTPT VISIT, EST, LEVL IV, 30-39 MIN: ICD-10-PCS | Mod: S$GLB,,, | Performed by: NURSE PRACTITIONER

## 2022-11-28 PROCEDURE — 1160F RVW MEDS BY RX/DR IN RCRD: CPT | Mod: CPTII,S$GLB,, | Performed by: NURSE PRACTITIONER

## 2022-11-28 PROCEDURE — 3080F PR MOST RECENT DIASTOLIC BLOOD PRESSURE >= 90 MM HG: ICD-10-PCS | Mod: CPTII,S$GLB,, | Performed by: NURSE PRACTITIONER

## 2022-11-28 PROCEDURE — 1159F MED LIST DOCD IN RCRD: CPT | Mod: CPTII,S$GLB,, | Performed by: NURSE PRACTITIONER

## 2022-11-28 PROCEDURE — 3008F BODY MASS INDEX DOCD: CPT | Mod: CPTII,S$GLB,, | Performed by: NURSE PRACTITIONER

## 2022-11-28 PROCEDURE — 99214 OFFICE O/P EST MOD 30 MIN: CPT | Mod: S$GLB,,, | Performed by: NURSE PRACTITIONER

## 2022-11-28 PROCEDURE — 4010F PR ACE/ARB THEARPY RXD/TAKEN: ICD-10-PCS | Mod: CPTII,S$GLB,, | Performed by: NURSE PRACTITIONER

## 2022-11-28 PROCEDURE — 1160F PR REVIEW ALL MEDS BY PRESCRIBER/CLIN PHARMACIST DOCUMENTED: ICD-10-PCS | Mod: CPTII,S$GLB,, | Performed by: NURSE PRACTITIONER

## 2022-11-28 PROCEDURE — 3288F FALL RISK ASSESSMENT DOCD: CPT | Mod: CPTII,S$GLB,, | Performed by: NURSE PRACTITIONER

## 2022-11-28 PROCEDURE — 4010F ACE/ARB THERAPY RXD/TAKEN: CPT | Mod: CPTII,S$GLB,, | Performed by: NURSE PRACTITIONER

## 2022-11-28 PROCEDURE — 3077F PR MOST RECENT SYSTOLIC BLOOD PRESSURE >= 140 MM HG: ICD-10-PCS | Mod: CPTII,S$GLB,, | Performed by: NURSE PRACTITIONER

## 2022-11-28 PROCEDURE — 1159F PR MEDICATION LIST DOCUMENTED IN MEDICAL RECORD: ICD-10-PCS | Mod: CPTII,S$GLB,, | Performed by: NURSE PRACTITIONER

## 2022-11-28 PROCEDURE — 1126F AMNT PAIN NOTED NONE PRSNT: CPT | Mod: CPTII,S$GLB,, | Performed by: NURSE PRACTITIONER

## 2022-11-28 PROCEDURE — 93000 EKG 12-LEAD: ICD-10-PCS | Mod: S$GLB,,, | Performed by: INTERNAL MEDICINE

## 2022-11-28 PROCEDURE — 3288F PR FALLS RISK ASSESSMENT DOCUMENTED: ICD-10-PCS | Mod: CPTII,S$GLB,, | Performed by: NURSE PRACTITIONER

## 2022-11-28 PROCEDURE — 3008F PR BODY MASS INDEX (BMI) DOCUMENTED: ICD-10-PCS | Mod: CPTII,S$GLB,, | Performed by: NURSE PRACTITIONER

## 2022-11-28 PROCEDURE — 93000 ELECTROCARDIOGRAM COMPLETE: CPT | Mod: S$GLB,,, | Performed by: INTERNAL MEDICINE

## 2022-11-28 PROCEDURE — 3077F SYST BP >= 140 MM HG: CPT | Mod: CPTII,S$GLB,, | Performed by: NURSE PRACTITIONER

## 2022-11-28 PROCEDURE — 99999 PR PBB SHADOW E&M-EST. PATIENT-LVL V: CPT | Mod: PBBFAC,,, | Performed by: NURSE PRACTITIONER

## 2022-11-28 PROCEDURE — 3044F HG A1C LEVEL LT 7.0%: CPT | Mod: CPTII,S$GLB,, | Performed by: NURSE PRACTITIONER

## 2022-11-28 PROCEDURE — 1101F PR PT FALLS ASSESS DOC 0-1 FALLS W/OUT INJ PAST YR: ICD-10-PCS | Mod: CPTII,S$GLB,, | Performed by: NURSE PRACTITIONER

## 2022-11-28 PROCEDURE — 1101F PT FALLS ASSESS-DOCD LE1/YR: CPT | Mod: CPTII,S$GLB,, | Performed by: NURSE PRACTITIONER

## 2022-11-28 PROCEDURE — 99999 PR PBB SHADOW E&M-EST. PATIENT-LVL V: ICD-10-PCS | Mod: PBBFAC,,, | Performed by: NURSE PRACTITIONER

## 2022-11-28 PROCEDURE — 1126F PR PAIN SEVERITY QUANTIFIED, NO PAIN PRESENT: ICD-10-PCS | Mod: CPTII,S$GLB,, | Performed by: NURSE PRACTITIONER

## 2022-11-28 PROCEDURE — 3080F DIAST BP >= 90 MM HG: CPT | Mod: CPTII,S$GLB,, | Performed by: NURSE PRACTITIONER

## 2022-11-28 PROCEDURE — 3044F PR MOST RECENT HEMOGLOBIN A1C LEVEL <7.0%: ICD-10-PCS | Mod: CPTII,S$GLB,, | Performed by: NURSE PRACTITIONER

## 2022-11-28 RX ORDER — HYDRALAZINE HYDROCHLORIDE 25 MG/1
50 TABLET, FILM COATED ORAL ONCE
Qty: 2 TABLET | Refills: 0 | Status: SHIPPED | OUTPATIENT
Start: 2022-11-28 | End: 2023-02-23

## 2022-11-28 NOTE — ASSESSMENT & PLAN NOTE
Continue current management.  Would like the patient to monitor her blood pressure more closely at home.  She states that her most recent blood pressure checks were around 130/80.

## 2022-11-28 NOTE — PROGRESS NOTES
Subjective:    Patient ID:  Loren Andre is a 72 y.o. female   Chief Complaint   Patient presents with    Hyperlipidemia    Hypertension       HPI:  Patient seen today for follow-up appointment.  She denies any chest discomfort, and her breathing is stable.  Her blood pressure is elevated today but she states she is under a great amount of stress due to finding out her sister has stage IV cancer.  She has a meeting later tonight to find out more information about her sister's prognosis and is quite nervous.  EKG today shows nonspecific T-wave abnormalities globally.    Review of patient's allergies indicates:   Allergen Reactions    Doxepin Anaphylaxis     abd pain    Norvasc [amlodipine] Palpitations    Xyzal [levocetirizine] Other (See Comments)     Can not take with blood pressure medications - headaches, higher blood pressure, red hands     Penicillins Rash    Sulfa (sulfonamide antibiotics) Rash    Duloxetine      Other reaction(s): insomnia    Gabapentin Nausea Only    Levofloxacin      Other reaction(s): Headache    Milnacipran      Other reaction(s): stomach pain    Nitrofurantoin monohyd/m-cryst      Other reaction(s): Itching    Prednisone      Other reaction(s): Itching    Pseudoephedrine-guaifenesin      Other reaction(s): Stomach upset    Terbinafine      Other reaction(s): rash       Past Medical History:   Diagnosis Date    Allergy     Anemia     sickle cell trait    Anxiety     Arthritis     Asthma     Chronic disease anemia 10/5/2017    Colon polyps     Depression     Dermatitis 3/7/2013    Fibromyalgia     HEARING LOSS     Hyperlipidemia     Hypertension     Leucopenia 10/5/2017    Polyneuropathy     Scoliosis     Sickle cell trait     Sickle-cell trait 10/5/2017    Trouble in sleeping     Urticaria      Past Surgical History:   Procedure Laterality Date    ADENOIDECTOMY      APPENDECTOMY      COLONOSCOPY  4/14/2008    Dr. Guerrero, 10 year recheck    COLONOSCOPY N/A 10/4/2018    Procedure:  COLONOSCOPY;  Surgeon: Tam Kemp MD;  Location: [facility];  Service: Endoscopy;  Laterality: N/A;    HYSTERECTOMY      OOPHORECTOMY      ROTATOR CUFF REPAIR Left 01/2016    ROTATOR CUFF REPAIR W/ DISTAL CLAVICLE EXCISION Right 12/04/2017    Dr. Eligio Timmons ( [facility] )    TONSILLECTOMY       Social History     Tobacco Use    Smoking status: Never    Smokeless tobacco: Never   Substance Use Topics    Alcohol use: Yes     Comment: occasionally - wine    Drug use: No     Family History   Problem Relation Age of Onset    Multiple sclerosis Mother     Seizures Mother     Cancer Father         lung    Alcohol abuse Brother     Early death Brother     Diabetes Maternal Aunt     Diabetes Maternal Uncle     Diabetes Maternal Grandmother     Mental illness Maternal Grandfather     Alzheimer's disease Maternal Grandfather     Asthma Paternal Aunt     Sickle cell anemia Paternal Aunt     Diabetes Sister     Arthritis Daughter     Miscarriages / Stillbirths Daughter     Anemia Daughter     Hyperlipidemia Son     Hypertension Son     Allergies Son     Sickle cell anemia Paternal Uncle     Asthma Brother     No Known Problems Sister     No Known Problems Sister     No Known Problems Sister     No Known Problems Brother     Miscarriages / Stillbirths Daughter     Thyroid disease Daughter     No Known Problems Daughter     Angioedema Neg Hx     Eczema Neg Hx     Immunodeficiency Neg Hx     Rheum arthritis Neg Hx     Psoriasis Neg Hx     Lupus Neg Hx         Review of Systems:   Constitution: Negative for diaphoresis and fever.   HEENT: Negative for nosebleeds.    Cardiovascular: Negative for chest pain       No dyspnea on exertion       No leg swelling        No palpitations  Respiratory: Negative for shortness of breath and wheezing.    Hematologic/Lymphatic: Negative for bleeding problem. Does not bruise/bleed easily.   Skin: Negative for color change and rash.   Musculoskeletal: Negative for falls and myalgias.    Gastrointestinal: Negative for hematemesis and hematochezia.   Genitourinary: Negative for hematuria.   Neurological: Negative for dizziness and light-headedness.   Psychiatric/Behavioral: Negative for altered mental status and memory loss.          Objective:        Vitals:    11/28/22 1552   BP: (!) 170/100   Pulse: (!) 56       Lab Results   Component Value Date    WBC 4.45 10/04/2022    HGB 12.8 10/04/2022    HCT 37.9 10/04/2022     10/04/2022    CHOL 125 10/04/2022    TRIG 41 10/04/2022    HDL 69 10/04/2022    ALT 30 10/04/2022    AST 30 10/04/2022     10/04/2022    K 4.1 10/04/2022     10/04/2022    CREATININE 1.3 10/04/2022    BUN 20 10/04/2022    CO2 25 10/04/2022    TSH 1.380 07/08/2020    INR 1.1 07/08/2020    HGBA1C 6.0 (H) 10/04/2022        ECHOCARDIOGRAM RESULTS  Results for orders placed during the hospital encounter of 07/08/20    Stress Echo Which stress agent will be used? Treadmill Exercise; Color Flow Doppler? No    Interpretation Summary  · The patient reached the end of the protocol.  · Moderate concentric left ventricular hypertrophy.  · Normal left ventricular systolic function. The estimated ejection fraction is 65%.  · Normal LV diastolic function.  · Normal right ventricular systolic function.  · Mild left atrial enlargement.  · The stress echo portion of this study is negative for myocardial ischemia.  · There were no arrhythmias during stress.  · The patient's exercise capacity was mildly impaired.  · The ECG portion of this study is abnormal but not diagnostic for ischemia.        CURRENT/PREVIOUS VISIT EKG  Results for orders placed or performed in visit on 08/10/21   IN OFFICE EKG 12-LEAD (to [employer])    Collection Time: 08/10/21  1:21 PM    Narrative    Test Reason : I10,    Vent. Rate : 054 BPM     Atrial Rate : 054 BPM     P-R Int : 150 ms          QRS Dur : 066 ms      QT Int : 410 ms       P-R-T Axes : 055 004 -01 degrees     QTc Int : 388 ms    Sinus  bradycardia  Possible Left atrial enlargement  Anteroseptal infarct ,age undetermined  Abnormal ECG  When compared with ECG of 08-JUL-2020 09:32,  Anteroseptal infarct is now Present  Confirmed by Cheyenne GOVEA, Obdulio VALENZUELA ([id number]) on 8/20/2021 2:05:52 PM    Referred By:             Confirmed By:Obdulio Quinn MD     No valid procedures specified.   No results found for this or any previous visit.      Physical Exam:  CONSTITUTIONAL: No fever, no chills  HEENT: Normocephalic, atraumatic,pupils reactive to light                 NECK:  No JVD no carotid bruit  CVS: S1S2+, RRR  LUNGS: Clear  ABDOMEN: Soft, NT, BS+  EXTREMITIES: No cyanosis, edema  : No trin[place] catheter  NEURO: AAO X 3  PSY: Normal affect      Medication List with Changes/Refills   New Medications    HYDRALAZINE (APRESOLINE) 25 MG TABLET    Take 2 tablets (50 mg total) by mouth once. Take one tablet one hour before stress test and bring the second dose with you to the test just in case it is needed. for 1 dose   Current Medications    ALBUTEROL (PROAIR HFA) 90 MCG/ACTUATION INHALER    Inhale 2 puffs into the lungs every 6 (six) hours as needed for Wheezing. Rescue    ASCORBIC ACID, VITAMIN C, (VITAMIN C) 500 MG TABLET    Take 500 mg by mouth daily as needed.     ASPIRIN 81 MG CHEW    Take 1 tablet (81 mg total) by mouth once daily.    ATORVASTATIN (LIPITOR) 40 MG TABLET    Take 1 tablet (40 mg total) by mouth once daily.    AZELASTINE (ASTELIN) 137 MCG (0.1 %) NASAL SPRAY    USE 1 SPRAY NASALLY TWICE DAILY    BETAMETHASONE DIPROPIONATE (DIPROLENE) 0.05 % CREAM    Apply topically 2 (two) times daily.    CALCIUM CARBONATE/VITAMIN D3 (VITAMIN D-3 ORAL)    Take 100 Int'l Units by mouth once daily.     CARVEDILOL (COREG) 25 MG TABLET    Take 1 tablet (25 mg total) by mouth 2 (two) times daily with meals.    CLOBETASOL (OLUX) 0.05 % FOAM    Apply topically 2 (two) times daily.    COENZYME Q10 10 MG CAPSULE    Take 10 mg by mouth once daily.    DOXYCYCLINE  (VIBRA-TABS) 100 MG TABLET    Take 1 tablet (100 mg total) by mouth every 12 (twelve) hours.    ESZOPICLONE (LUNESTA) 1 MG TAB    Take 1 tablet (1 mg total) by mouth nightly as needed (insomnia). Try to avoid daily use to reduce tolerance    FLUTICASONE-SALMETEROL DISKUS INHALER 250-50 MCG    Inhale 1 puff into the lungs 2 (two) times daily. Controller    FOLIC ACID (FOLVITE) 400 MCG TABLET    Take 400 mcg by mouth once daily.    GARLIC OIL ORAL    Take 1 capsule by mouth once daily.     LISINOPRIL (PRINIVIL,ZESTRIL) 40 MG TABLET    Take 1 tablet (40 mg total) by mouth once daily.    MAGNESIUM 250 MG TAB    Take 250 mg by mouth 2 (two) times a day.    MONTELUKAST (SINGULAIR) 10 MG TABLET    Take 1 tablet (10 mg total) by mouth once daily.    NIACIN 250 MG TAB    Take 250 mg by mouth nightly.    OMEGA-3 FATTY ACIDS/FISH OIL (FISH OIL-OMEGA-3 FATTY ACIDS) 300-1,000 MG CAPSULE    Take by mouth once daily.    TRAZODONE (DESYREL) 100 MG TABLET    Take 1 tablet (100 mg total) by mouth every evening.    UNABLE TO FIND    Always eye drop    VALACYCLOVIR (VALTREX) 500 MG TABLET    TAKE 1 TABLET (500 MG TOTAL) BY MOUTH 2 (TWO) TIMES DAILY FOR 7 DAYS AS DIRECTED.    VITAMIN E 100 UNIT CAPSULE    Take 100 Units by mouth once daily.             Assessment:       1. Hyperlipidemia, unspecified hyperlipidemia type    2. Primary hypertension    3. Stage 3a chronic kidney disease    4. Nonspecific abnormal electrocardiogram (ECG) (EKG)    5. Abnormal ECG         Plan:     Problem List Items Addressed This Visit          Unprioritized    HTN (hypertension)    Current Assessment & Plan     Continue current management.  Would like the patient to monitor her blood pressure more closely at home.  She states that her most recent blood pressure checks were around 130/80.         Relevant Medications    hydrALAZINE (APRESOLINE) 25 MG tablet    Hyperlipidemia - Primary    Current Assessment & Plan     Recommend low-fat low-cholesterol diet  and regular exercise.  Goal for LDL is less than 100.         Relevant Orders    IN OFFICE EKG 12-LEAD (to [employer])    Abnormal ECG    Current Assessment & Plan     Abnormal EKG may be related to uncontrolled hypertension.  However need to evaluate further and obtain a nuclear stress test.         Stage 3a chronic kidney disease     Other Visit Diagnoses       Nonspecific abnormal electrocardiogram (ECG) (EKG)        Relevant Orders    Nuclear Stress - Cardiology Interpreted            Follow up in about 3 months (around 2/28/2023).

## 2022-11-28 NOTE — ASSESSMENT & PLAN NOTE
Abnormal EKG may be related to uncontrolled hypertension.  However need to evaluate further and obtain a nuclear stress test.

## 2022-11-28 NOTE — PATIENT INSTRUCTIONS
The morning of stress test do not take carvedilol.   Please only take lisinopril, aspirin, singulair, and hydralazine.     Hydralazine is for stress test day only. Take one tablet one hour before stress test and bring the second dose with you to the test just in case it is needed.

## 2022-12-01 ENCOUNTER — TELEPHONE (OUTPATIENT)
Dept: CARDIOLOGY | Facility: HOSPITAL | Age: 72
End: 2022-12-01

## 2022-12-01 NOTE — TELEPHONE ENCOUNTER
Left message on voicemail.     Patient advised, test will be at [facility] ([address]   Will need to register on the first floor at the main entrance.   Patient advised that arrival time is 6:30am.  Patient advised that she may be here about 3.5-4 hours, and may want to bring something to occupy their time, as there will be periods of waiting.    Patient advised, may take her medications prior to testing if you need to.  Patient should HOLD Carvedilol. Advised if she needs to eat to take her medications, please keep it light, like toast and juice.    Patient advised to avoid all caffeine 12 hours prior to testing.  This includes decaf tea and coffee.    Will provide peanut butter crackers for a snack after stress test.  If patient would prefer something else, please bring a snack from home.    Wear comfortable clothing.   No lotions, oils, or powders to the upper chest area. May wear deodorant.    No metal jewelry, buttons, or zippers to the upper body.  Advised to call the office if any questions.

## 2022-12-02 ENCOUNTER — HOSPITAL ENCOUNTER (OUTPATIENT)
Dept: RADIOLOGY | Facility: HOSPITAL | Age: 72
Discharge: HOME | End: 2022-12-02
Attending: NURSE PRACTITIONER
Payer: MEDICARE

## 2022-12-02 ENCOUNTER — HOSPITAL ENCOUNTER (OUTPATIENT)
Dept: CARDIOLOGY | Facility: HOSPITAL | Age: 72
Discharge: HOME | End: 2022-12-02
Attending: NURSE PRACTITIONER
Payer: MEDICARE

## 2022-12-02 DIAGNOSIS — R94.31 NONSPECIFIC ABNORMAL ELECTROCARDIOGRAM (ECG) (EKG): ICD-10-CM

## 2022-12-02 LAB
CV PHARM DOSE: 0.4 MG
CV STRESS BASE HR: 58 BPM
DIASTOLIC BLOOD PRESSURE: 84 MMHG
EJECTION FRACTION- HIGH: 65 %
END DIASTOLIC INDEX-HIGH: 153 ML/M2
END DIASTOLIC INDEX-LOW: 93 ML/M2
END SYSTOLIC INDEX-HIGH: 71 ML/M2
END SYSTOLIC INDEX-LOW: 31 ML/M2
NUC STRESS DIASTOLIC VOLUME INDEX: 60
NUC STRESS EJECTION FRACTION: 73 %
NUC STRESS SYSTOLIC VOLUME INDEX: 16
OHS CV CPX 1 MINUTE RECOVERY HEART RATE: 98 BPM
OHS CV CPX 85 PERCENT MAX PREDICTED HEART RATE MALE: 120
OHS CV CPX MAX PREDICTED HEART RATE: 142
OHS CV CPX PATIENT IS FEMALE: 1
OHS CV CPX PATIENT IS MALE: 0
OHS CV CPX PEAK DIASTOLIC BLOOD PRESSURE: 90 MMHG
OHS CV CPX PEAK HEAR RATE: 98 BPM
OHS CV CPX PEAK RATE PRESSURE PRODUCT: NORMAL
OHS CV CPX PEAK SYSTOLIC BLOOD PRESSURE: 200 MMHG
OHS CV CPX PERCENT MAX PREDICTED HEART RATE ACHIEVED: 69
OHS CV CPX RATE PRESSURE PRODUCT PRESENTING: NORMAL
RETIRED EF AND QEF - SEE NOTES: 53 %
SYSTOLIC BLOOD PRESSURE: 194 MMHG

## 2022-12-02 PROCEDURE — 78452 NUCLEAR STRESS - CARDIOLOGY INTERPRETED (CUPID ONLY): ICD-10-PCS | Mod: 26,,, | Performed by: GENERAL PRACTICE

## 2022-12-02 PROCEDURE — 93018 NUCLEAR STRESS - CARDIOLOGY INTERPRETED (CUPID ONLY): ICD-10-PCS | Mod: ,,, | Performed by: GENERAL PRACTICE

## 2022-12-02 PROCEDURE — 78452 HT MUSCLE IMAGE SPECT MULT: CPT | Mod: 26,,, | Performed by: GENERAL PRACTICE

## 2022-12-02 PROCEDURE — 93018 CV STRESS TEST I&R ONLY: CPT | Mod: ,,, | Performed by: GENERAL PRACTICE

## 2022-12-02 PROCEDURE — 93016 NUCLEAR STRESS - CARDIOLOGY INTERPRETED (CUPID ONLY): ICD-10-PCS | Mod: ,,, | Performed by: NURSE PRACTITIONER

## 2022-12-02 PROCEDURE — A9502 TC99M TETROFOSMIN: HCPCS

## 2022-12-02 PROCEDURE — 93016 CV STRESS TEST SUPVJ ONLY: CPT | Mod: ,,, | Performed by: NURSE PRACTITIONER

## 2022-12-02 RX ORDER — REGADENOSON 0.08 MG/ML
0.4 INJECTION, SOLUTION INTRAVENOUS ONCE
Status: COMPLETED | OUTPATIENT
Start: 2022-12-02 | End: 2022-12-02

## 2022-12-02 RX ADMIN — REGADENOSON 0.4 MG: 0.08 INJECTION, SOLUTION INTRAVENOUS at 08:12

## 2022-12-03 ENCOUNTER — OFFICE VISIT (OUTPATIENT)
Dept: URGENT CARE | Facility: CLINIC | Age: 72
End: 2022-12-03
Payer: MEDICARE

## 2022-12-03 VITALS
RESPIRATION RATE: 16 BRPM | OXYGEN SATURATION: 98 % | SYSTOLIC BLOOD PRESSURE: 159 MMHG | HEIGHT: 66 IN | WEIGHT: 154 LBS | TEMPERATURE: 98 F | DIASTOLIC BLOOD PRESSURE: 74 MMHG | HEART RATE: 55 BPM | BODY MASS INDEX: 24.75 KG/M2

## 2022-12-03 DIAGNOSIS — J34.9 SINUS PROBLEM: Primary | ICD-10-CM

## 2022-12-03 DIAGNOSIS — Z20.822 COVID-19 VIRUS NOT DETECTED: ICD-10-CM

## 2022-12-03 DIAGNOSIS — J01.90 ACUTE BACTERIAL SINUSITIS: ICD-10-CM

## 2022-12-03 DIAGNOSIS — R05.9 COUGH, UNSPECIFIED TYPE: ICD-10-CM

## 2022-12-03 DIAGNOSIS — B96.89 ACUTE BACTERIAL SINUSITIS: ICD-10-CM

## 2022-12-03 LAB
CTP QC/QA: YES
CTP QC/QA: YES
FLUAV AG NPH QL: NEGATIVE
FLUBV AG NPH QL: NEGATIVE
SARS-COV-2 AG RESP QL IA.RAPID: NEGATIVE

## 2022-12-03 PROCEDURE — 3077F PR MOST RECENT SYSTOLIC BLOOD PRESSURE >= 140 MM HG: ICD-10-PCS | Mod: CPTII,S$GLB,, | Performed by: NURSE PRACTITIONER

## 2022-12-03 PROCEDURE — 3044F PR MOST RECENT HEMOGLOBIN A1C LEVEL <7.0%: ICD-10-PCS | Mod: CPTII,S$GLB,, | Performed by: NURSE PRACTITIONER

## 2022-12-03 PROCEDURE — 1159F MED LIST DOCD IN RCRD: CPT | Mod: CPTII,S$GLB,, | Performed by: NURSE PRACTITIONER

## 2022-12-03 PROCEDURE — 4010F PR ACE/ARB THEARPY RXD/TAKEN: ICD-10-PCS | Mod: CPTII,S$GLB,, | Performed by: NURSE PRACTITIONER

## 2022-12-03 PROCEDURE — 4010F ACE/ARB THERAPY RXD/TAKEN: CPT | Mod: CPTII,S$GLB,, | Performed by: NURSE PRACTITIONER

## 2022-12-03 PROCEDURE — 3008F BODY MASS INDEX DOCD: CPT | Mod: CPTII,S$GLB,, | Performed by: NURSE PRACTITIONER

## 2022-12-03 PROCEDURE — 3077F SYST BP >= 140 MM HG: CPT | Mod: CPTII,S$GLB,, | Performed by: NURSE PRACTITIONER

## 2022-12-03 PROCEDURE — 3008F PR BODY MASS INDEX (BMI) DOCUMENTED: ICD-10-PCS | Mod: CPTII,S$GLB,, | Performed by: NURSE PRACTITIONER

## 2022-12-03 PROCEDURE — 1159F PR MEDICATION LIST DOCUMENTED IN MEDICAL RECORD: ICD-10-PCS | Mod: CPTII,S$GLB,, | Performed by: NURSE PRACTITIONER

## 2022-12-03 PROCEDURE — 87811 SARS-COV-2 COVID19 W/OPTIC: CPT | Mod: QW,S$GLB,, | Performed by: NURSE PRACTITIONER

## 2022-12-03 PROCEDURE — 3044F HG A1C LEVEL LT 7.0%: CPT | Mod: CPTII,S$GLB,, | Performed by: NURSE PRACTITIONER

## 2022-12-03 PROCEDURE — 3078F DIAST BP <80 MM HG: CPT | Mod: CPTII,S$GLB,, | Performed by: NURSE PRACTITIONER

## 2022-12-03 PROCEDURE — 3078F PR MOST RECENT DIASTOLIC BLOOD PRESSURE < 80 MM HG: ICD-10-PCS | Mod: CPTII,S$GLB,, | Performed by: NURSE PRACTITIONER

## 2022-12-03 PROCEDURE — 99214 PR OFFICE/OUTPT VISIT, EST, LEVL IV, 30-39 MIN: ICD-10-PCS | Mod: S$GLB,,, | Performed by: NURSE PRACTITIONER

## 2022-12-03 PROCEDURE — 87804 INFLUENZA ASSAY W/OPTIC: CPT | Mod: QW,,, | Performed by: NURSE PRACTITIONER

## 2022-12-03 PROCEDURE — 99214 OFFICE O/P EST MOD 30 MIN: CPT | Mod: S$GLB,,, | Performed by: NURSE PRACTITIONER

## 2022-12-03 PROCEDURE — 87811 SARS CORONAVIRUS 2 ANTIGEN POCT, MANUAL READ: ICD-10-PCS | Mod: QW,S$GLB,, | Performed by: NURSE PRACTITIONER

## 2022-12-03 PROCEDURE — 87804 POCT INFLUENZA A/B: ICD-10-PCS | Mod: QW,,, | Performed by: NURSE PRACTITIONER

## 2022-12-03 RX ORDER — PROMETHAZINE HYDROCHLORIDE AND DEXTROMETHORPHAN HYDROBROMIDE 6.25; 15 MG/5ML; MG/5ML
5 SYRUP ORAL EVERY 8 HOURS PRN
Qty: 118 ML | Refills: 0 | Status: SHIPPED | OUTPATIENT
Start: 2022-12-03 | End: 2023-02-23

## 2022-12-03 RX ORDER — DOXYCYCLINE 100 MG/1
100 CAPSULE ORAL EVERY 12 HOURS
Qty: 14 CAPSULE | Refills: 0 | Status: SHIPPED | OUTPATIENT
Start: 2022-12-03 | End: 2022-12-10

## 2022-12-03 NOTE — PATIENT INSTRUCTIONS
Doxycycline 100mg twice daily with food x 7 days-avoid prolonged sun exposure while taking this medication  Promethazine DM 5mls every 8 hours as needed for cough  Tylenol as directed for headache  Follow up if your symptoms persist or worsen

## 2022-12-03 NOTE — PROGRESS NOTES
"Subjective:       Patient ID: Loren Andre is a 72 y.o. female.    Vitals:  height is 5' 6" (1.676 m) and weight is 69.9 kg (154 lb). Her oral temperature is 97.5 °F (36.4 °C). Her blood pressure is 159/74 (abnormal) and her pulse is 55 (abnormal). Her respiration is 16 and oxygen saturation is 98%.     Chief Complaint: Sinus Problem    Sinus Problem  This is a new problem. The current episode started 1 to 4 weeks ago (1 week ago). Associated symptoms include congestion, coughing, headaches and sinus pressure. Past treatments include acetaminophen. The treatment provided no relief.     HENT:  Positive for congestion and sinus pressure.    Respiratory:  Positive for cough.    Neurological:  Positive for headaches.     Objective:      Physical Exam   Constitutional: She is oriented to person, place, and time.   HENT:   Head: Normocephalic and atraumatic.   Ears:   Right Ear: Tympanic membrane, external ear and ear canal normal.   Left Ear: Tympanic membrane, external ear and ear canal normal.   Nose: Mucosal edema, purulent discharge and sinus tenderness present. Right sinus exhibits maxillary sinus tenderness. Left sinus exhibits maxillary sinus tenderness.   Mouth/Throat: Posterior oropharyngeal erythema present.   Eyes: Conjunctivae are normal. Extraocular movement intact   Neck: Neck supple.   Cardiovascular: Normal rate, regular rhythm, normal heart sounds and normal pulses.   Pulmonary/Chest: Effort normal and breath sounds normal.   Abdominal: Normal appearance. Soft.   Musculoskeletal: Normal range of motion.         General: Normal range of motion.   Neurological: She is alert and oriented to person, place, and time.   Skin: Skin is warm and dry. Capillary refill takes 2 to 3 seconds.   Psychiatric: Her behavior is normal. Mood normal.   Nursing note and vitals reviewed.      Assessment:       1. Sinus problem    2. COVID-19 virus not detected    3. Acute bacterial sinusitis    4. Cough, unspecified type  "     Covid antigen: Negative    Influenza A/B: Negative    Plan:         Sinus problem  -     SARS Coronavirus 2 Antigen, POCT Manual Read  -     POCT Influenza A/B    COVID-19 virus not detected    Acute bacterial sinusitis  -     doxycycline (VIBRAMYCIN) 100 MG Cap; Take 1 capsule (100 mg total) by mouth every 12 (twelve) hours. for 7 days  Dispense: 14 capsule; Refill: 0    Cough, unspecified type  -     promethazine-dextromethorphan (PROMETHAZINE-DM) 6.25-15 mg/5 mL Syrp; Take 5 mLs by mouth every 8 (eight) hours as needed (cough).  Dispense: 118 mL; Refill: 0       Doxycycline 100mg twice daily with food x 7 days-avoid prolonged sun exposure while taking this medication  Promethazine DM 5mls every 8 hours as needed for cough  Tylenol as directed for headache  Follow up if your symptoms persist or worsen

## 2023-01-03 ENCOUNTER — TELEPHONE (OUTPATIENT)
Dept: ALLERGY | Facility: CLINIC | Age: 73
End: 2023-01-03
Payer: MEDICARE

## 2023-01-03 DIAGNOSIS — L30.9 DERMATITIS: ICD-10-CM

## 2023-01-03 RX ORDER — CLOBETASOL PROPIONATE 0.5 MG/G
AEROSOL, FOAM TOPICAL 2 TIMES DAILY
Qty: 100 G | Refills: 3 | Status: SHIPPED | OUTPATIENT
Start: 2023-01-03 | End: 2023-01-04 | Stop reason: SDUPTHER

## 2023-01-03 NOTE — TELEPHONE ENCOUNTER
----- Message from Marlys Toure sent at 1/3/2023  8:13 AM CST -----  Contact: Self  Type:  RX Refill Request    Who Called:  Patient  Refill or New Rx:  New Rx  RX Name and Strength:  clobetasoL (OLUX) 0.05 % Foam  How is the patient currently taking it? (ex. 1XDay):  As Directed  Is this a 30 day or 90 day RX:  90  Preferred Pharmacy with phone number:    [facility] #[id number] [address] & [address] [address]  Phone: [phone] Fax: [phone]  Local or Mail Order:  Local  Ordering Provider:  Dr German Palm Call Back Number:  [phone]  Additional Information:  Thank You

## 2023-01-03 NOTE — TELEPHONE ENCOUNTER
----- Message from Marlys Toure sent at 1/3/2023  8:13 AM CST -----  Contact: Self  Type:  RX Refill Request    Who Called:  Patient  Refill or New Rx:  New Rx  RX Name and Strength:  clobetasoL (OLUX) 0.05 % Foam  How is the patient currently taking it? (ex. 1XDay):  As Directed  Is this a 30 day or 90 day RX:  90  Preferred Pharmacy with phone number:    [facility] #[id number] [address] & [address] [address]  Phone: [phone] Fax: [phone]  Local or Mail Order:  Local  Ordering Provider:  Dr German Palm Call Back Number:  [phone]  Additional Information:  Thank You

## 2023-01-04 DIAGNOSIS — L30.9 DERMATITIS: ICD-10-CM

## 2023-01-04 RX ORDER — CLOBETASOL PROPIONATE 0.5 MG/G
AEROSOL, FOAM TOPICAL 2 TIMES DAILY
Qty: 100 G | Refills: 3 | Status: SHIPPED | OUTPATIENT
Start: 2023-01-04 | End: 2023-02-23

## 2023-01-04 NOTE — TELEPHONE ENCOUNTER
----- Message from Miracle Hitchcock sent at 1/4/2023  8:45 AM CST -----  Type:  RX Refill Request    Who Called:  pt   Refill or New Rx:  refill  RX Name and Strength:  clobetasoL (OLUX) 0.05 % Foam  How is the patient currently taking it? (ex. 1XDay):  as directed   Is this a 30 day or 90 day RX:  30  Preferred Pharmacy with phone number:        [facility] #[id number] [address] & [address] [address]  Phone: [phone] Fax: [phone]      Local or Mail Order:  local   Ordering Provider:  yoli Palm Call Back Number:  [phone] (home)     Additional Information:  please advise thank you

## 2023-02-13 ENCOUNTER — TELEPHONE (OUTPATIENT)
Dept: FAMILY MEDICINE | Facility: CLINIC | Age: 73
End: 2023-02-13
Payer: MEDICARE

## 2023-02-13 DIAGNOSIS — G89.29 CHRONIC LEFT SHOULDER PAIN: Primary | ICD-10-CM

## 2023-02-13 DIAGNOSIS — M25.512 CHRONIC LEFT SHOULDER PAIN: Primary | ICD-10-CM

## 2023-02-13 NOTE — TELEPHONE ENCOUNTER
Kayla VALENZUELA Staff  Caller: patient (Today,  8:15 AM)      ----- Message from Kayla Turner sent at 2/13/2023  8:15 AM CST -----  Regarding: referral  Contact: patient  Type:  Patient Requesting Referral    Who Called:  patient  Does the patient already have the specialty appointment scheduled?:  no  If yes, what is the date of that appointment?:  no  Referral to What Specialty:  orthopedic  Reason for Referral:  left shoulder pain  Does the patient want the referral with a specific physician?:    [facility]  Patient Requesting a Call Back?:  yes  Best Call Back Number:  [phone] (home)   Additional Information:   Please call patient to advise. Thanks!

## 2023-02-13 NOTE — TELEPHONE ENCOUNTER
Patient requesting orthopedic referral related to left shoulder pain. Patient states she had past surgery on both shoulders in 2017 or 2018 per Dr. Eligio Timmons. Patient states for the last week she has been experiencing increase pain in left shoulder. Denies any recent injury to left shoulder.  Please advise. Thank you.

## 2023-02-17 ENCOUNTER — OFFICE VISIT (OUTPATIENT)
Dept: URGENT CARE | Facility: CLINIC | Age: 73
End: 2023-02-17
Payer: MEDICARE

## 2023-02-17 VITALS
SYSTOLIC BLOOD PRESSURE: 190 MMHG | TEMPERATURE: 98 F | DIASTOLIC BLOOD PRESSURE: 100 MMHG | RESPIRATION RATE: 16 BRPM | OXYGEN SATURATION: 98 % | WEIGHT: 159 LBS | HEIGHT: 66 IN | BODY MASS INDEX: 25.55 KG/M2 | HEART RATE: 60 BPM

## 2023-02-17 DIAGNOSIS — J02.9 SORE THROAT: Primary | ICD-10-CM

## 2023-02-17 LAB
CTP QC/QA: YES
CTP QC/QA: YES
S PYO RRNA THROAT QL PROBE: NEGATIVE
SARS-COV-2 AG RESP QL IA.RAPID: NEGATIVE

## 2023-02-17 PROCEDURE — 99214 OFFICE O/P EST MOD 30 MIN: CPT | Mod: S$GLB,,, | Performed by: NURSE PRACTITIONER

## 2023-02-17 PROCEDURE — 87880 STREP A ASSAY W/OPTIC: CPT | Mod: QW,,, | Performed by: NURSE PRACTITIONER

## 2023-02-17 PROCEDURE — 1160F RVW MEDS BY RX/DR IN RCRD: CPT | Mod: CPTII,S$GLB,, | Performed by: NURSE PRACTITIONER

## 2023-02-17 PROCEDURE — 1159F PR MEDICATION LIST DOCUMENTED IN MEDICAL RECORD: ICD-10-PCS | Mod: CPTII,S$GLB,, | Performed by: NURSE PRACTITIONER

## 2023-02-17 PROCEDURE — 1159F MED LIST DOCD IN RCRD: CPT | Mod: CPTII,S$GLB,, | Performed by: NURSE PRACTITIONER

## 2023-02-17 PROCEDURE — 3080F DIAST BP >= 90 MM HG: CPT | Mod: CPTII,S$GLB,, | Performed by: NURSE PRACTITIONER

## 2023-02-17 PROCEDURE — 99214 PR OFFICE/OUTPT VISIT, EST, LEVL IV, 30-39 MIN: ICD-10-PCS | Mod: S$GLB,,, | Performed by: NURSE PRACTITIONER

## 2023-02-17 PROCEDURE — 3008F PR BODY MASS INDEX (BMI) DOCUMENTED: ICD-10-PCS | Mod: CPTII,S$GLB,, | Performed by: NURSE PRACTITIONER

## 2023-02-17 PROCEDURE — 1160F PR REVIEW ALL MEDS BY PRESCRIBER/CLIN PHARMACIST DOCUMENTED: ICD-10-PCS | Mod: CPTII,S$GLB,, | Performed by: NURSE PRACTITIONER

## 2023-02-17 PROCEDURE — 3077F PR MOST RECENT SYSTOLIC BLOOD PRESSURE >= 140 MM HG: ICD-10-PCS | Mod: CPTII,S$GLB,, | Performed by: NURSE PRACTITIONER

## 2023-02-17 PROCEDURE — 87811 SARS CORONAVIRUS 2 ANTIGEN POCT, MANUAL READ: ICD-10-PCS | Mod: QW,S$GLB,, | Performed by: NURSE PRACTITIONER

## 2023-02-17 PROCEDURE — 3080F PR MOST RECENT DIASTOLIC BLOOD PRESSURE >= 90 MM HG: ICD-10-PCS | Mod: CPTII,S$GLB,, | Performed by: NURSE PRACTITIONER

## 2023-02-17 PROCEDURE — 3008F BODY MASS INDEX DOCD: CPT | Mod: CPTII,S$GLB,, | Performed by: NURSE PRACTITIONER

## 2023-02-17 PROCEDURE — 87880 POCT RAPID STREP A: ICD-10-PCS | Mod: QW,,, | Performed by: NURSE PRACTITIONER

## 2023-02-17 PROCEDURE — 87811 SARS-COV-2 COVID19 W/OPTIC: CPT | Mod: QW,S$GLB,, | Performed by: NURSE PRACTITIONER

## 2023-02-17 PROCEDURE — 3077F SYST BP >= 140 MM HG: CPT | Mod: CPTII,S$GLB,, | Performed by: NURSE PRACTITIONER

## 2023-02-17 RX ORDER — AZELASTINE 1 MG/ML
1 SPRAY, METERED NASAL 2 TIMES DAILY
Qty: 30 ML | Refills: 0 | OUTPATIENT
Start: 2023-02-17 | End: 2023-02-17

## 2023-02-17 RX ORDER — AZITHROMYCIN 250 MG/1
TABLET, FILM COATED ORAL
Qty: 6 TABLET | Refills: 0 | Status: SHIPPED | OUTPATIENT
Start: 2023-02-17 | End: 2023-02-23

## 2023-02-17 RX ORDER — BENZONATATE 100 MG/1
100 CAPSULE ORAL 3 TIMES DAILY PRN
Qty: 30 CAPSULE | Refills: 0 | OUTPATIENT
Start: 2023-02-17 | End: 2023-02-17

## 2023-02-17 RX ORDER — AZITHROMYCIN 250 MG/1
TABLET, FILM COATED ORAL
Qty: 6 TABLET | Refills: 0 | OUTPATIENT
Start: 2023-02-17 | End: 2023-02-17

## 2023-02-17 RX ORDER — BENZONATATE 100 MG/1
100 CAPSULE ORAL 3 TIMES DAILY PRN
Qty: 30 CAPSULE | Refills: 0 | Status: SHIPPED | OUTPATIENT
Start: 2023-02-17 | End: 2023-02-23

## 2023-02-17 RX ORDER — AZELASTINE 1 MG/ML
1 SPRAY, METERED NASAL 2 TIMES DAILY
Qty: 30 ML | Refills: 0 | Status: SHIPPED | OUTPATIENT
Start: 2023-02-17 | End: 2023-02-23 | Stop reason: SDUPTHER

## 2023-02-18 NOTE — PROGRESS NOTES
"Subjective:       Patient ID: Loren Andre is a 72 y.o. female.    Vitals:  height is 5' 6" (1.676 m) and weight is 72.1 kg (159 lb). Her temperature is 98 °F (36.7 °C). Her blood pressure is 190/100 (abnormal) and her pulse is 60. Her respiration is 16 and oxygen saturation is 98%.     Chief Complaint: Sore Throat    Pt c/o headache and sore throat started this morning, states last week felt bad, has sinus congestion and drianage Tried cough drops but no relief.   BP high, has not taken her Bp medication today.     Constitution: Positive for fatigue. Negative for chills.   HENT:  Positive for congestion, postnasal drip and sore throat. Negative for trouble swallowing.    Neck: neck negative.   Cardiovascular: Negative.    Respiratory:  Positive for cough and sputum production. Negative for shortness of breath and wheezing.    Neurological: Negative.      Objective:      Physical Exam   Constitutional: She is oriented to person, place, and time. No distress.   HENT:   Head: Normocephalic and atraumatic.   Ears:   Right Ear: Tympanic membrane normal.   Left Ear: Tympanic membrane normal.   Nose: Rhinorrhea and congestion present.   Mouth/Throat: Oropharyngeal exudate and posterior oropharyngeal erythema present.   Cardiovascular: Normal rate and regular rhythm.   No murmur heard.  Pulmonary/Chest: Effort normal and breath sounds normal. No respiratory distress.   Abdominal: Normal appearance and bowel sounds are normal.   Neurological: She is alert and oriented to person, place, and time.       Assessment:       1. Sore throat          Plan:       COVId and strep negative, discussed, treat as below. Advised patient to take her BP medication as soon as she gets home, return for Bp check tomorrow and scheduled asap appointment with PCP. Strict ed precautions discussed.   Sore throat  -     SARS Coronavirus 2 Antigen, POCT Manual Read  -     POCT rapid strep A    Other orders  -     Discontinue: azithromycin (Z-MARISABEL) " 250 MG tablet; Take 2 tablets by mouth on day 1; Take 1 tablet by mouth on days 2-5  Dispense: 6 tablet; Refill: 0  -     Discontinue: azelastine (ASTELIN) 137 mcg (0.1 %) nasal spray; 1 spray (137 mcg total) by Nasal route 2 (two) times daily.  Dispense: 30 mL; Refill: 0  -     Discontinue: benzonatate (TESSALON) 100 MG capsule; Take 1 capsule (100 mg total) by mouth 3 (three) times daily as needed for Cough.  Dispense: 30 capsule; Refill: 0  -     azelastine (ASTELIN) 137 mcg (0.1 %) nasal spray; 1 spray (137 mcg total) by Nasal route 2 (two) times daily.  Dispense: 30 mL; Refill: 0  -     azithromycin (Z-MARISABEL) 250 MG tablet; Take 2 tablets by mouth on day 1; Take 1 tablet by mouth on days 2-5  Dispense: 6 tablet; Refill: 0  -     benzonatate (TESSALON) 100 MG capsule; Take 1 capsule (100 mg total) by mouth 3 (three) times daily as needed for Cough.  Dispense: 30 capsule; Refill: 0

## 2023-02-23 ENCOUNTER — OFFICE VISIT (OUTPATIENT)
Dept: CARDIOLOGY | Facility: CLINIC | Age: 73
End: 2023-02-23
Payer: MEDICARE

## 2023-02-23 VITALS
BODY MASS INDEX: 24.59 KG/M2 | DIASTOLIC BLOOD PRESSURE: 80 MMHG | OXYGEN SATURATION: 99 % | WEIGHT: 153 LBS | HEIGHT: 66 IN | HEART RATE: 68 BPM | SYSTOLIC BLOOD PRESSURE: 162 MMHG

## 2023-02-23 DIAGNOSIS — R07.89 ATYPICAL CHEST PAIN: ICD-10-CM

## 2023-02-23 DIAGNOSIS — I10 PRIMARY HYPERTENSION: Primary | ICD-10-CM

## 2023-02-23 DIAGNOSIS — R94.39 ABNORMAL NUCLEAR STRESS TEST: ICD-10-CM

## 2023-02-23 DIAGNOSIS — N18.31 STAGE 3A CHRONIC KIDNEY DISEASE: ICD-10-CM

## 2023-02-23 DIAGNOSIS — R94.31 ABNORMAL ECG: ICD-10-CM

## 2023-02-23 PROCEDURE — 1125F AMNT PAIN NOTED PAIN PRSNT: CPT | Mod: HCNC,CPTII,S$GLB, | Performed by: NURSE PRACTITIONER

## 2023-02-23 PROCEDURE — 1125F PR PAIN SEVERITY QUANTIFIED, PAIN PRESENT: ICD-10-PCS | Mod: HCNC,CPTII,S$GLB, | Performed by: NURSE PRACTITIONER

## 2023-02-23 PROCEDURE — 3288F PR FALLS RISK ASSESSMENT DOCUMENTED: ICD-10-PCS | Mod: HCNC,CPTII,S$GLB, | Performed by: NURSE PRACTITIONER

## 2023-02-23 PROCEDURE — 3077F PR MOST RECENT SYSTOLIC BLOOD PRESSURE >= 140 MM HG: ICD-10-PCS | Mod: HCNC,CPTII,S$GLB, | Performed by: NURSE PRACTITIONER

## 2023-02-23 PROCEDURE — 3008F BODY MASS INDEX DOCD: CPT | Mod: HCNC,CPTII,S$GLB, | Performed by: NURSE PRACTITIONER

## 2023-02-23 PROCEDURE — 1159F PR MEDICATION LIST DOCUMENTED IN MEDICAL RECORD: ICD-10-PCS | Mod: HCNC,CPTII,S$GLB, | Performed by: NURSE PRACTITIONER

## 2023-02-23 PROCEDURE — 1160F PR REVIEW ALL MEDS BY PRESCRIBER/CLIN PHARMACIST DOCUMENTED: ICD-10-PCS | Mod: HCNC,CPTII,S$GLB, | Performed by: NURSE PRACTITIONER

## 2023-02-23 PROCEDURE — 3079F DIAST BP 80-89 MM HG: CPT | Mod: HCNC,CPTII,S$GLB, | Performed by: NURSE PRACTITIONER

## 2023-02-23 PROCEDURE — 99999 PR PBB SHADOW E&M-EST. PATIENT-LVL V: ICD-10-PCS | Mod: PBBFAC,HCNC,, | Performed by: NURSE PRACTITIONER

## 2023-02-23 PROCEDURE — 1101F PR PT FALLS ASSESS DOC 0-1 FALLS W/OUT INJ PAST YR: ICD-10-PCS | Mod: HCNC,CPTII,S$GLB, | Performed by: NURSE PRACTITIONER

## 2023-02-23 PROCEDURE — 3008F PR BODY MASS INDEX (BMI) DOCUMENTED: ICD-10-PCS | Mod: HCNC,CPTII,S$GLB, | Performed by: NURSE PRACTITIONER

## 2023-02-23 PROCEDURE — 99214 OFFICE O/P EST MOD 30 MIN: CPT | Mod: HCNC,S$GLB,, | Performed by: NURSE PRACTITIONER

## 2023-02-23 PROCEDURE — 3077F SYST BP >= 140 MM HG: CPT | Mod: HCNC,CPTII,S$GLB, | Performed by: NURSE PRACTITIONER

## 2023-02-23 PROCEDURE — 93000 EKG 12-LEAD: ICD-10-PCS | Mod: HCNC,S$GLB,, | Performed by: INTERNAL MEDICINE

## 2023-02-23 PROCEDURE — 1159F MED LIST DOCD IN RCRD: CPT | Mod: HCNC,CPTII,S$GLB, | Performed by: NURSE PRACTITIONER

## 2023-02-23 PROCEDURE — 99999 PR PBB SHADOW E&M-EST. PATIENT-LVL V: CPT | Mod: PBBFAC,HCNC,, | Performed by: NURSE PRACTITIONER

## 2023-02-23 PROCEDURE — 1160F RVW MEDS BY RX/DR IN RCRD: CPT | Mod: HCNC,CPTII,S$GLB, | Performed by: NURSE PRACTITIONER

## 2023-02-23 PROCEDURE — 1101F PT FALLS ASSESS-DOCD LE1/YR: CPT | Mod: HCNC,CPTII,S$GLB, | Performed by: NURSE PRACTITIONER

## 2023-02-23 PROCEDURE — 99214 PR OFFICE/OUTPT VISIT, EST, LEVL IV, 30-39 MIN: ICD-10-PCS | Mod: HCNC,S$GLB,, | Performed by: NURSE PRACTITIONER

## 2023-02-23 PROCEDURE — 93000 ELECTROCARDIOGRAM COMPLETE: CPT | Mod: HCNC,S$GLB,, | Performed by: INTERNAL MEDICINE

## 2023-02-23 PROCEDURE — 3288F FALL RISK ASSESSMENT DOCD: CPT | Mod: HCNC,CPTII,S$GLB, | Performed by: NURSE PRACTITIONER

## 2023-02-23 PROCEDURE — 3079F PR MOST RECENT DIASTOLIC BLOOD PRESSURE 80-89 MM HG: ICD-10-PCS | Mod: HCNC,CPTII,S$GLB, | Performed by: NURSE PRACTITIONER

## 2023-02-23 NOTE — ASSESSMENT & PLAN NOTE
BP is elevated today at 162/80. She reports her BP at home runs in the 130-140s/70s.   Recommend low sodium diet and to monitor BP at home.

## 2023-02-23 NOTE — PROGRESS NOTES
Subjective:    Patient ID:  Loren Andre is a 72 y.o. female   Chief Complaint   Patient presents with    Follow-up       HPI:  Patient seen today for follow-up appointment.  She denies any chest discomfort or shortness of breath.  She states she is been feeling okay overall.      Review of patient's allergies indicates:   Allergen Reactions    Doxepin Anaphylaxis     abd pain    Norvasc [amlodipine] Palpitations    Xyzal [levocetirizine] Other (See Comments)     Can not take with blood pressure medications - headaches, higher blood pressure, red hands     Penicillins Rash    Sulfa (sulfonamide antibiotics) Rash    Duloxetine      Other reaction(s): insomnia    Gabapentin Nausea Only    Levofloxacin      Other reaction(s): Headache    Milnacipran      Other reaction(s): stomach pain    Nitrofurantoin monohyd/m-cryst      Other reaction(s): Itching    Prednisone      Other reaction(s): Itching    Pseudoephedrine-guaifenesin      Other reaction(s): Stomach upset    Terbinafine      Other reaction(s): rash       Past Medical History:   Diagnosis Date    Allergy     Anemia     sickle cell trait    Anxiety     Arthritis     Asthma     Chronic disease anemia 10/5/2017    Colon polyps     Depression     Dermatitis 3/7/2013    Fibromyalgia     HEARING LOSS     Hyperlipidemia     Hypertension     Leucopenia 10/5/2017    Polyneuropathy     Scoliosis     Sickle cell trait     Sickle-cell trait 10/5/2017    Trouble in sleeping     Urticaria      Past Surgical History:   Procedure Laterality Date    ADENOIDECTOMY      APPENDECTOMY      COLONOSCOPY  4/14/2008    Dr. Guerrero, 10 year recheck    COLONOSCOPY N/A 10/4/2018    Procedure: COLONOSCOPY;  Surgeon: Tam Kemp MD;  Location: [facility];  Service: Endoscopy;  Laterality: N/A;    HYSTERECTOMY      OOPHORECTOMY      ROTATOR CUFF REPAIR Left 01/2016    ROTATOR CUFF REPAIR W/ DISTAL CLAVICLE EXCISION Right 12/04/2017    Dr. Eligio Timmons ( [facility] )    TONSILLECTOMY        Social History     Tobacco Use    Smoking status: Never    Smokeless tobacco: Never   Substance Use Topics    Alcohol use: Yes     Comment: occasionally - wine    Drug use: No     Family History   Problem Relation Age of Onset    Multiple sclerosis Mother     Seizures Mother     Cancer Father         lung    Alcohol abuse Brother     Early death Brother     Diabetes Maternal Aunt     Diabetes Maternal Uncle     Diabetes Maternal Grandmother     Mental illness Maternal Grandfather     Alzheimer's disease Maternal Grandfather     Asthma Paternal Aunt     Sickle cell anemia Paternal Aunt     Diabetes Sister     Arthritis Daughter     Miscarriages / Stillbirths Daughter     Anemia Daughter     Hyperlipidemia Son     Hypertension Son     Allergies Son     Sickle cell anemia Paternal Uncle     Asthma Brother     No Known Problems Sister     No Known Problems Sister     No Known Problems Sister     No Known Problems Brother     Miscarriages / Stillbirths Daughter     Thyroid disease Daughter     No Known Problems Daughter     Angioedema Neg Hx     Eczema Neg Hx     Immunodeficiency Neg Hx     Rheum arthritis Neg Hx     Psoriasis Neg Hx     Lupus Neg Hx         Review of Systems:   Constitution: Negative for diaphoresis and fever.   HEENT: Negative for nosebleeds.    Cardiovascular: Negative for chest pain       No dyspnea on exertion       No leg swelling        No palpitations  Respiratory: Negative for shortness of breath and wheezing.    Hematologic/Lymphatic: Negative for bleeding problem. Does not bruise/bleed easily.   Skin: Negative for color change and rash.   Musculoskeletal: Negative for falls and myalgias.   Gastrointestinal: Negative for hematemesis and hematochezia.   Genitourinary: Negative for hematuria.   Neurological: Negative for dizziness and light-headedness.   Psychiatric/Behavioral: Negative for altered mental status and memory loss.          Objective:        Vitals:    02/23/23 0858   BP: (!)  162/80   Pulse: 68       Lab Results   Component Value Date    WBC 4.45 10/04/2022    HGB 12.8 10/04/2022    HCT 37.9 10/04/2022     10/04/2022    CHOL 125 10/04/2022    TRIG 41 10/04/2022    HDL 69 10/04/2022    ALT 30 10/04/2022    AST 30 10/04/2022     10/04/2022    K 4.1 10/04/2022     10/04/2022    CREATININE 1.3 10/04/2022    BUN 20 10/04/2022    CO2 25 10/04/2022    TSH 1.380 07/08/2020    INR 1.1 07/08/2020    HGBA1C 6.0 (H) 10/04/2022        ECHOCARDIOGRAM RESULTS  Results for orders placed during the hospital encounter of 07/08/20    Stress Echo Which stress agent will be used? Treadmill Exercise; Color Flow Doppler? No    Interpretation Summary  · The patient reached the end of the protocol.  · Moderate concentric left ventricular hypertrophy.  · Normal left ventricular systolic function. The estimated ejection fraction is 65%.  · Normal LV diastolic function.  · Normal right ventricular systolic function.  · Mild left atrial enlargement.  · The stress echo portion of this study is negative for myocardial ischemia.  · There were no arrhythmias during stress.  · The patient's exercise capacity was mildly impaired.  · The ECG portion of this study is abnormal but not diagnostic for ischemia.        CURRENT/PREVIOUS VISIT EKG  Results for orders placed or performed in visit on 11/28/22   IN OFFICE EKG 12-LEAD (to [place])    Collection Time: 11/28/22  4:02 PM    Narrative    Test Reason : E78.5,    Vent. Rate : 058 BPM     Atrial Rate : 058 BPM     P-R Int : 146 ms          QRS Dur : 082 ms      QT Int : 396 ms       P-R-T Axes : 073 002 -21 degrees     QTc Int : 388 ms    Sinus bradycardia  Possible Left atrial enlargement  T wave abnormality, consider lateral ischemia  Abnormal ECG  When compared with ECG of 10-AUG-2021 13:21,  Criteria for Anteroseptal infarct are no longer Present  Inverted T waves have replaced nonspecific T wave abnormality in Lateral  leads  Confirmed by Lorrie GOVEA,  Ajith ([id number]) on 12/20/2022 5:11:37 PM    Referred By:             Confirmed By:Ajith Andrew MD     No valid procedures specified.   Results for orders placed during the hospital encounter of 12/02/22    Nuclear Stress - Cardiology Interpreted    Interpretation Summary    There is a mild to moderate intensity perfusion abnormality in the  wall of the left ventricle, secondary to breast attenuation.  Mild inferolateral hypoperfusion with mild reversibility can not rule out small area of ischemia versus artifact    The gated perfusion images showed an ejection fraction of 73% post stress. Normal ejection fraction is greater than 53%.    The EKG portion of this study is abnormal but not diagnostic.    The patient reported chest pain during the stress test.    There were no arrhythmias during stress.    TID 1.10      Physical Exam:  CONSTITUTIONAL: No fever, no chills  HEENT: Normocephalic, atraumatic,pupils reactive to light                 NECK:  No JVD no carotid bruit  CVS: S1S2+, RRR  LUNGS: Clear  ABDOMEN: Soft, NT, BS+  EXTREMITIES: No cyanosis, edema  : No [place] catheter  NEURO: AAO X 3  PSY: Normal affect      Medication List with Changes/Refills   Current Medications    ALBUTEROL (PROAIR HFA) 90 MCG/ACTUATION INHALER    Inhale 2 puffs into the lungs every 6 (six) hours as needed for Wheezing. Rescue    ASCORBIC ACID, VITAMIN C, (VITAMIN C) 500 MG TABLET    Take 500 mg by mouth daily as needed.     ASPIRIN 81 MG CHEW    Take 1 tablet (81 mg total) by mouth once daily.    ATORVASTATIN (LIPITOR) 40 MG TABLET    Take 1 tablet (40 mg total) by mouth once daily.    AZELASTINE (ASTELIN) 137 MCG (0.1 %) NASAL SPRAY    USE 1 SPRAY NASALLY TWICE DAILY    BETAMETHASONE DIPROPIONATE (DIPROLENE) 0.05 % CREAM    Apply topically 2 (two) times daily.    CALCIUM CARBONATE/VITAMIN D3 (VITAMIN D-3 ORAL)    Take 100 Int'l Units by mouth once daily.     CARVEDILOL (COREG) 25 MG TABLET    Take 1 tablet (25 mg total) by mouth 2  (two) times daily with meals.    COENZYME Q10 10 MG CAPSULE    Take 10 mg by mouth once daily.    FOLIC ACID (FOLVITE) 400 MCG TABLET    Take 400 mcg by mouth once daily.    GARLIC OIL ORAL    Take 1 capsule by mouth once daily.     LISINOPRIL (PRINIVIL,ZESTRIL) 40 MG TABLET    Take 1 tablet (40 mg total) by mouth once daily.    MAGNESIUM 250 MG TAB    Take 250 mg by mouth 2 (two) times a day.    MONTELUKAST (SINGULAIR) 10 MG TABLET    Take 1 tablet (10 mg total) by mouth once daily.    NIACIN 250 MG TAB    Take 250 mg by mouth nightly.    OMEGA-3 FATTY ACIDS/FISH OIL (FISH OIL-OMEGA-3 FATTY ACIDS) 300-1,000 MG CAPSULE    Take by mouth once daily.    UNABLE TO FIND    Always eye drop    VALACYCLOVIR (VALTREX) 500 MG TABLET    TAKE 1 TABLET (500 MG TOTAL) BY MOUTH 2 (TWO) TIMES DAILY FOR 7 DAYS AS DIRECTED.    VITAMIN E 100 UNIT CAPSULE    Take 100 Units by mouth once daily.   Discontinued Medications    AZELASTINE (ASTELIN) 137 MCG (0.1 %) NASAL SPRAY    1 spray (137 mcg total) by Nasal route 2 (two) times daily.    AZITHROMYCIN (Z-MARISABEL) 250 MG TABLET    Take 2 tablets by mouth on day 1; Take 1 tablet by mouth on days 2-5    BENZONATATE (TESSALON) 100 MG CAPSULE    Take 1 capsule (100 mg total) by mouth 3 (three) times daily as needed for Cough.    CLOBETASOL (OLUX) 0.05 % FOAM    Apply topically 2 (two) times daily.    FLUTICASONE-SALMETEROL DISKUS INHALER 250-50 MCG    Inhale 1 puff into the lungs 2 (two) times daily. Controller    HYDRALAZINE (APRESOLINE) 25 MG TABLET    Take 2 tablets (50 mg total) by mouth once. Take one tablet one hour before stress test and bring the second dose with you to the test just in case it is needed. for 1 dose    PROMETHAZINE-DEXTROMETHORPHAN (PROMETHAZINE-DM) 6.25-15 MG/5 ML SYRP    Take 5 mLs by mouth every 8 (eight) hours as needed (cough).    TRAZODONE (DESYREL) 100 MG TABLET    Take 1 tablet (100 mg total) by mouth every evening.             Assessment:       1. Primary  hypertension    2. Abnormal ECG    3. Stage 3a chronic kidney disease    4. Abnormal nuclear stress test    5. Atypical chest pain         Plan:     Problem List Items Addressed This Visit          Unprioritized    Primary hypertension - Primary    Current Assessment & Plan     BP is elevated today at 162/80. She reports her BP at home runs in the 130-140s/70s.   Recommend low sodium diet and to monitor BP at home.          Relevant Orders    IN OFFICE EKG 12-LEAD (to [place])    Abnormal ECG    Stage 3a chronic kidney disease    Current Assessment & Plan     Followed by her PCP.         Abnormal nuclear stress test    Current Assessment & Plan     Discussed results with patient. She does have atypical chest pain which may be asthma versus musculoskeletal. Advised patient on the option for medication management versus angiogram versus cardiac CTA. Unfortunately she believes she is allergic to iodine and is concerned about the effects on the kidneys. Will obtain a Cardiac calcium scoring.     Continue aspirin 81 mg po daily, atorvastatin 40 mg po daily, carvedilol 25 mg po BID, and lisinopril 40 mg po daily.          Relevant Orders    CT Cardiac Scoring    Atypical chest pain    Relevant Orders    CT Cardiac Scoring       Follow up in about 3 months (around 5/23/2023).

## 2023-02-23 NOTE — ASSESSMENT & PLAN NOTE
Discussed results with patient. She does have atypical chest pain which may be asthma versus musculoskeletal. Advised patient on the option for medication management versus angiogram versus cardiac CTA. Unfortunately she believes she is allergic to iodine and is concerned about the effects on the kidneys. Will obtain a Cardiac calcium scoring.     Continue aspirin 81 mg po daily, atorvastatin 40 mg po daily, carvedilol 25 mg po BID, and lisinopril 40 mg po daily.

## 2023-02-27 ENCOUNTER — HOSPITAL ENCOUNTER (OUTPATIENT)
Dept: RADIOLOGY | Facility: HOSPITAL | Age: 73
Discharge: HOME | End: 2023-02-27
Attending: NURSE PRACTITIONER
Payer: MEDICARE

## 2023-02-27 DIAGNOSIS — R94.39 ABNORMAL NUCLEAR STRESS TEST: ICD-10-CM

## 2023-02-27 DIAGNOSIS — R07.89 ATYPICAL CHEST PAIN: ICD-10-CM

## 2023-02-27 PROCEDURE — 75571 CT HRT W/O DYE W/CA TEST: CPT | Mod: TC

## 2023-02-28 ENCOUNTER — OFFICE VISIT (OUTPATIENT)
Dept: ORTHOPEDICS | Facility: CLINIC | Age: 73
End: 2023-02-28
Payer: MEDICARE

## 2023-02-28 VITALS — HEIGHT: 66 IN | WEIGHT: 153 LBS | BODY MASS INDEX: 24.59 KG/M2

## 2023-02-28 DIAGNOSIS — G89.29 CHRONIC LEFT SHOULDER PAIN: ICD-10-CM

## 2023-02-28 DIAGNOSIS — S63.502A SPRAIN OF LEFT WRIST, INITIAL ENCOUNTER: ICD-10-CM

## 2023-02-28 DIAGNOSIS — M25.532 LEFT WRIST PAIN: Primary | ICD-10-CM

## 2023-02-28 DIAGNOSIS — M25.512 CHRONIC LEFT SHOULDER PAIN: ICD-10-CM

## 2023-02-28 PROCEDURE — 1126F AMNT PAIN NOTED NONE PRSNT: CPT | Mod: CPTII,S$GLB,, | Performed by: ORTHOPAEDIC SURGERY

## 2023-02-28 PROCEDURE — 3008F BODY MASS INDEX DOCD: CPT | Mod: CPTII,S$GLB,, | Performed by: ORTHOPAEDIC SURGERY

## 2023-02-28 PROCEDURE — 3008F PR BODY MASS INDEX (BMI) DOCUMENTED: ICD-10-PCS | Mod: CPTII,S$GLB,, | Performed by: ORTHOPAEDIC SURGERY

## 2023-02-28 PROCEDURE — 1126F PR PAIN SEVERITY QUANTIFIED, NO PAIN PRESENT: ICD-10-PCS | Mod: CPTII,S$GLB,, | Performed by: ORTHOPAEDIC SURGERY

## 2023-02-28 PROCEDURE — 99203 PR OFFICE/OUTPT VISIT, NEW, LEVL III, 30-44 MIN: ICD-10-PCS | Mod: S$GLB,,, | Performed by: ORTHOPAEDIC SURGERY

## 2023-02-28 PROCEDURE — 1159F PR MEDICATION LIST DOCUMENTED IN MEDICAL RECORD: ICD-10-PCS | Mod: CPTII,S$GLB,, | Performed by: ORTHOPAEDIC SURGERY

## 2023-02-28 PROCEDURE — 99203 OFFICE O/P NEW LOW 30 MIN: CPT | Mod: S$GLB,,, | Performed by: ORTHOPAEDIC SURGERY

## 2023-02-28 PROCEDURE — 1160F PR REVIEW ALL MEDS BY PRESCRIBER/CLIN PHARMACIST DOCUMENTED: ICD-10-PCS | Mod: CPTII,S$GLB,, | Performed by: ORTHOPAEDIC SURGERY

## 2023-02-28 PROCEDURE — 1159F MED LIST DOCD IN RCRD: CPT | Mod: CPTII,S$GLB,, | Performed by: ORTHOPAEDIC SURGERY

## 2023-02-28 PROCEDURE — 1160F RVW MEDS BY RX/DR IN RCRD: CPT | Mod: CPTII,S$GLB,, | Performed by: ORTHOPAEDIC SURGERY

## 2023-02-28 RX ORDER — MELOXICAM 7.5 MG/1
7.5 TABLET ORAL DAILY
Qty: 30 TABLET | Refills: 0 | Status: SHIPPED | OUTPATIENT
Start: 2023-02-28 | End: 2023-09-14

## 2023-02-28 NOTE — PROGRESS NOTES
[facility]    Subjective:     Chief Complaint:   Chief Complaint   Patient presents with    Left Wrist - Pain     Left wrist pain that started after she lifted a heavy pot about 3-4 weeks ago. States that the pain is constant. Denies any numbness       Past Medical History:   Diagnosis Date    Allergy     Anemia     sickle cell trait    Anxiety     Arthritis     Asthma     Chronic disease anemia 10/5/2017    Colon polyps     Depression     Dermatitis 3/7/2013    Fibromyalgia     HEARING LOSS     Hyperlipidemia     Hypertension     Leucopenia 10/5/2017    Polyneuropathy     Scoliosis     Sickle cell trait     Sickle-cell trait 10/5/2017    Trouble in sleeping     Urticaria        Past Surgical History:   Procedure Laterality Date    ADENOIDECTOMY      APPENDECTOMY      COLONOSCOPY  4/14/2008    Dr. Guerrero, 10 year recheck    COLONOSCOPY N/A 10/4/2018    Procedure: COLONOSCOPY;  Surgeon: Tam Kemp MD;  Location: [facility];  Service: Endoscopy;  Laterality: N/A;    HYSTERECTOMY      OOPHORECTOMY      ROTATOR CUFF REPAIR Left 01/2016    ROTATOR CUFF REPAIR W/ DISTAL CLAVICLE EXCISION Right 12/04/2017    Dr. Eligio Timmons ( [facility] )    TONSILLECTOMY         Current Outpatient Medications   Medication Sig    albuterol (PROAIR HFA) 90 mcg/actuation inhaler Inhale 2 puffs into the lungs every 6 (six) hours as needed for Wheezing. Rescue    ascorbic acid, vitamin C, (VITAMIN C) 500 MG tablet Take 500 mg by mouth daily as needed.     atorvastatin (LIPITOR) 40 MG tablet Take 1 tablet (40 mg total) by mouth once daily.    azelastine (ASTELIN) 137 mcg (0.1 %) nasal spray USE 1 SPRAY NASALLY TWICE DAILY    CALCIUM CARBONATE/VITAMIN D3 (VITAMIN D-3 ORAL) Take 100 Int'l Units by mouth once daily.     carvediloL (COREG) 25 MG tablet Take 1 tablet (25 mg total) by mouth 2 (two) times daily with meals.    coenzyme Q10 10 mg capsule Take 10 mg by mouth once daily.    folic acid (FOLVITE) 400 MCG tablet Take  400 mcg by mouth once daily.    GARLIC OIL ORAL Take 1 capsule by mouth once daily.     lisinopriL (PRINIVIL,ZESTRIL) 40 MG tablet Take 1 tablet (40 mg total) by mouth once daily.    magnesium 250 mg Tab Take 250 mg by mouth 2 (two) times a day.    montelukast (SINGULAIR) 10 mg tablet Take 1 tablet (10 mg total) by mouth once daily.    niacin 250 MG Tab Take 250 mg by mouth nightly.    omega-3 fatty acids/fish oil (FISH OIL-OMEGA-3 FATTY ACIDS) 300-1,000 mg capsule Take by mouth once daily.    aspirin 81 MG Chew Take 1 tablet (81 mg total) by mouth once daily. (Patient taking differently: Take 81 mg by mouth once daily. Pt states taking OTC.)    betamethasone dipropionate (DIPROLENE) 0.05 % cream Apply topically 2 (two) times daily. (Patient not taking: Reported on 2/28/2023)    meloxicam (MOBIC) 7.5 MG tablet Take 1 tablet (7.5 mg total) by mouth once daily.    UNABLE TO FIND Always eye drop    valACYclovir (VALTREX) 500 MG tablet TAKE 1 TABLET (500 MG TOTAL) BY MOUTH 2 (TWO) TIMES DAILY FOR 7 DAYS AS DIRECTED.    vitamin E 100 UNIT capsule Take 100 Units by mouth once daily.     No current facility-administered medications for this visit.       Review of patient's allergies indicates:   Allergen Reactions    Doxepin Anaphylaxis     abd pain    Norvasc [amlodipine] Palpitations    Xyzal [levocetirizine] Other (See Comments)     Can not take with blood pressure medications - headaches, higher blood pressure, red hands     Penicillins Rash    Sulfa (sulfonamide antibiotics) Rash    Duloxetine      Other reaction(s): insomnia    Gabapentin Nausea Only    Levofloxacin      Other reaction(s): Headache    Milnacipran      Other reaction(s): stomach pain    Nitrofurantoin monohyd/m-cryst      Other reaction(s): Itching    Prednisone      Other reaction(s): Itching    Pseudoephedrine-guaifenesin      Other reaction(s): Stomach upset    Terbinafine      Other reaction(s): rash       Family History   Problem Relation Age of  Onset    Multiple sclerosis Mother     Seizures Mother     Cancer Father         lung    Alcohol abuse Brother     Early death Brother     Diabetes Maternal Aunt     Diabetes Maternal Uncle     Diabetes Maternal Grandmother     Mental illness Maternal Grandfather     Alzheimer's disease Maternal Grandfather     Asthma Paternal Aunt     Sickle cell anemia Paternal Aunt     Diabetes Sister     Arthritis Daughter     Miscarriages / Stillbirths Daughter     Anemia Daughter     Hyperlipidemia Son     Hypertension Son     Allergies Son     Sickle cell anemia Paternal Uncle     Asthma Brother     No Known Problems Sister     No Known Problems Sister     No Known Problems Sister     No Known Problems Brother     Miscarriages / Stillbirths Daughter     Thyroid disease Daughter     No Known Problems Daughter     Angioedema Neg Hx     Eczema Neg Hx     Immunodeficiency Neg Hx     Rheum arthritis Neg Hx     Psoriasis Neg Hx     Lupus Neg Hx        Social History     Socioeconomic History    Marital status:     Number of children: 2   Tobacco Use    Smoking status: Never    Smokeless tobacco: Never   Substance and Sexual Activity    Alcohol use: Yes     Comment: occasionally - wine    Drug use: No    Sexual activity: Not Currently     Birth control/protection: Surgical     Social Determinants of Health     Financial Resource Strain: Low Risk     Difficulty of Paying Living Expenses: Not hard at all   Food Insecurity: No Food Insecurity    Worried About Running Out of Food in the Last Year: Never true    Ran Out of Food in the Last Year: Never true   Transportation Needs: No Transportation Needs    Lack of Transportation (Medical): No    Lack of Transportation (Non-Medical): No   Physical Activity: Sufficiently Active    Days of Exercise per Week: 7 days    Minutes of Exercise per Session: 30 min   Stress: No Stress Concern Present    Feeling of Stress : Not at all   Social Connections: Moderately Isolated    Frequency of  Communication with Friends and Family: More than three times a week    Frequency of Social Gatherings with Friends and Family: More than three times a week    Attends [employer] Services: More than 4 times per year    Active Member of Clubs or Organizations: No    Attends Club or Organization Meetings: Never    Marital Status:    Housing Stability: Unknown    Unable to Pay for Housing in the Last Year: No    Unstable Housing in the Last Year: No       History of present illness:  Ms. Andre presents to the office today as a new patient chief complaint of left wrist and forearm pain that has been going on for approximately 3 weeks.  She does not recollect any specific injury or trauma.  Fortunately she is right-hand dominant.  She does report she was lifting a pot with her left hand that was empty several weeks ago and she feels this may have caused the problem.  She denies any numbness or paresthesias.  Denies weakness.    Review of Systems:    Constitution: Negative for chills, fever, and sweats.  Negative for unexplained weight loss.    HENT:  Negative for headaches and blurry vision.    Cardiovascular:Negative for chest pain or irregular heart beat. Negative for hypertension.    Respiratory:  Negative for cough and shortness of breath.    Gastrointestinal: Negative for abdominal pain, heartburn, melena, nausea, and vomitting.    Genitourinary:  Negative bladder incontinence and dysuria.    Musculoskeletal:  See HPI for details.     Neurological: Negative for numbness.    Psychiatric/Behavioral: Negative for depression.  The patient is not nervous/anxious.      Endocrine: Negative for polyuria    Hematologic/Lymphatic: Negative for bleeding problem.  Does not bruise/bleed easily.    Skin: Negative for poor would healing and rash    Objective:      Physical Examination:    Vital Signs:  There were no vitals filed for this visit.    Body mass index is 24.69 kg/m².    This a well-developed, well nourished  "patient in no acute distress.  They are alert and oriented and cooperative to examination.        Left wrist and hand exam: Skin to her left hand and wrist is clean dry and intact.  There is no erythema or ecchymosis.  There are no signs or symptoms of infection.  She is neurovascularly intact throughout the left upper extremity.  She can open and close her left hand into a fist.  She can oppose her left thumb to all digits in her left hand.   strength in the left hand would grade 5/5 and is equal bilaterally.  She has no anatomical snuffbox tenderness.  She has a negative Finkelstein's.  She has a negative basilar grind.    Pertinent New Results:    XRAY Report / Interpretation:   Two views were taken of the left wrist today:  AP and lateral views.  They reveal no acute fractures or dislocations.  Visualized soft tissues appear unremarkable.    Assessment/Plan:      1. Left wrist sprain.      We will treat this slowly/conservatively to begin with.  We will start her on Mobic 7.5 mg by mouth once a day with food.  We will do this every day for 1 month.  We will check her back in 6 weeks if she is still symptomatic.  If this is the case, we will proceed with an MRI.  This should likely resolve simply with tincture of time.    Obdulio Hearn, Physician Assistant, served in the capacity as a "scribe" for this patient encounter.  A "face-to-face" encounter occurred with Dr. Eligio Timmons on this date.  The treatment plan and medical decision-making is outlined above. Patient was seen and examined with a chaperone.       This note was created using Dragon voice recognition software that occasionally misinterpreted phrases or words.        "

## 2023-03-02 ENCOUNTER — OFFICE VISIT (OUTPATIENT)
Dept: URGENT CARE | Facility: CLINIC | Age: 73
End: 2023-03-02
Payer: MEDICARE

## 2023-03-02 VITALS
HEIGHT: 66 IN | OXYGEN SATURATION: 98 % | RESPIRATION RATE: 18 BRPM | DIASTOLIC BLOOD PRESSURE: 79 MMHG | BODY MASS INDEX: 24.91 KG/M2 | SYSTOLIC BLOOD PRESSURE: 153 MMHG | TEMPERATURE: 97 F | WEIGHT: 155 LBS | HEART RATE: 58 BPM

## 2023-03-02 DIAGNOSIS — J01.01 ACUTE RECURRENT MAXILLARY SINUSITIS: Primary | ICD-10-CM

## 2023-03-02 DIAGNOSIS — R82.90 MALODOROUS URINE: ICD-10-CM

## 2023-03-02 LAB
BILIRUB UR QL STRIP: NEGATIVE
GLUCOSE UR QL STRIP: NEGATIVE
KETONES UR QL STRIP: NEGATIVE
LEUKOCYTE ESTERASE UR QL STRIP: NEGATIVE
PH, POC UA: 6.5
POC BLOOD, URINE: NEGATIVE
POC NITRATES, URINE: NEGATIVE
PROT UR QL STRIP: NEGATIVE
SP GR UR STRIP: 1.01 (ref 1–1.03)
UROBILINOGEN UR STRIP-ACNC: NORMAL (ref 0.1–1.1)

## 2023-03-02 PROCEDURE — 3008F BODY MASS INDEX DOCD: CPT | Mod: CPTII,S$GLB,, | Performed by: NURSE PRACTITIONER

## 2023-03-02 PROCEDURE — 99214 PR OFFICE/OUTPT VISIT, EST, LEVL IV, 30-39 MIN: ICD-10-PCS | Mod: S$GLB,,, | Performed by: NURSE PRACTITIONER

## 2023-03-02 PROCEDURE — 81003 POCT URINALYSIS, DIPSTICK, AUTOMATED, W/O SCOPE: ICD-10-PCS | Mod: QW,S$GLB,, | Performed by: NURSE PRACTITIONER

## 2023-03-02 PROCEDURE — 1159F MED LIST DOCD IN RCRD: CPT | Mod: CPTII,S$GLB,, | Performed by: NURSE PRACTITIONER

## 2023-03-02 PROCEDURE — 3078F PR MOST RECENT DIASTOLIC BLOOD PRESSURE < 80 MM HG: ICD-10-PCS | Mod: CPTII,S$GLB,, | Performed by: NURSE PRACTITIONER

## 2023-03-02 PROCEDURE — 3077F SYST BP >= 140 MM HG: CPT | Mod: CPTII,S$GLB,, | Performed by: NURSE PRACTITIONER

## 2023-03-02 PROCEDURE — 99214 OFFICE O/P EST MOD 30 MIN: CPT | Mod: S$GLB,,, | Performed by: NURSE PRACTITIONER

## 2023-03-02 PROCEDURE — 3008F PR BODY MASS INDEX (BMI) DOCUMENTED: ICD-10-PCS | Mod: CPTII,S$GLB,, | Performed by: NURSE PRACTITIONER

## 2023-03-02 PROCEDURE — 3078F DIAST BP <80 MM HG: CPT | Mod: CPTII,S$GLB,, | Performed by: NURSE PRACTITIONER

## 2023-03-02 PROCEDURE — 3077F PR MOST RECENT SYSTOLIC BLOOD PRESSURE >= 140 MM HG: ICD-10-PCS | Mod: CPTII,S$GLB,, | Performed by: NURSE PRACTITIONER

## 2023-03-02 PROCEDURE — 81003 URINALYSIS AUTO W/O SCOPE: CPT | Mod: QW,S$GLB,, | Performed by: NURSE PRACTITIONER

## 2023-03-02 PROCEDURE — 1159F PR MEDICATION LIST DOCUMENTED IN MEDICAL RECORD: ICD-10-PCS | Mod: CPTII,S$GLB,, | Performed by: NURSE PRACTITIONER

## 2023-03-02 RX ORDER — CETIRIZINE HYDROCHLORIDE 10 MG/1
10 TABLET ORAL DAILY PRN
Qty: 7 TABLET | Refills: 0 | Status: SHIPPED | OUTPATIENT
Start: 2023-03-02 | End: 2023-03-02

## 2023-03-02 RX ORDER — AZELASTINE 1 MG/ML
1 SPRAY, METERED NASAL 2 TIMES DAILY
Qty: 30 ML | Refills: 0 | Status: SHIPPED | OUTPATIENT
Start: 2023-03-02 | End: 2023-03-02 | Stop reason: SDUPTHER

## 2023-03-02 RX ORDER — AZELASTINE 1 MG/ML
1 SPRAY, METERED NASAL 2 TIMES DAILY
Qty: 30 ML | Refills: 0 | Status: SHIPPED | OUTPATIENT
Start: 2023-03-02 | End: 2023-03-02

## 2023-03-02 RX ORDER — DOXYCYCLINE 100 MG/1
100 CAPSULE ORAL EVERY 12 HOURS
Qty: 14 CAPSULE | Refills: 0 | Status: SHIPPED | OUTPATIENT
Start: 2023-03-02 | End: 2023-03-09

## 2023-03-02 RX ORDER — CETIRIZINE HYDROCHLORIDE 10 MG/1
10 TABLET ORAL DAILY PRN
Qty: 7 TABLET | Refills: 0 | Status: SHIPPED | OUTPATIENT
Start: 2023-03-02 | End: 2023-08-07

## 2023-03-02 RX ORDER — AZELASTINE 1 MG/ML
1 SPRAY, METERED NASAL 2 TIMES DAILY
Qty: 30 ML | Refills: 0 | Status: SHIPPED | OUTPATIENT
Start: 2023-03-02 | End: 2023-05-16

## 2023-03-02 NOTE — PATIENT INSTRUCTIONS
Increase clear fluid intake  Start doxycycline and take it to completion  Stop all current over the counter cough, cold, flu medicine  Tylenol/motrin otc for fever or pain  Start Astelin nasal spray and Zyrtec as for sinus congestion and pressure.    Use a cool mist humidifier at bedside at night to loosen respiratory secretions and aid in respiratory comfort  May use over-the-counter saline based nasal spray as well  A culture has been sent on your urine, someone from this office will contact you with the results and any necessary changes in the plan of care we discussed at that time  Follow up with PCP  Go immediately to the nearest emergency room for shortness of breath, chest pain,  or other emergent concern.  Return to clinic for new, worse, or unresolving symptoms

## 2023-03-02 NOTE — PROGRESS NOTES
"Subjective:       Patient ID: Loren Andre is a 72 y.o. female.    Vitals:  height is 5' 6" (1.676 m) and weight is 70.3 kg (155 lb). Her oral temperature is 97.3 °F (36.3 °C). Her blood pressure is 153/79 (abnormal) and her pulse is 58 (abnormal). Her respiration is 18 and oxygen saturation is 98%.     Chief Complaint: Sinus Problem    Pt states she was seen here approximately 1 week ago and diagnosed with a sinus infection.  States she has taken all the antibiotics as she continues to have sinus congestion which is now causing dizziness.  As an aside she also states that she has developed foul-smelling urine    Sinus Problem  This is a recurrent problem. The current episode started in the past 7 days (5 days). The problem has been gradually worsening since onset. There has been no fever. Associated symptoms include congestion, coughing, headaches, a hoarse voice, sinus pressure, sneezing and a sore throat. Pertinent negatives include no chills, ear pain or shortness of breath. Past treatments include nothing.     Constitution: Negative for chills and fever.   HENT:  Positive for congestion, sinus pressure and sore throat. Negative for ear pain and sinus pain.    Neck: Negative for painful lymph nodes.   Cardiovascular:  Negative for chest pain, palpitations and sob on exertion.   Respiratory:  Positive for cough. Negative for sputum production and shortness of breath.    Skin:  Negative for rash.   Allergic/Immunologic: Positive for sneezing.   Neurological:  Positive for dizziness and headaches. Negative for history of vertigo, light-headedness, disorientation and altered mental status.   Hematologic/Lymphatic: Negative for swollen lymph nodes.   Psychiatric/Behavioral:  Negative for altered mental status, disorientation and confusion.      Objective:      Physical Exam   Constitutional: She is oriented to person, place, and time. She appears well-developed. She is cooperative.  Non-toxic appearance. She does " not appear ill. No distress.   HENT:   Head: Normocephalic and atraumatic.   Ears:   Right Ear: Hearing, tympanic membrane, external ear and ear canal normal.   Left Ear: Hearing, tympanic membrane, external ear and ear canal normal.   Nose: No mucosal edema, rhinorrhea, nasal deformity or congestion. No epistaxis. Right sinus exhibits no maxillary sinus tenderness and no frontal sinus tenderness. Left sinus exhibits no maxillary sinus tenderness and no frontal sinus tenderness.   Mouth/Throat: Uvula is midline, oropharynx is clear and moist and mucous membranes are normal. Mucous membranes are moist. No trismus in the jaw. Normal dentition. No uvula swelling. No oropharyngeal exudate, posterior oropharyngeal edema or posterior oropharyngeal erythema. Oropharynx is clear.   Eyes: Conjunctivae and lids are normal. No scleral icterus.   Neck: Trachea normal and phonation normal. Neck supple. No edema present. No erythema present. No neck rigidity present.   Cardiovascular: Normal rate, regular rhythm, normal heart sounds and normal pulses.   Pulmonary/Chest: Effort normal and breath sounds normal. No stridor. No respiratory distress. She has no decreased breath sounds. She has no rhonchi.   Abdominal: Normal appearance. She exhibits no distension and no mass. Soft. flat abdomen There is no abdominal tenderness. There is no guarding, no left CVA tenderness and no right CVA tenderness. No hernia.   Musculoskeletal: Normal range of motion.         General: No deformity. Normal range of motion.   Lymphadenopathy:     She has no cervical adenopathy.   Neurological: She is alert and oriented to person, place, and time. She exhibits normal muscle tone. Coordination normal.   Skin: Skin is warm, dry, intact, not diaphoretic and not pale. Capillary refill takes 2 to 3 seconds.   Psychiatric: Her speech is normal and behavior is normal. Judgment and thought content normal.   Nursing note and vitals reviewed.      Assessment:     "   1. Acute recurrent maxillary sinusitis    2. Malodorous urine          Plan:         Acute recurrent maxillary sinusitis  -     Discontinue: azelastine (ASTELIN) 137 mcg (0.1 %) nasal spray; 1 spray (137 mcg total) by Nasal route 2 (two) times daily.  Dispense: 30 mL; Refill: 0  -     Discontinue: cetirizine (ZYRTEC) 10 MG tablet; Take 1 tablet (10 mg total) by mouth daily as needed for Allergies.  Dispense: 7 tablet; Refill: 0  -     Discontinue: cetirizine (ZYRTEC) 10 MG tablet; Take 1 tablet (10 mg total) by mouth daily as needed for Allergies.  Dispense: 7 tablet; Refill: 0  -     Discontinue: azelastine (ASTELIN) 137 mcg (0.1 %) nasal spray; 1 spray (137 mcg total) by Nasal route 2 (two) times daily.  Dispense: 30 mL; Refill: 0  -     azelastine (ASTELIN) 137 mcg (0.1 %) nasal spray; 1 spray (137 mcg total) by Nasal route 2 (two) times daily.  Dispense: 30 mL; Refill: 0  -     cetirizine (ZYRTEC) 10 MG tablet; Take 1 tablet (10 mg total) by mouth daily as needed for Allergies.  Dispense: 7 tablet; Refill: 0  -     doxycycline (VIBRAMYCIN) 100 MG Cap; Take 1 capsule (100 mg total) by mouth every 12 (twelve) hours. for 7 days  Dispense: 14 capsule; Refill: 0    Malodorous urine  -     POCT Urinalysis, Dipstick, Automated, W/O Scope  -     CULTURE, URINE    Urinalysis=unremarkable       I have discussed the test results and physical exam findings with the patient. She refused any viral testing as "this has been going on for a week now".  I do not suspect UTI, more likely food related urine odor. She has relatively mild sinus symptoms and is reports allergies to steroids. Dizziness r/t sinus pressure. We discussed symptom monitoring, conservative care methods, medication use, and follow up orders. She verbalized understanding and agreement with the plan of care.         Increase clear fluid intake  Start doxycycline and take it to completion  Stop all current over the counter cough, cold, flu " medicine  Tylenol/motrin otc for fever or pain  Start Astelin nasal spray and Zyrtec as for sinus congestion and pressure.    Use a cool mist humidifier at bedside at night to loosen respiratory secretions and aid in respiratory comfort  May use over-the-counter saline based nasal spray as well  A culture has been sent on your urine, someone from this office will contact you with the results and any necessary changes in the plan of care we discussed at that time  Follow up with PCP  Go immediately to the nearest emergency room for shortness of breath, chest pain,  or other emergent concern.  Return to clinic for new, worse, or unresolving symptoms

## 2023-03-04 LAB
BACTERIA UR CULT: NO GROWTH
BACTERIA UR CULT: NORMAL

## 2023-03-10 ENCOUNTER — TELEPHONE (OUTPATIENT)
Dept: CARDIOLOGY | Facility: CLINIC | Age: 73
End: 2023-03-10
Payer: MEDICARE

## 2023-03-10 NOTE — TELEPHONE ENCOUNTER
----- Message from Liliam Argueta NP sent at 2/28/2023  8:54 AM CST -----  There is some plaque and calcium noted. If symptoms worsen or if you are concerned, we can have you come back in to discuss moving forward with an angiogram which is the most conclusive. However you are on aspirin, cholesterol medication, and blood pressure medications which protect the heart so if you are feeling fine, we do not have to change anything at this time.

## 2023-03-13 ENCOUNTER — TELEPHONE (OUTPATIENT)
Dept: CARDIOLOGY | Facility: CLINIC | Age: 73
End: 2023-03-13
Payer: MEDICARE

## 2023-03-13 NOTE — TELEPHONE ENCOUNTER
----- Message from Tejas Flynn sent at 3/13/2023  8:03 AM CDT -----  Type: Needs Medical Advice  Who Called:  pt  Symptoms (please be specific):  pt said she need to schedule a procedure with the dr--please call and advise  Best Call Back Number: [phone] (home)     Additional Information: thank you

## 2023-03-23 ENCOUNTER — OFFICE VISIT (OUTPATIENT)
Dept: CARDIOLOGY | Facility: CLINIC | Age: 73
End: 2023-03-23
Payer: MEDICARE

## 2023-03-23 VITALS
SYSTOLIC BLOOD PRESSURE: 150 MMHG | OXYGEN SATURATION: 98 % | BODY MASS INDEX: 25.07 KG/M2 | DIASTOLIC BLOOD PRESSURE: 72 MMHG | HEIGHT: 66 IN | WEIGHT: 156 LBS | HEART RATE: 70 BPM

## 2023-03-23 DIAGNOSIS — R93.1 AGATSTON CORONARY ARTERY CALCIUM SCORE LESS THAN 100: ICD-10-CM

## 2023-03-23 DIAGNOSIS — R94.39 ABNORMAL NUCLEAR STRESS TEST: ICD-10-CM

## 2023-03-23 DIAGNOSIS — E78.5 HYPERLIPIDEMIA, UNSPECIFIED HYPERLIPIDEMIA TYPE: ICD-10-CM

## 2023-03-23 DIAGNOSIS — N18.31 STAGE 3A CHRONIC KIDNEY DISEASE: ICD-10-CM

## 2023-03-23 DIAGNOSIS — I25.10 ATHEROSCLEROSIS OF NATIVE CORONARY ARTERY OF NATIVE HEART WITHOUT ANGINA PECTORIS: Primary | ICD-10-CM

## 2023-03-23 DIAGNOSIS — I10 PRIMARY HYPERTENSION: ICD-10-CM

## 2023-03-23 DIAGNOSIS — J45.20 MILD INTERMITTENT CHRONIC ASTHMA WITHOUT COMPLICATION: ICD-10-CM

## 2023-03-23 DIAGNOSIS — I10 WHITE COAT SYNDROME WITH DIAGNOSIS OF HYPERTENSION: ICD-10-CM

## 2023-03-23 PROCEDURE — 1159F MED LIST DOCD IN RCRD: CPT | Mod: HCNC,CPTII,S$GLB, | Performed by: NURSE PRACTITIONER

## 2023-03-23 PROCEDURE — 1101F PT FALLS ASSESS-DOCD LE1/YR: CPT | Mod: HCNC,CPTII,S$GLB, | Performed by: NURSE PRACTITIONER

## 2023-03-23 PROCEDURE — 3077F SYST BP >= 140 MM HG: CPT | Mod: HCNC,CPTII,S$GLB, | Performed by: NURSE PRACTITIONER

## 2023-03-23 PROCEDURE — 1126F AMNT PAIN NOTED NONE PRSNT: CPT | Mod: HCNC,CPTII,S$GLB, | Performed by: NURSE PRACTITIONER

## 2023-03-23 PROCEDURE — 1126F PR PAIN SEVERITY QUANTIFIED, NO PAIN PRESENT: ICD-10-PCS | Mod: HCNC,CPTII,S$GLB, | Performed by: NURSE PRACTITIONER

## 2023-03-23 PROCEDURE — 3078F PR MOST RECENT DIASTOLIC BLOOD PRESSURE < 80 MM HG: ICD-10-PCS | Mod: HCNC,CPTII,S$GLB, | Performed by: NURSE PRACTITIONER

## 2023-03-23 PROCEDURE — 3078F DIAST BP <80 MM HG: CPT | Mod: HCNC,CPTII,S$GLB, | Performed by: NURSE PRACTITIONER

## 2023-03-23 PROCEDURE — 99214 OFFICE O/P EST MOD 30 MIN: CPT | Mod: HCNC,S$GLB,, | Performed by: NURSE PRACTITIONER

## 2023-03-23 PROCEDURE — 1159F PR MEDICATION LIST DOCUMENTED IN MEDICAL RECORD: ICD-10-PCS | Mod: HCNC,CPTII,S$GLB, | Performed by: NURSE PRACTITIONER

## 2023-03-23 PROCEDURE — 3288F PR FALLS RISK ASSESSMENT DOCUMENTED: ICD-10-PCS | Mod: HCNC,CPTII,S$GLB, | Performed by: NURSE PRACTITIONER

## 2023-03-23 PROCEDURE — 3288F FALL RISK ASSESSMENT DOCD: CPT | Mod: HCNC,CPTII,S$GLB, | Performed by: NURSE PRACTITIONER

## 2023-03-23 PROCEDURE — 99999 PR PBB SHADOW E&M-EST. PATIENT-LVL IV: ICD-10-PCS | Mod: PBBFAC,HCNC,, | Performed by: NURSE PRACTITIONER

## 2023-03-23 PROCEDURE — 99999 PR PBB SHADOW E&M-EST. PATIENT-LVL IV: CPT | Mod: PBBFAC,HCNC,, | Performed by: NURSE PRACTITIONER

## 2023-03-23 PROCEDURE — 3008F PR BODY MASS INDEX (BMI) DOCUMENTED: ICD-10-PCS | Mod: HCNC,CPTII,S$GLB, | Performed by: NURSE PRACTITIONER

## 2023-03-23 PROCEDURE — 1101F PR PT FALLS ASSESS DOC 0-1 FALLS W/OUT INJ PAST YR: ICD-10-PCS | Mod: HCNC,CPTII,S$GLB, | Performed by: NURSE PRACTITIONER

## 2023-03-23 PROCEDURE — 3008F BODY MASS INDEX DOCD: CPT | Mod: HCNC,CPTII,S$GLB, | Performed by: NURSE PRACTITIONER

## 2023-03-23 PROCEDURE — 3077F PR MOST RECENT SYSTOLIC BLOOD PRESSURE >= 140 MM HG: ICD-10-PCS | Mod: HCNC,CPTII,S$GLB, | Performed by: NURSE PRACTITIONER

## 2023-03-23 PROCEDURE — 99214 PR OFFICE/OUTPT VISIT, EST, LEVL IV, 30-39 MIN: ICD-10-PCS | Mod: HCNC,S$GLB,, | Performed by: NURSE PRACTITIONER

## 2023-03-23 NOTE — ASSESSMENT & PLAN NOTE
She reports her BP at home runs in the 120-130s/60-70s. She appears to have some white coat HTN in addition to treated HTN. Continue current medications for now.

## 2023-03-23 NOTE — PROGRESS NOTES
Subjective:    Patient ID:  Loren Andre is a 73 y.o. female  Chief Complaint   Patient presents with    Follow-up       HPI:  Patient presents today for follow-up appointment.  Patient has no complaints of chest pain, dizziness, palpitations, or syncope.  She has been having ongoing SOB with environmental triggers which is alleviated with her inhaler. Overall she feels her breathing is stable. Patient has been doing well and taking medications as ordered.  Denies any falls or head injuries.  Denies any blood in the stool or in the urine.      Review of patient's allergies indicates:   Allergen Reactions    Doxepin Anaphylaxis     abd pain    Norvasc [amlodipine] Palpitations    Xyzal [levocetirizine] Other (See Comments)     Can not take with blood pressure medications - headaches, higher blood pressure, red hands     Penicillins Rash    Sulfa (sulfonamide antibiotics) Rash    Duloxetine      Other reaction(s): insomnia    Gabapentin Nausea Only    Levofloxacin      Other reaction(s): Headache    Milnacipran      Other reaction(s): stomach pain    Nitrofurantoin monohyd/m-cryst      Other reaction(s): Itching    Prednisone      Other reaction(s): Itching    Pseudoephedrine-guaifenesin      Other reaction(s): Stomach upset    Terbinafine      Other reaction(s): rash       Past Medical History:   Diagnosis Date    Allergy     Anemia     sickle cell trait    Anxiety     Arthritis     Asthma     Chronic disease anemia 10/5/2017    Colon polyps     Depression     Dermatitis 3/7/2013    Fibromyalgia     HEARING LOSS     Hyperlipidemia     Hypertension     Leucopenia 10/5/2017    Polyneuropathy     Scoliosis     Sickle cell trait     Sickle-cell trait 10/5/2017    Trouble in sleeping     Urticaria      Past Surgical History:   Procedure Laterality Date    ADENOIDECTOMY      APPENDECTOMY      COLONOSCOPY  4/14/2008    Dr. Guerrero, 10 year recheck    COLONOSCOPY N/A 10/4/2018    Procedure: COLONOSCOPY;  Surgeon: Tam MACIEL  MD [place];  Location: [facility];  Service: Endoscopy;  Laterality: N/A;    HYSTERECTOMY      OOPHORECTOMY      ROTATOR CUFF REPAIR Left 01/2016    ROTATOR CUFF REPAIR W/ DISTAL CLAVICLE EXCISION Right 12/04/2017    Dr. Eligio Timmons ( [facility] )    TONSILLECTOMY       Social History     Tobacco Use    Smoking status: Never    Smokeless tobacco: Never   Substance Use Topics    Alcohol use: Yes     Comment: occasionally - wine    Drug use: No     Family History   Problem Relation Age of Onset    Multiple sclerosis Mother     Seizures Mother     Cancer Father         lung    Alcohol abuse Brother     Early death Brother     Diabetes Maternal Aunt     Diabetes Maternal Uncle     Diabetes Maternal Grandmother     Mental illness Maternal Grandfather     Alzheimer's disease Maternal Grandfather     Asthma Paternal Aunt     Sickle cell anemia Paternal Aunt     Diabetes Sister     Arthritis Daughter     Miscarriages / Stillbirths Daughter     Anemia Daughter     Hyperlipidemia Son     Hypertension Son     Allergies Son     Sickle cell anemia Paternal Uncle     Asthma Brother     No Known Problems Sister     No Known Problems Sister     No Known Problems Sister     No Known Problems Brother     Miscarriages / Stillbirths Daughter     Thyroid disease Daughter     No Known Problems Daughter     Angioedema Neg Hx     Eczema Neg Hx     Immunodeficiency Neg Hx     Rheum arthritis Neg Hx     Psoriasis Neg Hx     Lupus Neg Hx         Review of Systems:   Constitution: Negative for diaphoresis and fever.   HEENT: Negative for nosebleeds.    Cardiovascular: Negative for chest pain       No dyspnea on exertion       No leg swelling        No palpitations  Respiratory: Negative for shortness of breath and wheezing.    Hematologic/Lymphatic: Negative for bleeding problem. Does not bruise/bleed easily.   Skin: Negative for color change and rash.   Musculoskeletal: Negative for falls and myalgias.   Gastrointestinal: Negative for  hematemesis and hematochezia.   Genitourinary: Negative for hematuria.   Neurological: Negative for dizziness and light-headedness.   Psychiatric/Behavioral: Negative for altered mental status and memory loss.          Objective:        Vitals:    03/23/23 0944   BP: (!) 150/72   Pulse: 70       Lab Results   Component Value Date    WBC 4.45 10/04/2022    HGB 12.8 10/04/2022    HCT 37.9 10/04/2022     10/04/2022    CHOL 125 10/04/2022    TRIG 41 10/04/2022    HDL 69 10/04/2022    ALT 30 10/04/2022    AST 30 10/04/2022     10/04/2022    K 4.1 10/04/2022     10/04/2022    CREATININE 1.3 10/04/2022    BUN 20 10/04/2022    CO2 25 10/04/2022    TSH 1.380 07/08/2020    INR 1.1 07/08/2020    HGBA1C 6.0 (H) 10/04/2022        ECHOCARDIOGRAM RESULTS  Results for orders placed during the hospital encounter of 07/08/20    Stress Echo Which stress agent will be used? Treadmill Exercise; Color Flow Doppler? No    Interpretation Summary  · The patient reached the end of the protocol.  · Moderate concentric left ventricular hypertrophy.  · Normal left ventricular systolic function. The estimated ejection fraction is 65%.  · Normal LV diastolic function.  · Normal right ventricular systolic function.  · Mild left atrial enlargement.  · The stress echo portion of this study is negative for myocardial ischemia.  · There were no arrhythmias during stress.  · The patient's exercise capacity was mildly impaired.  · The ECG portion of this study is abnormal but not diagnostic for ischemia.        CURRENT/PREVIOUS VISIT EKG  Results for orders placed or performed in visit on 02/23/23   IN OFFICE EKG 12-LEAD (to [place])    Collection Time: 02/23/23  9:10 AM    Narrative    Test Reason : I10,    Vent. Rate : 057 BPM     Atrial Rate : 057 BPM     P-R Int : 152 ms          QRS Dur : 066 ms      QT Int : 394 ms       P-R-T Axes : 077 079 -22 degrees     QTc Int : 383 ms    Sinus bradycardia  ST and T wave abnormality, consider  inferior ischemia  ST and T wave abnormality, consider anterolateral ischemia  Abnormal ECG  When compared with ECG of 28-NOV-2022 16:02,  Questionable change in The axis  Confirmed by Ajith Andrew MD ([id number]) on 2/28/2023 1:10:21 PM    Referred By:  TONYA           Confirmed By:Ajith Andrew MD     No valid procedures specified.   Results for orders placed during the hospital encounter of 12/02/22    Nuclear Stress - Cardiology Interpreted    Interpretation Summary    There is a mild to moderate intensity perfusion abnormality in the  wall of the left ventricle, secondary to breast attenuation.  Mild inferolateral hypoperfusion with mild reversibility can not rule out small area of ischemia versus artifact    The gated perfusion images showed an ejection fraction of 73% post stress. Normal ejection fraction is greater than 53%.    The EKG portion of this study is abnormal but not diagnostic.    The patient reported chest pain during the stress test.    There were no arrhythmias during stress.    TID 1.10      Physical Exam:  CONSTITUTIONAL: No fever, no chills  HEENT: Normocephalic, atraumatic,pupils reactive to light                 NECK:  No JVD no carotid bruit  CVS: S1S2+, RRR  LUNGS: Clear  ABDOMEN: Soft, NT, BS+  EXTREMITIES: No cyanosis, edema  : No [place] catheter  NEURO: AAO X 3  PSY: Normal affect      Medication List with Changes/Refills   Current Medications    ALBUTEROL (PROAIR HFA) 90 MCG/ACTUATION INHALER    Inhale 2 puffs into the lungs every 6 (six) hours as needed for Wheezing. Rescue    ASCORBIC ACID, VITAMIN C, (VITAMIN C) 500 MG TABLET    Take 500 mg by mouth daily as needed.     ASPIRIN 81 MG CHEW    Take 1 tablet (81 mg total) by mouth once daily.    ATORVASTATIN (LIPITOR) 40 MG TABLET    Take 1 tablet (40 mg total) by mouth once daily.    AZELASTINE (ASTELIN) 137 MCG (0.1 %) NASAL SPRAY    USE 1 SPRAY NASALLY TWICE DAILY    AZELASTINE (ASTELIN) 137 MCG (0.1 %) NASAL SPRAY    1 spray (137 mcg  total) by Nasal route 2 (two) times daily.    BETAMETHASONE DIPROPIONATE (DIPROLENE) 0.05 % CREAM    Apply topically 2 (two) times daily.    CALCIUM CARBONATE/VITAMIN D3 (VITAMIN D-3 ORAL)    Take 100 Int'l Units by mouth once daily.     CARVEDILOL (COREG) 25 MG TABLET    Take 1 tablet (25 mg total) by mouth 2 (two) times daily with meals.    CETIRIZINE (ZYRTEC) 10 MG TABLET    Take 1 tablet (10 mg total) by mouth daily as needed for Allergies.    COENZYME Q10 10 MG CAPSULE    Take 10 mg by mouth once daily.    FOLIC ACID (FOLVITE) 400 MCG TABLET    Take 400 mcg by mouth once daily.    GARLIC OIL ORAL    Take 1 capsule by mouth once daily.     LISINOPRIL (PRINIVIL,ZESTRIL) 40 MG TABLET    Take 1 tablet (40 mg total) by mouth once daily.    MAGNESIUM 250 MG TAB    Take 250 mg by mouth 2 (two) times a day.    MELOXICAM (MOBIC) 7.5 MG TABLET    Take 1 tablet (7.5 mg total) by mouth once daily.    MONTELUKAST (SINGULAIR) 10 MG TABLET    Take 1 tablet (10 mg total) by mouth once daily.    NIACIN 250 MG TAB    Take 250 mg by mouth nightly.    OMEGA-3 FATTY ACIDS/FISH OIL (FISH OIL-OMEGA-3 FATTY ACIDS) 300-1,000 MG CAPSULE    Take by mouth once daily.    UNABLE TO FIND    Always eye drop    VALACYCLOVIR (VALTREX) 500 MG TABLET    TAKE 1 TABLET (500 MG TOTAL) BY MOUTH 2 (TWO) TIMES DAILY FOR 7 DAYS AS DIRECTED.    VITAMIN E 100 UNIT CAPSULE    Take 100 Units by mouth once daily.             Assessment:       1. Atherosclerosis of native coronary artery of native heart without angina pectoris    2. Agatston coronary artery calcium score less than 100    3. Abnormal nuclear stress test    4. Primary hypertension    5. Hyperlipidemia, unspecified hyperlipidemia type    6. Stage 3a chronic kidney disease    7. Mild intermittent chronic asthma without complication    8. White coat syndrome with diagnosis of hypertension         Plan:     Problem List Items Addressed This Visit          Unprioritized    Asthma, chronic    Current  Assessment & Plan     Stable. Followed by PCP.          HTN (hypertension)    Current Assessment & Plan     She reports her BP at home runs in the 120-130s/60-70s. She appears to have some white coat HTN in addition to treated HTN. Continue current medications for now.          Hyperlipidemia    Current Assessment & Plan     At goal. Continue current regimen.          Stage 3a chronic kidney disease    Current Assessment & Plan     Stable on recent labs. Followed by PCP.         Abnormal nuclear stress test    Agatston coronary artery calcium score less than 100    Atherosclerosis of native coronary artery of native heart without angina pectoris - Primary    Current Assessment & Plan     Discussed CT calcium scoring results with patient. Will proceed with conservative medical management for now. Recommend low fat low cholesterol diet and regular exercise.   LDL has been less than 70 on labs. Continue atorvastatin 40 mg po daily.   She has been exercising daily. Continue.         White coat syndrome with diagnosis of hypertension       Follow up in about 3 months (around 6/23/2023).

## 2023-03-23 NOTE — ASSESSMENT & PLAN NOTE
Discussed CT calcium scoring results with patient. Will proceed with conservative medical management for now. Recommend low fat low cholesterol diet and regular exercise.   LDL has been less than 70 on labs. Continue atorvastatin 40 mg po daily.   She has been exercising daily. Continue.

## 2023-05-01 NOTE — PROGRESS NOTES
ALLERGY & IMMUNOLOGY CLINIC -  NEW  PATIENT     HISTORY OF PRESENT ILLNESS     Patient ID: Loren Andre is a 73 y.o. female    CC:   Chief Complaint   Patient presents with    Allergies     Allergy and asthma concerns   Headaches and hard of hearing  dizzy like feeling        HPI: Loren Andre is a 73 y.o. female presents for evaluation of:    Asthma:  -Has been taking montelukast daily and albuterol as needed; Has been using albuterol more frequently, approximately twice daily for the previous few weeks. Still uses Wixela November-February as symptoms worsen during these months. Feels like albuterol may be providing less relief of shortness of breath than previously. When not working, will use nebulizer after the inhaler. Cough has been worsening as well, awakening from sleep several nights per week. Cetirizine and loratadine do not provide much relief of coughing episodes, but has helped dry her up in the nasal passages. Feels like symptoms have worsened since discontinuing Wixela    AR: Has been experiencing headaches and ear fullness/trouble hearing R>L for the previous 2 weeks. Has experienced ear ringing on the right side for the previous 18 years following a sinus infection. Using cetirizine which helps dry her out. Also uses Azelastine 1 SEN BID, no other nasal spray usage      LOV with Dr. Porter  Allergy Problem List  Persistent asthma  Chronic nonallergic rhinitis  Labelled PCN allergic  Frequent sinusitis  Multiple perceived drug intolerances     HPI: 71 yo woman with asthma and nonallergic rhinitis last saw Dr Leija 4/26/2022.       Related medications and other interventions  Flonase 1 BID-- daily  Astelin 1 BID -- rarely  Montelukast every morining  Advair --uses Nov - Feb BID  Albuterol MDI or ebas needed     09/22/2022:  Patient's chief complaint is cough which she localizes to her neck region.  It is associated with throat clearing and occasional mucus.  It occurs throughout the day and  "also in spells.  It is not been helped by multiple antihistamines.  It was helped by promethazine DM.  She describes postnasal drip sensation occasionally awakening her from sleep and in the early mornings.  She says that weather changes are a definite precipitant.  She denies history of heartburn.     Client has a diagnosis of seasonal asthma in the winter.  She says this is manifest by chest tightness and mild shortness of breath.  She denies wheezing.  She says it was associated with a headache.  She takes Advair from November through February only.  During the rest of the year she takes albuterol as needed.     Excerpted from Dr Leija's notes  4/26/22:She was recently treated with doxycycline for sinus infection. She has had antibiotics every 4-6 months for sinus infections.      Rhinitis:   She recalls being an itchy sneezy kid, symptoms got worse as she got older. Since April 2021, she has had daily cough, congestion, rhinorrhea. Occasional headaches. No fevers. Sometimes itchy eyes. Nose feels itchy on the inside. Ears itch and feel full. Sense of smell is sometimes diminished. Taking allergy pill (pseudoephedine) once a day and nasal sprays constantly. Nasal spray is equate brand oxymetazoline. Perfume and scents are triggers. Rain seems to be a trigger as well.      Asthma:   Diagnosed as an adult. Main symptom is "a different type of headache" and chest tightness. No wheezing. On Advair. She takes this in the winter, because the cold air seems to trigger her breathing troubles. She does not recall systemic steroids in the past year. She is up to date with her flu vaccines and has had her covid vaccine.      Rash:   Never diagnosed with eczema, but does have a rash on arms, knees, neck that is clearing up with aveeno lotion. Occasional generalized pruritus.      Allergy testing:   She recalls a skin testing >10 years ago and was allergic to grass. She took allergy shots once weekly for a chunk of time " "but didn't really see much improvement.      Medication allergies:   Long list of medication allergies.   PCN: rash, in her 20s.  Sulfa: rash, 5-6 years ago.     REVIEW OF SYSTEMS     Balance of review of systems negative except as mentioned above     MEDICAL HISTORY     MedHx: active problems reviewed  SurgHx:   Past Surgical History:   Procedure Laterality Date    ADENOIDECTOMY      APPENDECTOMY      COLONOSCOPY  4/14/2008    Dr. Guerrero, 10 year recheck    COLONOSCOPY N/A 10/4/2018    Procedure: COLONOSCOPY;  Surgeon: Tam Kemp MD;  Location: [facility];  Service: Endoscopy;  Laterality: N/A;    HYSTERECTOMY      OOPHORECTOMY      ROTATOR CUFF REPAIR Left 01/2016    ROTATOR CUFF REPAIR W/ DISTAL CLAVICLE EXCISION Right 12/04/2017    Dr. Eligio Timmons ( [facility] )    TONSILLECTOMY         Allergies: see below  Medications: MAR reviewed       PHYSICAL EXAM     VS: /80 (BP Location: Right arm, Patient Position: Sitting, BP Method: Large (Manual))   Ht 5' 6" (1.676 m)   Wt 70.3 kg (154 lb 15.7 oz)   BMI 25.01 kg/m²   GENERAL: awake, alert, cooperative with exam  EYES: PERRL, EOMI, no conjunctival injection, no discharge, no infraorbital shiners  EARS: external auditory canals normal B/L  ORAL: MMM, no ulcers, no thrush, no cobblestoning  LUNGS: CTAB, no w/r/c, no increased WOB  HEART: Normal Rate and regular rhythm, normal S1/S2, no m/g/r  EXTREMITIES: +2 distal pulses, no c/c/e  DERM: no rashes, no skin breaks  NEURO: normal gait, no facial asymmetry     ALLERGEN TESTING     Skin Prick: done at least a decade ago, recalls grass being positive  Immunocaps 2021: negative to all aeroallergens tested      Labs      Immune testing April 2022  Nl IgG/A/M  Low Hib  Normal Pneumo  12/14 w/  previous vaccine x3  Normal diptheria and tetanus    IMAGING & OTHER DIAGNOSTICS      Sinus CT 2012: largely unremarkable for sinus disease  CT Chest 2020: lung fields normal     ASSESSMENT     Loren Andre is a 73 " y.o. female with         1. Chronic rhinitis    2. Cough variant asthma    3. Chronic allergic rhinitis           PLAN       Given increased albuterol usage, recommend spirometry to determine whether ongoing obstructive vs restrictive lung process   Consider starting Wixela 12 months/year rather than seasonally  Start Dymista given less efficacious azelastine nasal spray         Follow up: 1 month (Scheduled)       Vik Sorensen MD

## 2023-05-02 ENCOUNTER — OFFICE VISIT (OUTPATIENT)
Dept: ALLERGY | Facility: CLINIC | Age: 73
End: 2023-05-02
Payer: MEDICARE

## 2023-05-02 ENCOUNTER — TELEPHONE (OUTPATIENT)
Dept: ALLERGY | Facility: CLINIC | Age: 73
End: 2023-05-02

## 2023-05-02 VITALS
WEIGHT: 155 LBS | HEIGHT: 66 IN | SYSTOLIC BLOOD PRESSURE: 130 MMHG | DIASTOLIC BLOOD PRESSURE: 80 MMHG | BODY MASS INDEX: 24.91 KG/M2

## 2023-05-02 DIAGNOSIS — J31.0 CHRONIC RHINITIS: Primary | ICD-10-CM

## 2023-05-02 DIAGNOSIS — J30.9 CHRONIC ALLERGIC RHINITIS: ICD-10-CM

## 2023-05-02 DIAGNOSIS — J45.991 COUGH VARIANT ASTHMA: ICD-10-CM

## 2023-05-02 PROCEDURE — 4010F PR ACE/ARB THEARPY RXD/TAKEN: ICD-10-PCS | Mod: HCNC,CPTII,S$GLB, | Performed by: STUDENT IN AN ORGANIZED HEALTH CARE EDUCATION/TRAINING PROGRAM

## 2023-05-02 PROCEDURE — 3079F DIAST BP 80-89 MM HG: CPT | Mod: HCNC,CPTII,S$GLB, | Performed by: STUDENT IN AN ORGANIZED HEALTH CARE EDUCATION/TRAINING PROGRAM

## 2023-05-02 PROCEDURE — 1126F PR PAIN SEVERITY QUANTIFIED, NO PAIN PRESENT: ICD-10-PCS | Mod: HCNC,CPTII,S$GLB, | Performed by: STUDENT IN AN ORGANIZED HEALTH CARE EDUCATION/TRAINING PROGRAM

## 2023-05-02 PROCEDURE — 1159F MED LIST DOCD IN RCRD: CPT | Mod: HCNC,CPTII,S$GLB, | Performed by: STUDENT IN AN ORGANIZED HEALTH CARE EDUCATION/TRAINING PROGRAM

## 2023-05-02 PROCEDURE — 1159F PR MEDICATION LIST DOCUMENTED IN MEDICAL RECORD: ICD-10-PCS | Mod: HCNC,CPTII,S$GLB, | Performed by: STUDENT IN AN ORGANIZED HEALTH CARE EDUCATION/TRAINING PROGRAM

## 2023-05-02 PROCEDURE — 3075F SYST BP GE 130 - 139MM HG: CPT | Mod: HCNC,CPTII,S$GLB, | Performed by: STUDENT IN AN ORGANIZED HEALTH CARE EDUCATION/TRAINING PROGRAM

## 2023-05-02 PROCEDURE — 3079F PR MOST RECENT DIASTOLIC BLOOD PRESSURE 80-89 MM HG: ICD-10-PCS | Mod: HCNC,CPTII,S$GLB, | Performed by: STUDENT IN AN ORGANIZED HEALTH CARE EDUCATION/TRAINING PROGRAM

## 2023-05-02 PROCEDURE — 99999 PR PBB SHADOW E&M-EST. PATIENT-LVL III: CPT | Mod: PBBFAC,HCNC,, | Performed by: STUDENT IN AN ORGANIZED HEALTH CARE EDUCATION/TRAINING PROGRAM

## 2023-05-02 PROCEDURE — 1126F AMNT PAIN NOTED NONE PRSNT: CPT | Mod: HCNC,CPTII,S$GLB, | Performed by: STUDENT IN AN ORGANIZED HEALTH CARE EDUCATION/TRAINING PROGRAM

## 2023-05-02 PROCEDURE — 99214 PR OFFICE/OUTPT VISIT, EST, LEVL IV, 30-39 MIN: ICD-10-PCS | Mod: HCNC,S$GLB,, | Performed by: STUDENT IN AN ORGANIZED HEALTH CARE EDUCATION/TRAINING PROGRAM

## 2023-05-02 PROCEDURE — 4010F ACE/ARB THERAPY RXD/TAKEN: CPT | Mod: HCNC,CPTII,S$GLB, | Performed by: STUDENT IN AN ORGANIZED HEALTH CARE EDUCATION/TRAINING PROGRAM

## 2023-05-02 PROCEDURE — 3008F PR BODY MASS INDEX (BMI) DOCUMENTED: ICD-10-PCS | Mod: HCNC,CPTII,S$GLB, | Performed by: STUDENT IN AN ORGANIZED HEALTH CARE EDUCATION/TRAINING PROGRAM

## 2023-05-02 PROCEDURE — 99999 PR PBB SHADOW E&M-EST. PATIENT-LVL III: ICD-10-PCS | Mod: PBBFAC,HCNC,, | Performed by: STUDENT IN AN ORGANIZED HEALTH CARE EDUCATION/TRAINING PROGRAM

## 2023-05-02 PROCEDURE — 99214 OFFICE O/P EST MOD 30 MIN: CPT | Mod: HCNC,S$GLB,, | Performed by: STUDENT IN AN ORGANIZED HEALTH CARE EDUCATION/TRAINING PROGRAM

## 2023-05-02 PROCEDURE — 3075F PR MOST RECENT SYSTOLIC BLOOD PRESS GE 130-139MM HG: ICD-10-PCS | Mod: HCNC,CPTII,S$GLB, | Performed by: STUDENT IN AN ORGANIZED HEALTH CARE EDUCATION/TRAINING PROGRAM

## 2023-05-02 PROCEDURE — 3008F BODY MASS INDEX DOCD: CPT | Mod: HCNC,CPTII,S$GLB, | Performed by: STUDENT IN AN ORGANIZED HEALTH CARE EDUCATION/TRAINING PROGRAM

## 2023-05-02 RX ORDER — AZELASTINE HYDROCHLORIDE, FLUTICASONE PROPIONATE 137; 50 UG/1; UG/1
1 SPRAY, METERED NASAL 2 TIMES DAILY
Qty: 23 G | Refills: 1 | Status: SHIPPED | OUTPATIENT
Start: 2023-05-02 | End: 2023-10-04

## 2023-05-02 NOTE — TELEPHONE ENCOUNTER
Ana ADEN Staff  Caller: Unspecified (Today, 12:22 PM)  Type: Needs Medical Advice   Who Called:  Loren Palm Call Back Number: [phone]     Additional Information: Pt called to schedule pft please advise.     Phone call in response to above message.  Made 3 call attempts at 1:00, 2:10, and 3:00pm with no answer, full voicemail.

## 2023-05-04 ENCOUNTER — PES CALL (OUTPATIENT)
Dept: ADMINISTRATIVE | Facility: CLINIC | Age: 73
End: 2023-05-04
Payer: MEDICARE

## 2023-05-08 ENCOUNTER — PES CALL (OUTPATIENT)
Dept: ADMINISTRATIVE | Facility: CLINIC | Age: 73
End: 2023-05-08
Payer: MEDICARE

## 2023-05-08 ENCOUNTER — HOSPITAL ENCOUNTER (OUTPATIENT)
Dept: PULMONOLOGY | Facility: HOSPITAL | Age: 73
Discharge: HOME | End: 2023-05-08
Attending: STUDENT IN AN ORGANIZED HEALTH CARE EDUCATION/TRAINING PROGRAM
Payer: MEDICARE

## 2023-05-08 DIAGNOSIS — J45.991 COUGH VARIANT ASTHMA: ICD-10-CM

## 2023-05-08 DIAGNOSIS — J31.0 CHRONIC RHINITIS: ICD-10-CM

## 2023-05-08 PROCEDURE — 94729 DIFFUSING CAPACITY: CPT | Mod: HCNC

## 2023-05-08 PROCEDURE — 94726 PLETHYSMOGRAPHY LUNG VOLUMES: CPT | Mod: HCNC

## 2023-05-08 PROCEDURE — 94060 EVALUATION OF WHEEZING: CPT | Mod: HCNC

## 2023-05-08 RX ORDER — ALBUTEROL SULFATE 2.5 MG/.5ML
2.5 SOLUTION RESPIRATORY (INHALATION)
Status: COMPLETED | OUTPATIENT
Start: 2023-05-08 | End: 2023-05-08

## 2023-05-08 RX ADMIN — ALBUTEROL SULFATE 2.5 MG: 2.5 SOLUTION RESPIRATORY (INHALATION) at 07:05

## 2023-05-10 RX ORDER — CLOBETASOL PROPIONATE 0.5 MG/G
AEROSOL, FOAM TOPICAL
Qty: 100 G | Refills: 1 | Status: SHIPPED | OUTPATIENT
Start: 2023-05-10 | End: 2023-11-06

## 2023-05-11 LAB
BRPFT: ABNORMAL
DLCO SINGLE BREATH LLN: 16.2
DLCO SINGLE BREATH PRE REF: 59.7 %
DLCO SINGLE BREATH REF: 21.94
DLCOC SBVA LLN: 2.82
DLCOC SBVA REF: 4.16
DLCOC SINGLE BREATH LLN: 16.2
DLCOC SINGLE BREATH REF: 21.94
DLCOVA LLN: 2.82
DLCOVA PRE REF: 99.4 %
DLCOVA PRE: 4.13 ML/(MIN*MMHG*L) (ref 2.82–5.5)
DLCOVA REF: 4.16
ERV LLN: -16449.39
ERV PRE REF: 177.7 %
ERV REF: 0.61
FEF 25 75 CHG: 7.5 %
FEF 25 75 LLN: 0.57
FEF 25 75 POST REF: 94.1 %
FEF 25 75 PRE REF: 87.6 %
FEF 25 75 REF: 1.59
FET100 CHG: 16.8 %
FEV1 CHG: -6 %
FEV1 FVC CHG: -2 %
FEV1 FVC LLN: 64
FEV1 FVC POST REF: 99.5 %
FEV1 FVC PRE REF: 101.5 %
FEV1 FVC REF: 78
FEV1 LLN: 1.32
FEV1 POST REF: 87.9 %
FEV1 PRE REF: 93.6 %
FEV1 REF: 1.92
FRCPLETH LLN: 2.01
FRCPLETH PREREF: 100.1 %
FRCPLETH REF: 2.83
FVC CHG: -4.1 %
FVC LLN: 1.73
FVC POST REF: 87.6 %
FVC PRE REF: 91.4 %
FVC REF: 2.49
IVC PRE: 2.17 L (ref 1.73–3.3)
IVC SINGLE BREATH LLN: 1.73
IVC SINGLE BREATH PRE REF: 86.8 %
IVC SINGLE BREATH REF: 2.49
MVV LLN: 74
MVV PRE REF: 80.6 %
MVV REF: 87
PEF CHG: 24.8 %
PEF LLN: 2.7
PEF POST REF: 90.9 %
PEF PRE REF: 72.8 %
PEF REF: 4.83
POST FEF 25 75: 1.5 L/S (ref 0.57–3.21)
POST FET 100: 8.53 SEC
POST FEV1 FVC: 77.38 % (ref 64.39–89.44)
POST FEV1: 1.69 L (ref 1.32–2.5)
POST FVC: 2.19 L (ref 1.73–3.3)
POST PEF: 4.39 L/S (ref 2.7–6.96)
PRE DLCO: 13.1 ML/(MIN*MMHG) (ref 16.2–27.67)
PRE ERV: 1.09 L (ref -16449.39–16450.61)
PRE FEF 25 75: 1.4 L/S (ref 0.57–3.21)
PRE FET 100: 7.3 SEC
PRE FEV1 FVC: 78.94 % (ref 64.39–89.44)
PRE FEV1: 1.8 L (ref 1.32–2.5)
PRE FRC PL: 2.83 L (ref 2.01–3.65)
PRE FVC: 2.28 L (ref 1.73–3.3)
PRE MVV: 70 L/MIN (ref 73.84–99.9)
PRE PEF: 3.52 L/S (ref 2.7–6.96)
PRE RV: 1.75 L (ref 1.64–2.79)
PRE TLC: 4.02 L (ref 4.29–6.26)
RAW LLN: 3.06
RAW PRE REF: 96 %
RAW PRE: 2.94 CMH2O*S/L (ref 3.06–3.06)
RAW REF: 3.06
RV LLN: 1.64
RV PRE REF: 78.7 %
RV REF: 2.22
RVTLC LLN: 35
RVTLC PRE REF: 98.3 %
RVTLC PRE: 43.37 % (ref 34.53–53.71)
RVTLC REF: 44
TLC LLN: 4.29
TLC PRE REF: 76.3 %
TLC REF: 5.27
VA PRE: 3.17 L (ref 5.12–5.12)
VA SINGLE BREATH LLN: 5.12
VA SINGLE BREATH PRE REF: 61.9 %
VA SINGLE BREATH REF: 5.12
VC LLN: 1.73
VC PRE REF: 91.4 %
VC PRE: 2.28 L (ref 1.73–3.3)
VC REF: 2.49

## 2023-05-11 PROCEDURE — 94726 PULM FUNCT TST PLETHYSMOGRAP: ICD-10-PCS | Mod: 26,HCNC,, | Performed by: INTERNAL MEDICINE

## 2023-05-11 PROCEDURE — 94060 PR EVAL OF BRONCHOSPASM: ICD-10-PCS | Mod: 26,HCNC,, | Performed by: INTERNAL MEDICINE

## 2023-05-11 PROCEDURE — 94726 PLETHYSMOGRAPHY LUNG VOLUMES: CPT | Mod: 26,HCNC,, | Performed by: INTERNAL MEDICINE

## 2023-05-11 PROCEDURE — 94729 DIFFUSING CAPACITY: CPT | Mod: 26,HCNC,, | Performed by: INTERNAL MEDICINE

## 2023-05-11 PROCEDURE — 94060 EVALUATION OF WHEEZING: CPT | Mod: 26,HCNC,, | Performed by: INTERNAL MEDICINE

## 2023-05-11 PROCEDURE — 94729 PR C02/MEMBANE DIFFUSE CAPACITY: ICD-10-PCS | Mod: 26,HCNC,, | Performed by: INTERNAL MEDICINE

## 2023-05-16 ENCOUNTER — OFFICE VISIT (OUTPATIENT)
Dept: CARDIOLOGY | Facility: CLINIC | Age: 73
End: 2023-05-16
Payer: MEDICARE

## 2023-05-16 VITALS
OXYGEN SATURATION: 98 % | HEART RATE: 61 BPM | WEIGHT: 156.81 LBS | BODY MASS INDEX: 25.31 KG/M2 | SYSTOLIC BLOOD PRESSURE: 208 MMHG | DIASTOLIC BLOOD PRESSURE: 100 MMHG

## 2023-05-16 DIAGNOSIS — I25.10 ATHEROSCLEROSIS OF NATIVE CORONARY ARTERY OF NATIVE HEART WITHOUT ANGINA PECTORIS: ICD-10-CM

## 2023-05-16 DIAGNOSIS — I10 WHITE COAT SYNDROME WITH DIAGNOSIS OF HYPERTENSION: ICD-10-CM

## 2023-05-16 DIAGNOSIS — I10 PRIMARY HYPERTENSION: Primary | ICD-10-CM

## 2023-05-16 DIAGNOSIS — N18.31 STAGE 3A CHRONIC KIDNEY DISEASE: ICD-10-CM

## 2023-05-16 DIAGNOSIS — G47.00 INSOMNIA, UNSPECIFIED TYPE: ICD-10-CM

## 2023-05-16 PROCEDURE — 1101F PR PT FALLS ASSESS DOC 0-1 FALLS W/OUT INJ PAST YR: ICD-10-PCS | Mod: HCNC,CPTII,, | Performed by: NURSE PRACTITIONER

## 2023-05-16 PROCEDURE — 3288F PR FALLS RISK ASSESSMENT DOCUMENTED: ICD-10-PCS | Mod: HCNC,CPTII,, | Performed by: NURSE PRACTITIONER

## 2023-05-16 PROCEDURE — 4010F ACE/ARB THERAPY RXD/TAKEN: CPT | Mod: HCNC,CPTII,, | Performed by: NURSE PRACTITIONER

## 2023-05-16 PROCEDURE — 1159F PR MEDICATION LIST DOCUMENTED IN MEDICAL RECORD: ICD-10-PCS | Mod: HCNC,CPTII,, | Performed by: NURSE PRACTITIONER

## 2023-05-16 PROCEDURE — 99214 OFFICE O/P EST MOD 30 MIN: CPT | Mod: HCNC,,, | Performed by: NURSE PRACTITIONER

## 2023-05-16 PROCEDURE — 3080F DIAST BP >= 90 MM HG: CPT | Mod: HCNC,CPTII,, | Performed by: NURSE PRACTITIONER

## 2023-05-16 PROCEDURE — 3077F PR MOST RECENT SYSTOLIC BLOOD PRESSURE >= 140 MM HG: ICD-10-PCS | Mod: HCNC,CPTII,, | Performed by: NURSE PRACTITIONER

## 2023-05-16 PROCEDURE — 1159F MED LIST DOCD IN RCRD: CPT | Mod: HCNC,CPTII,, | Performed by: NURSE PRACTITIONER

## 2023-05-16 PROCEDURE — 1101F PT FALLS ASSESS-DOCD LE1/YR: CPT | Mod: HCNC,CPTII,, | Performed by: NURSE PRACTITIONER

## 2023-05-16 PROCEDURE — 3288F FALL RISK ASSESSMENT DOCD: CPT | Mod: HCNC,CPTII,, | Performed by: NURSE PRACTITIONER

## 2023-05-16 PROCEDURE — 3077F SYST BP >= 140 MM HG: CPT | Mod: HCNC,CPTII,, | Performed by: NURSE PRACTITIONER

## 2023-05-16 PROCEDURE — 3080F PR MOST RECENT DIASTOLIC BLOOD PRESSURE >= 90 MM HG: ICD-10-PCS | Mod: HCNC,CPTII,, | Performed by: NURSE PRACTITIONER

## 2023-05-16 PROCEDURE — 99999 PR PBB SHADOW E&M-EST. PATIENT-LVL IV: CPT | Mod: PBBFAC,HCNC,, | Performed by: NURSE PRACTITIONER

## 2023-05-16 PROCEDURE — 99999 PR PBB SHADOW E&M-EST. PATIENT-LVL IV: ICD-10-PCS | Mod: PBBFAC,HCNC,, | Performed by: NURSE PRACTITIONER

## 2023-05-16 PROCEDURE — 3008F BODY MASS INDEX DOCD: CPT | Mod: HCNC,CPTII,, | Performed by: NURSE PRACTITIONER

## 2023-05-16 PROCEDURE — 99214 PR OFFICE/OUTPT VISIT, EST, LEVL IV, 30-39 MIN: ICD-10-PCS | Mod: HCNC,,, | Performed by: NURSE PRACTITIONER

## 2023-05-16 PROCEDURE — 3008F PR BODY MASS INDEX (BMI) DOCUMENTED: ICD-10-PCS | Mod: HCNC,CPTII,, | Performed by: NURSE PRACTITIONER

## 2023-05-16 PROCEDURE — 4010F PR ACE/ARB THEARPY RXD/TAKEN: ICD-10-PCS | Mod: HCNC,CPTII,, | Performed by: NURSE PRACTITIONER

## 2023-05-16 RX ORDER — CLONIDINE HYDROCHLORIDE 0.1 MG/1
0.1 TABLET ORAL 3 TIMES DAILY PRN
Qty: 180 TABLET | Refills: 3 | Status: SHIPPED | OUTPATIENT
Start: 2023-05-16 | End: 2024-05-15

## 2023-05-16 RX ORDER — TRAZODONE HYDROCHLORIDE 50 MG/1
50 TABLET ORAL NIGHTLY
Qty: 30 TABLET | Refills: 0 | Status: SHIPPED | OUTPATIENT
Start: 2023-05-16 | End: 2023-10-04

## 2023-05-16 NOTE — PATIENT INSTRUCTIONS
Please track your BP (blood pressure) every 4 hours while awake for 5 days and turn in a log for evaluation. Please include the date, time, BP, and heart rate on your log in a list format.  Your BP log can be turned in via [employer] or in person at the  of our office.     Start clonidine as needed for systolic BP greater than 170.

## 2023-05-16 NOTE — ASSESSMENT & PLAN NOTE
Please track your BP (blood pressure) every 4 hours while awake for 5 days and turn in a log for evaluation. Please include the date, time, BP, and heart rate on your log in a list format.  Your BP log can be turned in via [employer] or in person at the  of our office.     Add clonidine 0.1 mg TID PRN for systolic BP >170.

## 2023-05-16 NOTE — ASSESSMENT & PLAN NOTE
Patient is off clonazepam and is requesting a refill of trazodone for insomnia/depression. Will give 30 days. Recommend follow up with PCP regarding this.

## 2023-05-16 NOTE — PROGRESS NOTES
Subjective:    Patient ID:  Loren Andre is a 73 y.o. female   Chief Complaint   Patient presents with    Hypertension    Hyperlipidemia    Coronary Artery Disease     2 month follow up        HPI:  Patient seen today for follow-up appointment.  Her blood pressure is significantly elevated.  She states that her brother is undergoing some health issues which has her concerned and is likely driving her blood pressure.  She reports compliance with her medications.  She recently saw her allergist and had a normal blood pressure at that visit. Patient insists she feels fine and is certain her BP is elevated due to worry over her brother.       Review of patient's allergies indicates:   Allergen Reactions    Doxepin Anaphylaxis     abd pain    Norvasc [amlodipine] Palpitations    Xyzal [levocetirizine] Other (See Comments)     Can not take with blood pressure medications - headaches, higher blood pressure, red hands     Penicillins Rash    Sulfa (sulfonamide antibiotics) Rash    Duloxetine      Other reaction(s): insomnia    Gabapentin Nausea Only    Levofloxacin      Other reaction(s): Headache    Milnacipran      Other reaction(s): stomach pain    Nitrofurantoin monohyd/m-cryst      Other reaction(s): Itching    Prednisone      Other reaction(s): Itching    Pseudoephedrine-guaifenesin      Other reaction(s): Stomach upset    Terbinafine      Other reaction(s): rash       Past Medical History:   Diagnosis Date    Allergy     Anemia     sickle cell trait    Anxiety     Arthritis     Asthma     Chronic disease anemia 10/5/2017    Colon polyps     Depression     Dermatitis 3/7/2013    Fibromyalgia     HEARING LOSS     Hyperlipidemia     Hypertension     Leucopenia 10/5/2017    Polyneuropathy     Scoliosis     Sickle cell trait     Sickle-cell trait 10/5/2017    Trouble in sleeping     Urticaria      Past Surgical History:   Procedure Laterality Date    ADENOIDECTOMY      APPENDECTOMY      COLONOSCOPY  4/14/2008      Yolanda, 10 year recheck    COLONOSCOPY N/A 10/4/2018    Procedure: COLONOSCOPY;  Surgeon: Tam Kemp MD;  Location: [facility];  Service: Endoscopy;  Laterality: N/A;    HYSTERECTOMY      OOPHORECTOMY      ROTATOR CUFF REPAIR Left 01/2016    ROTATOR CUFF REPAIR W/ DISTAL CLAVICLE EXCISION Right 12/04/2017    Dr. Eligio Timmons ( [facility] )    TONSILLECTOMY       Social History     Tobacco Use    Smoking status: Never    Smokeless tobacco: Never   Substance Use Topics    Alcohol use: Yes     Comment: occasionally - wine    Drug use: No     Family History   Problem Relation Age of Onset    Multiple sclerosis Mother     Seizures Mother     Cancer Father         lung    Alcohol abuse Brother     Early death Brother     Diabetes Maternal Aunt     Diabetes Maternal Uncle     Diabetes Maternal Grandmother     Mental illness Maternal Grandfather     Alzheimer's disease Maternal Grandfather     Asthma Paternal Aunt     Sickle cell anemia Paternal Aunt     Diabetes Sister     Arthritis Daughter     Miscarriages / Stillbirths Daughter     Anemia Daughter     Hyperlipidemia Son     Hypertension Son     Allergies Son     Sickle cell anemia Paternal Uncle     Asthma Brother     No Known Problems Sister     No Known Problems Sister     No Known Problems Sister     No Known Problems Brother     Miscarriages / Stillbirths Daughter     Thyroid disease Daughter     No Known Problems Daughter     Angioedema Neg Hx     Eczema Neg Hx     Immunodeficiency Neg Hx     Rheum arthritis Neg Hx     Psoriasis Neg Hx     Lupus Neg Hx         Review of Systems:   Constitution: Negative for diaphoresis and fever.   HEENT: Negative for nosebleeds.    Cardiovascular: Negative for chest pain       No dyspnea on exertion       No leg swelling        No palpitations  Respiratory: Negative for shortness of breath and wheezing.    Hematologic/Lymphatic: Negative for bleeding problem. Does not bruise/bleed easily.   Skin: Negative for color change  and rash.   Musculoskeletal: Negative for falls and myalgias.   Gastrointestinal: Negative for hematemesis and hematochezia.   Genitourinary: Negative for hematuria.   Neurological: Negative for dizziness and light-headedness.   Psychiatric/Behavioral: Negative for altered mental status and memory loss.          Objective:        Vitals:    05/16/23 0911   BP: (!) 208/100   Pulse:        Lab Results   Component Value Date    WBC 4.45 10/04/2022    HGB 12.8 10/04/2022    HCT 37.9 10/04/2022     10/04/2022    CHOL 125 10/04/2022    TRIG 41 10/04/2022    HDL 69 10/04/2022    ALT 30 10/04/2022    AST 30 10/04/2022     10/04/2022    K 4.1 10/04/2022     10/04/2022    CREATININE 1.3 10/04/2022    BUN 20 10/04/2022    CO2 25 10/04/2022    TSH 1.380 07/08/2020    INR 1.1 07/08/2020    HGBA1C 6.0 (H) 10/04/2022        ECHOCARDIOGRAM RESULTS  Results for orders placed during the hospital encounter of 07/08/20    Stress Echo Which stress agent will be used? Treadmill Exercise; Color Flow Doppler? No    Interpretation Summary  · The patient reached the end of the protocol.  · Moderate concentric left ventricular hypertrophy.  · Normal left ventricular systolic function. The estimated ejection fraction is 65%.  · Normal LV diastolic function.  · Normal right ventricular systolic function.  · Mild left atrial enlargement.  · The stress echo portion of this study is negative for myocardial ischemia.  · There were no arrhythmias during stress.  · The patient's exercise capacity was mildly impaired.  · The ECG portion of this study is abnormal but not diagnostic for ischemia.        CURRENT/PREVIOUS VISIT EKG  Results for orders placed or performed in visit on 02/23/23   IN OFFICE EKG 12-LEAD (to [place])    Collection Time: 02/23/23  9:10 AM    Narrative    Test Reason : I10,    Vent. Rate : 057 BPM     Atrial Rate : 057 BPM     P-R Int : 152 ms          QRS Dur : 066 ms      QT Int : 394 ms       P-R-T Axes : 077  079 -22 degrees     QTc Int : 383 ms    Sinus bradycardia  ST and T wave abnormality, consider inferior ischemia  ST and T wave abnormality, consider anterolateral ischemia  Abnormal ECG  When compared with ECG of 28-NOV-2022 16:02,  Questionable change in The axis  Confirmed by Ajith Andrew MD ([id number]) on 2/28/2023 1:10:21 PM    Referred By:  TONYA           Confirmed By:Ajith Andrew MD     No valid procedures specified.   Results for orders placed during the hospital encounter of 12/02/22    Nuclear Stress - Cardiology Interpreted    Interpretation Summary    There is a mild to moderate intensity perfusion abnormality in the  wall of the left ventricle, secondary to breast attenuation.  Mild inferolateral hypoperfusion with mild reversibility can not rule out small area of ischemia versus artifact    The gated perfusion images showed an ejection fraction of 73% post stress. Normal ejection fraction is greater than 53%.    The EKG portion of this study is abnormal but not diagnostic.    The patient reported chest pain during the stress test.    There were no arrhythmias during stress.    TID 1.10      Physical Exam:  CONSTITUTIONAL: No fever, no chills  HEENT: Normocephalic, atraumatic,pupils reactive to light                 NECK:  No JVD no carotid bruit  CVS: S1S2+, RRR  LUNGS: Clear  ABDOMEN: Soft, NT, BS+  EXTREMITIES: No cyanosis, edema  : No [place] catheter  NEURO: AAO X 3  PSY: Normal affect      Medication List with Changes/Refills   New Medications    CLONIDINE (CATAPRES) 0.1 MG TABLET    Take 1 tablet (0.1 mg total) by mouth 3 (three) times daily as needed (for systolic BP greater than 170).    TRAZODONE (DESYREL) 50 MG TABLET    Take 1 tablet (50 mg total) by mouth every evening.   Current Medications    ALBUTEROL (PROAIR HFA) 90 MCG/ACTUATION INHALER    Inhale 2 puffs into the lungs every 6 (six) hours as needed for Wheezing. Rescue    ASCORBIC ACID, VITAMIN C, (VITAMIN C) 500 MG TABLET    Take 500 mg  by mouth daily as needed.     ASPIRIN 81 MG CHEW    Take 1 tablet (81 mg total) by mouth once daily.    ATORVASTATIN (LIPITOR) 40 MG TABLET    Take 1 tablet (40 mg total) by mouth once daily.    AZELASTINE-FLUTICASONE (DYMISTA) 137-50 MCG/SPRAY SPRY NASSAL SPRAY    1 spray by Each Nostril route 2 (two) times daily.    BETAMETHASONE DIPROPIONATE (DIPROLENE) 0.05 % CREAM    Apply topically 2 (two) times daily.    CALCIUM CARBONATE/VITAMIN D3 (VITAMIN D-3 ORAL)    Take 100 Int'l Units by mouth once daily.     CARVEDILOL (COREG) 25 MG TABLET    Take 1 tablet (25 mg total) by mouth 2 (two) times daily with meals.    CETIRIZINE (ZYRTEC) 10 MG TABLET    Take 1 tablet (10 mg total) by mouth daily as needed for Allergies.    CLOBETASOL (OLUX) 0.05 % FOAM    APPLY TOPICALLY TO THE AFFECTED AREA TWICE DAILY    COENZYME Q10 10 MG CAPSULE    Take 10 mg by mouth once daily.    FOLIC ACID (FOLVITE) 400 MCG TABLET    Take 400 mcg by mouth once daily.    GARLIC OIL ORAL    Take 1 capsule by mouth once daily.     LISINOPRIL (PRINIVIL,ZESTRIL) 40 MG TABLET    Take 1 tablet (40 mg total) by mouth once daily.    MAGNESIUM 250 MG TAB    Take 250 mg by mouth 2 (two) times a day.    MELOXICAM (MOBIC) 7.5 MG TABLET    Take 1 tablet (7.5 mg total) by mouth once daily.    MONTELUKAST (SINGULAIR) 10 MG TABLET    Take 1 tablet (10 mg total) by mouth once daily.    NIACIN 250 MG TAB    Take 250 mg by mouth nightly.    OMEGA-3 FATTY ACIDS/FISH OIL (FISH OIL-OMEGA-3 FATTY ACIDS) 300-1,000 MG CAPSULE    Take by mouth once daily.    UNABLE TO FIND    Always eye drop    VALACYCLOVIR (VALTREX) 500 MG TABLET    TAKE 1 TABLET (500 MG TOTAL) BY MOUTH 2 (TWO) TIMES DAILY FOR 7 DAYS AS DIRECTED.    VITAMIN E 100 UNIT CAPSULE    Take 100 Units by mouth once daily.   Discontinued Medications    AZELASTINE (ASTELIN) 137 MCG (0.1 %) NASAL SPRAY    USE 1 SPRAY NASALLY TWICE DAILY    AZELASTINE (ASTELIN) 137 MCG (0.1 %) NASAL SPRAY    1 spray (137 mcg total) by  Nasal route 2 (two) times daily.             Assessment:       1. Primary hypertension    2. White coat syndrome with diagnosis of hypertension    3. Atherosclerosis of native coronary artery of native heart without angina pectoris    4. Stage 3a chronic kidney disease    5. Insomnia, unspecified type         Plan:     Problem List Items Addressed This Visit          Unprioritized    HTN (hypertension) - Primary    Current Assessment & Plan     Please track your BP (blood pressure) every 4 hours while awake for 5 days and turn in a log for evaluation. Please include the date, time, BP, and heart rate on your log in a list format.  Your BP log can be turned in via [employer] or in person at the  of our office.     Add clonidine 0.1 mg TID PRN for systolic BP >170.         Relevant Medications    cloNIDine (CATAPRES) 0.1 MG tablet    Stage 3a chronic kidney disease    Current Assessment & Plan     Followed by PCP.           Atherosclerosis of native coronary artery of native heart without angina pectoris    Current Assessment & Plan     continue to treat medically. Asymptomatic.           White coat syndrome with diagnosis of hypertension    Relevant Medications    cloNIDine (CATAPRES) 0.1 MG tablet    Insomnia    Current Assessment & Plan     Patient is off clonazepam and is requesting a refill of trazodone for insomnia/depression. Will give 30 days. Recommend follow up with PCP regarding this.            Relevant Medications    traZODone (DESYREL) 50 MG tablet       Follow up in about 4 months (around 9/16/2023).

## 2023-05-30 ENCOUNTER — TELEPHONE (OUTPATIENT)
Dept: FAMILY MEDICINE | Facility: CLINIC | Age: 73
End: 2023-05-30
Payer: MEDICARE

## 2023-05-30 NOTE — TELEPHONE ENCOUNTER
Asya VALENZUELA Staff    ----- Message from Asya Andre sent at 5/30/2023  9:35 AM CDT -----  Type: Needs Medical Advice  Who Called:  Pt  Best Call Back Number: [phone]  Additional Information: Pt is requesting some lab work, pl call bk to advise thanks

## 2023-05-30 NOTE — TELEPHONE ENCOUNTER
Patient states she is loosing her hair at the top of her head. Patient requesting to know if labs can be ordered to investigate vitamin deficiency as a possible cause of her hair loss. Patient states she has seen a dermatologist regarding this approximately 3 years ago. States the dermatologist was unable to locate a cause of the hair loss. Please advise. Thank you.

## 2023-06-07 ENCOUNTER — OFFICE VISIT (OUTPATIENT)
Dept: ALLERGY | Facility: CLINIC | Age: 73
End: 2023-06-07
Payer: MEDICARE

## 2023-06-07 VITALS — HEIGHT: 66 IN | WEIGHT: 156.31 LBS | BODY MASS INDEX: 25.12 KG/M2

## 2023-06-07 DIAGNOSIS — J30.9 CHRONIC ALLERGIC RHINITIS: ICD-10-CM

## 2023-06-07 DIAGNOSIS — J45.991 COUGH VARIANT ASTHMA: Primary | ICD-10-CM

## 2023-06-07 PROCEDURE — 3008F PR BODY MASS INDEX (BMI) DOCUMENTED: ICD-10-PCS | Mod: CPTII,S$GLB,, | Performed by: STUDENT IN AN ORGANIZED HEALTH CARE EDUCATION/TRAINING PROGRAM

## 2023-06-07 PROCEDURE — 1126F PR PAIN SEVERITY QUANTIFIED, NO PAIN PRESENT: ICD-10-PCS | Mod: CPTII,S$GLB,, | Performed by: STUDENT IN AN ORGANIZED HEALTH CARE EDUCATION/TRAINING PROGRAM

## 2023-06-07 PROCEDURE — 99999 PR PBB SHADOW E&M-EST. PATIENT-LVL II: ICD-10-PCS | Mod: PBBFAC,,, | Performed by: STUDENT IN AN ORGANIZED HEALTH CARE EDUCATION/TRAINING PROGRAM

## 2023-06-07 PROCEDURE — 99214 OFFICE O/P EST MOD 30 MIN: CPT | Mod: S$GLB,,, | Performed by: STUDENT IN AN ORGANIZED HEALTH CARE EDUCATION/TRAINING PROGRAM

## 2023-06-07 PROCEDURE — 1126F AMNT PAIN NOTED NONE PRSNT: CPT | Mod: CPTII,S$GLB,, | Performed by: STUDENT IN AN ORGANIZED HEALTH CARE EDUCATION/TRAINING PROGRAM

## 2023-06-07 PROCEDURE — 99214 PR OFFICE/OUTPT VISIT, EST, LEVL IV, 30-39 MIN: ICD-10-PCS | Mod: S$GLB,,, | Performed by: STUDENT IN AN ORGANIZED HEALTH CARE EDUCATION/TRAINING PROGRAM

## 2023-06-07 PROCEDURE — 99999 PR PBB SHADOW E&M-EST. PATIENT-LVL II: CPT | Mod: PBBFAC,,, | Performed by: STUDENT IN AN ORGANIZED HEALTH CARE EDUCATION/TRAINING PROGRAM

## 2023-06-07 PROCEDURE — 4010F ACE/ARB THERAPY RXD/TAKEN: CPT | Mod: CPTII,S$GLB,, | Performed by: STUDENT IN AN ORGANIZED HEALTH CARE EDUCATION/TRAINING PROGRAM

## 2023-06-07 PROCEDURE — 4010F PR ACE/ARB THEARPY RXD/TAKEN: ICD-10-PCS | Mod: CPTII,S$GLB,, | Performed by: STUDENT IN AN ORGANIZED HEALTH CARE EDUCATION/TRAINING PROGRAM

## 2023-06-07 PROCEDURE — 3008F BODY MASS INDEX DOCD: CPT | Mod: CPTII,S$GLB,, | Performed by: STUDENT IN AN ORGANIZED HEALTH CARE EDUCATION/TRAINING PROGRAM

## 2023-06-07 NOTE — PROGRESS NOTES
"ALLERGY & IMMUNOLOGY CLINIC - ESTABLISHED PATIENT     HISTORY OF PRESENT ILLNESS     Patient ID: Loren Andre is a 73 y.o. female    CC:   Chief Complaint   Patient presents with    Follow-up    Other     Had PFT done       HPI: Loren Andre is a 73 y.o. female presents for evaluation of:    Allergic Rhinitis: Previous equivocal result to mold and remainder of testing negative. Has been using Dymista BID with improvement in nasal congestion and difficulty with hearing. States she continues to experience right ear ringing and a "sharp pain" in both ears. Has been using cetirizine 10mg daily as well with some improvement. Feels like ear pains are not on a daily basis, typically will alternate ears and come and go throughout the day. Not associated with any activity and typically self-limited. Right ear ringing present for 10-15 years and believes it started after an ear infection.     Asthma: Spirometry recently performed did not show obstructive lung process nor reversibility. Not used Wixela and does not plan to use again until later this year. No recent usage of albuterol. Denies nighttime awakenings, denies ER visits and systemic steroids       REVIEW OF SYSTEMS     CONST: no F/C/NS, no unintentional weight changes  Balance of review of systems negative except as mentioned above     MEDICAL HISTORY     MedHx: active problems reviewed  SurgHx:   Past Surgical History:   Procedure Laterality Date    ADENOIDECTOMY      APPENDECTOMY      COLONOSCOPY  4/14/2008    Dr. Guerrero, 10 year recheck    COLONOSCOPY N/A 10/4/2018    Procedure: COLONOSCOPY;  Surgeon: Tam Kemp MD;  Location: [facility];  Service: Endoscopy;  Laterality: N/A;    HYSTERECTOMY      OOPHORECTOMY      ROTATOR CUFF REPAIR Left 01/2016    ROTATOR CUFF REPAIR W/ DISTAL CLAVICLE EXCISION Right 12/04/2017    Dr. Eligio Timmons ( [facility] )    TONSILLECTOMY       Allergies: see below  Medications: MAR reviewed       PHYSICAL EXAM     VS: Ht " "5' 6" (1.676 m)   Wt 70.9 kg (156 lb 4.9 oz)   BMI 25.23 kg/m²   GENERAL: awake, alert, cooperative with exam  EYES: PERRL, EOMI, no conjunctival injection, no discharge, no infraorbital shiners  EARS: external auditory canals normal B/L, TM normal B/L  NOSE: NT 3+ and pale B/L, +stringing mucous, no polyps  ORAL: MMM, no ulcers, no thrush, no cobblestoning  LUNGS: CTAB, no w/r/c, no increased WOB  HEART: Normal Rate and regular rhythm, normal S1/S2, no m/g/r  EXTREMITIES: +2 distal pulses, no c/c/e  DERM: no rashes, no skin breaks     LABORATORY STUDIES     Immune testing April 2022  Nl IgG/A/M  Low Hib  Normal Pneumo  12/14 w/  previous vaccine x3  Normal diptheria and tetanus    IMAGING & OTHER DIAGNOSTICS      Sinus CT 2012: largely unremarkable for sinus disease  CT Chest 2020: lung fields normal     ALLERGEN TESTING     Equivocal Result to [facility], remainder of allergy testing is negative     PULMONARY FUNCTION     PFTs: May 2023    Pre (L)   Post (% Change)   FEV1:  1.80L (94%)  1.69L (-6%)  FVC:  2.28L (91%)  2.19L (-4.1%)  FEV1/FVC: 79%   77%    TLC 76%  DLCO 60%     CHART REVIEW       LOV May 2023  Asthma:  -Has been taking montelukast daily and albuterol as needed; Has been using albuterol more frequently, approximately twice daily for the previous few weeks. Still uses Wixela November-February as symptoms worsen during these months. Feels like albuterol may be providing less relief of shortness of breath than previously. When not working, will use nebulizer after the inhaler. Cough has been worsening as well, awakening from sleep several nights per week. Cetirizine and loratadine do not provide much relief of coughing episodes, but has helped dry her up in the nasal passages. Feels like symptoms have worsened since discontinuing Wixela     AR: Has been experiencing headaches and ear fullness/trouble hearing R>L for the previous 2 weeks. Has experienced ear ringing on the right side for the previous " 18 years following a sinus infection. Using cetirizine which helps dry her out. Also uses Azelastine 1 SEN BID, no other nasal spray usage        LOV with Dr. Porter  Allergy Problem List  Persistent asthma  Chronic nonallergic rhinitis  Labelled PCN allergic  Frequent sinusitis  Multiple perceived drug intolerances     HPI: 71 yo woman with asthma and nonallergic rhinitis last saw Dr Leija 4/26/2022.       Related medications and other interventions  Flonase 1 BID-- daily  Astelin 1 BID -- rarely  Montelukast every morining  Advair --uses Nov - Feb BID  Albuterol MDI or ebas needed     09/22/2022:  Patient's chief complaint is cough which she localizes to her neck region.  It is associated with throat clearing and occasional mucus.  It occurs throughout the day and also in spells.  It is not been helped by multiple antihistamines.  It was helped by promethazine DM.  She describes postnasal drip sensation occasionally awakening her from sleep and in the early mornings.  She says that weather changes are a definite precipitant.  She denies history of heartburn.     Client has a diagnosis of seasonal asthma in the winter.  She says this is manifest by chest tightness and mild shortness of breath.  She denies wheezing.  She says it was associated with a headache.  She takes Advair from November through February only.  During the rest of the year she takes albuterol as needed.     Excerpted from Dr Leija's notes  4/26/22:She was recently treated with doxycycline for sinus infection. She has had antibiotics every 4-6 months for sinus infections.      Rhinitis:   She recalls being an itchy sneezy kid, symptoms got worse as she got older. Since April 2021, she has had daily cough, congestion, rhinorrhea. Occasional headaches. No fevers. Sometimes itchy eyes. Nose feels itchy on the inside. Ears itch and feel full. Sense of smell is sometimes diminished. Taking allergy pill (pseudoephedine) once a day and nasal  "sprays constantly. Nasal spray is equate brand oxymetazoline. Perfume and scents are triggers. Rain seems to be a trigger as well.      Asthma:   Diagnosed as an adult. Main symptom is "a different type of headache" and chest tightness. No wheezing. On Advair. She takes this in the winter, because the cold air seems to trigger her breathing troubles. She does not recall systemic steroids in the past year. She is up to date with her flu vaccines and has had her covid vaccine.      Rash:   Never diagnosed with eczema, but does have a rash on arms, knees, neck that is clearing up with aveeno lotion. Occasional generalized pruritus.      Allergy testing:   She recalls a skin testing >10 years ago and was allergic to grass. She took allergy shots once weekly for a chunk of time but didn't really see much improvement.      Medication allergies:   Long list of medication allergies.   PCN: rash, in her 20s.  Sulfa: rash, 5-6 years ago.     ASSESSMENT     Loren Andre is a 73 y.o. female with         1. Cough variant asthma    2. Chronic allergic rhinitis           PLAN       Continue Dymista 1 SEN BID  Continue Albuterol as needed  Consider Pulmonology referral to assess for diminished DLCO on recent spirometry         Follow up: 6 Months      Vik Sorensen MD    A total of 40 minutes spent on encounter. This includes face to face time and non-face to face time preparing to see the patient (eg, review of tests), obtaining and/or reviewing separately obtained history, documenting clinical information in the electronic or other health record, independently interpreting results and communicating results to the patient/family/caregiver, or care coordinator.        "

## 2023-07-03 ENCOUNTER — OFFICE VISIT (OUTPATIENT)
Dept: URGENT CARE | Facility: CLINIC | Age: 73
End: 2023-07-03
Payer: MEDICARE

## 2023-07-03 VITALS
SYSTOLIC BLOOD PRESSURE: 160 MMHG | OXYGEN SATURATION: 99 % | HEIGHT: 66 IN | RESPIRATION RATE: 18 BRPM | TEMPERATURE: 98 F | DIASTOLIC BLOOD PRESSURE: 83 MMHG | WEIGHT: 157 LBS | BODY MASS INDEX: 25.23 KG/M2 | HEART RATE: 62 BPM

## 2023-07-03 DIAGNOSIS — J01.90 ACUTE SINUSITIS, RECURRENCE NOT SPECIFIED, UNSPECIFIED LOCATION: Primary | ICD-10-CM

## 2023-07-03 PROCEDURE — 99213 PR OFFICE/OUTPT VISIT, EST, LEVL III, 20-29 MIN: ICD-10-PCS | Mod: S$GLB,,, | Performed by: NURSE PRACTITIONER

## 2023-07-03 PROCEDURE — 99213 OFFICE O/P EST LOW 20 MIN: CPT | Mod: S$GLB,,, | Performed by: NURSE PRACTITIONER

## 2023-07-03 RX ORDER — AZITHROMYCIN 250 MG/1
TABLET, FILM COATED ORAL
Qty: 6 TABLET | Refills: 0 | Status: SHIPPED | OUTPATIENT
Start: 2023-07-03 | End: 2023-07-08

## 2023-07-03 RX ORDER — METHYLPREDNISOLONE 4 MG/1
TABLET ORAL
Qty: 21 EACH | Refills: 0 | Status: SHIPPED | OUTPATIENT
Start: 2023-07-03 | End: 2023-07-24

## 2023-07-03 RX ORDER — GUAIFENESIN 600 MG/1
1200 TABLET, EXTENDED RELEASE ORAL 2 TIMES DAILY
Qty: 28 TABLET | Refills: 0 | Status: SHIPPED | OUTPATIENT
Start: 2023-07-03 | End: 2023-07-10

## 2023-07-03 RX ORDER — MECLIZINE HYDROCHLORIDE 25 MG/1
TABLET ORAL
COMMUNITY
Start: 2023-06-10 | End: 2023-10-04

## 2023-07-03 NOTE — PATIENT INSTRUCTIONS
Utilize over-the-counter Tylenol or Motrin as directed for fever.    Thank you for the opportunity to care for you today.  Please take all medications as directed, and continue any previously prescribed medications unless we specifically discussed discontinuing them.  If your symptoms do not resolve or worsen please return to the clinic for re-evaluation.  If your situation becomes emergent, please present to the nearest emergency department.  Follow-up with your PCP for continued evaluation and management.

## 2023-07-03 NOTE — PROGRESS NOTES
"Subjective:      Patient ID: Loren Andre is a 73 y.o. female.    Vitals:  height is 5' 6" (1.676 m) and weight is 71.2 kg (157 lb). Her temperature is 98.2 °F (36.8 °C). Her blood pressure is 160/83 (abnormal) and her pulse is 62. Her respiration is 18 and oxygen saturation is 99%.     Chief Complaint: Sinus Problem    Patient presents to clinic with complaints of cough, congestion, sore throat, sinus pressure, ear pain, nasal drainage progressing x1 week.  Reports using nasal decongestants and Tylenol with minimal relief in symptoms.    Sinus Problem  This is a recurrent problem. The current episode started in the past 7 days. The problem has been gradually worsening since onset. Associated symptoms include congestion, coughing, ear pain, headaches, a hoarse voice, sinus pressure, sneezing and a sore throat. Pertinent negatives include no chills, neck pain or shortness of breath. Past treatments include nasal decongestants and saline nose sprays. The treatment provided mild relief.     Constitution: Positive for fatigue. Negative for activity change, appetite change, chills, fever, unexpected weight change and generalized weakness.   HENT:  Positive for ear pain, congestion, postnasal drip, sinus pain, sinus pressure and sore throat. Negative for ear discharge, foreign body in ear, tinnitus, hearing loss, dental problem, mouth sores, tongue pain, facial swelling, trouble swallowing and voice change.    Neck: Negative for neck pain, neck stiffness and painful lymph nodes.   Cardiovascular:  Negative for chest pain, leg swelling, palpitations and sob on exertion.   Eyes:  Negative for eye trauma, eye discharge, eye itching, eye pain, eye redness, vision loss and eyelid swelling.   Respiratory:  Positive for cough and sputum production. Negative for chest tightness, COPD, shortness of breath, wheezing and asthma.    Gastrointestinal:  Negative for abdominal pain, nausea, vomiting, constipation, diarrhea, bright " red blood in stool and dark colored stools.   Endocrine: hair loss, cold intolerance and heat intolerance.   Genitourinary:  Negative for dysuria, frequency, urgency and hematuria.   Musculoskeletal:  Negative for pain, trauma, joint pain, joint swelling, abnormal ROM of joint and muscle ache.   Skin:  Negative for color change, pale, rash, wound and hives.   Allergic/Immunologic: Positive for sneezing. Negative for environmental allergies, seasonal allergies, food allergies, asthma, hives and itching.   Neurological:  Positive for headaches. Negative for dizziness, history of vertigo, light-headedness, facial drooping, speech difficulty, disorientation, altered mental status, loss of consciousness and numbness.   Hematologic/Lymphatic: Negative for swollen lymph nodes and easy bruising/bleeding. Does not bruise/bleed easily.   Psychiatric/Behavioral:  Negative for altered mental status, disorientation, confusion, agitation, sleep disturbance and hallucinations.     Objective:     Physical Exam   Constitutional: She is oriented to person, place, and time. She appears well-developed. She is cooperative.   HENT:   Head: Normocephalic and atraumatic.   Ears:   Right Ear: Hearing, tympanic membrane, external ear and ear canal normal.   Left Ear: Hearing, tympanic membrane, external ear and ear canal normal.   Nose: Rhinorrhea present. No mucosal edema or nasal deformity. No epistaxis. Right sinus exhibits frontal sinus tenderness. Right sinus exhibits no maxillary sinus tenderness. Left sinus exhibits frontal sinus tenderness. Left sinus exhibits no maxillary sinus tenderness.   Mouth/Throat: Uvula is midline and mucous membranes are normal. No trismus in the jaw. Normal dentition. No uvula swelling. Posterior oropharyngeal edema, posterior oropharyngeal erythema and cobblestoning present. No oropharyngeal exudate or tonsillar abscesses.   Eyes: Conjunctivae and lids are normal.   Neck: Trachea normal and phonation  normal. Neck supple.   Cardiovascular: Normal rate, regular rhythm, normal heart sounds and normal pulses.   Pulmonary/Chest: Effort normal and breath sounds normal.   Abdominal: Normal appearance and bowel sounds are normal. Soft.   Musculoskeletal: Normal range of motion.         General: Normal range of motion.   Neurological: She is alert and oriented to person, place, and time. She exhibits normal muscle tone.   Skin: Skin is warm, dry and intact.   Psychiatric: Her speech is normal and behavior is normal. Judgment and thought content normal.   Nursing note and vitals reviewed.    Assessment:     1. Acute sinusitis, recurrence not specified, unspecified location        Plan:       Acute sinusitis, recurrence not specified, unspecified location  -     methylPREDNISolone (MEDROL DOSEPACK) 4 mg tablet; use as directed  Dispense: 21 each; Refill: 0  -     azithromycin (Z-MARISABEL) 250 MG tablet; Take 2 tablets by mouth on day 1; Take 1 tablet by mouth on days 2-5  Dispense: 6 tablet; Refill: 0  -     guaiFENesin (MUCINEX) 600 mg 12 hr tablet; Take 2 tablets (1,200 mg total) by mouth 2 (two) times daily. for 7 days  Dispense: 28 tablet; Refill: 0      Utilize over-the-counter Tylenol or Motrin as directed for fever.    Return to clinic for new or worsening symptoms.    Total time spent on med rec, H&P, with over half of the time in direct patient care: 28 minutes         Additional MDM:     Heart Failure Score:   COPD = No

## 2023-08-07 ENCOUNTER — OFFICE VISIT (OUTPATIENT)
Dept: ALLERGY | Facility: CLINIC | Age: 73
End: 2023-08-07
Payer: MEDICARE

## 2023-08-07 VITALS — WEIGHT: 159.63 LBS | HEIGHT: 66 IN | BODY MASS INDEX: 25.66 KG/M2

## 2023-08-07 DIAGNOSIS — J45.909 ASTHMA, UNSPECIFIED ASTHMA SEVERITY, UNSPECIFIED WHETHER COMPLICATED, UNSPECIFIED WHETHER PERSISTENT: ICD-10-CM

## 2023-08-07 DIAGNOSIS — J30.9 CHRONIC ALLERGIC RHINITIS: Primary | ICD-10-CM

## 2023-08-07 PROCEDURE — 1159F PR MEDICATION LIST DOCUMENTED IN MEDICAL RECORD: ICD-10-PCS | Mod: HCNC,CPTII,S$GLB, | Performed by: STUDENT IN AN ORGANIZED HEALTH CARE EDUCATION/TRAINING PROGRAM

## 2023-08-07 PROCEDURE — 1159F MED LIST DOCD IN RCRD: CPT | Mod: HCNC,CPTII,S$GLB, | Performed by: STUDENT IN AN ORGANIZED HEALTH CARE EDUCATION/TRAINING PROGRAM

## 2023-08-07 PROCEDURE — 99214 PR OFFICE/OUTPT VISIT, EST, LEVL IV, 30-39 MIN: ICD-10-PCS | Mod: HCNC,S$GLB,, | Performed by: STUDENT IN AN ORGANIZED HEALTH CARE EDUCATION/TRAINING PROGRAM

## 2023-08-07 PROCEDURE — 99999 PR PBB SHADOW E&M-EST. PATIENT-LVL III: CPT | Mod: PBBFAC,HCNC,, | Performed by: STUDENT IN AN ORGANIZED HEALTH CARE EDUCATION/TRAINING PROGRAM

## 2023-08-07 PROCEDURE — 1126F AMNT PAIN NOTED NONE PRSNT: CPT | Mod: HCNC,CPTII,S$GLB, | Performed by: STUDENT IN AN ORGANIZED HEALTH CARE EDUCATION/TRAINING PROGRAM

## 2023-08-07 PROCEDURE — 1126F PR PAIN SEVERITY QUANTIFIED, NO PAIN PRESENT: ICD-10-PCS | Mod: HCNC,CPTII,S$GLB, | Performed by: STUDENT IN AN ORGANIZED HEALTH CARE EDUCATION/TRAINING PROGRAM

## 2023-08-07 PROCEDURE — 99999 PR PBB SHADOW E&M-EST. PATIENT-LVL III: ICD-10-PCS | Mod: PBBFAC,HCNC,, | Performed by: STUDENT IN AN ORGANIZED HEALTH CARE EDUCATION/TRAINING PROGRAM

## 2023-08-07 PROCEDURE — 99214 OFFICE O/P EST MOD 30 MIN: CPT | Mod: HCNC,S$GLB,, | Performed by: STUDENT IN AN ORGANIZED HEALTH CARE EDUCATION/TRAINING PROGRAM

## 2023-08-07 PROCEDURE — 4010F ACE/ARB THERAPY RXD/TAKEN: CPT | Mod: HCNC,CPTII,S$GLB, | Performed by: STUDENT IN AN ORGANIZED HEALTH CARE EDUCATION/TRAINING PROGRAM

## 2023-08-07 PROCEDURE — 3008F BODY MASS INDEX DOCD: CPT | Mod: HCNC,CPTII,S$GLB, | Performed by: STUDENT IN AN ORGANIZED HEALTH CARE EDUCATION/TRAINING PROGRAM

## 2023-08-07 PROCEDURE — 3008F PR BODY MASS INDEX (BMI) DOCUMENTED: ICD-10-PCS | Mod: HCNC,CPTII,S$GLB, | Performed by: STUDENT IN AN ORGANIZED HEALTH CARE EDUCATION/TRAINING PROGRAM

## 2023-08-07 PROCEDURE — 4010F PR ACE/ARB THEARPY RXD/TAKEN: ICD-10-PCS | Mod: HCNC,CPTII,S$GLB, | Performed by: STUDENT IN AN ORGANIZED HEALTH CARE EDUCATION/TRAINING PROGRAM

## 2023-08-07 RX ORDER — ALBUTEROL SULFATE 90 UG/1
2 AEROSOL, METERED RESPIRATORY (INHALATION) EVERY 6 HOURS PRN
Qty: 6.7 G | Refills: 3 | Status: SHIPPED | OUTPATIENT
Start: 2023-08-07

## 2023-08-07 RX ORDER — METHYLPREDNISOLONE 4 MG/1
TABLET ORAL
Qty: 21 EACH | Refills: 0 | Status: SHIPPED | OUTPATIENT
Start: 2023-08-07 | End: 2023-08-28

## 2023-08-07 NOTE — PROGRESS NOTES
ALLERGY & IMMUNOLOGY CLINIC -  NEW PATIENT     HISTORY OF PRESENT ILLNESS     Patient ID: Loren Andre is a 73 y.o. female    CC:   Chief Complaint   Patient presents with    Allergies     Allergy/ sinus concern        HPI: Loren Andre is a 73 y.o. female presents for evaluation of:    08/07/2023  Rhinitis: Previous allergy evaluation with equivocal result to [facility]. Has been using cetirizine 10mg daily and dymista 1 sprays each nostril BID. Feels like symptoms have not gotten significantly better in recent months. Experiencing recurrent green discharge and would like ENT referral    Asthma well controlled on albuterol as needed      LOV 6/2023  LOV May 2023  Asthma:  -Has been taking montelukast daily and albuterol as needed; Has been using albuterol more frequently, approximately twice daily for the previous few weeks. Still uses Wixela November-February as symptoms worsen during these months. Feels like albuterol may be providing less relief of shortness of breath than previously. When not working, will use nebulizer after the inhaler. Cough has been worsening as well, awakening from sleep several nights per week. Cetirizine and loratadine do not provide much relief of coughing episodes, but has helped dry her up in the nasal passages. Feels like symptoms have worsened since discontinuing Wixela     AR: Has been experiencing headaches and ear fullness/trouble hearing R>L for the previous 2 weeks. Has experienced ear ringing on the right side for the previous 18 years following a sinus infection. Using cetirizine which helps dry her out. Also uses Azelastine 1 SEN BID, no other nasal spray usage        LOV with Dr. Porter  Allergy Problem List  Persistent asthma  Chronic nonallergic rhinitis  Labelled PCN allergic  Frequent sinusitis  Multiple perceived drug intolerances     HPI: 71 yo woman with asthma and nonallergic rhinitis last saw Dr Leija 4/26/2022.       Related medications and other  "interventions  Flonase 1 BID-- daily  Astelin 1 BID -- rarely  Montelukast every morining  Advair --uses Nov - Feb BID  Albuterol MDI or ebas needed     09/22/2022:  Patient's chief complaint is cough which she localizes to her neck region.  It is associated with throat clearing and occasional mucus.  It occurs throughout the day and also in spells.  It is not been helped by multiple antihistamines.  It was helped by promethazine DM.  She describes postnasal drip sensation occasionally awakening her from sleep and in the early mornings.  She says that weather changes are a definite precipitant.  She denies history of heartburn.     Client has a diagnosis of seasonal asthma in the winter.  She says this is manifest by chest tightness and mild shortness of breath.  She denies wheezing.  She says it was associated with a headache.  She takes Advair from November through February only.  During the rest of the year she takes albuterol as needed.     Excerpted from Dr Leija's notes  4/26/22:She was recently treated with doxycycline for sinus infection. She has had antibiotics every 4-6 months for sinus infections.      Rhinitis:   She recalls being an itchy sneezy kid, symptoms got worse as she got older. Since April 2021, she has had daily cough, congestion, rhinorrhea. Occasional headaches. No fevers. Sometimes itchy eyes. Nose feels itchy on the inside. Ears itch and feel full. Sense of smell is sometimes diminished. Taking allergy pill (pseudoephedine) once a day and nasal sprays constantly. Nasal spray is equate brand oxymetazoline. Perfume and scents are triggers. Rain seems to be a trigger as well.      Asthma:   Diagnosed as an adult. Main symptom is "a different type of headache" and chest tightness. No wheezing. On Advair. She takes this in the winter, because the cold air seems to trigger her breathing troubles. She does not recall systemic steroids in the past year. She is up to date with her flu vaccines " "and has had her covid vaccine.      Rash:   Never diagnosed with eczema, but does have a rash on arms, knees, neck that is clearing up with aveeno lotion. Occasional generalized pruritus.      Allergy testing:   She recalls a skin testing >10 years ago and was allergic to grass. She took allergy shots once weekly for a chunk of time but didn't really see much improvement.      Medication allergies:   Long list of medication allergies.   PCN: rash, in her 20s.  Sulfa: rash, 5-6 years ago.     REVIEW OF SYSTEMS     CONST: no F/C/NS, no unintentional weight changes  Balance of review of systems negative except as mentioned above     MEDICAL HISTORY     MedHx: active problems reviewed  SurgHx:   Past Surgical History:   Procedure Laterality Date    ADENOIDECTOMY      APPENDECTOMY      COLONOSCOPY  4/14/2008    Dr. Guerrero, 10 year recheck    COLONOSCOPY N/A 10/4/2018    Procedure: COLONOSCOPY;  Surgeon: Tam Kemp MD;  Location: [facility];  Service: Endoscopy;  Laterality: N/A;    HYSTERECTOMY      OOPHORECTOMY      ROTATOR CUFF REPAIR Left 01/2016    ROTATOR CUFF REPAIR W/ DISTAL CLAVICLE EXCISION Right 12/04/2017    Dr. Eligio Timmons ( [facility] )    TONSILLECTOMY         Allergies: see below  Medications: MAR reviewed       PHYSICAL EXAM     VS: Ht 5' 6" (1.676 m)   Wt 72.4 kg (159 lb 9.8 oz)   BMI 25.76 kg/m²   GENERAL: awake, alert, cooperative with exam  EYES: PERRL, EOMI, no conjunctival injection, no discharge, no infraorbital shiners  EARS: external auditory canals normal B/L  ORAL: MMM, no ulcers, no thrush, no cobblestoning  LUNGS: CTAB, no w/r/c, no increased WOB  HEART: Normal Rate and regular rhythm, normal S1/S2, no m/g/r  EXTREMITIES: +2 distal pulses, no c/c/e  DERM: no rashes, no skin breaks     LABORATORY STUDIES     Immune testing April 2022  Nl IgG/A/M  Low Hib  Normal Pneumo  12/14 w/  previous vaccine x3  Normal diptheria and tetanus     Sinus CT 2012: largely unremarkable for sinus " disease  CT Chest 2020: lung fields normal      ALLERGEN TESTING      Equivocal Result to [facility], remainder of allergy testing is negative      PULMONARY FUNCTION     PFTs: May 2023                          Pre (L)                         Post (% Change)   FEV1:              1.80L (94%)                1.69L (-6%)  FVC:                2.28L (91%)                2.19L (-4.1%)  FEV1/FVC:      79%                             77%     TLC 76%  DLCO 60%     ASSESSMENT/PLAN     Loren Andre is a 73 y.o. female with     1. Chronic allergic rhinitis  -Likely mixed rhinitis with equivocal results to [facility] and remainder of allergy testing negative. At patient request, referred to ENT for further evaluation. Recommended dymista continued and cetirizine 10mg daily  - Ambulatory referral/consult to ENT; Future    2. Asthma, unspecified asthma severity, unspecified whether complicated, unspecified whether persistent  -Well controlled at this time  - albuterol (PROAIR HFA) 90 mcg/actuation inhaler; Inhale 2 puffs into the lungs every 6 (six) hours as needed for Wheezing. Rescue  Dispense: 6.7 g; Refill: 3        Follow up: 6 months      Vik Sorensen MD    I spent a total of 30 minutes on the day of the visit. This includes face to face time and non-face to face time preparing to see the patient (eg, review of tests), obtaining and/or reviewing separately obtained history, documenting clinical information in the electronic or other health record, independently interpreting results and communicating results to the patient/family/caregiver, or care coordinator.

## 2023-08-08 ENCOUNTER — TELEPHONE (OUTPATIENT)
Dept: FAMILY MEDICINE | Facility: CLINIC | Age: 73
End: 2023-08-08
Payer: MEDICARE

## 2023-08-29 ENCOUNTER — TELEPHONE (OUTPATIENT)
Dept: FAMILY MEDICINE | Facility: CLINIC | Age: 73
End: 2023-08-29
Payer: MEDICARE

## 2023-08-29 DIAGNOSIS — Z12.31 ENCOUNTER FOR SCREENING MAMMOGRAM FOR MALIGNANT NEOPLASM OF BREAST: Primary | ICD-10-CM

## 2023-08-29 NOTE — TELEPHONE ENCOUNTER
Patient orders for screening mammogram have been sent to radiology      ----- Message from Kayla Kaur sent at 8/29/2023  9:29 AM CDT -----  Regarding: YEARLY MAMMOGRAM ORDERS  Patient calling to get yearly mammogram appt set, please place orders for Digital Bilateral Mammogram Screening with [facility] , Then let me know they are place , I will follow up with our patient to set appt / Thanking you in advance.

## 2023-09-14 ENCOUNTER — OFFICE VISIT (OUTPATIENT)
Dept: OTOLARYNGOLOGY | Facility: CLINIC | Age: 73
End: 2023-09-14
Payer: MEDICARE

## 2023-09-14 ENCOUNTER — OFFICE VISIT (OUTPATIENT)
Dept: CARDIOLOGY | Facility: CLINIC | Age: 73
End: 2023-09-14
Payer: MEDICARE

## 2023-09-14 VITALS
BODY MASS INDEX: 25.39 KG/M2 | WEIGHT: 158 LBS | HEART RATE: 57 BPM | DIASTOLIC BLOOD PRESSURE: 86 MMHG | SYSTOLIC BLOOD PRESSURE: 160 MMHG | OXYGEN SATURATION: 97 % | HEIGHT: 66 IN

## 2023-09-14 VITALS
SYSTOLIC BLOOD PRESSURE: 195 MMHG | HEART RATE: 62 BPM | DIASTOLIC BLOOD PRESSURE: 79 MMHG | BODY MASS INDEX: 25.61 KG/M2 | WEIGHT: 159.38 LBS | HEIGHT: 66 IN

## 2023-09-14 DIAGNOSIS — J34.2 DEVIATED SEPTUM: ICD-10-CM

## 2023-09-14 DIAGNOSIS — J31.0 NONALLERGIC RHINITIS: ICD-10-CM

## 2023-09-14 DIAGNOSIS — I10 WHITE COAT SYNDROME WITH DIAGNOSIS OF HYPERTENSION: ICD-10-CM

## 2023-09-14 DIAGNOSIS — E78.5 HYPERLIPIDEMIA, UNSPECIFIED HYPERLIPIDEMIA TYPE: ICD-10-CM

## 2023-09-14 DIAGNOSIS — I10 PRIMARY HYPERTENSION: ICD-10-CM

## 2023-09-14 DIAGNOSIS — J01.91 ACUTE RECURRENT SINUSITIS, UNSPECIFIED LOCATION: Primary | ICD-10-CM

## 2023-09-14 DIAGNOSIS — I25.10 ATHEROSCLEROSIS OF NATIVE CORONARY ARTERY OF NATIVE HEART WITHOUT ANGINA PECTORIS: Primary | ICD-10-CM

## 2023-09-14 DIAGNOSIS — Z63.8 CAREGIVER ROLE STRAIN: ICD-10-CM

## 2023-09-14 DIAGNOSIS — N18.31 STAGE 3A CHRONIC KIDNEY DISEASE: ICD-10-CM

## 2023-09-14 DIAGNOSIS — J34.3 NASAL TURBINATE HYPERTROPHY: ICD-10-CM

## 2023-09-14 PROCEDURE — 1126F PR PAIN SEVERITY QUANTIFIED, NO PAIN PRESENT: ICD-10-PCS | Mod: HCNC,CPTII,S$GLB, | Performed by: OTOLARYNGOLOGY

## 2023-09-14 PROCEDURE — 3008F BODY MASS INDEX DOCD: CPT | Mod: HCNC,CPTII,S$GLB, | Performed by: NURSE PRACTITIONER

## 2023-09-14 PROCEDURE — 1159F PR MEDICATION LIST DOCUMENTED IN MEDICAL RECORD: ICD-10-PCS | Mod: HCNC,CPTII,S$GLB, | Performed by: OTOLARYNGOLOGY

## 2023-09-14 PROCEDURE — 99214 OFFICE O/P EST MOD 30 MIN: CPT | Mod: HCNC,S$GLB,, | Performed by: NURSE PRACTITIONER

## 2023-09-14 PROCEDURE — 3077F PR MOST RECENT SYSTOLIC BLOOD PRESSURE >= 140 MM HG: ICD-10-PCS | Mod: HCNC,CPTII,S$GLB, | Performed by: OTOLARYNGOLOGY

## 2023-09-14 PROCEDURE — 99204 OFFICE O/P NEW MOD 45 MIN: CPT | Mod: 25,HCNC,S$GLB, | Performed by: OTOLARYNGOLOGY

## 2023-09-14 PROCEDURE — 99999 PR PBB SHADOW E&M-EST. PATIENT-LVL IV: ICD-10-PCS | Mod: PBBFAC,HCNC,, | Performed by: NURSE PRACTITIONER

## 2023-09-14 PROCEDURE — 3077F PR MOST RECENT SYSTOLIC BLOOD PRESSURE >= 140 MM HG: ICD-10-PCS | Mod: HCNC,CPTII,S$GLB, | Performed by: NURSE PRACTITIONER

## 2023-09-14 PROCEDURE — 3077F SYST BP >= 140 MM HG: CPT | Mod: HCNC,CPTII,S$GLB, | Performed by: NURSE PRACTITIONER

## 2023-09-14 PROCEDURE — 3288F PR FALLS RISK ASSESSMENT DOCUMENTED: ICD-10-PCS | Mod: HCNC,CPTII,S$GLB, | Performed by: OTOLARYNGOLOGY

## 2023-09-14 PROCEDURE — 1101F PT FALLS ASSESS-DOCD LE1/YR: CPT | Mod: HCNC,CPTII,S$GLB, | Performed by: NURSE PRACTITIONER

## 2023-09-14 PROCEDURE — 4010F ACE/ARB THERAPY RXD/TAKEN: CPT | Mod: HCNC,CPTII,S$GLB, | Performed by: OTOLARYNGOLOGY

## 2023-09-14 PROCEDURE — 1101F PR PT FALLS ASSESS DOC 0-1 FALLS W/OUT INJ PAST YR: ICD-10-PCS | Mod: HCNC,CPTII,S$GLB, | Performed by: OTOLARYNGOLOGY

## 2023-09-14 PROCEDURE — 3078F PR MOST RECENT DIASTOLIC BLOOD PRESSURE < 80 MM HG: ICD-10-PCS | Mod: HCNC,CPTII,S$GLB, | Performed by: OTOLARYNGOLOGY

## 2023-09-14 PROCEDURE — 3078F DIAST BP <80 MM HG: CPT | Mod: HCNC,CPTII,S$GLB, | Performed by: OTOLARYNGOLOGY

## 2023-09-14 PROCEDURE — 3008F PR BODY MASS INDEX (BMI) DOCUMENTED: ICD-10-PCS | Mod: HCNC,CPTII,S$GLB, | Performed by: NURSE PRACTITIONER

## 2023-09-14 PROCEDURE — 1101F PR PT FALLS ASSESS DOC 0-1 FALLS W/OUT INJ PAST YR: ICD-10-PCS | Mod: HCNC,CPTII,S$GLB, | Performed by: NURSE PRACTITIONER

## 2023-09-14 PROCEDURE — 31231 NASAL/SINUS ENDOSCOPY: ICD-10-PCS | Mod: HCNC,S$GLB,, | Performed by: OTOLARYNGOLOGY

## 2023-09-14 PROCEDURE — 31231 NASAL ENDOSCOPY DX: CPT | Mod: HCNC,S$GLB,, | Performed by: OTOLARYNGOLOGY

## 2023-09-14 PROCEDURE — 1126F AMNT PAIN NOTED NONE PRSNT: CPT | Mod: HCNC,CPTII,S$GLB, | Performed by: OTOLARYNGOLOGY

## 2023-09-14 PROCEDURE — 99214 PR OFFICE/OUTPT VISIT, EST, LEVL IV, 30-39 MIN: ICD-10-PCS | Mod: HCNC,S$GLB,, | Performed by: NURSE PRACTITIONER

## 2023-09-14 PROCEDURE — 3077F SYST BP >= 140 MM HG: CPT | Mod: HCNC,CPTII,S$GLB, | Performed by: OTOLARYNGOLOGY

## 2023-09-14 PROCEDURE — 4010F PR ACE/ARB THEARPY RXD/TAKEN: ICD-10-PCS | Mod: HCNC,CPTII,S$GLB, | Performed by: NURSE PRACTITIONER

## 2023-09-14 PROCEDURE — 3288F PR FALLS RISK ASSESSMENT DOCUMENTED: ICD-10-PCS | Mod: HCNC,CPTII,S$GLB, | Performed by: NURSE PRACTITIONER

## 2023-09-14 PROCEDURE — 1159F MED LIST DOCD IN RCRD: CPT | Mod: HCNC,CPTII,S$GLB, | Performed by: NURSE PRACTITIONER

## 2023-09-14 PROCEDURE — 3288F FALL RISK ASSESSMENT DOCD: CPT | Mod: HCNC,CPTII,S$GLB, | Performed by: OTOLARYNGOLOGY

## 2023-09-14 PROCEDURE — 1159F PR MEDICATION LIST DOCUMENTED IN MEDICAL RECORD: ICD-10-PCS | Mod: HCNC,CPTII,S$GLB, | Performed by: NURSE PRACTITIONER

## 2023-09-14 PROCEDURE — 4010F ACE/ARB THERAPY RXD/TAKEN: CPT | Mod: HCNC,CPTII,S$GLB, | Performed by: NURSE PRACTITIONER

## 2023-09-14 PROCEDURE — 99999 PR PBB SHADOW E&M-EST. PATIENT-LVL IV: CPT | Mod: PBBFAC,HCNC,, | Performed by: NURSE PRACTITIONER

## 2023-09-14 PROCEDURE — 99204 PR OFFICE/OUTPT VISIT, NEW, LEVL IV, 45-59 MIN: ICD-10-PCS | Mod: 25,HCNC,S$GLB, | Performed by: OTOLARYNGOLOGY

## 2023-09-14 PROCEDURE — 3079F DIAST BP 80-89 MM HG: CPT | Mod: HCNC,CPTII,S$GLB, | Performed by: NURSE PRACTITIONER

## 2023-09-14 PROCEDURE — 99999 PR PBB SHADOW E&M-EST. PATIENT-LVL V: CPT | Mod: PBBFAC,HCNC,, | Performed by: OTOLARYNGOLOGY

## 2023-09-14 PROCEDURE — 3288F FALL RISK ASSESSMENT DOCD: CPT | Mod: HCNC,CPTII,S$GLB, | Performed by: NURSE PRACTITIONER

## 2023-09-14 PROCEDURE — 1101F PT FALLS ASSESS-DOCD LE1/YR: CPT | Mod: HCNC,CPTII,S$GLB, | Performed by: OTOLARYNGOLOGY

## 2023-09-14 PROCEDURE — 3079F PR MOST RECENT DIASTOLIC BLOOD PRESSURE 80-89 MM HG: ICD-10-PCS | Mod: HCNC,CPTII,S$GLB, | Performed by: NURSE PRACTITIONER

## 2023-09-14 PROCEDURE — 99999 PR PBB SHADOW E&M-EST. PATIENT-LVL V: ICD-10-PCS | Mod: PBBFAC,HCNC,, | Performed by: OTOLARYNGOLOGY

## 2023-09-14 PROCEDURE — 3008F BODY MASS INDEX DOCD: CPT | Mod: HCNC,CPTII,S$GLB, | Performed by: OTOLARYNGOLOGY

## 2023-09-14 PROCEDURE — 3008F PR BODY MASS INDEX (BMI) DOCUMENTED: ICD-10-PCS | Mod: HCNC,CPTII,S$GLB, | Performed by: OTOLARYNGOLOGY

## 2023-09-14 PROCEDURE — 1159F MED LIST DOCD IN RCRD: CPT | Mod: HCNC,CPTII,S$GLB, | Performed by: OTOLARYNGOLOGY

## 2023-09-14 PROCEDURE — 4010F PR ACE/ARB THEARPY RXD/TAKEN: ICD-10-PCS | Mod: HCNC,CPTII,S$GLB, | Performed by: OTOLARYNGOLOGY

## 2023-09-14 SDOH — SOCIAL DETERMINANTS OF HEALTH (SDOH): OTHER SPECIFIED PROBLEMS RELATED TO PRIMARY SUPPORT GROUP: Z63.8

## 2023-09-14 NOTE — PROGRESS NOTES
Subjective:    Patient ID:  Loren Andre is a 73 y.o. female   Chief Complaint   Patient presents with    Follow-up       HPI:  Patient seen today for follow-up appointment.  She reports having stress recently due to her brother being ill.  She reports that her blood pressure is more elevated today because she was feeling stressed and also nervous.  Her blood pressure has been more stable at home.  She denies any chest discomfort or shortness of breath.  She also notes that she has some chronic pain in shoes due to fibromyalgia believes that on days like today when she is in more her blood pressure rises.    Review of patient's allergies indicates:   Allergen Reactions    Doxepin Anaphylaxis     abd pain    Norvasc [amlodipine] Palpitations    Xyzal [levocetirizine] Other (See Comments)     Can not take with blood pressure medications - headaches, higher blood pressure, red hands     Penicillins Rash    Sulfa (sulfonamide antibiotics) Rash    Duloxetine      Other reaction(s): insomnia    Gabapentin Nausea Only    Levofloxacin      Other reaction(s): Headache    Milnacipran      Other reaction(s): stomach pain    Nitrofurantoin monohyd/m-cryst      Other reaction(s): Itching    Prednisone      Other reaction(s): Itching    Pseudoephedrine-guaifenesin      Other reaction(s): Stomach upset    Terbinafine      Other reaction(s): rash       Past Medical History:   Diagnosis Date    Allergy     Anemia     sickle cell trait    Anxiety     Arthritis     Asthma     Chronic disease anemia 10/5/2017    Colon polyps     Depression     Dermatitis 3/7/2013    Fibromyalgia     HEARING LOSS     Hyperlipidemia     Hypertension     Leucopenia 10/5/2017    Polyneuropathy     Scoliosis     Sickle cell trait     Sickle-cell trait 10/5/2017    Trouble in sleeping     Urticaria      Past Surgical History:   Procedure Laterality Date    ADENOIDECTOMY      APPENDECTOMY      COLONOSCOPY  4/14/2008    Dr. Guerrero, 10 year recheck     COLONOSCOPY N/A 10/4/2018    Procedure: COLONOSCOPY;  Surgeon: Tam Kemp MD;  Location: [facility];  Service: Endoscopy;  Laterality: N/A;    HYSTERECTOMY      OOPHORECTOMY      ROTATOR CUFF REPAIR Left 01/2016    ROTATOR CUFF REPAIR W/ DISTAL CLAVICLE EXCISION Right 12/04/2017    Dr. Eligio Timmons ( [facility] )    TONSILLECTOMY       Social History     Tobacco Use    Smoking status: Never    Smokeless tobacco: Never   Substance Use Topics    Alcohol use: Yes     Comment: occasionally - wine    Drug use: No     Family History   Problem Relation Age of Onset    Multiple sclerosis Mother     Seizures Mother     Cancer Father         lung    Alcohol abuse Brother     Early death Brother     Diabetes Maternal Aunt     Diabetes Maternal Uncle     Diabetes Maternal Grandmother     Mental illness Maternal Grandfather     Alzheimer's disease Maternal Grandfather     Asthma Paternal Aunt     Sickle cell anemia Paternal Aunt     Diabetes Sister     Arthritis Daughter     Miscarriages / Stillbirths Daughter     Anemia Daughter     Hyperlipidemia Son     Hypertension Son     Allergies Son     Sickle cell anemia Paternal Uncle     Asthma Brother     No Known Problems Sister     No Known Problems Sister     No Known Problems Sister     No Known Problems Brother     Miscarriages / Stillbirths Daughter     Thyroid disease Daughter     No Known Problems Daughter     Angioedema Neg Hx     Eczema Neg Hx     Immunodeficiency Neg Hx     Rheum arthritis Neg Hx     Psoriasis Neg Hx     Lupus Neg Hx         Review of Systems:   Per HPI       Objective:        Vitals:    09/14/23 0939   BP: (!) 160/86   Pulse: (!) 57       Lab Results   Component Value Date    WBC 4.45 10/04/2022    HGB 12.8 10/04/2022    HCT 37.9 10/04/2022     10/04/2022    CHOL 125 10/04/2022    TRIG 41 10/04/2022    HDL 69 10/04/2022    ALT 30 10/04/2022    AST 30 10/04/2022     10/04/2022    K 4.1 10/04/2022     10/04/2022    CREATININE 1.3  10/04/2022    BUN 20 10/04/2022    CO2 25 10/04/2022    TSH 1.380 07/08/2020    INR 1.1 07/08/2020    HGBA1C 6.0 (H) 10/04/2022        ECHOCARDIOGRAM RESULTS  Results for orders placed during the hospital encounter of 07/08/20    Stress Echo Which stress agent will be used? Treadmill Exercise; Color Flow Doppler? No    Interpretation Summary  · The patient reached the end of the protocol.  · Moderate concentric left ventricular hypertrophy.  · Normal left ventricular systolic function. The estimated ejection fraction is 65%.  · Normal LV diastolic function.  · Normal right ventricular systolic function.  · Mild left atrial enlargement.  · The stress echo portion of this study is negative for myocardial ischemia.  · There were no arrhythmias during stress.  · The patient's exercise capacity was mildly impaired.  · The ECG portion of this study is abnormal but not diagnostic for ischemia.        CURRENT/PREVIOUS VISIT EKG  Results for orders placed or performed in visit on 02/23/23   IN OFFICE EKG 12-LEAD (to [facility])    Collection Time: 02/23/23  9:10 AM    Narrative    Test Reason : I10,    Vent. Rate : 057 BPM     Atrial Rate : 057 BPM     P-R Int : 152 ms          QRS Dur : 066 ms      QT Int : 394 ms       P-R-T Axes : 077 079 -22 degrees     QTc Int : 383 ms    Sinus bradycardia  ST and T wave abnormality, consider inferior ischemia  ST and T wave abnormality, consider anterolateral ischemia  Abnormal ECG  When compared with ECG of 28-NOV-2022 16:02,  Questionable change in The axis  Confirmed by Ajith Andrew MD ([id number]) on 2/28/2023 1:10:21 PM    Referred By:             Confirmed By:Ajith Andrew MD     No valid procedures specified.   Results for orders placed during the hospital encounter of 12/02/22    Nuclear Stress - Cardiology Interpreted    Interpretation Summary    There is a mild to moderate intensity perfusion abnormality in the  wall of the left ventricle, secondary to breast attenuation.  Mild  inferolateral hypoperfusion with mild reversibility can not rule out small area of ischemia versus artifact    The gated perfusion images showed an ejection fraction of 73% post stress. Normal ejection fraction is greater than 53%.    The EKG portion of this study is abnormal but not diagnostic.    The patient reported chest pain during the stress test.    There were no arrhythmias during stress.    TID 1.10      Physical Exam:  CONSTITUTIONAL: No fever, no chills  HEENT: Normocephalic, atraumatic,pupils reactive to light                 NECK:  No JVD no carotid bruit  CVS: S1S2+, RRR  LUNGS: Clear  ABDOMEN: Soft, NT, BS+  EXTREMITIES: No cyanosis, edema  : No tr[place] catheter  NEURO: AAO X 3  PSY: Normal affect      Medication List with Changes/Refills   Current Medications    ALBUTEROL (PROAIR HFA) 90 MCG/ACTUATION INHALER    Inhale 2 puffs into the lungs every 6 (six) hours as needed for Wheezing. Rescue    ASCORBIC ACID, VITAMIN C, (VITAMIN C) 500 MG TABLET    Take 500 mg by mouth daily as needed.     ASPIRIN 81 MG CHEW    Take 1 tablet (81 mg total) by mouth once daily.    ATORVASTATIN (LIPITOR) 40 MG TABLET    Take 1 tablet (40 mg total) by mouth once daily.    AZELASTINE-FLUTICASONE (DYMISTA) 137-50 MCG/SPRAY SPRY NASSAL SPRAY    1 spray by Each Nostril route 2 (two) times daily.    BETAMETHASONE DIPROPIONATE (DIPROLENE) 0.05 % CREAM    Apply topically 2 (two) times daily.    CALCIUM CARBONATE/VITAMIN D3 (VITAMIN D-3 ORAL)    Take 100 Int'l Units by mouth once daily.     CARVEDILOL (COREG) 25 MG TABLET    Take 1 tablet (25 mg total) by mouth 2 (two) times daily with meals.    CETIRIZINE (ZYRTEC) 10 MG TABLET    Take 1 tablet (10 mg total) by mouth daily as needed for Allergies.    CLOBETASOL (OLUX) 0.05 % FOAM    APPLY TOPICALLY TO THE AFFECTED AREA TWICE DAILY    CLONIDINE (CATAPRES) 0.1 MG TABLET    Take 1 tablet (0.1 mg total) by mouth 3 (three) times daily as needed (for systolic BP greater than 170).     COENZYME Q10 10 MG CAPSULE    Take 10 mg by mouth once daily.    FOLIC ACID (FOLVITE) 400 MCG TABLET    Take 400 mcg by mouth once daily.    GARLIC OIL ORAL    Take 1 capsule by mouth once daily.     LISINOPRIL (PRINIVIL,ZESTRIL) 40 MG TABLET    Take 1 tablet (40 mg total) by mouth once daily.    MAGNESIUM 250 MG TAB    Take 250 mg by mouth 2 (two) times a day.    MECLIZINE (ANTIVERT) 25 MG TABLET    Take by mouth.    MONTELUKAST (SINGULAIR) 10 MG TABLET    Take 1 tablet (10 mg total) by mouth once daily.    NIACIN 250 MG TAB    Take 250 mg by mouth nightly.    OMEGA-3 FATTY ACIDS/FISH OIL (FISH OIL-OMEGA-3 FATTY ACIDS) 300-1,000 MG CAPSULE    Take by mouth once daily.    TRAZODONE (DESYREL) 50 MG TABLET    Take 1 tablet (50 mg total) by mouth every evening.    UNABLE TO FIND    Always eye drop    VALACYCLOVIR (VALTREX) 500 MG TABLET    TAKE 1 TABLET (500 MG TOTAL) BY MOUTH 2 (TWO) TIMES DAILY FOR 7 DAYS AS DIRECTED.    VITAMIN E 100 UNIT CAPSULE    Take 100 Units by mouth once daily.   Discontinued Medications    MELOXICAM (MOBIC) 7.5 MG TABLET    Take 1 tablet (7.5 mg total) by mouth once daily.             Assessment:       1. Atherosclerosis of native coronary artery of native heart without angina pectoris    2. Primary hypertension    3. Hyperlipidemia, unspecified hyperlipidemia type    4. White coat syndrome with diagnosis of hypertension    5. Stage 3a chronic kidney disease    6. Caregiver role strain         Plan:     Problem List Items Addressed This Visit          Unprioritized    HTN (hypertension)    Current Assessment & Plan     Continue with current antihypertensive regimen and monitor blood pressure at home.  I agree that heightened stress as well as pain affects the blood pressure intermittently.  She has clonidine to take p.r.n. for systolic blood pressure greater than 170.         Hyperlipidemia    Current Assessment & Plan     Recommend low-fat low-cholesterol diet.  Goal for LDL is less than  70.  Continue atorvastatin 40 mg p.o. daily.         Stage 3a chronic kidney disease    Current Assessment & Plan     Followed by PCP.         Atherosclerosis of native coronary artery of native heart without angina pectoris - Primary    Current Assessment & Plan     Stable and asymptomatic at the present time.  Recommend low-fat low-cholesterol diet and regular exercise.         White coat syndrome with diagnosis of hypertension    Caregiver role strain       Follow up in about 6 months (around 3/14/2024).

## 2023-09-14 NOTE — ASSESSMENT & PLAN NOTE
Stable and asymptomatic at the present time.  Recommend low-fat low-cholesterol diet and regular exercise.

## 2023-09-14 NOTE — PROCEDURES
"Nasal/sinus endoscopy    Date/Time: 9/14/2023 1:40 PM    Time out: Immediately prior to procedure a "time out" was called to verify the correct patient, procedure, equipment, support staff and site/side marked as required.    Performed by: Alex Henson MD  Authorized by: Alex Henson MD    Consent Done?:  Yes (Verbal)  Anesthesia:     Local anesthetic:  Afrin    Location details:  Left nostril    Local Atomizer?      Type of Endoscope:  Flexible  Nose:     Procedure Performed:  Nasal Endoscopy  Nasopharynx:      Afrin only use with limited decongestive response.  Bowing of the nasal septum to the left side.  No evidence of active middle meatal drainage or any edema or polyp on the right side.  Middle meatus inferior meatus and sphenoethmoid recess well visualized.    Left side narrower as outlined.  Left superior lateral aspect of the middle turbinate does exhibit some focal edema.  There is what appears to be a open maxillary os with no active drainage.  No sphenoethmoid recess drainage or edema on the left side.    "

## 2023-09-14 NOTE — ASSESSMENT & PLAN NOTE
Recommend low-fat low-cholesterol diet.  Goal for LDL is less than 70.  Continue atorvastatin 40 mg p.o. daily.

## 2023-09-14 NOTE — PROGRESS NOTES
[facility]    Subjective:      Patient: Loren Andre Patient PCP: Sanket Toure MD         :  1950     Sex:  female      MRN:  [id number]          Date of Visit: 2023      Chief Complaint: Sinus Problem (States gets a lot of sinus infections and nasal allergy symptoms. Sees Allergist and was referred to ENT to discuss sinus surgery. )      Patient ID: Loren Andre is a 73 y.o. female lifelong NON-smoker with non allergic rhinitis and recurrent sinus infections with a past medical history of HTN, HLD, CAD, CKD 3, RF, fibromyalgia, sickle cell trait and chronic anemia and leukopenia referred to me by Dr. Vik Sorensen in consultation for evaluation of patient for possible surgical intervention.  Patient is on Singulair and ProAir as well as Zyrtec and Dymista.  patient gets sinus infections at least 3 times a year.  She can feel them coming on and usually seeks medical attention within 3-5 days and has improvement in symptoms with antibiotics.  She feels her symptoms fully resolve.  She was treated with antibiotics as recently as July and August of this year.  She is currently asymptomatic.  Her classic infectious symptoms include headache, facial pressure and clear nasal drainage.  She denies nasal obstruction, foul smell, discolored drainage, loss of smell or taste or any focal dental pressure or pain.  She does not have a history of migraine.  She feels she is had no improvement with nasal steroids.  No increase or decrease in the frequency of her recurrent symptoms with or without the nasal steroids.  She has used nasal saline sprays but never rinses.  She does use Vicks have in the nose.    2023 allergy visit with Dr. Sorensen  Report equivocal allergy to Alternaria.  Recurrent green nasal discharge requesting ENT referral asthma well controlled on as needed albuterol.  2012 CT sinuses  Images reviewed shows some mild bowing of the septum the left side no appreciable  mucoperiosteal disease of any of the paranasal sinuses      Review of Systems     Past Medical History  She has a past medical history of Allergy, Anemia, Anxiety, Arthritis, Asthma, Chronic disease anemia, Colon polyps, Depression, Dermatitis, Fibromyalgia, HEARING LOSS, Hyperlipidemia, Hypertension, Leucopenia, Polyneuropathy, Scoliosis, Sickle cell trait, Sickle-cell trait, Trouble in sleeping, and Urticaria.    Family / Surgical / Social History  Her family history includes Alcohol abuse in her brother; Allergies in her son; Alzheimer's disease in her maternal grandfather; Anemia in her daughter; Arthritis in her daughter; Asthma in her brother and paternal aunt; Cancer in her father; Diabetes in her maternal aunt, maternal grandmother, maternal uncle, and sister; Early death in her brother; Hyperlipidemia in her son; Hypertension in her son; Mental illness in her maternal grandfather; Miscarriages / Stillbirths in her daughter and daughter; Multiple sclerosis in her mother; No Known Problems in her brother, daughter, sister, sister, and sister; Seizures in her mother; Sickle cell anemia in her paternal aunt and paternal uncle; Thyroid disease in her daughter.    Past Surgical History:   Procedure Laterality Date    ADENOIDECTOMY      APPENDECTOMY      COLONOSCOPY  4/14/2008    Dr. Guerrero, 10 year recheck    COLONOSCOPY N/A 10/4/2018    Procedure: COLONOSCOPY;  Surgeon: Tam Kemp MD;  Location: [facility];  Service: Endoscopy;  Laterality: N/A;    HYSTERECTOMY      OOPHORECTOMY      ROTATOR CUFF REPAIR Left 01/2016    ROTATOR CUFF REPAIR W/ DISTAL CLAVICLE EXCISION Right 12/04/2017    Dr. Eligio Timmons ( [facility] )    TONSILLECTOMY         Social History     Tobacco Use    Smoking status: Never    Smokeless tobacco: Never   Substance and Sexual Activity    Alcohol use: Yes     Comment: occasionally - wine    Drug use: No    Sexual activity: Not Currently     Birth control/protection: Surgical  "      Medications  She has a current medication list which includes the following prescription(s): albuterol, ascorbic acid (vitamin c), aspirin, atorvastatin, azelastine-fluticasone, betamethasone dipropionate, calcium carbonate/vitamin d3, carvedilol, cetirizine, clobetasol, clonidine, coenzyme q10, folic acid, garlic, lisinopril, magnesium, meclizine, montelukast, niacin, fish oil-omega-3 fatty acids, trazodone, UNABLE TO FIND, valacyclovir, and vitamin e.      Allergies  Review of patient's allergies indicates:   Allergen Reactions    Doxepin Anaphylaxis     abd pain    Norvasc [amlodipine] Palpitations    Xyzal [levocetirizine] Other (See Comments)     Can not take with blood pressure medications - headaches, higher blood pressure, red hands     Penicillins Rash    Sulfa (sulfonamide antibiotics) Rash    Duloxetine      Other reaction(s): insomnia    Gabapentin Nausea Only    Levofloxacin      Other reaction(s): Headache    Milnacipran      Other reaction(s): stomach pain    Nitrofurantoin monohyd/m-cryst      Other reaction(s): Itching    Prednisone      Other reaction(s): Itching    Pseudoephedrine-guaifenesin      Other reaction(s): Stomach upset    Terbinafine      Other reaction(s): rash       All medications, allergies, and past history have been reviewed.    Objective:      Vitals:      7/3/2023     8:15 AM 8/7/2023     2:17 PM 9/14/2023     9:39 AM   Vitals - 1 value per visit   SYSTOLIC 160  160   DIASTOLIC 83  86   Pulse 62  57   Temp 98.2 °F (36.8 °C)     Resp 18     SPO2 99 %  97 %   Weight (lb) 157 159.61 158   Weight (kg) 71.215 72.4 71.668   Height 5' 6" (1.676 m) 5' 6" (1.676 m) 5' 6" (1.676 m)   BMI (Calculated) 25.4 25.8 25.5   Pain Score  Zero        There is no height or weight on file to calculate BSA.    Physical Exam:    GENERAL  APPEARANCE -  alert, appears stated age, and cooperative  BARRIER(S) TO COMMUNICATION -  none VOICE - appropriate for age and gender    INTEGUMENTARY  no " suspicious head and neck lesions    HEENT  HEAD: Normocephalic, without obvious abnormality, atraumatic  FACE: INSPECTION - Symmetric, no signs of trauma, no suspicious lesion(s)  PALPATION -  No masses SALIVARY GLANDS - non-tender with no appreciable mass  STRENGTH - facial symmetry  NECK/THYROID: normal atraumatic, no neck masses, normal thyroid, no jvd    EYES  Normal occular alignment and mobility with no visible nystagmus at rest    EARS/NOSE/MOUTH/THROAT  EARS  PINNAE AND EXTERNAL EARS - no suspicious lesion OTOSCOPIC EXAM (surgical microscopy was not used for visualization/instrumentation): EAR EXAM - Normal ear canals, tympanic membranes and mobility, and middle ear spaces bilaterally.  HEARING - grossly intact to voice/finger rub    NOSE AND SINUSES  EXTERNAL NOSE - Grossly normal for age/sex  SEPTUM - bowing left TURBINATES - bilaterally 3+ right > left MUCOSA - pink, mild congestion, no anterior edema     MOUTH AND THROAT   ORAL CAVITY, LIPS, TEETH, GUMS & TONGUE - moist, no suspicious lesions  OROPHARYNX /TONSILS/PHARYNGEAL WALLS/HYPOPHARYNX - no erythema or exudates  NASOPHARYNX - limited mirror exam - unable to visualize due to anatomy/gag  LARYNX -  - limited mirror exam - unable to visualize due to anatomy/gag      CHEST AND LUNG   INSPECTION & AUSCULTATION - normal effort, no stridor    CARDIOVASCULAR  AUSCULTATION & PERIPHERAL VASCULAR - regular rate and rhythm.    NEUROLOGIC  MENTAL STATUS - alert, interactive CRANIAL NERVES - normal    LYMPHATIC  HEAD AND NECK - non-palpable      Procedure(s):  Nasal endoscopy performed.  See procedure note.    Left middle turbinate edema and open maxillary Os        Labs:  WBC   Date Value Ref Range Status   10/04/2022 4.45 3.90 - 12.70 K/uL Final     Hemoglobin   Date Value Ref Range Status   10/04/2022 12.8 12.0 - 16.0 g/dL Final     Platelets   Date Value Ref Range Status   10/04/2022 180 150 - 450 K/uL Final     Creatinine   Date Value Ref Range Status    10/04/2022 1.3 0.5 - 1.4 mg/dL Final     TSH   Date Value Ref Range Status   07/08/2020 1.380 0.340 - 5.600 uIU/mL Final     Glucose   Date Value Ref Range Status   10/04/2022 99 70 - 110 mg/dL Final     Hemoglobin A1C   Date Value Ref Range Status   10/04/2022 6.0 (H) 4.0 - 5.6 % Final     Comment:     ADA Screening Guidelines:  5.7-6.4%  Consistent with prediabetes  >or=6.5%  Consistent with diabetes    High levels of fetal hemoglobin interfere with the HbA1C  assay. Heterozygous hemoglobin variants (HbS, HgC, etc)do  not significantly interfere with this assay.   However, presence of multiple variants may affect accuracy.           Assessment:      Problem List Items Addressed This Visit    None  Visit Diagnoses       Acute recurrent sinusitis, unspecified location    -  Primary    Nonallergic rhinitis        Deviated septum        Nasal turbinate hypertrophy                     Plan:      Sinus surgery may indeed be indicated if we are truly experiencing recurrent acute sinusitis not recurrent flare-ups of sinus allergy type disease which is not bacterially infected.  The symptoms as discussed are different.  The indication for antibiotics is when symptoms consistent with bacterial infection are present for least 10-14 days (not the typical 3-5 days of previous treatments).      If a sinus infection is starting Afrin decongestant spray as outlined can be used.  It should only be used to abort a sinus infection and thereby prevent the need for steroids and antibiotics.  A short course of an oral steroid is probably more likely be helpful early in the course of illness then the antibiotics.  Antibiotics should be considered if symptoms progress beyond 10-14 days consistent with bacterial infection.  If this is the case, with the next infection, a CT scan should be obtained in high-resolution for surgical planning and treatment with surgery for recurrent acute sinusitis as discussed.  Septoplasty and turbinate  reductions may also be performed at that time.  This will be discussed in greater detail at follow-up if symptoms are progressing as above.      Patient encouraged to use lots of saline and sinus rinses (NeilMed) at least daily.  Nasal steroids are encouraged.  Patient feels she is had no benefit from Dymista and other nasal steroids in the past.  Alternate nasal steroid spray options all of which are available over-the-counter now are outlined for consideration.      Return with any worsening of symptoms, failure to improve, or any other concerns for further evaluation and treatment.      Voice recognition software was used in the creation of this note/communication and any sound-alike errors which may have occurred from its use should be taken in context when interpreting.  If such errors prevent a clear understanding of the note/communication, please contact the office for clarification.

## 2023-09-14 NOTE — ASSESSMENT & PLAN NOTE
Continue with current antihypertensive regimen and monitor blood pressure at home.  I agree that heightened stress as well as pain affects the blood pressure intermittently.  She has clonidine to take p.r.n. for systolic blood pressure greater than 170.

## 2023-09-14 NOTE — PATIENT INSTRUCTIONS
Sinus surgery may indeed be indicated if we are truly experiencing recurrent acute sinusitis not recurrent flare-ups of sinus allergy type disease which is not bacterially infected.  The symptoms as discussed are different.  The indication for antibiotics is when symptoms consistent with bacterial infection are present for least 10-14 days (not the typical 3-5 days of previous treatments).      If a sinus infection is starting Afrin decongestant spray as outlined can be used.  It should only be used to abort a sinus infection and thereby prevent the need for steroids and antibiotics.  A short course of an oral steroid is probably more likely be helpful early in the course of illness then the antibiotics.  Antibiotics should be considered if symptoms progress beyond 10-14 days consistent with bacterial infection.  If this is the case, with the next infection, a CT scan should be obtained in high-resolution for surgical planning and treatment with surgery for recurrent acute sinusitis as discussed.  Septoplasty and turbinate reductions may also be performed at that time.  This will be discussed in greater detail at follow-up if symptoms are progressing as above.      Patient encouraged to use lots of saline and sinus rinses (NeilMed) at least daily.  Nasal steroids are encouraged.  Patient feels she is had no benefit from Dymista and other nasal steroids in the past.  Alternate nasal steroid spray options all of which are available over-the-counter now are outlined for consideration.      Return with any worsening of symptoms, failure to improve, or any other concerns for further evaluation and treatment.      Voice recognition software was used in the creation of this note/communication and any sound-alike errors which may have occurred from its use should be taken in context when interpreting.  If such errors prevent a clear understanding of the note/communication, please contact the office for  clarification.          NASAL SALINE    Still saline comes in many preparations including sprays/mists, gels, and rinses.  Different preparations served different purposes.  Saline spray helps to briefly moisturize the nose and help clear mucus.  Saline gels coat the nose for longer protective benefit of keeping the linings the nose moist.  Saline rinses clear the nose and sinuses and a more thorough way in her best used for significant postnasal drip and sinus complaints.  A combination of saline sprays/mists, gels and rinses should be used to address routine nasal clearing and dryness issues as well as flushing for better control of allergy and postnasal drip symptoms.  There is no real risk of over use of nasal saline products.  Saline sprays do not have any of the potential rebound or addiction of nasal decongestant sprays.  Nasal saline sprays and rinses should be used prior to the application of any medicated nasal sprays such as nasal steroids or nasal antihistamine sprays.        INTRANASAL STEROID SPRAYS      Intranasal steroid sprays are available both by prescription and over-the-counter both in generic and brand name preparations.  They are all very similar in efficacy and side effect profiles.  Over-the-counter and prescription intranasal steroids include fluticasone propionate (Flonase), fluticasone furoate (Sensimist), triamcinolone (Nasacort), and budesonide (Rhinocort).  While these medications are available as prescriptions as well there are few nasal steroids in her available by prescription only and include mometasone (Nasonex), flunisolide (Nasarel), and beclomethasone (QNASL).    Nasal steroids or the foundation of treatment of both allergy and other inflammatory conditions of the nose and sinuses.  They are safe for regular use and while there are many side effects listed most of these are steroid class effects and not typically encountered.  Typical side effects include dryness and even  ulceration and bleeding of the nose.  These side effects can be minimized by proper application and proper moisturization with saline and saline gel.    Sometimes changing between 1 brand of nasal steroid and another can result in improved control of symptoms especially after long term use of one particular nasal steroid.    Proper application of the nasal steroid spray is accomplished by spraying towards the I/ear on the same side with the tip of the superior just barely inside the nostril with the chin slightly downward.  Any dripping should be gently inhaled not sniff test backwards into the throat.  Labeled instructions should be followed.        ASTELIN (Azelastine) nasal spray    Astelin is a topical nasal antihistamine which can be of additional benefit in controlling nasal allergy and postnasal drip.  Typically is recommended on an as-needed basis 1-2 sprays each nostril twice daily.  People often find it beneficial at night.  This typically added to her regimen of saline and nasal steroids as a 3rd line agent for as needed use.  Excessive use can cause excessive dryness and even nose bleeds.  It has a very strong taste which many people find intolerable.  Astelin needs to be stopped 5-7 days prior to any skin allergy testing just like oral antihistamines as it will inhibit the skin response.      SINUS RINSE INSTRUCTIONS    Nasal Saline Irrigation Instructions  You can wash your nasal and sinus passages using nasal saline (salt water) irrigation. This   is simple and effective. Follow the instructions below, as well as the ones provided by your   physician.  Supplies  First, you will need a nasal saline irrigation bottle and rinse solution.   You can purchase nasal rinse kits that include these items (such as   NeilMed®, Ayr®, Simply Saline®, Ocean Complete®) at most drug   stores. You can also make your own saline irrigation solution by   adding kosher (non-iodine) salt and baking soda to distilled water.    Your physician may tell you to add medications like a steroid or   antibiotic to the rinse as needed.  Steps for nasal irrigation  Step 1. Fill the bottle  ? Wash your hands.  ? Fill the irrigation bottle with lukewarm distilled water or boiled water that has cooled.  Step 2. Mix the solution  ? Put the saline and salt packet contents into the bottle.  ? Tighten the top of the bottle and shake it gently to dissolve the mixture.  ? If you are making your own solution:   - Add 1/4 to 1/2 teaspoon of baking soda and 1/8 teaspoon of kosher (non-iodine) salt   into the bottle.   - Tighten the top of the bottle.   - Shake the bottle gently to dissolve the mixture.  Step 3. Get into position  ?  front of the sink.  ? Unless you were instructed to use another position, bend forward.   Then tilt your face down about 45 degrees so that you are looking   down into the sink.  ? Gently place the spout of the saline bottle against 1 of your nostrils.  [facility]  Patient Education  ©2018 [employer]  ©2018 [employer]  Step 4. Rinse  ? Breathing through your mouth, gently squeeze the   bottle. This will squirt the solution into your nostril. The   solution will start to drain from the other nostril. Some   may drain from your mouth. This is normal.  ? Use 2 ounces (half of the bottle) on each nostril.  ? Afterwards, you may need to blow your nose gently to   help drain any solution that is left behind.  Step 5. Repeat  ? Repeat steps 3 and 4 with the other nostril.  You can watch a video to learn how to do nasal saline irrigation. Go to [email or web address] and   search for NeilMed Sinus Rinse.  Step 6.  Clean the bottle and cap. Air dry the Sinus Rinse bottle, cap, and tube on a clean paper towel, a lint free towel, or use NeilMed® NasaDOCK® or NasaDOCK plus (sold separately) to store the bottle, cap and tube.  Please read Warnings before using.   Our recommendation is to replace the bottle every three months.      NEILMED CLEAING INSTRUCTIONS    It is very important to keep these devices clean and free from any contamination. Replace the bottle every 3 months.  NasaDock Plus  NasaDock NeilMed® SINUS RINSE Squeeze Bottle: Please perform routine inspections of the bottle and tube for any discolorations and cracks. If there are any visual signs of deterioration or permanent color changes, please clean thoroughly. If the discolorations remain after cleansing, discard the items and purchase new ones. Please follow these instructions after each use of the product. Be sure to replace your product after three months.  Step 1: Rinse the cap, tube and bottle using running water. Fill the bottle with distilled, micro-filtered (through 0.2 micron), reverse osmosis filtered, commercially bottled or previously boiled and cooled down water at lukewarm or body temperature..  Step 2: Add a few drops of dish washing liquid or baby shampoo.  Step 3: Attach the cap and tube to the bottle; hold your finger over the opening in the cap and shake the bottle vigorously.  Step 4: Squeeze the bottle hard to allow the soapy solution to clean the interior of the tube and the cap. Empty out the bottle completely.  Step 5: Rinse the soap from the bottle, cap and tube thoroughly and place the items on a clean paper towel to dry or use the preferred NasaDOCK® or NasaDOCK plus.    The NasaDOCK® is a simple, hygienic way to dry and store the SINUS RINSE bottle, cap and tube. NasaDOCK® comes with various hanging options and is available in different colors. Our newest model also offers storage for our SINUS RINSE mixture packets. We strongly suggest using NasaDOCK® as an inexpensive, easy way to dry the cap, tube and SINUS RINSE bottle.        Cleaning:  Do not use a  to clean the inside of a bottle. While our bottle is  safe, a  will not adequately  clean the SINUS RINSE bottle. The water jets in dishwashers cannot enter the narrow neck of the bottle, and portions of the bottles interior will not be cleaned thoroughly. Additional methods of cleaning the bottle include the use of concentrated white vinegar or isopropyl alcohol (70% concentration), followed by scrubbing and rinsing as described above.       Microwave Disinfection  Clean the device with soap and water as mentioned above and shake off the excess water. Now place the bottle, cap and tube in the microwave for 40 seconds. This will disinfect the bottle, cap and tube. If the microwave has been used recently, please make sure that the inside of the microwave has cooled back down to room temperature before using it to disinfect the bottle.    NeilMed NasaFlo® Neti Pot Users:  Use the same procedure as above.    Sinugator® Cleaning Directions:  Clean the Sinugator® by running plain water and dry with a clean lint free towel and then air dry the unit by keeping it open to the air. The nasal  tip, blue reservoir and white soft tube can be disinfected by cleaning with soap and water and shaking off the excess water before placing in the home microwave for 60 seconds. Clean the entire unit with a few drops of dishwashing liquid and water every three days to keep the unit clean. As a fi nal rinse to wash off any residual soap or tap water, use either distilled, micro-filtered (through 0.2 micron filter), commercially bottled or previously boiled & cooled down water. Please make sure to rinse thoroughly during each wash so no soap is left behind. DO NOT place the white motor unit in microwave for disinfection. Because of the units stainless steel components, this can cause damage or fire hazards.    General Principles of Maintenance & Storage:  When permissible use a microwave periodically to disinfect devices. Always store NeilMed® products in a cool and dry place with adequate ventilation.  NasaDOCK® or NasaDOCK plus offer a simple hygienic way to air dry & neatly store the bottle, cap, tube and NasaFlo. Do not store the bottle with the cap screwed on, unless both are dry. Do not store the wet parts in a sealed plastic bag. If you travel before they are dry, wrap parts separately in paper towels. Hand soap or shampoo can be used for cleaning parts while away from home.        USE ONLY AS DIRECTED, IF SYMPTOMS PERSIST SEE YOUR DOCTOR/HEALTHCARE PROFESSIONAL. ALWAYS READ THE LABEL.

## 2023-09-20 ENCOUNTER — HOSPITAL ENCOUNTER (OUTPATIENT)
Dept: RADIOLOGY | Facility: CLINIC | Age: 73
Discharge: HOME | End: 2023-09-20
Attending: FAMILY MEDICINE
Payer: MEDICARE

## 2023-09-20 DIAGNOSIS — Z12.31 ENCOUNTER FOR SCREENING MAMMOGRAM FOR MALIGNANT NEOPLASM OF BREAST: ICD-10-CM

## 2023-09-20 PROCEDURE — 77067 SCR MAMMO BI INCL CAD: CPT | Mod: TC,HCNC,PO

## 2023-09-20 PROCEDURE — 77063 BREAST TOMOSYNTHESIS BI: CPT | Mod: 26,HCNC,, | Performed by: RADIOLOGY

## 2023-09-20 PROCEDURE — 77067 MAMMO DIGITAL SCREENING BILAT WITH TOMO: ICD-10-PCS | Mod: 26,HCNC,, | Performed by: RADIOLOGY

## 2023-09-20 PROCEDURE — 77063 MAMMO DIGITAL SCREENING BILAT WITH TOMO: ICD-10-PCS | Mod: 26,HCNC,, | Performed by: RADIOLOGY

## 2023-09-20 PROCEDURE — 77067 SCR MAMMO BI INCL CAD: CPT | Mod: 26,HCNC,, | Performed by: RADIOLOGY

## 2023-10-04 ENCOUNTER — OFFICE VISIT (OUTPATIENT)
Dept: FAMILY MEDICINE | Facility: CLINIC | Age: 73
End: 2023-10-04
Attending: FAMILY MEDICINE
Payer: MEDICARE

## 2023-10-04 VITALS
DIASTOLIC BLOOD PRESSURE: 80 MMHG | BODY MASS INDEX: 25.3 KG/M2 | WEIGHT: 157.44 LBS | HEIGHT: 66 IN | SYSTOLIC BLOOD PRESSURE: 132 MMHG | HEART RATE: 96 BPM | OXYGEN SATURATION: 96 % | TEMPERATURE: 98 F

## 2023-10-04 DIAGNOSIS — I10 PRIMARY HYPERTENSION: ICD-10-CM

## 2023-10-04 DIAGNOSIS — M79.10 MYALGIA: ICD-10-CM

## 2023-10-04 DIAGNOSIS — G47.00 INSOMNIA, UNSPECIFIED TYPE: ICD-10-CM

## 2023-10-04 DIAGNOSIS — M79.7 FIBROMYALGIA: ICD-10-CM

## 2023-10-04 DIAGNOSIS — N18.31 STAGE 3A CHRONIC KIDNEY DISEASE: ICD-10-CM

## 2023-10-04 DIAGNOSIS — E78.5 HYPERLIPIDEMIA, UNSPECIFIED HYPERLIPIDEMIA TYPE: ICD-10-CM

## 2023-10-04 DIAGNOSIS — Z00.00 ENCOUNTER FOR PREVENTIVE HEALTH EXAMINATION: Primary | ICD-10-CM

## 2023-10-04 DIAGNOSIS — Z63.8 CAREGIVER ROLE STRAIN: ICD-10-CM

## 2023-10-04 DIAGNOSIS — Z78.0 MENOPAUSE: ICD-10-CM

## 2023-10-04 DIAGNOSIS — I25.10 ATHEROSCLEROSIS OF NATIVE CORONARY ARTERY OF NATIVE HEART WITHOUT ANGINA PECTORIS: ICD-10-CM

## 2023-10-04 DIAGNOSIS — D72.819 LEUKOPENIA, UNSPECIFIED TYPE: ICD-10-CM

## 2023-10-04 DIAGNOSIS — J45.909 ASTHMA, UNSPECIFIED ASTHMA SEVERITY, UNSPECIFIED WHETHER COMPLICATED, UNSPECIFIED WHETHER PERSISTENT: ICD-10-CM

## 2023-10-04 DIAGNOSIS — R73.03 PREDIABETES: ICD-10-CM

## 2023-10-04 DIAGNOSIS — D57.3 SICKLE-CELL TRAIT: ICD-10-CM

## 2023-10-04 PROCEDURE — 1160F RVW MEDS BY RX/DR IN RCRD: CPT | Mod: HCNC,CPTII,S$GLB, | Performed by: FAMILY MEDICINE

## 2023-10-04 PROCEDURE — 3075F SYST BP GE 130 - 139MM HG: CPT | Mod: HCNC,CPTII,S$GLB, | Performed by: FAMILY MEDICINE

## 2023-10-04 PROCEDURE — 99999 PR PBB SHADOW E&M-EST. PATIENT-LVL III: ICD-10-PCS | Mod: PBBFAC,HCNC,, | Performed by: FAMILY MEDICINE

## 2023-10-04 PROCEDURE — 4010F PR ACE/ARB THEARPY RXD/TAKEN: ICD-10-PCS | Mod: HCNC,CPTII,S$GLB, | Performed by: FAMILY MEDICINE

## 2023-10-04 PROCEDURE — 1126F AMNT PAIN NOTED NONE PRSNT: CPT | Mod: HCNC,CPTII,S$GLB, | Performed by: FAMILY MEDICINE

## 2023-10-04 PROCEDURE — 99397 PER PM REEVAL EST PAT 65+ YR: CPT | Mod: 25,HCNC,S$GLB, | Performed by: FAMILY MEDICINE

## 2023-10-04 PROCEDURE — 1126F PR PAIN SEVERITY QUANTIFIED, NO PAIN PRESENT: ICD-10-PCS | Mod: HCNC,CPTII,S$GLB, | Performed by: FAMILY MEDICINE

## 2023-10-04 PROCEDURE — G0008 ADMIN INFLUENZA VIRUS VAC: HCPCS | Mod: HCNC,S$GLB,, | Performed by: FAMILY MEDICINE

## 2023-10-04 PROCEDURE — 3079F PR MOST RECENT DIASTOLIC BLOOD PRESSURE 80-89 MM HG: ICD-10-PCS | Mod: HCNC,CPTII,S$GLB, | Performed by: FAMILY MEDICINE

## 2023-10-04 PROCEDURE — 90694 VACC AIIV4 NO PRSRV 0.5ML IM: CPT | Mod: HCNC,S$GLB,, | Performed by: FAMILY MEDICINE

## 2023-10-04 PROCEDURE — 1159F PR MEDICATION LIST DOCUMENTED IN MEDICAL RECORD: ICD-10-PCS | Mod: HCNC,CPTII,S$GLB, | Performed by: FAMILY MEDICINE

## 2023-10-04 PROCEDURE — 4010F ACE/ARB THERAPY RXD/TAKEN: CPT | Mod: HCNC,CPTII,S$GLB, | Performed by: FAMILY MEDICINE

## 2023-10-04 PROCEDURE — 99999 PR PBB SHADOW E&M-EST. PATIENT-LVL III: CPT | Mod: PBBFAC,HCNC,, | Performed by: FAMILY MEDICINE

## 2023-10-04 PROCEDURE — 3079F DIAST BP 80-89 MM HG: CPT | Mod: HCNC,CPTII,S$GLB, | Performed by: FAMILY MEDICINE

## 2023-10-04 PROCEDURE — 1101F PT FALLS ASSESS-DOCD LE1/YR: CPT | Mod: HCNC,CPTII,S$GLB, | Performed by: FAMILY MEDICINE

## 2023-10-04 PROCEDURE — 3008F PR BODY MASS INDEX (BMI) DOCUMENTED: ICD-10-PCS | Mod: HCNC,CPTII,S$GLB, | Performed by: FAMILY MEDICINE

## 2023-10-04 PROCEDURE — 99397 PR PREVENTIVE VISIT,EST,65 & OVER: ICD-10-PCS | Mod: 25,HCNC,S$GLB, | Performed by: FAMILY MEDICINE

## 2023-10-04 PROCEDURE — 1160F PR REVIEW ALL MEDS BY PRESCRIBER/CLIN PHARMACIST DOCUMENTED: ICD-10-PCS | Mod: HCNC,CPTII,S$GLB, | Performed by: FAMILY MEDICINE

## 2023-10-04 PROCEDURE — 1101F PR PT FALLS ASSESS DOC 0-1 FALLS W/OUT INJ PAST YR: ICD-10-PCS | Mod: HCNC,CPTII,S$GLB, | Performed by: FAMILY MEDICINE

## 2023-10-04 PROCEDURE — 3288F FALL RISK ASSESSMENT DOCD: CPT | Mod: HCNC,CPTII,S$GLB, | Performed by: FAMILY MEDICINE

## 2023-10-04 PROCEDURE — 1159F MED LIST DOCD IN RCRD: CPT | Mod: HCNC,CPTII,S$GLB, | Performed by: FAMILY MEDICINE

## 2023-10-04 PROCEDURE — 3288F PR FALLS RISK ASSESSMENT DOCUMENTED: ICD-10-PCS | Mod: HCNC,CPTII,S$GLB, | Performed by: FAMILY MEDICINE

## 2023-10-04 PROCEDURE — 3075F PR MOST RECENT SYSTOLIC BLOOD PRESS GE 130-139MM HG: ICD-10-PCS | Mod: HCNC,CPTII,S$GLB, | Performed by: FAMILY MEDICINE

## 2023-10-04 PROCEDURE — 3008F BODY MASS INDEX DOCD: CPT | Mod: HCNC,CPTII,S$GLB, | Performed by: FAMILY MEDICINE

## 2023-10-04 PROCEDURE — G0008 FLU VACCINE - QUADRIVALENT - ADJUVANTED: ICD-10-PCS | Mod: HCNC,S$GLB,, | Performed by: FAMILY MEDICINE

## 2023-10-04 PROCEDURE — 90694 FLU VACCINE - QUADRIVALENT - ADJUVANTED: ICD-10-PCS | Mod: HCNC,S$GLB,, | Performed by: FAMILY MEDICINE

## 2023-10-04 RX ORDER — LISINOPRIL 40 MG/1
40 TABLET ORAL DAILY
Qty: 90 TABLET | Refills: 3 | Status: SHIPPED | OUTPATIENT
Start: 2023-10-04

## 2023-10-04 RX ORDER — CARVEDILOL 25 MG/1
25 TABLET ORAL 2 TIMES DAILY WITH MEALS
Qty: 180 TABLET | Refills: 3 | Status: SHIPPED | OUTPATIENT
Start: 2023-10-04 | End: 2023-12-07

## 2023-10-04 RX ORDER — MONTELUKAST SODIUM 10 MG/1
10 TABLET ORAL DAILY
Qty: 90 TABLET | Refills: 3 | Status: SHIPPED | OUTPATIENT
Start: 2023-10-04

## 2023-10-04 RX ORDER — TRAMADOL HYDROCHLORIDE AND ACETAMINOPHEN 37.5; 325 MG/1; MG/1
1 TABLET, FILM COATED ORAL EVERY 6 HOURS PRN
Qty: 20 TABLET | Refills: 0 | Status: SHIPPED | OUTPATIENT
Start: 2023-10-04

## 2023-10-04 RX ORDER — ATORVASTATIN CALCIUM 40 MG/1
40 TABLET, FILM COATED ORAL DAILY
Qty: 90 TABLET | Refills: 3 | Status: SHIPPED | OUTPATIENT
Start: 2023-10-04

## 2023-10-04 SDOH — SOCIAL DETERMINANTS OF HEALTH (SDOH): OTHER SPECIFIED PROBLEMS RELATED TO PRIMARY SUPPORT GROUP: Z63.8
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
## 2023-10-10 DIAGNOSIS — A60.9 HSV (HERPES SIMPLEX VIRUS) ANOGENITAL INFECTION: ICD-10-CM

## 2023-10-10 RX ORDER — VALACYCLOVIR HYDROCHLORIDE 500 MG/1
TABLET, FILM COATED ORAL
Qty: 60 TABLET | Refills: 3 | Status: SHIPPED | OUTPATIENT
Start: 2023-10-10

## 2023-10-10 NOTE — TELEPHONE ENCOUNTER
No care due was identified.  [facility] Embedded Care Due Messages. Reference number: [id number].   10/10/2023 8:05:00 AM CDT

## 2023-10-10 NOTE — TELEPHONE ENCOUNTER
Refill Routing Note   Medication(s) are not appropriate for processing by [facility] for the following reason(s):      Due for refill >6 months ago    [facility] action(s):  Defer Care Due:  None identified              Appointments  past 12m or future 3m with PCP    Date Provider   Last Visit   10/4/2023 Sanket Toure MD   Next Visit   Visit date not found Sanket Toure MD   ED visits in past 90 days: 0        Note composed:2:21 PM 10/10/2023

## 2023-10-17 ENCOUNTER — HOSPITAL ENCOUNTER (OUTPATIENT)
Dept: RADIOLOGY | Facility: CLINIC | Age: 73
Discharge: HOME | End: 2023-10-17
Attending: FAMILY MEDICINE
Payer: MEDICARE

## 2023-10-17 DIAGNOSIS — Z78.0 MENOPAUSE: ICD-10-CM

## 2023-10-17 PROCEDURE — 77080 DXA BONE DENSITY AXIAL: CPT | Mod: 26,HCNC,, | Performed by: RADIOLOGY

## 2023-10-17 PROCEDURE — 77080 DXA BONE DENSITY AXIAL SKELETON 1 OR MORE SITES: ICD-10-PCS | Mod: 26,HCNC,, | Performed by: RADIOLOGY

## 2023-10-17 PROCEDURE — 77080 DXA BONE DENSITY AXIAL: CPT | Mod: TC,HCNC,PO

## 2023-10-23 DIAGNOSIS — J45.909 ASTHMA, UNSPECIFIED ASTHMA SEVERITY, UNSPECIFIED WHETHER COMPLICATED, UNSPECIFIED WHETHER PERSISTENT: Primary | ICD-10-CM

## 2023-10-23 NOTE — TELEPHONE ENCOUNTER
Spoke with patient   Needs new mouthpiece for nebulizer    Orders pended     ----- Message from Nesha Larios sent at 10/23/2023  9:39 AM CDT -----  Regarding: Needs Medical Order  Contact: patient at [phone]  Type: Needs Medical Order  Who Called:  patient at [phone]    Additional Information: patient's mouthpiece for the nebulizer is broken and she would like to get an order for it to be replaced. Please call and advise. Thank you

## 2023-10-24 NOTE — TELEPHONE ENCOUNTER
Please sign mar update to add Calm gummies- it's a magnesium and melatonin supplement-she takes 2 at bedtime.

## 2023-10-24 NOTE — TELEPHONE ENCOUNTER
Patient notified supply order faxed to [facility] in [place].

## 2023-11-06 ENCOUNTER — TELEPHONE (OUTPATIENT)
Dept: FAMILY MEDICINE | Facility: CLINIC | Age: 73
End: 2023-11-06
Payer: MEDICARE

## 2023-11-06 DIAGNOSIS — L98.9 LESION OF SKIN OF FACE: Primary | ICD-10-CM

## 2023-11-06 NOTE — TELEPHONE ENCOUNTER
Spoke to patient.  She is requesting a referral to dermatology.  She has new moles appearing on face very close to eyes   Referral pended        ----- Message from Asya Andre sent at 11/6/2023  8:31 AM CST -----  Type: Needs Medical Advice  Who Called:  pt  Best Call Back Number: [phone]  Additional Information: pt is requesting to speak with nurse Arreaga about getting a referral for Derm, pl call bk to advise thanks

## 2023-11-07 ENCOUNTER — TELEPHONE (OUTPATIENT)
Dept: DERMATOLOGY | Facility: CLINIC | Age: 73
End: 2023-11-07
Payer: MEDICARE

## 2023-12-07 DIAGNOSIS — I10 PRIMARY HYPERTENSION: ICD-10-CM

## 2023-12-07 RX ORDER — CARVEDILOL 25 MG/1
25 TABLET ORAL 2 TIMES DAILY WITH MEALS
Qty: 180 TABLET | Refills: 3 | Status: SHIPPED | OUTPATIENT
Start: 2023-12-07

## 2023-12-08 NOTE — TELEPHONE ENCOUNTER
Refill Decision Note   Loren Thai  is requesting a refill authorization.  Brief Assessment and Rationale for Refill:  Approve     Medication Therapy Plan:         Alert overridden per protocol: Yes   Comments:     Note composed:9:28 PM 12/07/2023

## 2023-12-08 NOTE — TELEPHONE ENCOUNTER
No care due was identified.  [facility] Embedded Care Due Messages. Reference number: [id number].   12/07/2023 8:47:39 PM CST

## 2023-12-13 ENCOUNTER — OFFICE VISIT (OUTPATIENT)
Dept: URGENT CARE | Facility: CLINIC | Age: 73
End: 2023-12-13
Payer: MEDICARE

## 2023-12-13 VITALS
HEIGHT: 66 IN | SYSTOLIC BLOOD PRESSURE: 189 MMHG | WEIGHT: 158 LBS | RESPIRATION RATE: 18 BRPM | DIASTOLIC BLOOD PRESSURE: 85 MMHG | BODY MASS INDEX: 25.39 KG/M2 | OXYGEN SATURATION: 99 % | TEMPERATURE: 97 F | HEART RATE: 57 BPM

## 2023-12-13 DIAGNOSIS — J01.90 ACUTE SINUSITIS, RECURRENCE NOT SPECIFIED, UNSPECIFIED LOCATION: Primary | ICD-10-CM

## 2023-12-13 PROCEDURE — 99214 OFFICE O/P EST MOD 30 MIN: CPT | Mod: S$GLB,,,

## 2023-12-13 PROCEDURE — 99214 PR OFFICE/OUTPT VISIT, EST, LEVL IV, 30-39 MIN: ICD-10-PCS | Mod: S$GLB,,,

## 2023-12-13 RX ORDER — DOXYCYCLINE 100 MG/1
100 CAPSULE ORAL 2 TIMES DAILY
Qty: 14 CAPSULE | Refills: 0 | Status: SHIPPED | OUTPATIENT
Start: 2023-12-13 | End: 2023-12-13 | Stop reason: SDUPTHER

## 2023-12-13 RX ORDER — DOXYCYCLINE 100 MG/1
100 CAPSULE ORAL 2 TIMES DAILY
Qty: 14 CAPSULE | Refills: 0 | Status: SHIPPED | OUTPATIENT
Start: 2023-12-13 | End: 2023-12-20

## 2023-12-13 NOTE — PROGRESS NOTES
"Subjective:      Patient ID: Loren Andre is a 73 y.o. female.    Vitals:  height is 5' 6" (1.676 m) and weight is 71.7 kg (158 lb). Her oral temperature is 97.2 °F (36.2 °C). Her blood pressure is 189/85 (abnormal) and her pulse is 57 (abnormal). Her respiration is 18 and oxygen saturation is 99%.     Chief Complaint: Sinus Problem    Loren, past medical history of hypertension, recurrent sinusitis, anxiety, asthma, presents to clinic with a chief complaint of sinus infection.  States been having facial swelling, sinus pressure, nasal drainage, and productive cough for 1 week.  Denies fever.  She states symptoms are as previous sinus infections.    Sinus Problem  This is a new problem. The current episode started 1 to 4 weeks ago. There has been no fever. Associated symptoms include congestion, coughing, headaches and sinus pressure.       Constitution: Negative for fever.   HENT:  Positive for facial swelling, congestion, postnasal drip, sinus pain and sinus pressure.    Respiratory:  Positive for chest tightness, cough, sputum production and asthma. Negative for wheezing.    Gastrointestinal:  Negative for nausea, vomiting and diarrhea.   Allergic/Immunologic: Positive for asthma and recurrent sinus infections.   Neurological:  Positive for headaches.      Objective:     Physical Exam   Constitutional: She is oriented to person, place, and time. She appears well-developed. She is cooperative.  Non-toxic appearance. She does not appear ill. No distress. obesity  HENT:   Head: Normocephalic and atraumatic.   Ears:   Right Ear: Hearing, tympanic membrane, external ear and ear canal normal.   Left Ear: Hearing, tympanic membrane, external ear and ear canal normal.   Nose: Congestion present. No mucosal edema or nasal deformity. No epistaxis. Right sinus exhibits maxillary sinus tenderness and frontal sinus tenderness. Left sinus exhibits maxillary sinus tenderness and frontal sinus tenderness.   Mouth/Throat: " Uvula is midline, oropharynx is clear and moist and mucous membranes are normal. Mucous membranes are moist. No trismus in the jaw. Normal dentition. No uvula swelling. Oropharynx is clear.   Eyes: Conjunctivae and lids are normal. Pupils are equal, round, and reactive to light. Extraocular movement intact   Neck: Trachea normal and phonation normal. Neck supple.   Cardiovascular: Normal rate, regular rhythm, normal heart sounds and normal pulses.   Pulmonary/Chest: Effort normal and breath sounds normal.   Abdominal: Normal appearance. Soft. flat abdomen   Musculoskeletal: Normal range of motion.         General: Normal range of motion.   Neurological: no focal deficit. She is alert, oriented to person, place, and time and at baseline. She exhibits normal muscle tone.   Skin: Skin is warm, dry, intact and not diaphoretic. Capillary refill takes 2 to 3 seconds.   Psychiatric: Her speech is normal and behavior is normal. Mood, judgment and thought content normal.   Nursing note and vitals reviewed.      Assessment:     1. Acute sinusitis, recurrence not specified, unspecified location        Plan:       Acute sinusitis, recurrence not specified, unspecified location  -     Discontinue: doxycycline (VIBRAMYCIN) 100 MG Cap; Take 1 capsule (100 mg total) by mouth 2 (two) times daily. for 7 days  Dispense: 14 capsule; Refill: 0  -     doxycycline (VIBRAMYCIN) 100 MG Cap; Take 1 capsule (100 mg total) by mouth 2 (two) times daily. for 7 days  Dispense: 14 capsule; Refill: 0      Shared medical decision-making with the patient declined viral testing.  States this is her typical sinus infection.

## 2023-12-13 NOTE — PATIENT INSTRUCTIONS
Continue using nose sprays,  Nasal irrigation, an antihistamine use.  Take doxy with a full glass of water do not lie flat for 1 hour after.  Protect skin by using sunscreen and wear sunglasses.  He will be more susceptible to sunburns while taking doxy

## 2023-12-19 ENCOUNTER — TELEPHONE (OUTPATIENT)
Dept: FAMILY MEDICINE | Facility: CLINIC | Age: 73
End: 2023-12-19
Payer: MEDICARE

## 2023-12-19 NOTE — TELEPHONE ENCOUNTER
----- Message from Marlys Toure sent at 12/19/2023  1:03 PM CST -----  Contact: Self  Type: Needs Medical Advice  Who Called:  Patient  Symptoms (please be specific):  Pt is asking if Dr Toure recommends getting the RSV vaccine.  How long has patient had these symptoms:  N/A  Pharmacy name and phone #:  N/A  Best Call Back Number: [phone]  Additional Information: Please call pt back to advise. Thank You.

## 2024-01-09 ENCOUNTER — TELEPHONE (OUTPATIENT)
Dept: FAMILY MEDICINE | Facility: CLINIC | Age: 74
End: 2024-01-09
Payer: MEDICARE

## 2024-01-09 DIAGNOSIS — M79.10 MYALGIA: Primary | ICD-10-CM

## 2024-01-09 NOTE — TELEPHONE ENCOUNTER
----- Message from Gordon Ramon sent at 1/9/2024  8:54 AM CST -----  Contact: pt  Type: Needs Medical Advice  Who Called: pt  Best Call Back Number: [phone]    Additional Information:     Pt is calling needs to talk to the nurse to pass some information on to  Regarding script.Please call back and advise.

## 2024-01-09 NOTE — TELEPHONE ENCOUNTER
Spoke with patient.   She has been taking Tramadol as prescribed.   She states the medication does not help at all.   Last night in severe pain.  She was unable to sleep, due to her pain.  Her daughter gave her a Gabapentin ( She does not know a mg)   This did help. She was able to rest.   Please advise.

## 2024-01-10 RX ORDER — GABAPENTIN 100 MG/1
100 CAPSULE ORAL NIGHTLY
Qty: 30 CAPSULE | Refills: 5 | Status: SHIPPED | OUTPATIENT
Start: 2024-01-10 | End: 2025-01-09

## 2024-01-10 NOTE — TELEPHONE ENCOUNTER
She does appear to have an element of fibromyalgia so gabapentin might help.  I usually start with the 300 mg once at bedtime and increase from there.  People who were sensitive to drug side effects might prefer the 100 mg and there are 400s, 600s, and 800s.  It would help very much if we could identify what strength she tried.

## 2024-01-11 NOTE — TELEPHONE ENCOUNTER
Spoke with patient    Instructed to watch for any problems and contact the office.   Verbalizes understanding

## 2024-01-11 NOTE — TELEPHONE ENCOUNTER
site checked, no inappropriate activity found    I sent 100 mg gabapentin to be taken at bedtime to [facility] on [address].  She had it listed in her allergy list as causing nausea, this is likely to be a dose dependent effect, if she can keep it at a low dose it probably will not bother her

## 2024-04-03 ENCOUNTER — OFFICE VISIT (OUTPATIENT)
Dept: CARDIOLOGY | Facility: CLINIC | Age: 74
End: 2024-04-03
Payer: MEDICARE

## 2024-04-03 VITALS
SYSTOLIC BLOOD PRESSURE: 202 MMHG | WEIGHT: 158.75 LBS | HEART RATE: 60 BPM | BODY MASS INDEX: 25.51 KG/M2 | DIASTOLIC BLOOD PRESSURE: 89 MMHG | HEIGHT: 66 IN

## 2024-04-03 DIAGNOSIS — I25.10 ATHEROSCLEROSIS OF NATIVE CORONARY ARTERY OF NATIVE HEART WITHOUT ANGINA PECTORIS: Primary | ICD-10-CM

## 2024-04-03 DIAGNOSIS — I10 WHITE COAT SYNDROME WITH DIAGNOSIS OF HYPERTENSION: ICD-10-CM

## 2024-04-03 DIAGNOSIS — I10 PRIMARY HYPERTENSION: ICD-10-CM

## 2024-04-03 DIAGNOSIS — R00.2 PALPITATIONS: ICD-10-CM

## 2024-04-03 DIAGNOSIS — R07.89 ATYPICAL CHEST PAIN: ICD-10-CM

## 2024-04-03 DIAGNOSIS — I70.0 AORTIC ATHEROSCLEROSIS: ICD-10-CM

## 2024-04-03 PROCEDURE — 93000 ELECTROCARDIOGRAM COMPLETE: CPT | Mod: S$GLB,,, | Performed by: INTERNAL MEDICINE

## 2024-04-03 PROCEDURE — 99214 OFFICE O/P EST MOD 30 MIN: CPT | Mod: S$GLB,,, | Performed by: NURSE PRACTITIONER

## 2024-04-03 PROCEDURE — 3079F DIAST BP 80-89 MM HG: CPT | Mod: CPTII,S$GLB,, | Performed by: NURSE PRACTITIONER

## 2024-04-03 PROCEDURE — 1101F PT FALLS ASSESS-DOCD LE1/YR: CPT | Mod: CPTII,S$GLB,, | Performed by: NURSE PRACTITIONER

## 2024-04-03 PROCEDURE — 1159F MED LIST DOCD IN RCRD: CPT | Mod: CPTII,S$GLB,, | Performed by: NURSE PRACTITIONER

## 2024-04-03 PROCEDURE — 3077F SYST BP >= 140 MM HG: CPT | Mod: CPTII,S$GLB,, | Performed by: NURSE PRACTITIONER

## 2024-04-03 PROCEDURE — 3288F FALL RISK ASSESSMENT DOCD: CPT | Mod: CPTII,S$GLB,, | Performed by: NURSE PRACTITIONER

## 2024-04-03 PROCEDURE — 1126F AMNT PAIN NOTED NONE PRSNT: CPT | Mod: CPTII,S$GLB,, | Performed by: NURSE PRACTITIONER

## 2024-04-03 PROCEDURE — 4010F ACE/ARB THERAPY RXD/TAKEN: CPT | Mod: CPTII,S$GLB,, | Performed by: NURSE PRACTITIONER

## 2024-04-03 PROCEDURE — 99999 PR PBB SHADOW E&M-EST. PATIENT-LVL V: CPT | Mod: PBBFAC,HCNC,, | Performed by: NURSE PRACTITIONER

## 2024-04-03 NOTE — ASSESSMENT & PLAN NOTE
BP elevated today's visit. She has a known hx of white coat HTN. She reports her BP at home has been running in the 130/70s.

## 2024-04-03 NOTE — PATIENT INSTRUCTIONS
Please track your BP (blood pressure) every 4 hours while awake for 5 days and turn in a log for evaluation. Please include the date, time, BP, and heart rate on your log in a list format.  Your BP log can be turned in via [employer] or in person at the  of our office.

## 2024-04-03 NOTE — PROGRESS NOTES
Subjective:    Patient ID:  Loren Andre is a 74 y.o. female.  Chief Complaint   Patient presents with    Hypertension    Hyperlipidemia       HPI:  Patient presents today for follow-up appointment.  Patient has no complaints of exertional chest pain, dizziness, shortness of breath, or syncope. She does have some intermittent palpitations at times. She has also noted some left sided chest soreness which occurred after exercising but not during exercise. Patient has been doing well and taking medications as ordered.  Denies any falls or head injuries.  Denies any blood in the stool or in the urine.      Review of patient's allergies indicates:   Allergen Reactions    Doxepin Anaphylaxis     abd pain    Norvasc [amlodipine] Palpitations    Xyzal [levocetirizine] Other (See Comments)     Can not take with blood pressure medications - headaches, higher blood pressure, red hands     Penicillins Rash    Sulfa (sulfonamide antibiotics) Rash    Duloxetine      Other reaction(s): insomnia    Levofloxacin      Other reaction(s): Headache    Milnacipran      Other reaction(s): stomach pain    Nitrofurantoin monohyd/m-cryst      Other reaction(s): Itching    Prednisone      Other reaction(s): Itching    Pseudoephedrine-guaifenesin      Other reaction(s): Stomach upset    Terbinafine      Other reaction(s): rash       Past Medical History:   Diagnosis Date    Allergy     Anemia     sickle cell trait    Anxiety     Arthritis     Asthma     Chronic disease anemia 10/5/2017    Colon polyps     Depression     Dermatitis 3/7/2013    Fibromyalgia     HEARING LOSS     Hyperlipidemia     Hypertension     Leucopenia 10/5/2017    Polyneuropathy     Scoliosis     Sickle cell trait     Sickle-cell trait 10/5/2017    Trouble in sleeping     Urticaria      Past Surgical History:   Procedure Laterality Date    ADENOIDECTOMY      APPENDECTOMY      COLONOSCOPY  4/14/2008    Dr. Guerrero, 10 year recheck    COLONOSCOPY N/A 10/4/2018     Procedure: COLONOSCOPY;  Surgeon: Tam Kemp MD;  Location: [facility];  Service: Endoscopy;  Laterality: N/A;    HYSTERECTOMY      OOPHORECTOMY      ROTATOR CUFF REPAIR Left 01/2016    ROTATOR CUFF REPAIR W/ DISTAL CLAVICLE EXCISION Right 12/04/2017    Dr. Eligio Timmons ( [facility] )    TONSILLECTOMY       Social History     Tobacco Use    Smoking status: Never    Smokeless tobacco: Never   Substance Use Topics    Alcohol use: Yes     Comment: occasionally - wine    Drug use: No     Family History   Problem Relation Age of Onset    Multiple sclerosis Mother     Seizures Mother     Cancer Father         lung    Alcohol abuse Brother     Early death Brother     Diabetes Maternal Aunt     Diabetes Maternal Uncle     Diabetes Maternal Grandmother     Mental illness Maternal Grandfather     Alzheimer's disease Maternal Grandfather     Asthma Paternal Aunt     Sickle cell anemia Paternal Aunt     Diabetes Sister     Arthritis Daughter     Miscarriages / Stillbirths Daughter     Anemia Daughter     Hyperlipidemia Son     Hypertension Son     Allergies Son     Sickle cell anemia Paternal Uncle     Asthma Brother     No Known Problems Sister     No Known Problems Sister     No Known Problems Sister     No Known Problems Brother     Miscarriages / Stillbirths Daughter     Thyroid disease Daughter     No Known Problems Daughter     Angioedema Neg Hx     Eczema Neg Hx     Immunodeficiency Neg Hx     Rheum arthritis Neg Hx     Psoriasis Neg Hx     Lupus Neg Hx         Review of Systems:   Per HPI         Objective:        Vitals:    04/03/24 1355   BP: (!) 202/89   Pulse: 60       Lab Results   Component Value Date    WBC 3.61 (L) 10/17/2023    HGB 12.4 10/17/2023    HCT 37.6 10/17/2023     10/17/2023    CHOL 121 10/17/2023    TRIG 34 10/17/2023    HDL 65 10/17/2023    ALT 27 10/17/2023    AST 29 10/17/2023     10/17/2023    K 4.1 10/17/2023     10/17/2023    CREATININE 1.2 10/17/2023    BUN 12  10/17/2023    CO2 24 10/17/2023    TSH 1.380 07/08/2020    INR 1.1 07/08/2020    HGBA1C 6.1 (H) 10/17/2023        ECHOCARDIOGRAM RESULTS  Results for orders placed during the hospital encounter of 07/08/20    Stress Echo Which stress agent will be used? Treadmill Exercise; Color Flow Doppler? No    Interpretation Summary  · The patient reached the end of the protocol.  · Moderate concentric left ventricular hypertrophy.  · Normal left ventricular systolic function. The estimated ejection fraction is 65%.  · Normal LV diastolic function.  · Normal right ventricular systolic function.  · Mild left atrial enlargement.  · The stress echo portion of this study is negative for myocardial ischemia.  · There were no arrhythmias during stress.  · The patient's exercise capacity was mildly impaired.  · The ECG portion of this study is abnormal but not diagnostic for ischemia.        CURRENT/PREVIOUS VISIT EKG  Results for orders placed or performed in visit on 02/23/23   IN OFFICE EKG 12-LEAD (to [place])    Collection Time: 02/23/23  9:10 AM    Narrative    Test Reason : I10,    Vent. Rate : 057 BPM     Atrial Rate : 057 BPM     P-R Int : 152 ms          QRS Dur : 066 ms      QT Int : 394 ms       P-R-T Axes : 077 079 -22 degrees     QTc Int : 383 ms    Sinus bradycardia  ST and T wave abnormality, consider inferior ischemia  ST and T wave abnormality, consider anterolateral ischemia  Abnormal ECG  When compared with ECG of 28-NOV-2022 16:02,  Questionable change in The axis  Confirmed by Ajith Andrew MD ([id number]) on 2/28/2023 1:10:21 PM    Referred By:  TONYA           Confirmed By:Ajith Andrew MD     No valid procedures specified.   Results for orders placed during the hospital encounter of 12/02/22    Nuclear Stress - Cardiology Interpreted    Interpretation Summary    There is a mild to moderate intensity perfusion abnormality in the  wall of the left ventricle, secondary to breast attenuation.  Mild inferolateral  hypoperfusion with mild reversibility can not rule out small area of ischemia versus artifact    The gated perfusion images showed an ejection fraction of 73% post stress. Normal ejection fraction is greater than 53%.    The EKG portion of this study is abnormal but not diagnostic.    The patient reported chest pain during the stress test.    There were no arrhythmias during stress.    TID 1.10      Physical Exam:  CONSTITUTIONAL: No fever, no chills  HEENT: Normocephalic, atraumatic,pupils reactive to light                 NECK:  No JVD no carotid bruit  CVS: S1S2+, RRR   LUNGS: Clear  ABDOMEN: Soft, NT, BS+  EXTREMITIES: No cyanosis, edema  : No [place] catheter  NEURO: AAO X 3  PSY: Normal affect      Medication List with Changes/Refills   Current Medications    ALBUTEROL (PROAIR HFA) 90 MCG/ACTUATION INHALER    Inhale 2 puffs into the lungs every 6 (six) hours as needed for Wheezing. Rescue    ASCORBIC ACID, VITAMIN C, (VITAMIN C) 500 MG TABLET    Take 500 mg by mouth daily as needed.     ASPIRIN 81 MG CHEW    Take 1 tablet (81 mg total) by mouth once daily.    ATORVASTATIN (LIPITOR) 40 MG TABLET    Take 1 tablet (40 mg total) by mouth once daily.    CALCIUM CARBONATE/VITAMIN D3 (VITAMIN D-3 ORAL)    Take 100 Int'l Units by mouth once daily.     CARVEDILOL (COREG) 25 MG TABLET    TAKE 1 TABLET(25 MG) BY MOUTH TWICE DAILY WITH MEALS    CETIRIZINE (ZYRTEC) 10 MG TABLET    Take 1 tablet (10 mg total) by mouth daily as needed for Allergies.    CLOBETASOL (OLUX) 0.05 % FOAM    APPLY TOPICALLY TO THE AFFECTED AREA TWICE DAILY    CLONIDINE (CATAPRES) 0.1 MG TABLET    Take 1 tablet (0.1 mg total) by mouth 3 (three) times daily as needed (for systolic BP greater than 170).    COENZYME Q10 10 MG CAPSULE    Take 10 mg by mouth once daily.    FOLIC ACID (FOLVITE) 400 MCG TABLET    Take 400 mcg by mouth once daily.    GABAPENTIN (NEURONTIN) 100 MG CAPSULE    Take 1 capsule (100 mg total) by mouth every evening.    GARLIC OIL  ORAL    Take 1 capsule by mouth once daily.     LISINOPRIL (PRINIVIL,ZESTRIL) 40 MG TABLET    Take 1 tablet (40 mg total) by mouth once daily.    MAGNESIUM 250 MG TAB    Take 250 mg by mouth 2 (two) times a day.    MELATONIN-MAGNESIUM CITRATE 1-71.5 MG TBEC    Take 2 each by mouth every evening.    MONTELUKAST (SINGULAIR) 10 MG TABLET    Take 1 tablet (10 mg total) by mouth once daily.    NIACIN 250 MG TAB    Take 250 mg by mouth nightly.    OMEGA-3 FATTY ACIDS/FISH OIL (FISH OIL-OMEGA-3 FATTY ACIDS) 300-1,000 MG CAPSULE    Take by mouth once daily.    TRAMADOL-ACETAMINOPHEN 37.5-325 MG (ULTRACET) 37.5-325 MG TAB    Take 1 tablet by mouth every 6 (six) hours as needed for Pain.    VALACYCLOVIR (VALTREX) 500 MG TABLET    TAKE 1 TABLET(500 MG) BY MOUTH TWICE DAILY FOR 7 DAYS AS DIRECTED    VITAMIN E 100 UNIT CAPSULE    Take 100 Units by mouth once daily.             Assessment:       1. Atherosclerosis of native coronary artery of native heart without angina pectoris    2. Aortic atherosclerosis    3. White coat syndrome with diagnosis of hypertension    4. Primary hypertension    5. Palpitations    6. Atypical chest pain         Plan:     Problem List Items Addressed This Visit          Unprioritized    HTN (hypertension)    Current Assessment & Plan     BP elevated today's visit. She has a known hx of white coat HTN. She reports her BP at home has been running in the 130/70s.          Atypical chest pain    Current Assessment & Plan     Reports some muscle pain on the left side of her chest/ribs. Denies any exertional chest pain.          Relevant Orders    Echo    Atherosclerosis of native coronary artery of native heart without angina pectoris - Primary    Current Assessment & Plan     Continue risk factor modifications. Recommend low fat low cholesterol diet and regular exercise.          Relevant Orders    IN OFFICE EKG 12-LEAD (to [place])    Echo    White coat syndrome with diagnosis of hypertension    Aortic  atherosclerosis     Other Visit Diagnoses       Palpitations        Relevant Orders    Cardiac Monitor - 3-15 Day Adult ([facility] Only)    Echo            Follow up in about 6 weeks (around 5/15/2024).

## 2024-04-09 ENCOUNTER — HOSPITAL ENCOUNTER (OUTPATIENT)
Dept: CARDIOLOGY | Facility: CLINIC | Age: 74
Discharge: HOME | End: 2024-04-09
Attending: NURSE PRACTITIONER
Payer: MEDICARE

## 2024-04-09 ENCOUNTER — HOSPITAL ENCOUNTER (OUTPATIENT)
Dept: CARDIOLOGY | Facility: HOSPITAL | Age: 74
Discharge: HOME | End: 2024-04-09
Attending: NURSE PRACTITIONER
Payer: MEDICARE

## 2024-04-09 VITALS — BODY MASS INDEX: 25.51 KG/M2 | WEIGHT: 158.75 LBS | HEIGHT: 66 IN

## 2024-04-09 DIAGNOSIS — R00.2 PALPITATIONS: ICD-10-CM

## 2024-04-09 DIAGNOSIS — R07.89 ATYPICAL CHEST PAIN: ICD-10-CM

## 2024-04-09 DIAGNOSIS — I25.10 ATHEROSCLEROSIS OF NATIVE CORONARY ARTERY OF NATIVE HEART WITHOUT ANGINA PECTORIS: ICD-10-CM

## 2024-04-09 LAB
AORTIC ROOT ANNULUS: 2.6 CM
AORTIC VALVE CUSP SEPERATION: 1.8 CM
AV INDEX (PROSTH): 0.73
AV MEAN GRADIENT: 4 MMHG
AV PEAK GRADIENT: 7 MMHG
AV REGURGITATION PRESSURE HALF TIME: 619 MS
AV VALVE AREA BY VELOCITY RATIO: 2.02 CM²
AV VALVE AREA: 2.06 CM²
AV VELOCITY RATIO: 0.71
BSA FOR ECHO PROCEDURE: 1.83 M2
CV ECHO LV RWT: 0.73 CM
DOP CALC AO PEAK VEL: 1.36 M/S
DOP CALC AO VTI: 32.9 CM
DOP CALC LVOT AREA: 2.8 CM2
DOP CALC LVOT DIAMETER: 1.9 CM
DOP CALC LVOT PEAK VEL: 0.97 M/S
DOP CALC LVOT STROKE VOLUME: 67.73 CM3
DOP CALC MV VTI: 28 CM
DOP CALCLVOT PEAK VEL VTI: 23.9 CM
E WAVE DECELERATION TIME: 213 MSEC
E/A RATIO: 0.85
E/E' RATIO: 11.38 M/S
ECHO LV POSTERIOR WALL: 1.37 CM (ref 0.6–1.1)
FRACTIONAL SHORTENING: 37 % (ref 28–44)
INTERVENTRICULAR SEPTUM: 1.36 CM (ref 0.6–1.1)
IVC DIAMETER: 1.23 CM
IVRT: 81 MSEC
LEFT ATRIUM SIZE: 3.7 CM
LEFT ATRIUM VOLUME INDEX MOD: 34.1 ML/M2
LEFT ATRIUM VOLUME MOD: 61.7 CM3
LEFT INTERNAL DIMENSION IN SYSTOLE: 2.38 CM (ref 2.1–4)
LEFT VENTRICLE DIASTOLIC VOLUME INDEX: 33.15 ML/M2
LEFT VENTRICLE DIASTOLIC VOLUME: 60 ML
LEFT VENTRICLE MASS INDEX: 101 G/M2
LEFT VENTRICLE SYSTOLIC VOLUME INDEX: 10.9 ML/M2
LEFT VENTRICLE SYSTOLIC VOLUME: 19.7 ML
LEFT VENTRICULAR INTERNAL DIMENSION IN DIASTOLE: 3.75 CM (ref 3.5–6)
LEFT VENTRICULAR MASS: 183.11 G
LV LATERAL E/E' RATIO: 12.33 M/S
LV SEPTAL E/E' RATIO: 10.57 M/S
LVOT MG: 2 MMHG
LVOT MV: 0.63 CM/S
MV MEAN GRADIENT: 1 MMHG
MV PEAK A VEL: 0.87 M/S
MV PEAK E VEL: 0.74 M/S
MV PEAK GRADIENT: 3 MMHG
MV VALVE AREA BY CONTINUITY EQUATION: 2.42 CM2
PISA AR MAX VEL: 3.85 M/S
PISA TR MAX VEL: 2.69 M/S
PV MV: 0.54 M/S
PV PEAK GRADIENT: 2 MMHG
PV PEAK VELOCITY: 0.79 M/S
RA PRESSURE ESTIMATED: 3 MMHG
RIGHT VENTRICULAR END-DIASTOLIC DIMENSION: 2.02 CM
RV TB RVSP: 6 MMHG
RV TISSUE DOPPLER FREE WALL SYSTOLIC VELOCITY 1 (APICAL 4 CHAMBER VIEW): 11.4 CM/S
TDI LATERAL: 0.06 M/S
TDI SEPTAL: 0.07 M/S
TDI: 0.07 M/S
TR MAX PG: 29 MMHG
TRICUSPID ANNULAR PLANE SYSTOLIC EXCURSION: 2.06 CM
TV REST PULMONARY ARTERY PRESSURE: 32 MMHG
Z-SCORE OF LEFT VENTRICULAR DIMENSION IN END DIASTOLE: -2.89
Z-SCORE OF LEFT VENTRICULAR DIMENSION IN END SYSTOLE: -2.07

## 2024-04-09 PROCEDURE — 93306 TTE W/DOPPLER COMPLETE: CPT

## 2024-04-09 PROCEDURE — 93306 TTE W/DOPPLER COMPLETE: CPT | Mod: 26,,, | Performed by: INTERNAL MEDICINE

## 2024-04-09 PROCEDURE — 93248 EXT ECG>7D<15D REV&INTERPJ: CPT | Mod: ,,, | Performed by: INTERNAL MEDICINE

## 2024-04-09 PROCEDURE — 93246 EXT ECG>7D<15D RECORDING: CPT | Mod: ,,, | Performed by: INTERNAL MEDICINE

## 2024-04-12 RX ORDER — CLOBETASOL PROPIONATE 0.5 MG/G
AEROSOL, FOAM TOPICAL
Qty: 100 G | Refills: 1 | Status: SHIPPED | OUTPATIENT
Start: 2024-04-12 | End: 2024-10-09

## 2024-04-29 LAB
OHS CV EVENT MONITOR DAY: 12
OHS CV HOLTER HOOKUP DATE: NORMAL
OHS CV HOLTER HOOKUP TIME: NORMAL
OHS CV HOLTER LENGTH DECIMAL HOURS: 302
OHS CV HOLTER LENGTH HOURS: 14
OHS CV HOLTER LENGTH MINUTES: 0
OHS CV HOLTER SCAN DATE: NORMAL
OHS CV HOLTER SINUS AVERAGE HR: 64 BPM
OHS CV HOLTER SINUS MAX HR: 140 BPM
OHS CV HOLTER SINUS MIN HR: 50 BPM
OHS CV HOLTER STUDY END DATE: NORMAL
OHS CV HOLTER STUDY END TIME: NORMAL

## 2024-05-02 LAB
OHS QRS DURATION: 84 MS
OHS QTC CALCULATION: 375 MS

## 2024-05-06 ENCOUNTER — OFFICE VISIT (OUTPATIENT)
Dept: URGENT CARE | Facility: CLINIC | Age: 74
End: 2024-05-06
Payer: MEDICARE

## 2024-05-06 VITALS
OXYGEN SATURATION: 99 % | BODY MASS INDEX: 25.15 KG/M2 | HEART RATE: 60 BPM | TEMPERATURE: 98 F | RESPIRATION RATE: 16 BRPM | DIASTOLIC BLOOD PRESSURE: 82 MMHG | WEIGHT: 155.81 LBS | SYSTOLIC BLOOD PRESSURE: 187 MMHG

## 2024-05-06 DIAGNOSIS — B96.89 ACUTE BACTERIAL SINUSITIS: Primary | ICD-10-CM

## 2024-05-06 DIAGNOSIS — R09.81 SINUS CONGESTION: ICD-10-CM

## 2024-05-06 DIAGNOSIS — J01.90 ACUTE BACTERIAL SINUSITIS: Primary | ICD-10-CM

## 2024-05-06 PROCEDURE — 99214 OFFICE O/P EST MOD 30 MIN: CPT | Mod: S$GLB,,, | Performed by: NURSE PRACTITIONER

## 2024-05-06 PROCEDURE — 87811 SARS-COV-2 COVID19 W/OPTIC: CPT | Mod: QW,S$GLB,, | Performed by: NURSE PRACTITIONER

## 2024-05-06 PROCEDURE — 87804 INFLUENZA ASSAY W/OPTIC: CPT | Mod: 59,QW,, | Performed by: NURSE PRACTITIONER

## 2024-05-06 RX ORDER — DOXYCYCLINE 100 MG/1
100 CAPSULE ORAL 2 TIMES DAILY
Qty: 14 CAPSULE | Refills: 0 | Status: SHIPPED | OUTPATIENT
Start: 2024-05-06 | End: 2024-05-13

## 2024-05-06 RX ORDER — BENZONATATE 200 MG/1
200 CAPSULE ORAL 3 TIMES DAILY PRN
Qty: 20 CAPSULE | Refills: 0 | Status: SHIPPED | OUTPATIENT
Start: 2024-05-06

## 2024-05-06 NOTE — PROGRESS NOTES
Subjective:      Patient ID: Loren Andre is a 74 y.o. female.    Vitals:  weight is 70.7 kg (155 lb 12.8 oz). Her oral temperature is 98.2 °F (36.8 °C). Her blood pressure is 187/82 (abnormal) and her pulse is 60. Her respiration is 16 and oxygen saturation is 99%.     Chief Complaint: Sinus Problem    Loren Andre is a 74 year old female presenting to the clinic with a 1 1/2 week history of cough, rhinorrhea, and congestion. She has been using OTC medications with no relief. She has had no fever, SOB, chest pain. She is tolerating PO intake without difficulty.     Sinus Problem  The current episode started 1 to 4 weeks ago. The problem is unchanged. There has been no fever. She is experiencing no pain. Associated symptoms include coughing, headaches and sinus pressure. Pertinent negatives include no chills. The treatment provided no relief.       Constitution: Negative for chills and fever.   HENT:  Positive for sinus pressure.    Neck: neck negative.   Respiratory:  Positive for cough.    Gastrointestinal: Negative.    Genitourinary: Negative.    Musculoskeletal: Negative.    Skin: Negative.    Neurological:  Positive for headaches.      Objective:     Physical Exam   Constitutional: She is oriented to person, place, and time. She appears well-developed. She is cooperative.  Non-toxic appearance. She does not appear ill. No distress.   HENT:   Head: Normocephalic and atraumatic.   Ears:   Right Ear: Hearing, tympanic membrane, external ear and ear canal normal.   Left Ear: Hearing, tympanic membrane, external ear and ear canal normal.   Nose: Nose normal. No mucosal edema, rhinorrhea or nasal deformity. No epistaxis. Right sinus exhibits no maxillary sinus tenderness and no frontal sinus tenderness. Left sinus exhibits no maxillary sinus tenderness and no frontal sinus tenderness.   Mouth/Throat: Uvula is midline, oropharynx is clear and moist and mucous membranes are normal. No trismus in the jaw. Normal  dentition. No uvula swelling. No oropharyngeal exudate, posterior oropharyngeal edema or posterior oropharyngeal erythema.   Eyes: Conjunctivae and lids are normal. No scleral icterus.   Neck: Trachea normal and phonation normal. Neck supple. No edema present. No erythema present. No neck rigidity present.   Cardiovascular: Normal rate, regular rhythm, normal heart sounds and normal pulses.   Pulmonary/Chest: Effort normal and breath sounds normal. No respiratory distress. She has no decreased breath sounds. She has no rhonchi.   Abdominal: Normal appearance.   Musculoskeletal: Normal range of motion.         General: No deformity. Normal range of motion.   Neurological: She is alert and oriented to person, place, and time. She exhibits normal muscle tone. Coordination normal.   Skin: Skin is warm, dry, intact, not diaphoretic and not pale.   Psychiatric: Her speech is normal and behavior is normal. Judgment and thought content normal.   Nursing note and vitals reviewed.      Assessment:     1. Acute bacterial sinusitis    2. Sinus congestion        Plan:   Patient has had symptoms x at least 1 1/2 weeks that are not improving. Will start doxycycline due to PCN allergy and no history in the chart regarding use of cephalosporins. Do not suspect pneumonia, AOM. Instructed to follow up with PCP or go to ER for any worsening symptoms.     Acute bacterial sinusitis    Sinus congestion  -     SARS Coronavirus 2 Antigen, POCT Manual Read  -     POCT Influenza A/B Rapid Antigen    Other orders  -     doxycycline (MONODOX) 100 MG capsule; Take 1 capsule (100 mg total) by mouth 2 (two) times daily. for 7 days  Dispense: 14 capsule; Refill: 0  -     benzonatate (TESSALON) 200 MG capsule; Take 1 capsule (200 mg total) by mouth 3 (three) times daily as needed for Cough.  Dispense: 20 capsule; Refill: 0

## 2024-05-13 ENCOUNTER — OFFICE VISIT (OUTPATIENT)
Dept: CARDIOLOGY | Facility: CLINIC | Age: 74
End: 2024-05-13
Payer: MEDICARE

## 2024-05-13 VITALS
HEIGHT: 66 IN | HEART RATE: 60 BPM | BODY MASS INDEX: 24.8 KG/M2 | DIASTOLIC BLOOD PRESSURE: 92 MMHG | SYSTOLIC BLOOD PRESSURE: 205 MMHG | WEIGHT: 154.31 LBS

## 2024-05-13 DIAGNOSIS — N18.31 STAGE 3A CHRONIC KIDNEY DISEASE: ICD-10-CM

## 2024-05-13 DIAGNOSIS — I10 PRIMARY HYPERTENSION: Primary | ICD-10-CM

## 2024-05-13 DIAGNOSIS — I25.10 ATHEROSCLEROSIS OF NATIVE CORONARY ARTERY OF NATIVE HEART WITHOUT ANGINA PECTORIS: ICD-10-CM

## 2024-05-13 DIAGNOSIS — E78.5 HYPERLIPIDEMIA, UNSPECIFIED HYPERLIPIDEMIA TYPE: ICD-10-CM

## 2024-05-13 PROCEDURE — 1126F AMNT PAIN NOTED NONE PRSNT: CPT | Mod: CPTII,S$GLB,, | Performed by: NURSE PRACTITIONER

## 2024-05-13 PROCEDURE — 3080F DIAST BP >= 90 MM HG: CPT | Mod: CPTII,S$GLB,, | Performed by: NURSE PRACTITIONER

## 2024-05-13 PROCEDURE — 1101F PT FALLS ASSESS-DOCD LE1/YR: CPT | Mod: CPTII,S$GLB,, | Performed by: NURSE PRACTITIONER

## 2024-05-13 PROCEDURE — 4010F ACE/ARB THERAPY RXD/TAKEN: CPT | Mod: CPTII,S$GLB,, | Performed by: NURSE PRACTITIONER

## 2024-05-13 PROCEDURE — 1159F MED LIST DOCD IN RCRD: CPT | Mod: CPTII,S$GLB,, | Performed by: NURSE PRACTITIONER

## 2024-05-13 PROCEDURE — 3288F FALL RISK ASSESSMENT DOCD: CPT | Mod: CPTII,S$GLB,, | Performed by: NURSE PRACTITIONER

## 2024-05-13 PROCEDURE — 99999 PR PBB SHADOW E&M-EST. PATIENT-LVL IV: CPT | Mod: PBBFAC,HCNC,, | Performed by: NURSE PRACTITIONER

## 2024-05-13 PROCEDURE — 99214 OFFICE O/P EST MOD 30 MIN: CPT | Mod: S$GLB,,, | Performed by: NURSE PRACTITIONER

## 2024-05-13 PROCEDURE — 3077F SYST BP >= 140 MM HG: CPT | Mod: CPTII,S$GLB,, | Performed by: NURSE PRACTITIONER

## 2024-05-13 NOTE — PROGRESS NOTES
Subjective:    Patient ID:  Loren Andre is a 74 y.o. female.  Chief Complaint   Patient presents with    Results    Hypertension    Hyperlipidemia       HPI:  Patient presents today for follow-up appointment.  Patient has no complaints of chest pain, dizziness, shortness of breath, palpitations, or syncope.  Patient has been doing well and taking medications as ordered.  Denies any falls or head injuries.  Denies any blood in the stool or in the urine.        Review of patient's allergies indicates:   Allergen Reactions    Doxepin Anaphylaxis     abd pain    Norvasc [amlodipine] Palpitations    Xyzal [levocetirizine] Other (See Comments)     Can not take with blood pressure medications - headaches, higher blood pressure, red hands     Penicillins Rash    Sulfa (sulfonamide antibiotics) Rash    Duloxetine      Other reaction(s): insomnia    Levofloxacin      Other reaction(s): Headache    Milnacipran      Other reaction(s): stomach pain    Nitrofurantoin monohyd/m-cryst      Other reaction(s): Itching    Prednisone      Other reaction(s): Itching    Pseudoephedrine-guaifenesin      Other reaction(s): Stomach upset    Terbinafine      Other reaction(s): rash       Past Medical History:   Diagnosis Date    Allergy     Anemia     sickle cell trait    Anxiety     Arthritis     Asthma     Chronic disease anemia 10/5/2017    Colon polyps     Depression     Dermatitis 3/7/2013    Fibromyalgia     HEARING LOSS     Hyperlipidemia     Hypertension     Leucopenia 10/5/2017    Polyneuropathy     Scoliosis     Sickle cell trait     Sickle-cell trait 10/5/2017    Trouble in sleeping     Urticaria      Past Surgical History:   Procedure Laterality Date    ADENOIDECTOMY      APPENDECTOMY      COLONOSCOPY  4/14/2008    Dr. Guerrero, 10 year recheck    COLONOSCOPY N/A 10/4/2018    Procedure: COLONOSCOPY;  Surgeon: Tam Kemp MD;  Location: [facility];  Service: Endoscopy;  Laterality: N/A;    HYSTERECTOMY      OOPHORECTOMY       ROTATOR CUFF REPAIR Left 01/2016    ROTATOR CUFF REPAIR W/ DISTAL CLAVICLE EXCISION Right 12/04/2017    Dr. Eligio Timmons ( [facility] )    TONSILLECTOMY       Social History     Tobacco Use    Smoking status: Never    Smokeless tobacco: Never   Substance Use Topics    Alcohol use: Yes     Comment: occasionally - wine    Drug use: No     Family History   Problem Relation Name Age of Onset    Multiple sclerosis Mother      Seizures Mother      Cancer Father          lung    Alcohol abuse Brother ruby     Early death Brother ruby     Diabetes Maternal Aunt      Diabetes Maternal Uncle      Diabetes Maternal Grandmother      Mental illness Maternal Grandfather      Alzheimer's disease Maternal Grandfather      Asthma Paternal Aunt      Sickle cell anemia Paternal Aunt      Diabetes Sister rickey     Arthritis Daughter lissett     Miscarriages / Stillbirths Daughter lissett     Anemia Daughter lissett     Hyperlipidemia Son lonnie     Hypertension Son lonnie     Allergies Son lonnie     Sickle cell anemia Paternal Uncle      Asthma Brother quentin     No Known Problems Sister luis enrique     No Known Problems Sister cassia     No Known Problems Sister fabiola     No Known Problems Brother johnson     Miscarriages / Stillbirths Daughter dondeyel     Thyroid disease Daughter dondeyel     No Known Problems Daughter dashita     Angioedema Neg Hx      Eczema Neg Hx      Immunodeficiency Neg Hx      Rheum arthritis Neg Hx      Psoriasis Neg Hx      Lupus Neg Hx          Review of Systems:   Per HPI         Objective:        Vitals:    05/13/24 1050   BP: (!) 205/92   Pulse: 60       Lab Results   Component Value Date    WBC 3.61 (L) 10/17/2023    HGB 12.4 10/17/2023    HCT 37.6 10/17/2023     10/17/2023    CHOL 121 10/17/2023    TRIG 34 10/17/2023    HDL 65 10/17/2023    ALT 27 10/17/2023    AST 29 10/17/2023     10/17/2023    K 4.1 10/17/2023     10/17/2023    CREATININE 1.2 10/17/2023    BUN 12 10/17/2023     CO2 24 10/17/2023    TSH 1.380 07/08/2020    INR 1.1 07/08/2020    HGBA1C 6.1 (H) 10/17/2023        ECHOCARDIOGRAM RESULTS  Results for orders placed during the hospital encounter of 04/09/24    Echo    Interpretation Summary    Left Ventricle: The left ventricle is normal in size. Mildly increased wall thickness. There is mild concentric hypertrophy. Normal wall motion. There is normal systolic function with a visually estimated ejection fraction of 65 - 70%. There is normal diastolic function.    Right Ventricle: Normal right ventricular cavity size. Wall thickness is normal. Right ventricle wall motion  is normal. Systolic function is normal.    Aortic Valve: There is mild aortic regurgitation with a centrally directed jet.    Tricuspid Valve: There is trace regurgitation.    Pulmonary Artery: The estimated pulmonary artery systolic pressure is 32 mmHg.    IVC/SVC: Normal venous pressure at 3 mmHg.        CURRENT/PREVIOUS VISIT EKG  Results for orders placed or performed in visit on 04/03/24   IN OFFICE EKG 12-LEAD (to [place])    Collection Time: 04/03/24  2:05 PM   Result Value Ref Range    QRS Duration 84 ms    [facility] QTC Calculation 375 ms    Narrative    Test Reason : I25.10,    Vent. Rate : 055 BPM     Atrial Rate : 055 BPM     P-R Int : 142 ms          QRS Dur : 084 ms      QT Int : 392 ms       P-R-T Axes : 001 003 012 degrees     QTc Int : 375 ms    Sinus bradycardia  Nonspecific T wave abnormality  Abnormal ECG  When compared with ECG of 23-FEB-2023 09:10,  Questionable change in The axis  Nonspecific T wave abnormality has replaced inverted T waves in Inferior  leads  Nonspecific T wave abnormality has replaced inverted T waves in Anterior  leads  Confirmed by Ajith Andrew MD ([id number]) on 5/2/2024 10:44:17 AM    Referred By:             Confirmed By:Ajith Andrew MD     No valid procedures specified.   Results for orders placed during the hospital encounter of 12/02/22    Nuclear Stress - Cardiology  Interpreted    Interpretation Summary    There is a mild to moderate intensity perfusion abnormality in the  wall of the left ventricle, secondary to breast attenuation.  Mild inferolateral hypoperfusion with mild reversibility can not rule out small area of ischemia versus artifact    The gated perfusion images showed an ejection fraction of 73% post stress. Normal ejection fraction is greater than 53%.    The EKG portion of this study is abnormal but not diagnostic.    The patient reported chest pain during the stress test.    There were no arrhythmias during stress.    TID 1.10      Physical Exam:  CONSTITUTIONAL: No fever, no chills  HEENT: Normocephalic, atraumatic,pupils reactive to light                 NECK:  No JVD no carotid bruit  CVS: S1S2+, RRR, no murmurs,   LUNGS: Clear  ABDOMEN: Soft, NT, BS+  EXTREMITIES: No cyanosis, edema  : No [place] catheter  NEURO: AAO X 3  PSY: Normal affect      Medication List with Changes/Refills   Current Medications    ALBUTEROL (PROAIR HFA) 90 MCG/ACTUATION INHALER    Inhale 2 puffs into the lungs every 6 (six) hours as needed for Wheezing. Rescue    ASCORBIC ACID, VITAMIN C, (VITAMIN C) 500 MG TABLET    Take 500 mg by mouth daily as needed.     ASPIRIN 81 MG CHEW    Take 1 tablet (81 mg total) by mouth once daily.    ATORVASTATIN (LIPITOR) 40 MG TABLET    Take 1 tablet (40 mg total) by mouth once daily.    BENZONATATE (TESSALON) 200 MG CAPSULE    Take 1 capsule (200 mg total) by mouth 3 (three) times daily as needed for Cough.    CALCIUM CARBONATE/VITAMIN D3 (VITAMIN D-3 ORAL)    Take 100 Int'l Units by mouth once daily.     CARVEDILOL (COREG) 25 MG TABLET    TAKE 1 TABLET(25 MG) BY MOUTH TWICE DAILY WITH MEALS    CETIRIZINE (ZYRTEC) 10 MG TABLET    Take 1 tablet (10 mg total) by mouth daily as needed for Allergies.    CLOBETASOL (OLUX) 0.05 % FOAM    APPLY TOPICALLY TO THE AFFECTED AREA TWICE DAILY    CLONIDINE (CATAPRES) 0.1 MG TABLET    Take 1 tablet (0.1 mg total)  by mouth 3 (three) times daily as needed (for systolic BP greater than 170).    COENZYME Q10 10 MG CAPSULE    Take 10 mg by mouth once daily.    DOXYCYCLINE (MONODOX) 100 MG CAPSULE    Take 1 capsule (100 mg total) by mouth 2 (two) times daily. for 7 days    FOLIC ACID (FOLVITE) 400 MCG TABLET    Take 400 mcg by mouth once daily.    GABAPENTIN (NEURONTIN) 100 MG CAPSULE    Take 1 capsule (100 mg total) by mouth every evening.    GARLIC OIL ORAL    Take 1 capsule by mouth once daily.     LISINOPRIL (PRINIVIL,ZESTRIL) 40 MG TABLET    Take 1 tablet (40 mg total) by mouth once daily.    MAGNESIUM 250 MG TAB    Take 250 mg by mouth 2 (two) times a day.    MELATONIN-MAGNESIUM CITRATE 1-71.5 MG TBEC    Take 2 each by mouth every evening.    MONTELUKAST (SINGULAIR) 10 MG TABLET    Take 1 tablet (10 mg total) by mouth once daily.    NIACIN 250 MG TAB    Take 250 mg by mouth nightly.    OMEGA-3 FATTY ACIDS/FISH OIL (FISH OIL-OMEGA-3 FATTY ACIDS) 300-1,000 MG CAPSULE    Take by mouth once daily.    TRAMADOL-ACETAMINOPHEN 37.5-325 MG (ULTRACET) 37.5-325 MG TAB    Take 1 tablet by mouth every 6 (six) hours as needed for Pain.    VALACYCLOVIR (VALTREX) 500 MG TABLET    TAKE 1 TABLET(500 MG) BY MOUTH TWICE DAILY FOR 7 DAYS AS DIRECTED    VITAMIN E 100 UNIT CAPSULE    Take 100 Units by mouth once daily.             Assessment:       1. Primary hypertension    2. Hyperlipidemia, unspecified hyperlipidemia type    3. Stage 3a chronic kidney disease    4. Atherosclerosis of native coronary artery of native heart without angina pectoris         Plan:     Problem List Items Addressed This Visit          Unprioritized    HTN (hypertension) - Primary    Current Assessment & Plan     BP significantly elevated at today's visit but she attributes this to white coat HTN. Recommend monitoring BP at home. Consider digital medicine monitoring.   Continue carvedilol 25 mg po BID, lisinopril 40 mg po daily, and clonidine 0.1 mg po TID PRN for SBP  >170. Patient to go home and check BP and take clonidine if needed.     Will check renal artery US to rule out stenosis.          Relevant Orders    US Renal Artery Stenosis Hyperten (xpd)    Hyperlipidemia    Current Assessment & Plan     Recommend low fat low cholesterol diet and regular exercise. Goal for LDL is less than 70.          Stage 3a chronic kidney disease    Current Assessment & Plan     Followed by PCP.          Atherosclerosis of native coronary artery of native heart without angina pectoris    Current Assessment & Plan     Stable and asymptomatic. Denies any chest pain or tightness. Breathing is stable. Echocardiogram shows stable EF with no major valve issues noted.             Follow up in about 6 months (around 11/13/2024).

## 2024-05-13 NOTE — ASSESSMENT & PLAN NOTE
BP significantly elevated at today's visit but she attributes this to white coat HTN. Recommend monitoring BP at home. Consider digital medicine monitoring.   Continue carvedilol 25 mg po BID, lisinopril 40 mg po daily, and clonidine 0.1 mg po TID PRN for SBP >170. Patient to go home and check BP and take clonidine if needed.     Will check renal artery US to rule out stenosis.

## 2024-05-13 NOTE — ASSESSMENT & PLAN NOTE
Stable and asymptomatic. Denies any chest pain or tightness. Breathing is stable. Echocardiogram shows stable EF with no major valve issues noted.

## 2024-05-17 ENCOUNTER — TELEPHONE (OUTPATIENT)
Dept: CARDIOLOGY | Facility: CLINIC | Age: 74
End: 2024-05-17
Payer: MEDICARE

## 2024-05-17 ENCOUNTER — HOSPITAL ENCOUNTER (OUTPATIENT)
Dept: RADIOLOGY | Facility: HOSPITAL | Age: 74
Discharge: HOME | End: 2024-05-17
Attending: NURSE PRACTITIONER
Payer: MEDICARE

## 2024-05-17 DIAGNOSIS — I10 PRIMARY HYPERTENSION: ICD-10-CM

## 2024-05-17 PROCEDURE — 93975 VASCULAR STUDY: CPT | Mod: 26,,, | Performed by: RADIOLOGY

## 2024-05-17 PROCEDURE — 76770 US EXAM ABDO BACK WALL COMP: CPT | Mod: TC

## 2024-05-17 PROCEDURE — 76770 US EXAM ABDO BACK WALL COMP: CPT | Mod: 26,59,, | Performed by: RADIOLOGY

## 2024-05-17 NOTE — TELEPHONE ENCOUNTER
----- Message from Liliam Argueta NP sent at 5/17/2024  9:01 AM CDT -----  Please contact the patient and let them know that their results were fine and do not require any change in treatment.

## 2024-06-03 ENCOUNTER — OFFICE VISIT (OUTPATIENT)
Dept: URGENT CARE | Facility: CLINIC | Age: 74
End: 2024-06-03
Payer: MEDICARE

## 2024-06-03 VITALS
DIASTOLIC BLOOD PRESSURE: 93 MMHG | HEIGHT: 66 IN | WEIGHT: 156.19 LBS | SYSTOLIC BLOOD PRESSURE: 199 MMHG | RESPIRATION RATE: 18 BRPM | BODY MASS INDEX: 25.1 KG/M2 | OXYGEN SATURATION: 97 % | HEART RATE: 63 BPM | TEMPERATURE: 98 F

## 2024-06-03 DIAGNOSIS — H66.93 ACUTE OTITIS MEDIA, BILATERAL: Primary | ICD-10-CM

## 2024-06-03 PROCEDURE — 99213 OFFICE O/P EST LOW 20 MIN: CPT | Mod: S$GLB,,, | Performed by: STUDENT IN AN ORGANIZED HEALTH CARE EDUCATION/TRAINING PROGRAM

## 2024-06-03 RX ORDER — DOXYCYCLINE HYCLATE 100 MG
100 TABLET ORAL 2 TIMES DAILY
Qty: 20 TABLET | Refills: 0 | Status: SHIPPED | OUTPATIENT
Start: 2024-06-03 | End: 2024-06-03

## 2024-06-03 RX ORDER — DOXYCYCLINE HYCLATE 100 MG
100 TABLET ORAL 2 TIMES DAILY
Qty: 20 TABLET | Refills: 0 | Status: SHIPPED | OUTPATIENT
Start: 2024-06-03 | End: 2024-06-13

## 2024-06-03 NOTE — PROGRESS NOTES
"Subjective:      Patient ID: Loren Andre is a 74 y.o. female.    Vitals:  height is 5' 6" (1.676 m) and weight is 70.9 kg (156 lb 3.2 oz). Her oral temperature is 97.8 °F (36.6 °C). Her blood pressure is 199/93 (abnormal) and her pulse is 63. Her respiration is 18 and oxygen saturation is 97%.     Chief Complaint: Otalgia    Patient is a 74-year-old female with a past medical history of anxiety, asthma, hypertension, hyperlipidemia, coronary artery disease, chronic kidney disease, anemia, sick with cell, fibromyalgia, and insomnia who presents to clinic for evaluation ear pain.  Patient reports symptoms times 3-4 days now.  Patient reports no trauma or injury to the ears.  Patient reports no water within ears nor any swimming.  Patient reports no over-the-counter medications for symptoms at this point.  Patient states constant fullness in both ears.  Patient states they feel like they needs to drain.  Patient states she has not had any tinnitus.  Patient states chronic hearing loss.  Patient states not associated with acute pain.  Patient states no headaches or dizziness, fever or chills.  Patient denies any other symptoms at this point.        Constitution: Negative. Negative for chills, sweating, fatigue and fever.   HENT:  Positive for ear pain (Bilateral) and hearing loss (Reports chronic). Negative for ear discharge, tinnitus, congestion and sore throat.    Neck: neck negative.   Cardiovascular: Negative.  Negative for chest pain and palpitations.   Eyes: Negative.    Respiratory: Negative.  Negative for chest tightness, cough and shortness of breath.    Gastrointestinal: Negative.  Negative for abdominal pain, nausea, vomiting and diarrhea.   Endocrine: negative.   Genitourinary: Negative.  Negative for dysuria, frequency and urgency.   Musculoskeletal: Negative.    Skin: Negative.  Negative for color change, pale, rash and erythema.   Allergic/Immunologic: Negative.    Neurological: Negative.  Negative " for dizziness, light-headedness, passing out, headaches, disorientation and altered mental status.   Hematologic/Lymphatic: Negative.    Psychiatric/Behavioral: Negative.  Negative for altered mental status, disorientation and confusion.       Objective:     Physical Exam   Constitutional: She is oriented to person, place, and time. She appears well-developed. She is cooperative.  Non-toxic appearance. She does not appear ill. No distress.   HENT:   Head: Normocephalic and atraumatic.   Ears:   Right Ear: External ear and ear canal normal. No no drainage, swelling or tenderness. Tympanic membrane is erythematous and bulging. Tympanic membrane is not perforated. Decreased hearing is noted.   Left Ear: External ear and ear canal normal. No no drainage, swelling or tenderness. Tympanic membrane is erythematous and bulging. Tympanic membrane is not perforated. Decreased hearing is noted.   Nose: Nose normal. No mucosal edema, rhinorrhea, nasal deformity or congestion. No epistaxis. Right sinus exhibits no maxillary sinus tenderness and no frontal sinus tenderness. Left sinus exhibits no maxillary sinus tenderness and no frontal sinus tenderness.   Mouth/Throat: Uvula is midline, oropharynx is clear and moist and mucous membranes are normal. Mucous membranes are moist. No trismus in the jaw. Normal dentition. No uvula swelling. No oropharyngeal exudate or posterior oropharyngeal erythema. Oropharynx is clear.   Eyes: Conjunctivae and lids are normal. Pupils are equal, round, and reactive to light. Right eye exhibits no discharge. Left eye exhibits no discharge. No scleral icterus.   Neck: Trachea normal and phonation normal. Neck supple.   Cardiovascular: Normal rate, regular rhythm and normal pulses.   Pulmonary/Chest: Effort normal and breath sounds normal. No respiratory distress. She has no wheezes. She has no rhonchi. She has no rales.   Abdominal: Normal appearance and bowel sounds are normal. She exhibits no  distension. Soft.   Musculoskeletal: Normal range of motion.         General: Normal range of motion.   Neurological: She is alert and oriented to person, place, and time. She exhibits normal muscle tone.   Skin: Skin is warm, dry, intact, not diaphoretic, not pale and no rash. Capillary refill takes 2 to 3 seconds. No erythema   Psychiatric: Her speech is normal and behavior is normal. Judgment and thought content normal.   Nursing note and vitals reviewed.      Assessment:     1. Acute otitis media, bilateral        Plan:       Acute otitis media, bilateral    Other orders  -     Discontinue: doxycycline (VIBRA-TABS) 100 MG tablet; Take 1 tablet (100 mg total) by mouth 2 (two) times daily. for 10 days  Dispense: 20 tablet; Refill: 0  -     doxycycline (VIBRA-TABS) 100 MG tablet; Take 1 tablet (100 mg total) by mouth 2 (two) times daily. for 10 days  Dispense: 20 tablet; Refill: 0                Take medications as prescribed.    Tylenol/Motrin per package instructions for any pain or fever.    Assure adequate hydration.    Follow-up with PCP in 1-2 days.    Return to clinic as needed.    Follow-up with ENT if symptoms not better within 1-2 weeks.    To ED for any new or acutely worsening symptoms.    Patient in agreement with plan of care.    DISCLAIMER: Please note that my documentation in this Electronic Healthcare Record was produced using speech recognition software and therefore may contain errors related to that software system.These could include grammar, punctuation and spelling errors or the inclusion/exclusion of phrases that were not intended. Garbled syntax, mangled pronouns, and other bizarre constructions may be attributed to that software system.

## 2024-06-20 ENCOUNTER — OFFICE VISIT (OUTPATIENT)
Dept: URGENT CARE | Facility: CLINIC | Age: 74
End: 2024-06-20
Payer: MEDICARE

## 2024-06-20 VITALS
RESPIRATION RATE: 18 BRPM | TEMPERATURE: 98 F | HEIGHT: 66 IN | DIASTOLIC BLOOD PRESSURE: 90 MMHG | BODY MASS INDEX: 25.29 KG/M2 | HEART RATE: 65 BPM | OXYGEN SATURATION: 99 % | WEIGHT: 157.38 LBS | SYSTOLIC BLOOD PRESSURE: 220 MMHG

## 2024-06-20 DIAGNOSIS — H69.93 EUSTACHIAN TUBE DYSFUNCTION, BILATERAL: ICD-10-CM

## 2024-06-20 DIAGNOSIS — J01.90 SUBACUTE SINUSITIS, UNSPECIFIED LOCATION: Primary | ICD-10-CM

## 2024-06-20 PROCEDURE — 99213 OFFICE O/P EST LOW 20 MIN: CPT | Mod: S$GLB,,, | Performed by: NURSE PRACTITIONER

## 2024-06-20 RX ORDER — AZITHROMYCIN 250 MG/1
TABLET, FILM COATED ORAL
Qty: 6 TABLET | Refills: 0 | Status: SHIPPED | OUTPATIENT
Start: 2024-06-20

## 2024-06-20 RX ORDER — PREDNISONE 20 MG/1
20 TABLET ORAL 2 TIMES DAILY
Qty: 8 TABLET | Refills: 0 | Status: SHIPPED | OUTPATIENT
Start: 2024-06-21 | End: 2024-06-25

## 2024-06-20 NOTE — PROGRESS NOTES
"Subjective:      Patient ID: Loren Andre is a 74 y.o. female.    Vitals:  height is 5' 6" (1.676 m) and weight is 71.4 kg (157 lb 6.4 oz). Her oral temperature is 98.3 °F (36.8 °C). Her blood pressure is 220/90 (abnormal) and her pulse is 65. Her respiration is 18 and oxygen saturation is 99%.     Chief Complaint: Otalgia (Left and right, seen here 1 for same and put on antibx. Not feeling better)    Seen here on 06/03 for similar sx's.  Rx'd doxy, felt better completed course.  Sx's returned with bilateral ear congestion, ringing, pressure/fluid.  Sinus drainage wit    Otalgia   There is pain in both ears. This is a recurrent problem. The current episode started in the past 7 days. The problem occurs constantly. The problem has been unchanged. There has been no fever. The pain is at a severity of 7/10. The pain is mild. Associated symptoms include coughing and headaches. Pertinent negatives include no abdominal pain, diarrhea, ear discharge, rash, sore throat or vomiting. She has tried antibiotics for the symptoms. The treatment provided mild relief.       Constitution: Negative for appetite change, chills, fatigue, fever and generalized weakness.   HENT:  Positive for ear pain (both), tinnitus and congestion (thick/yellow at times). Negative for ear discharge and sore throat.    Cardiovascular:  Negative for chest pain, leg swelling, palpitations and sob on exertion.   Eyes:  Negative for vision loss, double vision and blurred vision.   Respiratory:  Positive for cough, sputum production and asthma. Negative for chest tightness, shortness of breath and wheezing.    Gastrointestinal:  Negative for abdominal pain, nausea, vomiting and diarrhea.   Musculoskeletal:  Negative for muscle ache.   Skin:  Negative for rash.   Allergic/Immunologic: Positive for asthma.   Neurological:  Positive for headaches. Negative for dizziness, light-headedness, speech difficulty, disorientation, altered mental status, loss of " consciousness, numbness and tingling.   Psychiatric/Behavioral:  Negative for altered mental status and disorientation.       Objective:     Physical Exam   Constitutional: She is oriented to person, place, and time. She is cooperative.  Non-toxic appearance. She does not appear ill. No distress. awake  HENT:   Head: Normocephalic and atraumatic.   Ears:   Right Ear: Tympanic membrane, external ear and ear canal normal.   Left Ear: Tympanic membrane, external ear and ear canal normal.   Nose: Congestion present. No rhinorrhea.   Mouth/Throat: Mucous membranes are moist. No oropharyngeal exudate or posterior oropharyngeal erythema.   Eyes: Conjunctivae are normal. Right eye exhibits no discharge. Left eye exhibits no discharge.   Neck: Neck supple. No neck rigidity present.   Cardiovascular: Normal rate, regular rhythm and normal heart sounds.   Pulmonary/Chest: Effort normal and breath sounds normal. No accessory muscle usage. No tachypnea. No respiratory distress. She has no wheezes. She has no rhonchi. She has no rales. She exhibits no tenderness.   Abdominal: Normal appearance.   Musculoskeletal:      Cervical back: She exhibits no tenderness.   Lymphadenopathy:     She has no cervical adenopathy.   Neurological: no focal deficit. She is alert and oriented to person, place, and time. No sensory deficit.   Skin: Skin is warm, dry, not diaphoretic and no rash. Capillary refill takes 2 to 3 seconds.   Psychiatric: Her behavior is normal. Mood normal.   Nursing note and vitals reviewed.      Assessment:     1. Subacute sinusitis, unspecified location    2. Eustachian tube dysfunction, bilateral        Plan:       Subacute sinusitis, unspecified location  -     azithromycin (Z-MARISABEL) 250 MG tablet; Take 2 tablets by mouth on day 1; Take 1 tablet by mouth on days 2-5  Dispense: 6 tablet; Refill: 0  -     predniSONE (DELTASONE) 20 MG tablet; Take 1 tablet (20 mg total) by mouth 2 (two) times daily. for 4 days  Dispense: 8  tablet; Refill: 0    Eustachian tube dysfunction, bilateral  -     azithromycin (Z-MARISABEL) 250 MG tablet; Take 2 tablets by mouth on day 1; Take 1 tablet by mouth on days 2-5  Dispense: 6 tablet; Refill: 0  -     predniSONE (DELTASONE) 20 MG tablet; Take 1 tablet (20 mg total) by mouth 2 (two) times daily. for 4 days  Dispense: 8 tablet; Refill: 0                  Established with ENT, does not desire to return

## 2024-06-20 NOTE — PATIENT INSTRUCTIONS
Azithromycin as prescribed  Start prednisone pills tomorrow and take as prescribed.  Always take with food    Personal protection against illness for 1-2 weeks due to lowered immune system from steroid therapy.  Please wear a mask and eye protection around others and wash hands often

## 2024-06-21 ENCOUNTER — OFFICE VISIT (OUTPATIENT)
Dept: DERMATOLOGY | Facility: CLINIC | Age: 74
End: 2024-06-21
Payer: MEDICARE

## 2024-06-21 DIAGNOSIS — L30.8 PSORIASIFORM DERMATITIS: Primary | ICD-10-CM

## 2024-06-21 DIAGNOSIS — L65.9 ALOPECIA: ICD-10-CM

## 2024-06-21 DIAGNOSIS — L82.1 DERMATOSIS PAPULOSA NIGRA: ICD-10-CM

## 2024-06-21 RX ORDER — FLUOCINOLONE ACETONIDE 0.11 MG/ML
OIL TOPICAL 2 TIMES DAILY
Qty: 118 ML | Refills: 2 | Status: SHIPPED | OUTPATIENT
Start: 2024-06-21

## 2024-06-21 NOTE — PROGRESS NOTES
Subjective:      Patient ID:  Loren Andre is a 74 y.o. female who presents for   Chief Complaint   Patient presents with    Itching     Scalp      New patient     Patient here today for itching on scalp x 1 month. Patient states it only itches in certain areas. Denies feeling bumps. Applies shea butter, does not help.     Also complains of spots on face x years. Not bothersome.           Review of Systems   Constitutional:  Negative for fever, chills and fatigue.   Respiratory:  Negative for cough and shortness of breath.    Gastrointestinal:  Negative for nausea and vomiting.   Skin:  Positive for itching.       Objective:   Physical Exam   Constitutional: She appears well-developed and well-nourished.   Neurological: She is alert and oriented to person, place, and time.   Psychiatric: She has a normal mood and affect.   Skin:   Areas Examined (abnormalities noted in diagram):   Scalp / Hair Palpated and Inspected  Head / Face Inspection Performed            Diagram Legend     Erythematous scaling macule/papule c/w actinic keratosis       Vascular papule c/w angioma      Pigmented verrucoid papule/plaque c/w seborrheic keratosis      Yellow umbilicated papule c/w sebaceous hyperplasia      Irregularly shaped tan macule c/w lentigo     1-2 mm smooth white papules consistent with Milia      Movable subcutaneous cyst with punctum c/w epidermal inclusion cyst      Subcutaneous movable cyst c/w pilar cyst      Firm pink to brown papule c/w dermatofibroma      Pedunculated fleshy papule(s) c/w skin tag(s)      Evenly pigmented macule c/w junctional nevus     Mildly variegated pigmented, slightly irregular-bordered macule c/w mildly atypical nevus      Flesh colored to evenly pigmented papule c/w intradermal nevus       Pink pearly papule/plaque c/w basal cell carcinoma      Erythematous hyperkeratotic cursted plaque c/w SCC      Surgical scar with no sign of skin cancer recurrence      Open and closed comedones       Inflammatory papules and pustules      Verrucoid papule consistent consistent with wart     Erythematous eczematous patches and plaques     Dystrophic onycholytic nail with subungual debris c/w onychomycosis     Umbilicated papule    Erythematous-base heme-crusted tan verrucoid plaque consistent with inflamed seborrheic keratosis     Erythematous Silvery Scaling Plaque c/w Psoriasis     See annotation      Assessment / Plan:        Psoriasiform dermatitis  -     fluocinolone (DERMA-SMOOTHE) 0.01 % external oil; Apply topically 2 (two) times daily. Use on AA BID x 2 weeks  Dispense: 118 mL; Refill: 2    Dermatosis papulosa nigra  These are benign inherited growths without a malignant potential. Reassurance given to patient. No treatment is necessary.     Alopecia  Suspect traction , possible component of CCCA, no active pustules or scaling but does itch  Intralesional Kenalog 5mg/cc (3 cc total) injected into 3 lesions on the scalp today after obtaining verbal consent including risk of surrounding hypopigmentation. Patient tolerated procedure well.    Units: 2  NDC for Kenalog 10mg/cc:  3087-8638-90           6 weeks  No follow-ups on file.

## 2024-07-09 DIAGNOSIS — M79.10 MYALGIA: ICD-10-CM

## 2024-07-09 RX ORDER — GABAPENTIN 100 MG/1
100 CAPSULE ORAL NIGHTLY
Qty: 30 CAPSULE | Refills: 3 | Status: SHIPPED | OUTPATIENT
Start: 2024-07-09 | End: 2025-07-09

## 2024-07-09 NOTE — TELEPHONE ENCOUNTER
No care due was identified.  [facility] Embedded Care Due Messages. Reference number: [id number].   7/09/2024 8:50:33 AM CDT

## 2024-07-09 NOTE — TELEPHONE ENCOUNTER
----- Message from Josiane Waite sent at 7/9/2024  8:38 AM CDT -----  Contact: self  Type:  RX Refill Request    Who Called:  pt  Refill or New Rx:  refill  RX Name and Strength:  gabapentin (NEURONTIN) 100 MG capsule  Medication    How is the patient currently taking it? (ex. 1XDay):  as directed  Is this a 30 day or 90 day RX:  30  Preferred Pharmacy with phone number:    [id number] - [place], [place]  [address] [place] [address] & [address] [address]  Phone: [phone] Fax: [phone]     Local or Mail Order:  local  Ordering Provider:  toney Palm Call Back Number:  [phone]   Additional Information:  please advise

## 2024-08-08 ENCOUNTER — OFFICE VISIT (OUTPATIENT)
Dept: DERMATOLOGY | Facility: CLINIC | Age: 74
End: 2024-08-08
Payer: MEDICARE

## 2024-08-08 DIAGNOSIS — L66.9 SCARRING ALOPECIA: Primary | ICD-10-CM

## 2024-08-08 PROCEDURE — 11900 INJECT SKIN LESIONS </W 7: CPT | Mod: S$GLB,,, | Performed by: STUDENT IN AN ORGANIZED HEALTH CARE EDUCATION/TRAINING PROGRAM

## 2024-08-08 PROCEDURE — 99499 UNLISTED E&M SERVICE: CPT | Mod: S$GLB,,, | Performed by: STUDENT IN AN ORGANIZED HEALTH CARE EDUCATION/TRAINING PROGRAM

## 2024-08-08 NOTE — PROGRESS NOTES
Subjective:      Patient ID:  Loren Andre is a 74 y.o. female who presents for   Chief Complaint   Patient presents with    Hair Loss     Follow up     LOV 06/21/2024    Patient here today for a follow up on her hair loss- suspect traction vs. CCCA. ILK 5mg/cc 3 cc total at lov x1 rounds.   States that the itching has improved.   Used fluocinolone oil BID on scalp- has stopped using this.                     Review of Systems   Constitutional:  Negative for fever, chills and fatigue.   Respiratory:  Negative for cough and shortness of breath.    Gastrointestinal:  Negative for nausea and vomiting.   Skin:  Negative for itching.       Objective:   Physical Exam   Constitutional: She appears well-developed and well-nourished.   Neurological: She is alert and oriented to person, place, and time.   Psychiatric: She has a normal mood and affect.   Skin:   Areas Examined (abnormalities noted in diagram):   Scalp / Hair Palpated and Inspected            Diagram Legend     Erythematous scaling macule/papule c/w actinic keratosis       Vascular papule c/w angioma      Pigmented verrucoid papule/plaque c/w seborrheic keratosis      Yellow umbilicated papule c/w sebaceous hyperplasia      Irregularly shaped tan macule c/w lentigo     1-2 mm smooth white papules consistent with Milia      Movable subcutaneous cyst with punctum c/w epidermal inclusion cyst      Subcutaneous movable cyst c/w pilar cyst      Firm pink to brown papule c/w dermatofibroma      Pedunculated fleshy papule(s) c/w skin tag(s)      Evenly pigmented macule c/w junctional nevus     Mildly variegated pigmented, slightly irregular-bordered macule c/w mildly atypical nevus      Flesh colored to evenly pigmented papule c/w intradermal nevus       Pink pearly papule/plaque c/w basal cell carcinoma      Erythematous hyperkeratotic cursted plaque c/w SCC      Surgical scar with no sign of skin cancer recurrence      Open and closed comedones       Inflammatory papules and pustules      Verrucoid papule consistent consistent with wart     Erythematous eczematous patches and plaques     Dystrophic onycholytic nail with subungual debris c/w onychomycosis     Umbilicated papule    Erythematous-base heme-crusted tan verrucoid plaque consistent with inflamed seborrheic keratosis     Erythematous Silvery Scaling Plaque c/w Psoriasis     See annotation      Assessment / Plan:        Scarring alopecia  Intralesional Kenalog 5mg/cc (3 cc total) injected into 7 lesions on the scalp today after obtaining verbal consent including risk of surrounding hypopigmentation. Patient tolerated procedure well.    Units: 2  NDC for Kenalog 10mg/cc:  3351-6949-37             No follow-ups on file.

## 2024-08-19 ENCOUNTER — TELEPHONE (OUTPATIENT)
Dept: DERMATOLOGY | Facility: CLINIC | Age: 74
End: 2024-08-19
Payer: MEDICARE

## 2024-08-19 NOTE — TELEPHONE ENCOUNTER
Called patient with recommendations.     ----- Message from Edie Arevalo MD sent at 8/19/2024  2:20 PM CDT -----  Contact: self   Keratase or olaplex  brands are good  ----- Message -----  From: Hawa Hill MA  Sent: 8/19/2024   2:19 PM CDT  To: Edie Arevalo MD     Patient wants to know if there is any specific shampoo that you would recommend her using. She says she is using the derma-smoothe every day and feels like she is running out of it quickly.  ----- Message -----  From: Leeanne Johns RN  Sent: 8/19/2024   9:21 AM CDT  To: Chloé Heredia MA; JANNETTE Lorenzo  ----- Message -----  From: Stephania Guerin  Sent: 8/19/2024   9:12 AM CDT  To: Mickey Stewart Staff    Patient needs call back. She has questions about getting a shampoo as well as questions on her Rx for fluocinolone (DERMA-SMOOTHE) 0.01 % external oil. Please call to advise

## 2024-09-16 ENCOUNTER — OFFICE VISIT (OUTPATIENT)
Dept: URGENT CARE | Facility: CLINIC | Age: 74
End: 2024-09-16
Payer: MEDICARE

## 2024-09-16 VITALS
BODY MASS INDEX: 25.07 KG/M2 | SYSTOLIC BLOOD PRESSURE: 150 MMHG | RESPIRATION RATE: 20 BRPM | HEART RATE: 64 BPM | WEIGHT: 156 LBS | DIASTOLIC BLOOD PRESSURE: 80 MMHG | OXYGEN SATURATION: 99 % | HEIGHT: 66 IN | TEMPERATURE: 97 F

## 2024-09-16 DIAGNOSIS — R05.9 COUGH, UNSPECIFIED TYPE: ICD-10-CM

## 2024-09-16 DIAGNOSIS — B96.89 ACUTE BACTERIAL SINUSITIS: Primary | ICD-10-CM

## 2024-09-16 DIAGNOSIS — J01.90 ACUTE BACTERIAL SINUSITIS: Primary | ICD-10-CM

## 2024-09-16 LAB
CTP QC/QA: YES
SARS-COV-2 AG RESP QL IA.RAPID: NEGATIVE

## 2024-09-16 PROCEDURE — 87811 SARS-COV-2 COVID19 W/OPTIC: CPT | Mod: QW,S$GLB,, | Performed by: NURSE PRACTITIONER

## 2024-09-16 PROCEDURE — 99214 OFFICE O/P EST MOD 30 MIN: CPT | Mod: S$GLB,,, | Performed by: NURSE PRACTITIONER

## 2024-09-16 RX ORDER — DOXYCYCLINE 100 MG/1
100 CAPSULE ORAL 2 TIMES DAILY
Qty: 20 CAPSULE | Refills: 0 | Status: SHIPPED | OUTPATIENT
Start: 2024-09-16 | End: 2024-09-26

## 2024-09-16 NOTE — PROGRESS NOTES
"Subjective:      Patient ID: Loren Andre is a 74 y.o. female.    Vitals:  height is 5' 6" (1.676 m) and weight is 70.8 kg (156 lb). Her oral temperature is 97.4 °F (36.3 °C). Her blood pressure is 150/80 (abnormal) and her pulse is 64. Her respiration is 20 and oxygen saturation is 99%.     Chief Complaint: Sinus Problem    Loren Andre is a 74 year old female presenting to the clinic with a five day history of worsening sinus pressure, congestion, and drainage. She has had no fever, vomiting, SOB. She has used a mary-pot without improvement.     Sinus Problem  This is a new problem. The current episode started in the past 7 days. The problem has been gradually worsening since onset. There has been no fever. Her pain is at a severity of 0/10. She is experiencing no pain. Associated symptoms include congestion, coughing and sinus pressure. Past treatments include nasal decongestants. The treatment provided no relief.       Constitution: Negative.   HENT:  Positive for congestion, sinus pain and sinus pressure.    Neck: neck negative.   Cardiovascular: Negative.    Eyes: Negative.    Respiratory:  Positive for cough.    Gastrointestinal: Negative.    Genitourinary: Negative.    Musculoskeletal: Negative.    Skin: Negative.    Neurological: Negative.       Objective:     Physical Exam   Constitutional: She is oriented to person, place, and time. She appears well-developed. She is cooperative.  Non-toxic appearance. She does not appear ill. No distress.   HENT:   Head: Normocephalic and atraumatic.   Ears:   Right Ear: Hearing, tympanic membrane, external ear and ear canal normal.   Left Ear: Hearing, tympanic membrane, external ear and ear canal normal.   Nose: Nose normal. No mucosal edema, rhinorrhea or nasal deformity. No epistaxis. Right sinus exhibits no maxillary sinus tenderness and no frontal sinus tenderness. Left sinus exhibits no maxillary sinus tenderness and no frontal sinus tenderness. "   Mouth/Throat: Uvula is midline, oropharynx is clear and moist and mucous membranes are normal. No trismus in the jaw. Normal dentition. No uvula swelling. No oropharyngeal exudate, posterior oropharyngeal edema or posterior oropharyngeal erythema.   Eyes: Conjunctivae and lids are normal. No scleral icterus.   Neck: Trachea normal and phonation normal. Neck supple. No edema present. No erythema present. No neck rigidity present.   Cardiovascular: Normal rate, regular rhythm, normal heart sounds and normal pulses.   Pulmonary/Chest: Effort normal and breath sounds normal. No respiratory distress. She has no decreased breath sounds. She has no rhonchi.   Abdominal: Normal appearance.   Musculoskeletal: Normal range of motion.         General: No deformity. Normal range of motion.   Neurological: She is alert and oriented to person, place, and time. She exhibits normal muscle tone. Coordination normal.   Skin: Skin is warm, dry, intact, not diaphoretic and not pale.   Psychiatric: Her speech is normal and behavior is normal. Judgment and thought content normal.   Nursing note and vitals reviewed.      Assessment:     1. Acute bacterial sinusitis    2. Cough, unspecified type        Plan:   Will treat patient for bacterial sinusitis. Multiple allergies listed so will start doxycycline. Discussed use of coricidin HBP if desired. Follow up with PCP as needed.     Acute bacterial sinusitis    Cough, unspecified type  -     SARS Coronavirus 2 Antigen, POCT Manual Read    Other orders  -     doxycycline (MONODOX) 100 MG capsule; Take 1 capsule (100 mg total) by mouth 2 (two) times daily. for 10 days  Dispense: 20 capsule; Refill: 0

## 2024-09-30 DIAGNOSIS — J45.909 ASTHMA, UNSPECIFIED ASTHMA SEVERITY, UNSPECIFIED WHETHER COMPLICATED, UNSPECIFIED WHETHER PERSISTENT: ICD-10-CM

## 2024-09-30 DIAGNOSIS — I10 PRIMARY HYPERTENSION: ICD-10-CM

## 2024-09-30 DIAGNOSIS — E78.5 HYPERLIPIDEMIA, UNSPECIFIED HYPERLIPIDEMIA TYPE: ICD-10-CM

## 2024-09-30 DIAGNOSIS — Z12.31 VISIT FOR SCREENING MAMMOGRAM: Primary | ICD-10-CM

## 2024-09-30 RX ORDER — MONTELUKAST SODIUM 10 MG/1
10 TABLET ORAL
Qty: 90 TABLET | Refills: 0 | Status: SHIPPED | OUTPATIENT
Start: 2024-09-30

## 2024-09-30 RX ORDER — ATORVASTATIN CALCIUM 40 MG/1
40 TABLET, FILM COATED ORAL DAILY
Qty: 90 TABLET | Refills: 0 | Status: SHIPPED | OUTPATIENT
Start: 2024-09-30

## 2024-09-30 NOTE — TELEPHONE ENCOUNTER
Refill Routing Note   Medication(s) are not appropriate for processing by [facility] for the following reason(s):        Required vitals abnormal    [facility] action(s):  Defer  Approve   Requires labs : Yes             Appointments  past 12m or future 3m with PCP    Date Provider   Last Visit   Visit date not found Sanket Toure MD   Next Visit   10/4/2024 Sanket Toure MD   ED visits in past 90 days: 0        Note composed:1:50 PM 09/30/2024

## 2024-09-30 NOTE — TELEPHONE ENCOUNTER
Care Due:                  Date            Visit Type   Department     Provider  --------------------------------------------------------------------------------                                EP -                              PRIMARY      [facility]  Last Visit: 10-      CARE ([facility])   PRACTICE       Sanket Toure                              EP -                              PRIMARY      [facility]  Next Visit: 10-      CARE ([facility])   PRACTICE       Sanket Toure                                                            Last  Test          Frequency    Reason                     Performed    Due Date  --------------------------------------------------------------------------------    CBC.........  12 months..  valACYclovir.............  10-   10-    CMP.........  12 months..  atorvastatin, lisinopriL,   10-   10-                             valACYclovir.............    Lipid Panel.  12 months..  atorvastatin.............  10-   10-    [facility] Embedded Care Due Messages. Reference number: [id number].   9/30/2024 6:46:30 AM CDT

## 2024-10-01 RX ORDER — LISINOPRIL 40 MG/1
40 TABLET ORAL
Qty: 90 TABLET | Refills: 0 | Status: SHIPPED | OUTPATIENT
Start: 2024-10-01

## 2024-10-01 NOTE — TELEPHONE ENCOUNTER
Refill Routing Note   Medication(s) are not appropriate for processing by [facility] for the following reason(s):        Required labs abnormal    [facility] action(s):  Defer     Requires labs : Yes             Appointments  past 12m or future 3m with PCP    Date Provider   Last Visit   10/4/2023 Sanket Toure MD   Next Visit   11/25/2024 Sanket Toure MD   ED visits in past 90 days: 0        Note composed:9:28 AM 10/01/2024

## 2024-10-01 NOTE — TELEPHONE ENCOUNTER
----- Message from Jessy sent at 10/1/2024  8:42 AM CDT -----  Contact: Patient  Type:  RX Refill Request    Who Called:   Patient  Refill or New Rx:  Refill    RX Name and Strength:    lisinopriL (PRINIVIL,ZESTRIL) 40 MG tablet   atorvastatin (LIPITOR) 40 MG tablet     Preferred Pharmacy with phone number:      [facility] #[id number] [address] & [address] [address]  Phone: [phone] Fax: [phone]    Local or Mail Order:  Local  Ordering Provider:  Dr Sanket Toure    Would the patient rather a call back or a response via [facility]?   Call back  Best Call Back Number:  [phone]    Additional Information:   States she is almost out of these medications and requesting refills - states she would also like to speak with the nurse - please call - thank you

## 2024-10-02 ENCOUNTER — OFFICE VISIT (OUTPATIENT)
Dept: FAMILY MEDICINE | Facility: CLINIC | Age: 74
End: 2024-10-02
Payer: MEDICARE

## 2024-10-02 ENCOUNTER — HOSPITAL ENCOUNTER (OUTPATIENT)
Dept: RADIOLOGY | Facility: CLINIC | Age: 74
Discharge: HOME | End: 2024-10-02
Attending: PHYSICIAN ASSISTANT
Payer: MEDICARE

## 2024-10-02 VITALS
SYSTOLIC BLOOD PRESSURE: 152 MMHG | HEART RATE: 77 BPM | DIASTOLIC BLOOD PRESSURE: 88 MMHG | RESPIRATION RATE: 16 BRPM | TEMPERATURE: 98 F | WEIGHT: 158.5 LBS | BODY MASS INDEX: 25.47 KG/M2 | HEIGHT: 66 IN | OXYGEN SATURATION: 99 %

## 2024-10-02 DIAGNOSIS — Z12.31 VISIT FOR SCREENING MAMMOGRAM: ICD-10-CM

## 2024-10-02 DIAGNOSIS — R22.2 MASS ON BACK: Primary | ICD-10-CM

## 2024-10-02 DIAGNOSIS — I10 PRIMARY HYPERTENSION: ICD-10-CM

## 2024-10-02 DIAGNOSIS — N18.31 STAGE 3A CHRONIC KIDNEY DISEASE: ICD-10-CM

## 2024-10-02 PROCEDURE — 1126F AMNT PAIN NOTED NONE PRSNT: CPT | Mod: CPTII,S$GLB,,

## 2024-10-02 PROCEDURE — 1159F MED LIST DOCD IN RCRD: CPT | Mod: CPTII,S$GLB,,

## 2024-10-02 PROCEDURE — 77067 SCR MAMMO BI INCL CAD: CPT | Mod: TC,HCNC,PO

## 2024-10-02 PROCEDURE — 3079F DIAST BP 80-89 MM HG: CPT | Mod: CPTII,S$GLB,,

## 2024-10-02 PROCEDURE — 3288F FALL RISK ASSESSMENT DOCD: CPT | Mod: CPTII,S$GLB,,

## 2024-10-02 PROCEDURE — 77067 SCR MAMMO BI INCL CAD: CPT | Mod: 26,,, | Performed by: RADIOLOGY

## 2024-10-02 PROCEDURE — 77063 BREAST TOMOSYNTHESIS BI: CPT | Mod: 26,,, | Performed by: RADIOLOGY

## 2024-10-02 PROCEDURE — 4010F ACE/ARB THERAPY RXD/TAKEN: CPT | Mod: CPTII,S$GLB,,

## 2024-10-02 PROCEDURE — 3077F SYST BP >= 140 MM HG: CPT | Mod: CPTII,S$GLB,,

## 2024-10-02 PROCEDURE — 1101F PT FALLS ASSESS-DOCD LE1/YR: CPT | Mod: CPTII,S$GLB,,

## 2024-10-02 PROCEDURE — 1160F RVW MEDS BY RX/DR IN RCRD: CPT | Mod: CPTII,S$GLB,,

## 2024-10-02 PROCEDURE — 99214 OFFICE O/P EST MOD 30 MIN: CPT | Mod: S$GLB,,,

## 2024-10-02 PROCEDURE — 99999 PR PBB SHADOW E&M-EST. PATIENT-LVL V: CPT | Mod: PBBFAC,HCNC,,

## 2024-10-02 NOTE — PROGRESS NOTES
Subjective:       Patient ID:  [id number]     Chief Complaint: Mass      Loren Abel a 74 y.o. female who presents to the clinic for mass on her upper back.      Patient reports that she 1st noticed the mass approximately 3 weeks ago.  She reports that the area is mildly pruritic.  She denies fever, chills, body aches.    Patients blood pressure is elevated today. Patient reports they are compliant with their current antihypertensive medication regimen. Patient does monitor their BP at home, and reports readings are better controlled at home.. Patient denies CP, SOB, LE swelling, lightheadedness, dizziness, and HA.     No other concerns.    Past Medical History:   Diagnosis Date    Allergy     Anemia     sickle cell trait    Anxiety     Arthritis     Asthma     Chronic disease anemia 10/05/2017    Colon polyps     Depression     Dermatitis 03/07/2013    Fibromyalgia     HEARING LOSS     Hyperlipidemia     Hypertension     Leucopenia 10/05/2017    Polyneuropathy     Scoliosis     Sickle cell trait     Sickle-cell trait 10/05/2017    Trouble in sleeping     Urticaria       Active Problem List with Overview Notes    Diagnosis Date Noted    Aortic atherosclerosis 04/03/2024    Caregiver role strain 09/14/2023    Insomnia 05/16/2023    Agatston coronary artery calcium score less than 100 03/23/2023    Atherosclerosis of native coronary artery of native heart without angina pectoris 03/23/2023    White coat syndrome with diagnosis of hypertension 03/23/2023    Abnormal nuclear stress test 02/23/2023    Atypical chest pain 02/23/2023    Stage 3a chronic kidney disease 02/14/2022    Abnormal ECG 02/09/2021    Prediabetes 10/01/2020    Hyperlipidemia     Screen for colon cancer 10/04/2018    Leucopenia 10/05/2017    Chronic disease anemia 10/05/2017    Sickle-cell trait 10/05/2017    Genital herpes 08/04/2015    Rheumatoid factor positive 06/23/2015    Primary hypertension 06/23/2015    HTN (hypertension) 08/25/2014     Asthma, chronic 06/03/2013    Fibromyalgia 06/07/2012    Anxiety 06/07/2012      Review of patient's allergies indicates:   Allergen Reactions    Doxepin Anaphylaxis     abd pain    Norvasc [amlodipine] Palpitations    Xyzal [levocetirizine] Other (See Comments)     Can not take with blood pressure medications - headaches, higher blood pressure, red hands     Penicillins Rash    Sulfa (sulfonamide antibiotics) Rash    Duloxetine      Other reaction(s): insomnia    Levofloxacin      Other reaction(s): Headache    Milnacipran      Other reaction(s): stomach pain    Nitrofurantoin monohyd/m-cryst      Other reaction(s): Itching    Prednisone      Other reaction(s): Itching    Pseudoephedrine-guaifenesin      Other reaction(s): Stomach upset    Terbinafine      Other reaction(s): rash         Current Outpatient Medications:     albuterol (PROAIR HFA) 90 mcg/actuation inhaler, Inhale 2 puffs into the lungs every 6 (six) hours as needed for Wheezing. Rescue, Disp: 6.7 g, Rfl: 3    ascorbic acid, vitamin C, (VITAMIN C) 500 MG tablet, Take 500 mg by mouth daily as needed. , Disp: , Rfl:     atorvastatin (LIPITOR) 40 MG tablet, Take 1 tablet (40 mg total) by mouth once daily., Disp: 90 tablet, Rfl: 0    CALCIUM CARBONATE/VITAMIN D3 (VITAMIN D-3 ORAL), Take 100 Int'l Units by mouth once daily. , Disp: , Rfl:     carvediloL (COREG) 25 MG tablet, TAKE 1 TABLET(25 MG) BY MOUTH TWICE DAILY WITH MEALS, Disp: 180 tablet, Rfl: 3    clobetasoL (OLUX) 0.05 % Foam, APPLY TOPICALLY TO THE AFFECTED AREA TWICE DAILY, Disp: 100 g, Rfl: 1    coenzyme Q10 10 mg capsule, Take 10 mg by mouth once daily., Disp: , Rfl:     fluocinolone (DERMA-SMOOTHE) 0.01 % external oil, Apply topically 2 (two) times daily. Use on AA BID x 2 weeks, Disp: 118 mL, Rfl: 2    folic acid (FOLVITE) 400 MCG tablet, Take 400 mcg by mouth once daily., Disp: , Rfl:     gabapentin (NEURONTIN) 100 MG capsule, Take 1 capsule (100 mg total) by mouth every evening., Disp: 30  capsule, Rfl: 3    GARLIC OIL ORAL, Take 1 capsule by mouth once daily. , Disp: , Rfl:     lisinopriL (PRINIVIL,ZESTRIL) 40 MG tablet, TAKE 1 TABLET(40 MG) BY MOUTH EVERY DAY, Disp: 90 tablet, Rfl: 0    magnesium 250 mg Tab, Take 250 mg by mouth 2 (two) times a day., Disp: , Rfl:     melatonin-magnesium citrate 1-71.5 mg TbEC, Take 2 each by mouth every evening., Disp: 180 tablet, Rfl: 0    montelukast (SINGULAIR) 10 mg tablet, TAKE 1 TABLET(10 MG) BY MOUTH EVERY DAY, Disp: 90 tablet, Rfl: 0    niacin 250 MG Tab, Take 250 mg by mouth nightly., Disp: , Rfl:     omega-3 fatty acids/fish oil (FISH OIL-OMEGA-3 FATTY ACIDS) 300-1,000 mg capsule, Take by mouth once daily., Disp: , Rfl:     tramadol-acetaminophen 37.5-325 mg (ULTRACET) 37.5-325 mg Tab, Take 1 tablet by mouth every 6 (six) hours as needed for Pain., Disp: 20 tablet, Rfl: 0    vitamin E 100 UNIT capsule, Take 100 Units by mouth once daily., Disp: , Rfl:     aspirin 81 MG Chew, Take 1 tablet (81 mg total) by mouth once daily. (Patient taking differently: Take 81 mg by mouth once daily. Pt states taking OTC.), Disp: , Rfl: 0    azithromycin (Z-MARISABEL) 250 MG tablet, Take 2 tablets by mouth on day 1; Take 1 tablet by mouth on days 2-5 (Patient not taking: Reported on 10/2/2024), Disp: 6 tablet, Rfl: 0    benzonatate (TESSALON) 200 MG capsule, Take 1 capsule (200 mg total) by mouth 3 (three) times daily as needed for Cough. (Patient not taking: Reported on 10/2/2024), Disp: 20 capsule, Rfl: 0    cetirizine (ZYRTEC) 10 MG tablet, Take 1 tablet (10 mg total) by mouth daily as needed for Allergies., Disp: 7 tablet, Rfl: 0    cloNIDine (CATAPRES) 0.1 MG tablet, Take 1 tablet (0.1 mg total) by mouth 3 (three) times daily as needed (for systolic BP greater than 170)., Disp: 180 tablet, Rfl: 3    valACYclovir (VALTREX) 500 MG tablet, TAKE 1 TABLET(500 MG) BY MOUTH TWICE DAILY FOR 7 DAYS AS DIRECTED (Patient not taking: Reported on 10/2/2024), Disp: 60 tablet, Rfl:  3    Lab Results   Component Value Date    WBC 3.61 (L) 10/17/2023    HGB 12.4 10/17/2023    HCT 37.6 10/17/2023     10/17/2023    CHOL 121 10/17/2023    TRIG 34 10/17/2023    HDL 65 10/17/2023    ALT 27 10/17/2023    AST 29 10/17/2023     10/17/2023    K 4.1 10/17/2023     10/17/2023    CREATININE 1.2 10/17/2023    BUN 12 10/17/2023    CO2 24 10/17/2023    TSH 1.380 07/08/2020    INR 1.1 07/08/2020    HGBA1C 6.1 (H) 10/17/2023       Review of Systems   Constitutional:  Negative for fatigue and fever.   HENT: Negative.  Negative for congestion, sneezing and sore throat.    Eyes: Negative.    Respiratory:  Negative for cough, shortness of breath and wheezing.    Cardiovascular:  Negative for chest pain, palpitations and leg swelling.   Gastrointestinal:  Negative for abdominal pain, nausea and vomiting.   Genitourinary: Negative.    Musculoskeletal: Negative.  Negative for arthralgias.   Skin: Negative.  Negative for rash.   Neurological:  Negative for dizziness, weakness, light-headedness, numbness and headaches.   Hematological: Negative.    Psychiatric/Behavioral: Negative.     All other systems reviewed and are negative.      Objective:      Physical Exam  Constitutional:       Appearance: Normal appearance.   HENT:      Head: Normocephalic and atraumatic.      Nose: Nose normal.   Eyes:      Extraocular Movements: Extraocular movements intact.   Pulmonary:      Effort: Pulmonary effort is normal.   Musculoskeletal:         General: Normal range of motion.      Cervical back: Normal range of motion.   Skin:     General: Skin is warm and dry.      Comments: Large approximately 4 cm soft, non mobile mass noted.  Central punctum noted.   Neurological:      General: No focal deficit present.      Mental Status: She is alert and oriented to person, place, and time.   Psychiatric:         Mood and Affect: Mood normal.         Assessment:       1. Mass on back    2. Primary hypertension    3. Stage 3a  chronic kidney disease        Plan:       Loren was seen today for mass.    Diagnoses and all orders for this visit:    Mass on back  - lipoma versus epidermal inclusion cyst  -     US Soft Tissue Chest_Upper Back; Future    Primary hypertension  - elevated in clinic  - continue to monitor  - continue current regimen  - discussed dash diet, exercise/weight loss, and increased cardiovascular exercise   - monitor BP at home w/ goal <140/90     Stage 3a chronic kidney disease  - GFR 47.5 and cr 1.2 10/2023  - avoid nephrotoxic drugs             Future Appointments       Date Provider Specialty Appt Notes    10/3/2024 Edie Arevalo MD Dermatology *8 week follow up    11/25/2024 Sanket Toure MD Family Medicine annual               Portions of this note were dictated using voice recognition software and may contain dictation related errors in spelling / grammar / syntax not discovered on text review.     Maria A Montez PA-C

## 2024-10-03 ENCOUNTER — OFFICE VISIT (OUTPATIENT)
Dept: DERMATOLOGY | Facility: CLINIC | Age: 74
End: 2024-10-03
Payer: MEDICARE

## 2024-10-03 DIAGNOSIS — L65.9 ALOPECIA: Primary | ICD-10-CM

## 2024-10-03 PROCEDURE — 1159F MED LIST DOCD IN RCRD: CPT | Mod: CPTII,S$GLB,, | Performed by: STUDENT IN AN ORGANIZED HEALTH CARE EDUCATION/TRAINING PROGRAM

## 2024-10-03 PROCEDURE — 1101F PT FALLS ASSESS-DOCD LE1/YR: CPT | Mod: CPTII,S$GLB,, | Performed by: STUDENT IN AN ORGANIZED HEALTH CARE EDUCATION/TRAINING PROGRAM

## 2024-10-03 PROCEDURE — 3288F FALL RISK ASSESSMENT DOCD: CPT | Mod: CPTII,S$GLB,, | Performed by: STUDENT IN AN ORGANIZED HEALTH CARE EDUCATION/TRAINING PROGRAM

## 2024-10-03 PROCEDURE — 99213 OFFICE O/P EST LOW 20 MIN: CPT | Mod: S$GLB,,, | Performed by: STUDENT IN AN ORGANIZED HEALTH CARE EDUCATION/TRAINING PROGRAM

## 2024-10-03 PROCEDURE — 4010F ACE/ARB THERAPY RXD/TAKEN: CPT | Mod: CPTII,S$GLB,, | Performed by: STUDENT IN AN ORGANIZED HEALTH CARE EDUCATION/TRAINING PROGRAM

## 2024-10-03 PROCEDURE — 1160F RVW MEDS BY RX/DR IN RCRD: CPT | Mod: CPTII,S$GLB,, | Performed by: STUDENT IN AN ORGANIZED HEALTH CARE EDUCATION/TRAINING PROGRAM

## 2024-10-03 PROCEDURE — 1126F AMNT PAIN NOTED NONE PRSNT: CPT | Mod: CPTII,S$GLB,, | Performed by: STUDENT IN AN ORGANIZED HEALTH CARE EDUCATION/TRAINING PROGRAM

## 2024-10-03 NOTE — PROGRESS NOTES
Subjective:      Patient ID:  Loren Andre is a 74 y.o. female who presents for   Chief Complaint   Patient presents with    Alopecia     Follow up     LOV 08/08/2024    Patient here today for a follow up on her hair loss- suspect traction vs. CCCA. ILK 5mg/cc 3 cc total at lov x2 rounds.   States that the itching has improved.   Use fluocinolone oil BID on scalp, shea butter, and patel oil.     Denies itching, burning on scalp, denies any symptoms    Scarring alopecia  Intralesional Kenalog 5mg/cc (3 cc total) injected into 7 lesions on the scalp today after obtaining verbal consent including risk of surrounding hypopigmentation. Patient tolerated procedure well.     Units: 2  NDC for Kenalog 10mg/cc:  5649-3733-90          Review of Systems   Constitutional:  Negative for fever, chills and fatigue.   Respiratory:  Negative for cough and shortness of breath.    Gastrointestinal:  Negative for nausea and vomiting.   Skin:  Negative for itching.       Objective:   Physical Exam   Constitutional: She appears well-developed and well-nourished.   Neurological: She is alert and oriented to person, place, and time.   Psychiatric: She has a normal mood and affect.   Skin:   Areas Examined (abnormalities noted in diagram):   Scalp / Hair Palpated and Inspected            Diagram Legend     Erythematous scaling macule/papule c/w actinic keratosis       Vascular papule c/w angioma      Pigmented verrucoid papule/plaque c/w seborrheic keratosis      Yellow umbilicated papule c/w sebaceous hyperplasia      Irregularly shaped tan macule c/w lentigo     1-2 mm smooth white papules consistent with Milia      Movable subcutaneous cyst with punctum c/w epidermal inclusion cyst      Subcutaneous movable cyst c/w pilar cyst      Firm pink to brown papule c/w dermatofibroma      Pedunculated fleshy papule(s) c/w skin tag(s)      Evenly pigmented macule c/w junctional nevus     Mildly variegated pigmented, slightly  irregular-bordered macule c/w mildly atypical nevus      Flesh colored to evenly pigmented papule c/w intradermal nevus       Pink pearly papule/plaque c/w basal cell carcinoma      Erythematous hyperkeratotic cursted plaque c/w SCC      Surgical scar with no sign of skin cancer recurrence      Open and closed comedones      Inflammatory papules and pustules      Verrucoid papule consistent consistent with wart     Erythematous eczematous patches and plaques     Dystrophic onycholytic nail with subungual debris c/w onychomycosis     Umbilicated papule    Erythematous-base heme-crusted tan verrucoid plaque consistent with inflamed seborrheic keratosis     Erythematous Silvery Scaling Plaque c/w Psoriasis     See annotation      Assessment / Plan:        Alopecia  Suspect CCCA  Not improving with ILK x 2 rounds, asymptomatic and she does not desire further ILK at this time  No visible active inflammation  Recommend rogaine 5% BID, continue dermasmoothe oil  - instructed patient to apply OTC minoxidil 5% solution or  5% foam twice daily. Side effects may include dryness, irritation, and rare ACD.             No follow-ups on file.

## 2024-10-07 ENCOUNTER — HOSPITAL ENCOUNTER (OUTPATIENT)
Dept: RADIOLOGY | Facility: HOSPITAL | Age: 74
Discharge: HOME | End: 2024-10-07
Payer: MEDICARE

## 2024-10-07 DIAGNOSIS — R22.2 MASS ON BACK: ICD-10-CM

## 2024-10-07 PROCEDURE — 76604 US EXAM CHEST: CPT | Mod: 26,,, | Performed by: RADIOLOGY

## 2024-10-07 PROCEDURE — 76604 US EXAM CHEST: CPT | Mod: TC,PO

## 2024-10-08 DIAGNOSIS — N18.31 STAGE 3A CHRONIC KIDNEY DISEASE: ICD-10-CM

## 2024-10-08 DIAGNOSIS — R22.2 MASS ON BACK: Primary | ICD-10-CM

## 2024-10-09 DIAGNOSIS — R22.2 MASS ON BACK: Primary | ICD-10-CM

## 2024-10-13 ENCOUNTER — HOSPITAL ENCOUNTER (OUTPATIENT)
Dept: RADIOLOGY | Facility: HOSPITAL | Age: 74
Discharge: HOME | End: 2024-10-13
Payer: MEDICARE

## 2024-10-13 DIAGNOSIS — R22.2 MASS ON BACK: ICD-10-CM

## 2024-10-13 PROCEDURE — 72146 MRI CHEST SPINE W/O DYE: CPT | Mod: TC

## 2024-10-13 PROCEDURE — 72146 MRI CHEST SPINE W/O DYE: CPT | Mod: 26,,, | Performed by: RADIOLOGY

## 2024-10-14 ENCOUNTER — TELEPHONE (OUTPATIENT)
Dept: FAMILY MEDICINE | Facility: CLINIC | Age: 74
End: 2024-10-14
Payer: MEDICARE

## 2024-10-14 NOTE — TELEPHONE ENCOUNTER
Pt states she is unsure about having precedure to remove lipoma and will pray about it then give us a call back.----- Message from Maria A Montez PA-C sent at 10/14/2024  8:29 AM CDT -----  Please notify patient that her MRI did show that the area of concern is a lipoma.  If she was interested in having the area removed I can place a referral to General surgery.  Please let me know.  Thanks

## 2024-10-15 ENCOUNTER — TELEPHONE (OUTPATIENT)
Dept: FAMILY MEDICINE | Facility: CLINIC | Age: 74
End: 2024-10-15
Payer: MEDICARE

## 2024-10-15 NOTE — TELEPHONE ENCOUNTER
Reviewed the following results with patient:     Maria A Montez PA-C  10/14/2024  8:29 AM CDT       Please notify patient that her MRI did show that the area of concern is a lipoma.  If she was interested in having the area removed I can place a referral to General surgery.  Please let me know.  Thanks       Patient states there is no pain at area of concern.  Requesting to know if she has the lipoma removed will the area be flat afterwards.  Patient had other questions regarding how long the procedure would take.  Writer advised best practice would be to have referral placed and schedule consult with general surgeon who would be better able to answer all questions regarding procedure and what to expect there after.  Patient agreed; requesting referral.  Will send follow up message to LONNIE Montez for notification.

## 2024-10-15 NOTE — TELEPHONE ENCOUNTER
Nesha Larios Maria A Staff     ----- Message from Nesha sent at 10/15/2024 12:27 PM CDT -----  Regarding: Needs Medical Advice  Contact: patient at [phone]  Type: Needs Medical Advice  Who Called: patient at [phone]    Additional Information: would like to discuss the next steps for the mass on her back. Please call and advise. Thank you

## 2024-10-15 NOTE — TELEPHONE ENCOUNTER
Call placed to patient for notification. Patient verbalized understanding.  Provided number to call and schedule appointment with general surgery.       Maria A Montez PA-C  You26 minutes ago (1:08 PM)       I recommended follow up with General surgery if she wishes to have area removed.  It was not necessary for her to have the area removed, but if it was uncomfortable or it was not cosmetically pleasing to her she can have it removed.  Thanks

## 2024-10-28 ENCOUNTER — TELEPHONE (OUTPATIENT)
Dept: FAMILY MEDICINE | Facility: CLINIC | Age: 74
End: 2024-10-28

## 2024-10-28 NOTE — TELEPHONE ENCOUNTER
----- Message from Marlys sent at 10/28/2024  1:42 PM CDT -----  Regarding: Update        Name Of Caller:   Loren      Contact Preference:  [phone]       Nature of call:   Pt would like to inform nurse they don't wish to proceed with lipoma removal. She's content with the way it is.

## 2024-10-29 DIAGNOSIS — Z00.00 ENCOUNTER FOR MEDICARE ANNUAL WELLNESS EXAM: ICD-10-CM

## 2024-10-31 ENCOUNTER — TELEPHONE (OUTPATIENT)
Dept: FAMILY MEDICINE | Facility: CLINIC | Age: 74
End: 2024-10-31
Payer: MEDICARE

## 2024-10-31 DIAGNOSIS — M79.10 MYALGIA: ICD-10-CM

## 2024-10-31 NOTE — TELEPHONE ENCOUNTER
----- Message from Humera sent at 10/31/2024  8:06 AM CDT -----  Type: Needs Medical Advice  Who Called:  PT     Best Call Back Number: [phone] (home)     Additional Information: pt requesting call back from juanita please advise

## 2024-10-31 NOTE — TELEPHONE ENCOUNTER
Patient has fibromyalgia and her legs are hurting and burning- she feels the Gabapentin dose 100mg she is on is no longer working and wants to increase it. She has appt 11/25 with Dr.Taylor. Urena

## 2024-11-01 ENCOUNTER — LAB VISIT (OUTPATIENT)
Dept: LAB | Facility: HOSPITAL | Age: 74
End: 2024-11-01
Payer: MEDICARE

## 2024-11-01 DIAGNOSIS — N18.31 STAGE 3A CHRONIC KIDNEY DISEASE: ICD-10-CM

## 2024-11-01 LAB
ANION GAP SERPL CALC-SCNC: 9 MMOL/L (ref 8–16)
BUN SERPL-MCNC: 20 MG/DL (ref 8–23)
CALCIUM SERPL-MCNC: 9.3 MG/DL (ref 8.7–10.5)
CHLORIDE SERPL-SCNC: 109 MMOL/L (ref 95–110)
CO2 SERPL-SCNC: 25 MMOL/L (ref 23–29)
CREAT SERPL-MCNC: 1.4 MG/DL (ref 0.5–1.4)
EST. GFR  (NO RACE VARIABLE): 39 ML/MIN/1.73 M^2
GLUCOSE SERPL-MCNC: 94 MG/DL (ref 70–110)
POTASSIUM SERPL-SCNC: 4.3 MMOL/L (ref 3.5–5.1)
SODIUM SERPL-SCNC: 143 MMOL/L (ref 136–145)

## 2024-11-01 PROCEDURE — 36415 COLL VENOUS BLD VENIPUNCTURE: CPT

## 2024-11-01 PROCEDURE — 80048 BASIC METABOLIC PNL TOTAL CA: CPT

## 2024-11-01 NOTE — TELEPHONE ENCOUNTER
AM assessment completed. See flowsheet. A/O x 1. Pt follows commands. able to make needs known. Lungs sounds rhochi in upper, diminished in bases. Oral suctioning effective. Afib on bedside monitor. Bowel sounds hypoactive. Midline incision approximated with staples. Some serous drainage from top and bottom of incision. Colostomy with little output to LUQ. +2-3 BUE and +2 BLE edema noted. BUE weeping. Urinary catheter in place draining clear yellow urine. Labs reviewed. Cont to monitor. Her last kidney function check was about a year ago, we need to reassess that before I can increase the gabapentin.  Maria A Stevens put in an order for a BMP on October 8th that has not been done yet.  Is it going to be done?, I can put in another one if needed.

## 2024-11-04 RX ORDER — GABAPENTIN 100 MG/1
200 CAPSULE ORAL NIGHTLY
Qty: 60 CAPSULE | Refills: 3 | Status: SHIPPED | OUTPATIENT
Start: 2024-11-04 | End: 2025-11-04

## 2024-11-04 NOTE — TELEPHONE ENCOUNTER
site checked, no inappropriate activity found    Renal function has deteriorated somewhat but not so bad that we can not go up on the gabapentin.  New prescription sent to [facility] taking 200 mg at bedtime.

## 2024-11-06 ENCOUNTER — OFFICE VISIT (OUTPATIENT)
Dept: CARDIOLOGY | Facility: CLINIC | Age: 74
End: 2024-11-06
Payer: MEDICARE

## 2024-11-06 ENCOUNTER — TELEPHONE (OUTPATIENT)
Dept: FAMILY MEDICINE | Facility: CLINIC | Age: 74
End: 2024-11-06
Payer: MEDICARE

## 2024-11-06 VITALS
DIASTOLIC BLOOD PRESSURE: 76 MMHG | HEART RATE: 65 BPM | HEIGHT: 66 IN | WEIGHT: 157.81 LBS | OXYGEN SATURATION: 97 % | SYSTOLIC BLOOD PRESSURE: 189 MMHG | BODY MASS INDEX: 25.36 KG/M2

## 2024-11-06 DIAGNOSIS — I10 WHITE COAT SYNDROME WITH DIAGNOSIS OF HYPERTENSION: ICD-10-CM

## 2024-11-06 DIAGNOSIS — I20.0 UNSTABLE ANGINA PECTORIS: ICD-10-CM

## 2024-11-06 DIAGNOSIS — M79.7 FIBROMYALGIA: ICD-10-CM

## 2024-11-06 DIAGNOSIS — R93.1 AGATSTON CORONARY ARTERY CALCIUM SCORE LESS THAN 100: ICD-10-CM

## 2024-11-06 DIAGNOSIS — I10 PRIMARY HYPERTENSION: Primary | ICD-10-CM

## 2024-11-06 DIAGNOSIS — R07.89 ATYPICAL CHEST PAIN: ICD-10-CM

## 2024-11-06 LAB
OHS QRS DURATION: 82 MS
OHS QTC CALCULATION: 395 MS

## 2024-11-06 PROCEDURE — 3077F SYST BP >= 140 MM HG: CPT | Mod: HCNC,CPTII,S$GLB, | Performed by: NURSE PRACTITIONER

## 2024-11-06 PROCEDURE — 3288F FALL RISK ASSESSMENT DOCD: CPT | Mod: HCNC,CPTII,S$GLB, | Performed by: NURSE PRACTITIONER

## 2024-11-06 PROCEDURE — 1126F AMNT PAIN NOTED NONE PRSNT: CPT | Mod: HCNC,CPTII,S$GLB, | Performed by: NURSE PRACTITIONER

## 2024-11-06 PROCEDURE — 1159F MED LIST DOCD IN RCRD: CPT | Mod: HCNC,CPTII,S$GLB, | Performed by: NURSE PRACTITIONER

## 2024-11-06 PROCEDURE — 99999 PR PBB SHADOW E&M-EST. PATIENT-LVL V: CPT | Mod: PBBFAC,,, | Performed by: NURSE PRACTITIONER

## 2024-11-06 PROCEDURE — 3078F DIAST BP <80 MM HG: CPT | Mod: HCNC,CPTII,S$GLB, | Performed by: NURSE PRACTITIONER

## 2024-11-06 PROCEDURE — 99214 OFFICE O/P EST MOD 30 MIN: CPT | Mod: HCNC,S$GLB,, | Performed by: NURSE PRACTITIONER

## 2024-11-06 PROCEDURE — 4010F ACE/ARB THERAPY RXD/TAKEN: CPT | Mod: HCNC,CPTII,S$GLB, | Performed by: NURSE PRACTITIONER

## 2024-11-06 PROCEDURE — 1101F PT FALLS ASSESS-DOCD LE1/YR: CPT | Mod: HCNC,CPTII,S$GLB, | Performed by: NURSE PRACTITIONER

## 2024-11-06 RX ORDER — TERAZOSIN 2 MG/1
2 CAPSULE ORAL NIGHTLY
Qty: 30 CAPSULE | Refills: 11 | Status: SHIPPED | OUTPATIENT
Start: 2024-11-06 | End: 2025-11-06

## 2024-11-06 NOTE — ASSESSMENT & PLAN NOTE
Start terazosin 2 mg po QHS. Monitor BP at home.   Continue carvedilol 25 mg po BID and lisinopril 40 mg po daily.

## 2024-11-06 NOTE — TELEPHONE ENCOUNTER
----- Message from Maria A Montez PA-C sent at 11/6/2024  3:47 PM CST -----  Please notify patient that her kidney function has taken a small dip.  I encourage her to drink plenty of fluids and avoid taking nephrotoxic drugs like ibuprofen.  I recommend that she keep her upcoming appointment with Dr. Toure in a couple of weeks for further assessment.  Thanks

## 2024-11-06 NOTE — PATIENT INSTRUCTIONS
Start the terazosin 2 mg daily at bedtime. Take immediately before going to bed. Change positions slowly to avoid dizziness and lightheadedness.     Please track your BP (blood pressure) every 4 hours while awake for 5 days and turn in a log for evaluation. Please include the date, time, BP, and heart rate on your log in a list format.  Your BP log can be turned in via [facility] or in person at the  of our office.

## 2024-11-06 NOTE — ASSESSMENT & PLAN NOTE
Recently flaring and causing more pain and discomfort which may be driving BP as well. To follow up with PCP.

## 2024-11-06 NOTE — PROGRESS NOTES
Subjective:    Patient ID:  Loren Andre is a 74 y.o. female.  Chief Complaint   Patient presents with    Follow-up     6 month f/u      Chest Pain     Slightly  (L) side under breast area        HPI:    Patient seen today for follow up appointment. She denies any chest pain with exertion but does occasionally have some left sided chest pain. She does also have some mild NAJERA. Her BP is significantly elevated at today's visit. She states it primarily runs in the 140/80s systolic at home.       Review of patient's allergies indicates:   Allergen Reactions    Doxepin Anaphylaxis     abd pain    Norvasc [amlodipine] Palpitations    Xyzal [levocetirizine] Other (See Comments)     Can not take with blood pressure medications - headaches, higher blood pressure, red hands     Penicillins Rash    Sulfa (sulfonamide antibiotics) Rash    Duloxetine      Other reaction(s): insomnia    Levofloxacin      Other reaction(s): Headache    Milnacipran      Other reaction(s): stomach pain    Nitrofurantoin monohyd/m-cryst      Other reaction(s): Itching    Prednisone      Other reaction(s): Itching    Pseudoephedrine-guaifenesin      Other reaction(s): Stomach upset    Terbinafine      Other reaction(s): rash       Past Medical History:   Diagnosis Date    Allergy     Anemia     sickle cell trait    Anxiety     Arthritis     Asthma     Chronic disease anemia 10/05/2017    Colon polyps     Depression     Dermatitis 03/07/2013    Fibromyalgia     HEARING LOSS     Hyperlipidemia     Hypertension     Leucopenia 10/05/2017    Polyneuropathy     Scoliosis     Sickle cell trait     Sickle-cell trait 10/05/2017    Trouble in sleeping     Urticaria      Past Surgical History:   Procedure Laterality Date    ADENOIDECTOMY      APPENDECTOMY      COLONOSCOPY  4/14/2008    Dr. Guerrero, 10 year recheck    COLONOSCOPY N/A 10/4/2018    Procedure: COLONOSCOPY;  Surgeon: Tam Kemp MD;  Location: [facility];  Service: Endoscopy;  Laterality:  N/A;    HYSTERECTOMY      OOPHORECTOMY      ROTATOR CUFF REPAIR Left 01/2016    ROTATOR CUFF REPAIR W/ DISTAL CLAVICLE EXCISION Right 12/04/2017    Dr. Eligio Timmons ( [facility] )    TONSILLECTOMY       Social History     Tobacco Use    Smoking status: Never    Smokeless tobacco: Never   Substance Use Topics    Alcohol use: Yes     Comment: occasionally - wine    Drug use: No     Family History   Problem Relation Name Age of Onset    Multiple sclerosis Mother      Seizures Mother      Cancer Father          lung    Alcohol abuse Brother ruby     Early death Brother ruby     Diabetes Maternal Aunt      Diabetes Maternal Uncle      Diabetes Maternal Grandmother      Mental illness Maternal Grandfather      Alzheimer's disease Maternal Grandfather      Asthma Paternal Aunt      Sickle cell anemia Paternal Aunt      Diabetes Sister rickey     Arthritis Daughter lissett     Miscarriages / Stillbirths Daughter lissett     Anemia Daughter lissett     Hyperlipidemia Son lonnie     Hypertension Son lonnie     Allergies Son lonnie     Sickle cell anemia Paternal Uncle      Asthma Brother quentin     No Known Problems Sister luis enrique     No Known Problems Sister cassia     No Known Problems Sister fabiola     No Known Problems Brother johnson     Miscarriages / Stillbirths Daughter dondeyel     Thyroid disease Daughter dondeyel     No Known Problems Daughter dashita     Angioedema Neg Hx      Eczema Neg Hx      Immunodeficiency Neg Hx      Rheum arthritis Neg Hx      Psoriasis Neg Hx      Lupus Neg Hx          Review of Systems:   Per HPI         Objective:        Vitals:    11/06/24 1001   BP: (!) 189/76   Pulse: 65       Lab Results   Component Value Date    WBC 3.61 (L) 10/17/2023    HGB 12.4 10/17/2023    HCT 37.6 10/17/2023     10/17/2023    CHOL 121 10/17/2023    TRIG 34 10/17/2023    HDL 65 10/17/2023    ALT 27 10/17/2023    AST 29 10/17/2023     11/01/2024    K 4.3 11/01/2024     11/01/2024     CREATININE 1.4 11/01/2024    BUN 20 11/01/2024    CO2 25 11/01/2024    TSH 1.380 07/08/2020    INR 1.1 07/08/2020    HGBA1C 6.1 (H) 10/17/2023        ECHOCARDIOGRAM RESULTS  Results for orders placed during the hospital encounter of 04/09/24    Echo    Interpretation Summary    Left Ventricle: The left ventricle is normal in size. Mildly increased wall thickness. There is mild concentric hypertrophy. Normal wall motion. There is normal systolic function with a visually estimated ejection fraction of 65 - 70%. There is normal diastolic function.    Right Ventricle: Normal right ventricular cavity size. Wall thickness is normal. Right ventricle wall motion  is normal. Systolic function is normal.    Aortic Valve: There is mild aortic regurgitation with a centrally directed jet.    Tricuspid Valve: There is trace regurgitation.    Pulmonary Artery: The estimated pulmonary artery systolic pressure is 32 mmHg.    IVC/SVC: Normal venous pressure at 3 mmHg.        CURRENT/PREVIOUS VISIT EKG  Results for orders placed or performed in visit on 11/06/24   IN OFFICE EKG 12-LEAD (to [facility])    Collection Time: 11/06/24 10:02 AM   Result Value Ref Range    QRS Duration 82 ms    [facility] QTC Calculation 395 ms    Narrative    Test Reason : I10,I20.0,    Vent. Rate : 057 BPM     Atrial Rate : 057 BPM     P-R Int : 152 ms          QRS Dur : 082 ms      QT Int : 406 ms       P-R-T Axes : 068 000 -18 degrees     QTc Int : 395 ms    Sinus bradycardia  Possible Left atrial enlargement  T wave abnormality, consider lateral ischemia  Abnormal ECG  When compared with ECG of 03-APR-2024 14:05,  No significant change was found    Referred By:             Confirmed By:      No valid procedures specified.   Results for orders placed during the hospital encounter of 12/02/22    Nuclear Stress - Cardiology Interpreted    Interpretation Summary    There is a mild to moderate intensity perfusion abnormality in the  wall of the left ventricle, secondary  to breast attenuation.  Mild inferolateral hypoperfusion with mild reversibility can not rule out small area of ischemia versus artifact    The gated perfusion images showed an ejection fraction of 73% post stress. Normal ejection fraction is greater than 53%.    The EKG portion of this study is abnormal but not diagnostic.    The patient reported chest pain during the stress test.    There were no arrhythmias during stress.    TID 1.10      Physical Exam:  CONSTITUTIONAL: No fever, no chills  HEENT: Normocephalic, atraumatic,pupils reactive to light                 NECK:  No JVD no carotid bruit  CVS: S1S2+, RRR, no murmurs,   LUNGS: Clear  ABDOMEN: Soft, NT, BS+  EXTREMITIES: No cyanosis, edema  : No trin[place] catheter  NEURO: AAO X 3  PSY: Normal affect      Medication List with Changes/Refills   New Medications    TERAZOSIN (HYTRIN) 2 MG CAPSULE    Take 1 capsule (2 mg total) by mouth every evening.   Current Medications    ALBUTEROL (PROAIR HFA) 90 MCG/ACTUATION INHALER    Inhale 2 puffs into the lungs every 6 (six) hours as needed for Wheezing. Rescue    ASCORBIC ACID, VITAMIN C, (VITAMIN C) 500 MG TABLET    Take 500 mg by mouth daily as needed.     ASPIRIN 81 MG CHEW    Take 1 tablet (81 mg total) by mouth once daily.    ATORVASTATIN (LIPITOR) 40 MG TABLET    Take 1 tablet (40 mg total) by mouth once daily.    AZITHROMYCIN (Z-MARISABEL) 250 MG TABLET    Take 2 tablets by mouth on day 1; Take 1 tablet by mouth on days 2-5    BENZONATATE (TESSALON) 200 MG CAPSULE    Take 1 capsule (200 mg total) by mouth 3 (three) times daily as needed for Cough.    CALCIUM CARBONATE/VITAMIN D3 (VITAMIN D-3 ORAL)    Take 100 Int'l Units by mouth once daily.     CARVEDILOL (COREG) 25 MG TABLET    TAKE 1 TABLET(25 MG) BY MOUTH TWICE DAILY WITH MEALS    CETIRIZINE (ZYRTEC) 10 MG TABLET    Take 1 tablet (10 mg total) by mouth daily as needed for Allergies.    CLOBETASOL (OLUX) 0.05 % FOAM    APPLY TOPICALLY TO THE AFFECTED AREA TWICE DAILY     CLONIDINE (CATAPRES) 0.1 MG TABLET    Take 1 tablet (0.1 mg total) by mouth 3 (three) times daily as needed (for systolic BP greater than 170).    COENZYME Q10 10 MG CAPSULE    Take 10 mg by mouth once daily.    FLUOCINOLONE (DERMA-SMOOTHE) 0.01 % EXTERNAL OIL    Apply topically 2 (two) times daily. Use on AA BID x 2 weeks    FOLIC ACID (FOLVITE) 400 MCG TABLET    Take 400 mcg by mouth once daily.    GABAPENTIN (NEURONTIN) 100 MG CAPSULE    Take 2 capsules (200 mg total) by mouth every evening.    GARLIC OIL ORAL    Take 1 capsule by mouth once daily.     LISINOPRIL (PRINIVIL,ZESTRIL) 40 MG TABLET    TAKE 1 TABLET(40 MG) BY MOUTH EVERY DAY    MAGNESIUM 250 MG TAB    Take 250 mg by mouth 2 (two) times a day.    MELATONIN-MAGNESIUM CITRATE 1-71.5 MG TBEC    Take 2 each by mouth every evening.    MONTELUKAST (SINGULAIR) 10 MG TABLET    TAKE 1 TABLET(10 MG) BY MOUTH EVERY DAY    NIACIN 250 MG TAB    Take 250 mg by mouth nightly.    OMEGA-3 FATTY ACIDS/FISH OIL (FISH OIL-OMEGA-3 FATTY ACIDS) 300-1,000 MG CAPSULE    Take by mouth once daily.    TRAMADOL-ACETAMINOPHEN 37.5-325 MG (ULTRACET) 37.5-325 MG TAB    Take 1 tablet by mouth every 6 (six) hours as needed for Pain.    VALACYCLOVIR (VALTREX) 500 MG TABLET    TAKE 1 TABLET(500 MG) BY MOUTH TWICE DAILY FOR 7 DAYS AS DIRECTED    VITAMIN E 100 UNIT CAPSULE    Take 100 Units by mouth once daily.             Assessment:       1. Primary hypertension    2. Unstable angina pectoris    3. Fibromyalgia    4. White coat syndrome with diagnosis of hypertension    5. Atypical chest pain    6. Agatston coronary artery calcium score less than 100         Plan:     Problem List Items Addressed This Visit          Unprioritized    Fibromyalgia    Current Assessment & Plan     Recently flaring and causing more pain and discomfort which may be driving BP as well. To follow up with PCP.          Primary hypertension - Primary    Current Assessment & Plan     Start terazosin 2 mg po  QHS. Monitor BP at home.   Continue carvedilol 25 mg po BID and lisinopril 40 mg po daily.          Relevant Orders    IN OFFICE EKG 12-LEAD (to [facility])    Atypical chest pain    Current Assessment & Plan     Obtain a nuclear stress test to rule out reversible ischemia.          Relevant Orders    Nuclear Stress - Cardiology Interpreted    Agatston coronary artery calcium score less than 100    Relevant Orders    Nuclear Stress - Cardiology Interpreted    White coat syndrome with diagnosis of hypertension    Relevant Orders    Nuclear Stress - Cardiology Interpreted     Other Visit Diagnoses       Unstable angina pectoris        Relevant Orders    IN OFFICE EKG 12-LEAD (to [facility])            Follow up in about 3 months (around 2/6/2025).

## 2024-11-11 DIAGNOSIS — A60.9 HSV (HERPES SIMPLEX VIRUS) ANOGENITAL INFECTION: ICD-10-CM

## 2024-11-11 RX ORDER — VALACYCLOVIR HYDROCHLORIDE 500 MG/1
TABLET, FILM COATED ORAL
Qty: 14 TABLET | Refills: 3 | Status: SHIPPED | OUTPATIENT
Start: 2024-11-11

## 2024-11-11 NOTE — TELEPHONE ENCOUNTER
No care due was identified.  [facility] Embedded Care Due Messages. Reference number: [id number].   11/11/2024 9:09:33 AM CST

## 2024-11-11 NOTE — TELEPHONE ENCOUNTER
Refill Routing Note   Medication(s) are not appropriate for processing by [facility] for the following reason(s):        Due for refill >6 months ago    [facility] action(s):  Defer               Appointments  past 12m or future 3m with PCP    Date Provider   Last Visit   10/4/2023 Sanket Toure MD   Next Visit   11/25/2024 Sanket Toure MD   ED visits in past 90 days: 0        Note composed:3:16 PM 11/11/2024

## 2024-11-25 ENCOUNTER — OFFICE VISIT (OUTPATIENT)
Dept: FAMILY MEDICINE | Facility: CLINIC | Age: 74
End: 2024-11-25
Attending: FAMILY MEDICINE
Payer: MEDICARE

## 2024-11-25 VITALS
BODY MASS INDEX: 25.44 KG/M2 | HEIGHT: 66 IN | SYSTOLIC BLOOD PRESSURE: 160 MMHG | DIASTOLIC BLOOD PRESSURE: 84 MMHG | HEART RATE: 65 BPM | WEIGHT: 158.31 LBS | OXYGEN SATURATION: 98 % | TEMPERATURE: 98 F

## 2024-11-25 DIAGNOSIS — I10 PRIMARY HYPERTENSION: ICD-10-CM

## 2024-11-25 DIAGNOSIS — Z23 FLU VACCINE NEED: ICD-10-CM

## 2024-11-25 DIAGNOSIS — D57.3 SICKLE-CELL TRAIT: ICD-10-CM

## 2024-11-25 DIAGNOSIS — M79.7 FIBROMYALGIA: ICD-10-CM

## 2024-11-25 DIAGNOSIS — Z00.00 PREVENTATIVE HEALTH CARE: Primary | ICD-10-CM

## 2024-11-25 DIAGNOSIS — J45.20 MILD INTERMITTENT CHRONIC ASTHMA WITHOUT COMPLICATION: ICD-10-CM

## 2024-11-25 DIAGNOSIS — I70.0 AORTIC ATHEROSCLEROSIS: ICD-10-CM

## 2024-11-25 DIAGNOSIS — E78.5 HYPERLIPIDEMIA, UNSPECIFIED HYPERLIPIDEMIA TYPE: ICD-10-CM

## 2024-11-25 DIAGNOSIS — R73.03 PREDIABETES: ICD-10-CM

## 2024-11-25 DIAGNOSIS — I25.10 ATHEROSCLEROSIS OF NATIVE CORONARY ARTERY OF NATIVE HEART WITHOUT ANGINA PECTORIS: ICD-10-CM

## 2024-11-25 DIAGNOSIS — N18.31 STAGE 3A CHRONIC KIDNEY DISEASE: ICD-10-CM

## 2024-11-25 PROCEDURE — G0008 ADMIN INFLUENZA VIRUS VAC: HCPCS | Mod: HCNC,S$GLB,, | Performed by: FAMILY MEDICINE

## 2024-11-25 PROCEDURE — 1101F PT FALLS ASSESS-DOCD LE1/YR: CPT | Mod: HCNC,CPTII,S$GLB, | Performed by: FAMILY MEDICINE

## 2024-11-25 PROCEDURE — 1125F AMNT PAIN NOTED PAIN PRSNT: CPT | Mod: HCNC,CPTII,S$GLB, | Performed by: FAMILY MEDICINE

## 2024-11-25 PROCEDURE — 3288F FALL RISK ASSESSMENT DOCD: CPT | Mod: HCNC,CPTII,S$GLB, | Performed by: FAMILY MEDICINE

## 2024-11-25 PROCEDURE — 99999 PR PBB SHADOW E&M-EST. PATIENT-LVL V: CPT | Mod: PBBFAC,HCNC,, | Performed by: FAMILY MEDICINE

## 2024-11-25 PROCEDURE — 3077F SYST BP >= 140 MM HG: CPT | Mod: HCNC,CPTII,S$GLB, | Performed by: FAMILY MEDICINE

## 2024-11-25 PROCEDURE — 1160F RVW MEDS BY RX/DR IN RCRD: CPT | Mod: HCNC,CPTII,S$GLB, | Performed by: FAMILY MEDICINE

## 2024-11-25 PROCEDURE — 4010F ACE/ARB THERAPY RXD/TAKEN: CPT | Mod: HCNC,CPTII,S$GLB, | Performed by: FAMILY MEDICINE

## 2024-11-25 PROCEDURE — 90653 IIV ADJUVANT VACCINE IM: CPT | Mod: HCNC,S$GLB,, | Performed by: FAMILY MEDICINE

## 2024-11-25 PROCEDURE — 99214 OFFICE O/P EST MOD 30 MIN: CPT | Mod: HCNC,S$GLB,, | Performed by: FAMILY MEDICINE

## 2024-11-25 PROCEDURE — 1159F MED LIST DOCD IN RCRD: CPT | Mod: HCNC,CPTII,S$GLB, | Performed by: FAMILY MEDICINE

## 2024-11-25 PROCEDURE — 3079F DIAST BP 80-89 MM HG: CPT | Mod: HCNC,CPTII,S$GLB, | Performed by: FAMILY MEDICINE

## 2024-11-25 RX ORDER — ALBUTEROL SULFATE 90 UG/1
2 INHALANT RESPIRATORY (INHALATION) EVERY 6 HOURS PRN
Qty: 6.7 G | Refills: 3 | Status: SHIPPED | OUTPATIENT
Start: 2024-11-25

## 2024-11-25 RX ORDER — GABAPENTIN 300 MG/1
300 CAPSULE ORAL NIGHTLY
Qty: 30 CAPSULE | Refills: 5 | Status: SHIPPED | OUTPATIENT
Start: 2024-11-25 | End: 2025-11-25

## 2024-11-25 RX ORDER — TERAZOSIN 2 MG/1
4 CAPSULE ORAL NIGHTLY
Start: 2024-11-25 | End: 2025-11-25

## 2024-11-25 RX ORDER — ALBUTEROL SULFATE 0.83 MG/ML
2.5 SOLUTION RESPIRATORY (INHALATION) EVERY 6 HOURS PRN
Qty: 90 ML | Refills: 2 | Status: SHIPPED | OUTPATIENT
Start: 2024-11-25 | End: 2025-11-25

## 2024-11-25 NOTE — PROGRESS NOTES
Subjective:       Patient ID: Loren Andre is a 74 y.o. female.    Chief Complaint: Annual Exam    74-year-old female coming in for annual exam.  Blood pressure has been elevated and she was placed on terazosin 2 mg at bedtime by Cardiology.  That has resulted in somewhat of a drop in her blood pressure but not sufficient.  She has no complaints of dizziness or lightheadedness.  History includes hypertension, hyperlipidemia, atherosclerosis of native coronary artery native heart without angina, sickle cell trait, prediabetes, fibromyalgia, aortic atherosclerosis, and stage IIIA chronic kidney disease.  She needs a flu vaccine and she has questions about the RSV which she has already had once.  She has been having some burning in the lower extremities that she feels is related to the fibromyalgia but this is associated with a right hip pain in the area of the greater trochanter that has perhaps altered her gait.  She does have scoliosis as well.  There is no complaints of tingling or pins and needles down into the lower extremities.    Past Medical History:  No date: Allergy  No date: Anemia      Comment:  sickle cell trait  No date: Anxiety  No date: Arthritis  No date: Asthma  10/05/2017: Chronic disease anemia  No date: Colon polyps  No date: Depression  03/07/2013: Dermatitis  No date: Fibromyalgia  No date: HEARING LOSS  No date: Hyperlipidemia  No date: Hypertension  10/05/2017: Leucopenia  No date: Polyneuropathy  No date: Scoliosis  No date: Sickle cell trait  10/05/2017: Sickle-cell trait  No date: Trouble in sleeping  No date: Urticaria    Past Surgical History:  No date: ADENOIDECTOMY  No date: APPENDECTOMY  4/14/2008: COLONOSCOPY      Comment:  Dr. Guerrero, 10 year recheck  10/4/2018: COLONOSCOPY; N/A      Comment:  Procedure: COLONOSCOPY;  Surgeon: Tam Kemp MD;                Location: [facility];  Service: Endoscopy;  Laterality:                N/A;  No date: HYSTERECTOMY  No date:  OOPHORECTOMY  01/2016: ROTATOR CUFF REPAIR; Left  12/04/2017: ROTATOR CUFF REPAIR W/ DISTAL CLAVICLE EXCISION; Right      Comment:  Dr. Eligio Timmons ( [facility] )  No date: TONSILLECTOMY    Review of patient's family history indicates:  Problem: Multiple sclerosis      Relation: Mother          Name:               Age of Onset: (Not Specified)  Problem: Seizures      Relation: Mother          Name:               Age of Onset: (Not Specified)  Problem: Cancer      Relation: Father          Name:               Age of Onset: (Not Specified)              Comment: lung  Problem: Alcohol abuse      Relation: Brother          Name: ruby              Age of Onset: (Not Specified)  Problem: Early death      Relation: Brother          Name: ruby              Age of Onset: (Not Specified)  Problem: Diabetes      Relation: Maternal Aunt          Name:               Age of Onset: (Not Specified)  Problem: Diabetes      Relation: Maternal Uncle          Name:               Age of Onset: (Not Specified)  Problem: Diabetes      Relation: Maternal Grandmother          Name:               Age of Onset: (Not Specified)  Problem: Mental illness      Relation: Maternal Grandfather          Name:               Age of Onset: (Not Specified)  Problem: Alzheimer's disease      Relation: Maternal Grandfather          Name:               Age of Onset: (Not Specified)  Problem: Asthma      Relation: Paternal Aunt          Name:               Age of Onset: (Not Specified)  Problem: Sickle cell anemia      Relation: Paternal Aunt          Name:               Age of Onset: (Not Specified)  Problem: Diabetes      Relation: Sister          Name: rickey              Age of Onset: (Not Specified)  Problem: Arthritis      Relation: Daughter          Name: lissett              Age of Onset: (Not Specified)  Problem: Miscarriages / Stillbirths      Relation: Daughter          Name: lissett              Age of Onset: (Not Specified)  Problem: Anemia       Relation: Daughter          Name: lissett              Age of Onset: (Not Specified)  Problem: Hyperlipidemia      Relation: Son          Name: lonnie              Age of Onset: (Not Specified)  Problem: Hypertension      Relation: Son          Name: lonnie              Age of Onset: (Not Specified)  Problem: Allergies      Relation: Son          Name: lonnie              Age of Onset: (Not Specified)  Problem: Sickle cell anemia      Relation: Paternal Uncle          Name:               Age of Onset: (Not Specified)  Problem: Asthma      Relation: Brother          Name: quentin              Age of Onset: (Not Specified)  Problem: No Known Problems      Relation: Sister          Name: luis enrique              Age of Onset: (Not Specified)  Problem: No Known Problems      Relation: Sister          Name: cassia              Age of Onset: (Not Specified)  Problem: No Known Problems      Relation: Sister          Name: fabiola              Age of Onset: (Not Specified)  Problem: No Known Problems      Relation: Brother          Name: johnson              Age of Onset: (Not Specified)  Problem: Miscarriages / Stillbirths      Relation: Daughter          Name: keysha              Age of Onset: (Not Specified)  Problem: Thyroid disease      Relation: Daughter          Name: keysha              Age of Onset: (Not Specified)  Problem: No Known Problems      Relation: Daughter          Name: gwendolyn              Age of Onset: (Not Specified)  Problem: Angioedema      Relation: Neg Hx          Name:               Age of Onset: (Not Specified)  Problem: Eczema      Relation: Neg Hx          Name:               Age of Onset: (Not Specified)  Problem: Immunodeficiency      Relation: Neg Hx          Name:               Age of Onset: (Not Specified)  Problem: Rheum arthritis      Relation: Neg Hx          Name:               Age of Onset: (Not Specified)  Problem: Psoriasis      Relation: Neg Hx          Name:               Age of  Onset: (Not Specified)  Problem: Lupus      Relation: Neg Hx          Name:               Age of Onset: (Not Specified)    Social History    Tobacco Use      Smoking status: Never      Smokeless tobacco: Never    Alcohol use: Yes      Comment: occasionally - wine    Drug use: No    Current Outpatient Medications on File Prior to Visit:  ascorbic acid, vitamin C, (VITAMIN C) 500 MG tablet, Take 500 mg by mouth daily as needed. , Disp: , Rfl:   aspirin 81 MG Chew, Take 1 tablet (81 mg total) by mouth once daily., Disp: , Rfl: 0  atorvastatin (LIPITOR) 40 MG tablet, Take 1 tablet (40 mg total) by mouth once daily., Disp: 90 tablet, Rfl: 0  CALCIUM CARBONATE/VITAMIN D3 (VITAMIN D-3 ORAL), Take 100 Int'l Units by mouth once daily. , Disp: , Rfl:   carvediloL (COREG) 25 MG tablet, TAKE 1 TABLET(25 MG) BY MOUTH TWICE DAILY WITH MEALS, Disp: 180 tablet, Rfl: 3  GARLIC OIL ORAL, Take 1 capsule by mouth once daily. , Disp: , Rfl:   lisinopriL (PRINIVIL,ZESTRIL) 40 MG tablet, TAKE 1 TABLET(40 MG) BY MOUTH EVERY DAY, Disp: 90 tablet, Rfl: 0  magnesium 250 mg Tab, Take 250 mg by mouth 2 (two) times a day., Disp: , Rfl:   montelukast (SINGULAIR) 10 mg tablet, TAKE 1 TABLET(10 MG) BY MOUTH EVERY DAY, Disp: 90 tablet, Rfl: 0  omega-3 fatty acids/fish oil (FISH OIL-OMEGA-3 FATTY ACIDS) 300-1,000 mg capsule, Take by mouth once daily., Disp: , Rfl:   valACYclovir (VALTREX) 500 MG tablet, TAKE 1 TABLET(500 MG) BY MOUTH TWICE DAILY FOR 7 DAYS AS DIRECTED, Disp: 14 tablet, Rfl: 3  vitamin E 100 UNIT capsule, Take 100 Units by mouth once daily., Disp: , Rfl:   [DISCONTINUED] albuterol (PROAIR HFA) 90 mcg/actuation inhaler, Inhale 2 puffs into the lungs every 6 (six) hours as needed for Wheezing. Rescue, Disp: 6.7 g, Rfl: 3  [DISCONTINUED] gabapentin (NEURONTIN) 100 MG capsule, Take 2 capsules (200 mg total) by mouth every evening., Disp: 60 capsule, Rfl: 3  azithromycin (Z-MARISABEL) 250 MG tablet, Take 2 tablets by mouth on day 1; Take 1  tablet by mouth on days 2-5 (Patient not taking: Reported on 11/25/2024), Disp: 6 tablet, Rfl: 0  benzonatate (TESSALON) 200 MG capsule, Take 1 capsule (200 mg total) by mouth 3 (three) times daily as needed for Cough. (Patient not taking: Reported on 11/25/2024), Disp: 20 capsule, Rfl: 0  cetirizine (ZYRTEC) 10 MG tablet, Take 1 tablet (10 mg total) by mouth daily as needed for Allergies. (Patient not taking: Reported on 11/25/2024), Disp: 7 tablet, Rfl: 0  clobetasoL (OLUX) 0.05 % Foam, APPLY TOPICALLY TO THE AFFECTED AREA TWICE DAILY (Patient not taking: Reported on 11/25/2024), Disp: 100 g, Rfl: 1  cloNIDine (CATAPRES) 0.1 MG tablet, Take 1 tablet (0.1 mg total) by mouth 3 (three) times daily as needed (for systolic BP greater than 170). (Patient not taking: Reported on 11/25/2024), Disp: 180 tablet, Rfl: 3  coenzyme Q10 10 mg capsule, Take 10 mg by mouth once daily. (Patient not taking: Reported on 11/25/2024), Disp: , Rfl:   fluocinolone (DERMA-SMOOTHE) 0.01 % external oil, Apply topically 2 (two) times daily. Use on AA BID x 2 weeks (Patient not taking: Reported on 11/25/2024), Disp: 118 mL, Rfl: 2  folic acid (FOLVITE) 400 MCG tablet, Take 400 mcg by mouth once daily. (Patient not taking: Reported on 11/25/2024), Disp: , Rfl:   melatonin-magnesium citrate 1-71.5 mg TbEC, Take 2 each by mouth every evening. (Patient not taking: Reported on 11/25/2024), Disp: 180 tablet, Rfl: 0  niacin 250 MG Tab, Take 250 mg by mouth nightly. (Patient not taking: Reported on 11/25/2024), Disp: , Rfl:   tramadol-acetaminophen 37.5-325 mg (ULTRACET) 37.5-325 mg Tab, Take 1 tablet by mouth every 6 (six) hours as needed for Pain. (Patient not taking: Reported on 11/25/2024), Disp: 20 tablet, Rfl: 0  [DISCONTINUED] terazosin (HYTRIN) 2 MG capsule, Take 1 capsule (2 mg total) by mouth every evening. (Patient not taking: Reported on 11/25/2024), Disp: 30 capsule, Rfl: 11    No current facility-administered medications on file prior  to visit.          Review of Systems   Constitutional:  Negative for chills, diaphoresis, fatigue, fever and unexpected weight change.   HENT:  Negative for congestion, ear pain, hearing loss, postnasal drip, sinus pressure, sneezing, sore throat, tinnitus and trouble swallowing.    Eyes:  Negative for itching and visual disturbance.   Respiratory:  Negative for cough, chest tightness, shortness of breath and wheezing.    Cardiovascular:  Negative for chest pain, palpitations and leg swelling.   Gastrointestinal:  Positive for constipation (Improved with stool softeners). Negative for abdominal pain, blood in stool, diarrhea, nausea and vomiting.   Genitourinary:  Negative for dysuria, frequency and hematuria.   Musculoskeletal:  Positive for back pain and myalgias. Negative for arthralgias and joint swelling.   Neurological:  Positive for numbness (Burning in the lower extremities). Negative for dizziness and headaches.   Hematological:  Negative for adenopathy.   Psychiatric/Behavioral:  Negative for sleep disturbance. The patient is not nervous/anxious.        Objective:      Physical Exam  Vitals and nursing note reviewed.   Constitutional:       General: She is not in acute distress.     Appearance: Normal appearance. She is well-developed and normal weight. She is not ill-appearing, toxic-appearing or diaphoretic.      Comments: Elevated blood pressure, on recheck 142/82 and then again 160/84  Pulse 65 with regular rhythm   Normal weight with a BMI of 25.6 she is up 0.8 lb from her October 4, 2023 checkup   HENT:      Head: Normocephalic and atraumatic.      Right Ear: Tympanic membrane, ear canal and external ear normal. There is no impacted cerumen.      Left Ear: Tympanic membrane, ear canal and external ear normal. There is no impacted cerumen.      Nose: Nose normal. No congestion or rhinorrhea.      Mouth/Throat:      Mouth: Mucous membranes are moist.      Pharynx: Oropharynx is clear. No oropharyngeal  exudate or posterior oropharyngeal erythema.   Eyes:      General: No scleral icterus.        Right eye: No discharge.         Left eye: No discharge.      Extraocular Movements: Extraocular movements intact.      Conjunctiva/sclera: Conjunctivae normal.      Pupils: Pupils are equal, round, and reactive to light.   Neck:      Thyroid: No thyromegaly.      Vascular: No carotid bruit or JVD.   Cardiovascular:      Rate and Rhythm: Normal rate and regular rhythm.      Pulses: Normal pulses.      Heart sounds: Normal heart sounds. No murmur heard.     No friction rub. No gallop.   Pulmonary:      Effort: Pulmonary effort is normal. No respiratory distress.      Breath sounds: Normal breath sounds. No stridor. No wheezing, rhonchi or rales.   Chest:      Chest wall: No tenderness.   Abdominal:      General: Bowel sounds are normal. There is no distension.      Palpations: Abdomen is soft. There is no mass.      Tenderness: There is no abdominal tenderness. There is no guarding or rebound.      Hernia: No hernia is present.   Musculoskeletal:         General: No tenderness. Normal range of motion.      Cervical back: Normal range of motion and neck supple. No rigidity or tenderness.      Lumbar back: No spasms, tenderness or bony tenderness. Normal range of motion. Negative right straight leg raise test and negative left straight leg raise test.      Right hip: Bony tenderness (Mild tenderness over the right greater trochanter) present. No deformity or crepitus. Normal range of motion.      Left hip: Normal.      Right lower leg: No edema.      Left lower leg: No edema.   Lymphadenopathy:      Cervical: No cervical adenopathy.   Skin:     General: Skin is warm and dry.      Coloration: Skin is not jaundiced or pale.      Findings: No bruising, erythema, lesion or rash.   Neurological:      General: No focal deficit present.      Mental Status: She is alert and oriented to person, place, and time. Mental status is at  baseline.      Cranial Nerves: No cranial nerve deficit.      Sensory: No sensory deficit.      Motor: No weakness.      Coordination: Coordination normal.      Gait: Gait normal.      Deep Tendon Reflexes: Reflexes are normal and symmetric. Reflexes normal.   Psychiatric:         Mood and Affect: Mood normal.         Behavior: Behavior normal.         Thought Content: Thought content normal.         Judgment: Judgment normal.         Assessment:       1. Preventative health care    2. Primary hypertension    3. Hyperlipidemia, unspecified hyperlipidemia type    4. Prediabetes    5. Sickle-cell trait    6. Mild intermittent chronic asthma without complication    7. Atherosclerosis of native coronary artery of native heart without angina pectoris    8. Aortic atherosclerosis    9. Stage 3a chronic kidney disease    10. Fibromyalgia    11. Flu vaccine need    12. BMI 25.0-25.9,adult        Plan:       1. Preventative health care (Primary)  - Comprehensive Metabolic Panel; Future  - Hemoglobin A1C; Future  - Lipid Panel; Future  - CBC Auto Differential; Future    2. Primary hypertension  Increase terazosin to 4 mg nightly taking two of the 2 mg.  If it works out we can switch her to a five.  Follow-up with cardiology as ordered  - Comprehensive Metabolic Panel; Future  - Lipid Panel; Future  - CBC Auto Differential; Future  - Microalbumin/Creatinine Ratio, Urine; Future    3. Hyperlipidemia, unspecified hyperlipidemia type  Await lipid panel results  - Comprehensive Metabolic Panel; Future  - Lipid Panel; Future    4. Prediabetes  Await A1c and fasting glucose results  - Comprehensive Metabolic Panel; Future  - Hemoglobin A1C; Future    5. Sickle-cell trait  Await CBC, no active complaints  - CBC Auto Differential; Future    6. Mild intermittent chronic asthma without complication  Asymptomatic currently but needs refills on her nebulizer and hand-held rescue inhaler  - albuterol (PROAIR HFA) 90 mcg/actuation inhaler;  Inhale 2 puffs into the lungs every 6 (six) hours as needed for Wheezing. Rescue  Dispense: 6.7 g; Refill: 3    7. Atherosclerosis of native coronary artery of native heart without angina pectoris  Stable without active angina    8. Aortic atherosclerosis  Asymptomatic, control blood pressure, cholesterol, and blood glucose levels    9. Stage 3a chronic kidney disease  Await chemistry panel  - Comprehensive Metabolic Panel; Future    10. Fibromyalgia  Increase gabapentin to 300 mg at bedtime, we can go higher if needed and if tolerated  - gabapentin (NEURONTIN) 300 MG capsule; Take 1 capsule (300 mg total) by mouth every evening.  Dispense: 30 capsule; Refill: 5    11. Flu vaccine need  Given  - influenza (adjuvanted) (Fluad) 45 mcg/0.5 mL IM vaccine (> or = 64 yo) 0.5 mL    12. BMI 25.0-25.9,adult  Good weight no changes needed

## 2024-11-26 ENCOUNTER — TELEPHONE (OUTPATIENT)
Dept: FAMILY MEDICINE | Facility: CLINIC | Age: 74
End: 2024-11-26
Payer: MEDICARE

## 2024-11-26 NOTE — TELEPHONE ENCOUNTER
Chloé Menjivar Staff     ----- Message from Chloé sent at 11/26/2024  8:06 AM CST -----  Type: Needs Medical Advice  Who Called:  pt  Symptoms (please be specific):    How long has patient had these symptoms:    Pharmacy name and phone #:    Best Call Back Number: [phone]    Additional Information: pt is calling because Dr. Sanket Toure is retiring and she is looking to [facility].  Please call to appoint as it took me to [facility] then there was no schedule left

## 2024-12-03 ENCOUNTER — TELEPHONE (OUTPATIENT)
Dept: CARDIOLOGY | Facility: HOSPITAL | Age: 74
End: 2024-12-03

## 2024-12-03 NOTE — TELEPHONE ENCOUNTER
Left message on voicemail.    Patient advised, test will be at [facility] ([address]  Will need to register on the first floor at the main entrance.  Patient advised that arrival time is 06:40.  Patient advised that they may be here about 3.5-4 hours, and may want to bring something to occupy their time, as there will be periods of waiting.    Patient advised, may take her medications prior to testing if you need to.  Patient should HOLD Coreg. Advised if medications are taken with food, please keep it light, like toast and juice.    Patient advised to avoid all caffeine 12 hours prior to testing.  This includes chocolate, decaf tea and coffee.    Will provide peanut butter crackers for a snack after stress test.  If patient would prefer something else, please bring a snack from home.    Wear comfortable clothing.  No lotions, oils, or powders to the upper chest area. May wear deodorant.    No metal jewelry, buttons, or zippers to the upper body.  Advised to call the office if any questions.    Will send instructions via [employer] as well.     Yes

## 2024-12-04 ENCOUNTER — HOSPITAL ENCOUNTER (OUTPATIENT)
Dept: RADIOLOGY | Facility: HOSPITAL | Age: 74
Discharge: HOME | End: 2024-12-04
Attending: NURSE PRACTITIONER
Payer: MEDICARE

## 2024-12-04 ENCOUNTER — HOSPITAL ENCOUNTER (OUTPATIENT)
Dept: CARDIOLOGY | Facility: HOSPITAL | Age: 74
Discharge: HOME | End: 2024-12-04
Attending: NURSE PRACTITIONER
Payer: MEDICARE

## 2024-12-04 DIAGNOSIS — R07.89 ATYPICAL CHEST PAIN: ICD-10-CM

## 2024-12-04 DIAGNOSIS — I10 WHITE COAT SYNDROME WITH DIAGNOSIS OF HYPERTENSION: ICD-10-CM

## 2024-12-04 DIAGNOSIS — R93.1 AGATSTON CORONARY ARTERY CALCIUM SCORE LESS THAN 100: ICD-10-CM

## 2024-12-04 LAB
CV PHARM DOSE: 0.4 MG
CV STRESS BASE HR: 52 BPM
DIASTOLIC BLOOD PRESSURE: 96 MMHG
EJECTION FRACTION- HIGH: 65 %
END DIASTOLIC INDEX-HIGH: 153 ML/M2
END DIASTOLIC INDEX-LOW: 93 ML/M2
END SYSTOLIC INDEX-HIGH: 71 ML/M2
END SYSTOLIC INDEX-LOW: 31 ML/M2
NUC REST DIASTOLIC VOLUME INDEX: 65
NUC REST EJECTION FRACTION: 65
NUC REST SYSTOLIC VOLUME INDEX: 23
NUC STRESS DIASTOLIC VOLUME INDEX: 66
NUC STRESS EJECTION FRACTION: 65 %
NUC STRESS SYSTOLIC VOLUME INDEX: 23
OHS CV CPX 1 MINUTE RECOVERY HEART RATE: 91 BPM
OHS CV CPX 85 PERCENT MAX PREDICTED HEART RATE MALE: 123
OHS CV CPX MAX PREDICTED HEART RATE: 145
OHS CV CPX PATIENT IS FEMALE: 1
OHS CV CPX PATIENT IS MALE: 0
OHS CV CPX PEAK DIASTOLIC BLOOD PRESSURE: 78 MMHG
OHS CV CPX PEAK HEAR RATE: 91 BPM
OHS CV CPX PEAK RATE PRESSURE PRODUCT: NORMAL
OHS CV CPX PEAK SYSTOLIC BLOOD PRESSURE: 182 MMHG
OHS CV CPX PERCENT MAX PREDICTED HEART RATE ACHIEVED: 65
OHS CV CPX RATE PRESSURE PRODUCT PRESENTING: NORMAL
OHS CV INITIAL DOSE: 12.6 MCG/KG/MIN
OHS CV PEAK DOSE: 26.3 MCG/KG/MIN
RETIRED EF AND QEF - SEE NOTES: 53 %
SYSTOLIC BLOOD PRESSURE: 196 MMHG

## 2024-12-04 PROCEDURE — 78452 HT MUSCLE IMAGE SPECT MULT: CPT | Mod: 26,,, | Performed by: INTERNAL MEDICINE

## 2024-12-04 PROCEDURE — 93016 CV STRESS TEST SUPVJ ONLY: CPT | Mod: ,,, | Performed by: NURSE PRACTITIONER

## 2024-12-04 PROCEDURE — 93017 CV STRESS TEST TRACING ONLY: CPT

## 2024-12-04 PROCEDURE — 93018 CV STRESS TEST I&R ONLY: CPT | Mod: ,,, | Performed by: INTERNAL MEDICINE

## 2024-12-04 PROCEDURE — A9502 TC99M TETROFOSMIN: HCPCS | Performed by: NURSE PRACTITIONER

## 2024-12-04 PROCEDURE — 63600175 PHARM REV CODE 636 W HCPCS: Performed by: NURSE PRACTITIONER

## 2024-12-04 RX ORDER — REGADENOSON 0.08 MG/ML
0.4 INJECTION, SOLUTION INTRAVENOUS
Status: COMPLETED | OUTPATIENT
Start: 2024-12-04 | End: 2024-12-04

## 2024-12-04 RX ADMIN — TETROFOSMIN 26.3 MILLICURIE: 1.38 INJECTION, POWDER, LYOPHILIZED, FOR SOLUTION INTRAVENOUS at 08:12

## 2024-12-04 RX ADMIN — TETROFOSMIN 12.6 MILLICURIE: 1.38 INJECTION, POWDER, LYOPHILIZED, FOR SOLUTION INTRAVENOUS at 06:12

## 2024-12-04 RX ADMIN — REGADENOSON 0.4 MG: 0.08 INJECTION, SOLUTION INTRAVENOUS at 08:12

## 2024-12-09 ENCOUNTER — LAB VISIT (OUTPATIENT)
Dept: LAB | Facility: HOSPITAL | Age: 74
End: 2024-12-09
Attending: FAMILY MEDICINE
Payer: MEDICARE

## 2024-12-09 DIAGNOSIS — Z00.00 PREVENTATIVE HEALTH CARE: ICD-10-CM

## 2024-12-09 DIAGNOSIS — D57.3 SICKLE-CELL TRAIT: ICD-10-CM

## 2024-12-09 DIAGNOSIS — R73.03 PREDIABETES: ICD-10-CM

## 2024-12-09 DIAGNOSIS — I10 PRIMARY HYPERTENSION: ICD-10-CM

## 2024-12-09 DIAGNOSIS — N18.31 STAGE 3A CHRONIC KIDNEY DISEASE: ICD-10-CM

## 2024-12-09 DIAGNOSIS — E78.5 HYPERLIPIDEMIA, UNSPECIFIED HYPERLIPIDEMIA TYPE: ICD-10-CM

## 2024-12-09 LAB
ALBUMIN SERPL BCP-MCNC: 3.6 G/DL (ref 3.5–5.2)
ALP SERPL-CCNC: 60 U/L (ref 40–150)
ALT SERPL W/O P-5'-P-CCNC: 25 U/L (ref 10–44)
ANION GAP SERPL CALC-SCNC: 7 MMOL/L (ref 8–16)
AST SERPL-CCNC: 28 U/L (ref 10–40)
BASOPHILS # BLD AUTO: 0.02 K/UL (ref 0–0.2)
BASOPHILS NFR BLD: 0.5 % (ref 0–1.9)
BILIRUB SERPL-MCNC: 0.6 MG/DL (ref 0.1–1)
BUN SERPL-MCNC: 16 MG/DL (ref 8–23)
CALCIUM SERPL-MCNC: 9.3 MG/DL (ref 8.7–10.5)
CHLORIDE SERPL-SCNC: 109 MMOL/L (ref 95–110)
CHOLEST SERPL-MCNC: 125 MG/DL (ref 120–199)
CHOLEST/HDLC SERPL: 2.1 {RATIO} (ref 2–5)
CO2 SERPL-SCNC: 25 MMOL/L (ref 23–29)
CREAT SERPL-MCNC: 1.1 MG/DL (ref 0.5–1.4)
DIFFERENTIAL METHOD BLD: ABNORMAL
EOSINOPHIL # BLD AUTO: 0.2 K/UL (ref 0–0.5)
EOSINOPHIL NFR BLD: 4.1 % (ref 0–8)
ERYTHROCYTE [DISTWIDTH] IN BLOOD BY AUTOMATED COUNT: 13.4 % (ref 11.5–14.5)
EST. GFR  (NO RACE VARIABLE): 52.4 ML/MIN/1.73 M^2
ESTIMATED AVG GLUCOSE: 123 MG/DL (ref 68–131)
GLUCOSE SERPL-MCNC: 95 MG/DL (ref 70–110)
HBA1C MFR BLD: 5.9 % (ref 4–5.6)
HCT VFR BLD AUTO: 36.5 % (ref 37–48.5)
HDLC SERPL-MCNC: 60 MG/DL (ref 40–75)
HDLC SERPL: 48 % (ref 20–50)
HGB BLD-MCNC: 12.5 G/DL (ref 12–16)
IMM GRANULOCYTES # BLD AUTO: 0 K/UL (ref 0–0.04)
IMM GRANULOCYTES NFR BLD AUTO: 0 % (ref 0–0.5)
LDLC SERPL CALC-MCNC: 55.6 MG/DL (ref 63–159)
LYMPHOCYTES # BLD AUTO: 1.5 K/UL (ref 1–4.8)
LYMPHOCYTES NFR BLD: 40.8 % (ref 18–48)
MCH RBC QN AUTO: 28.4 PG (ref 27–31)
MCHC RBC AUTO-ENTMCNC: 34.2 G/DL (ref 32–36)
MCV RBC AUTO: 83 FL (ref 82–98)
MONOCYTES # BLD AUTO: 0.5 K/UL (ref 0.3–1)
MONOCYTES NFR BLD: 12.5 % (ref 4–15)
NEUTROPHILS # BLD AUTO: 1.6 K/UL (ref 1.8–7.7)
NEUTROPHILS NFR BLD: 42.1 % (ref 38–73)
NONHDLC SERPL-MCNC: 65 MG/DL
NRBC BLD-RTO: 0 /100 WBC
PLATELET # BLD AUTO: 173 K/UL (ref 150–450)
PMV BLD AUTO: 11.7 FL (ref 9.2–12.9)
POTASSIUM SERPL-SCNC: 3.6 MMOL/L (ref 3.5–5.1)
PROT SERPL-MCNC: 6.7 G/DL (ref 6–8.4)
RBC # BLD AUTO: 4.4 M/UL (ref 4–5.4)
SODIUM SERPL-SCNC: 141 MMOL/L (ref 136–145)
TRIGL SERPL-MCNC: 47 MG/DL (ref 30–150)
WBC # BLD AUTO: 3.68 K/UL (ref 3.9–12.7)

## 2024-12-09 PROCEDURE — 80053 COMPREHEN METABOLIC PANEL: CPT | Mod: HCNC | Performed by: FAMILY MEDICINE

## 2024-12-09 PROCEDURE — 83036 HEMOGLOBIN GLYCOSYLATED A1C: CPT | Mod: HCNC | Performed by: FAMILY MEDICINE

## 2024-12-09 PROCEDURE — 36415 COLL VENOUS BLD VENIPUNCTURE: CPT | Mod: HCNC,PO | Performed by: FAMILY MEDICINE

## 2024-12-09 PROCEDURE — 85025 COMPLETE CBC W/AUTO DIFF WBC: CPT | Mod: HCNC | Performed by: FAMILY MEDICINE

## 2024-12-09 PROCEDURE — 80061 LIPID PANEL: CPT | Mod: HCNC | Performed by: FAMILY MEDICINE

## 2024-12-13 ENCOUNTER — TELEPHONE (OUTPATIENT)
Dept: CARDIOLOGY | Facility: CLINIC | Age: 74
End: 2024-12-13
Payer: MEDICARE

## 2024-12-13 ENCOUNTER — TELEPHONE (OUTPATIENT)
Dept: FAMILY MEDICINE | Facility: CLINIC | Age: 74
End: 2024-12-13
Payer: MEDICARE

## 2024-12-13 NOTE — TELEPHONE ENCOUNTER
----- Message from Jimi sent at 12/13/2024  8:14 AM CST -----  Regarding: Test Results  Type:  Test Results    Who Called: PT    Name of Test (Lab/Mammo/Etc): Labs    Date of Test: 12/9    Ordering Provider: Tejal    Where the test was performed: [facility]    Would the patient rather a call back or a response via [facility]? Call back    Best Call Back Number: [phone]      Additional Information:  \sts she doesn't understand the results    Please advise -- Thank you

## 2024-12-13 NOTE — TELEPHONE ENCOUNTER
----- Message from Jimi sent at 12/13/2024  8:13 AM CST -----  Regarding: Test Results  Type:  Test Results    Who Called: PT     Name of Test (Lab/Mammo/Etc): stress test    Date of Test: 12/4    Ordering Provider: Kendall    Where the test was performed: [facility]    Would the patient rather a call back or a response via [facility]? Call back    Best Call Back Number: [phone]      Additional Information:  Sts she just needs someone to call her and explain the results.      Please advise -- Thank you

## 2024-12-15 ENCOUNTER — OFFICE VISIT (OUTPATIENT)
Dept: URGENT CARE | Facility: CLINIC | Age: 74
End: 2024-12-15
Payer: MEDICARE

## 2024-12-15 VITALS
HEIGHT: 66 IN | TEMPERATURE: 98 F | BODY MASS INDEX: 25.39 KG/M2 | OXYGEN SATURATION: 97 % | WEIGHT: 158 LBS | SYSTOLIC BLOOD PRESSURE: 170 MMHG | RESPIRATION RATE: 14 BRPM | DIASTOLIC BLOOD PRESSURE: 90 MMHG | HEART RATE: 59 BPM

## 2024-12-15 DIAGNOSIS — J01.90 ACUTE BACTERIAL SINUSITIS: ICD-10-CM

## 2024-12-15 DIAGNOSIS — B96.89 ACUTE BACTERIAL SINUSITIS: ICD-10-CM

## 2024-12-15 DIAGNOSIS — J34.9 SINUS PROBLEM: Primary | ICD-10-CM

## 2024-12-15 DIAGNOSIS — N18.31 STAGE 3A CHRONIC KIDNEY DISEASE: ICD-10-CM

## 2024-12-15 DIAGNOSIS — Z86.79 HISTORY OF HYPERTENSION: ICD-10-CM

## 2024-12-15 PROCEDURE — 87804 INFLUENZA ASSAY W/OPTIC: CPT | Mod: QW,,, | Performed by: NURSE PRACTITIONER

## 2024-12-15 PROCEDURE — 99214 OFFICE O/P EST MOD 30 MIN: CPT | Mod: S$GLB,,, | Performed by: NURSE PRACTITIONER

## 2024-12-15 PROCEDURE — 87811 SARS-COV-2 COVID19 W/OPTIC: CPT | Mod: QW,S$GLB,, | Performed by: NURSE PRACTITIONER

## 2024-12-15 RX ORDER — DOXYCYCLINE 100 MG/1
100 CAPSULE ORAL 2 TIMES DAILY
Qty: 14 CAPSULE | Refills: 0 | Status: SHIPPED | OUTPATIENT
Start: 2024-12-15 | End: 2024-12-22

## 2024-12-15 NOTE — PATIENT INSTRUCTIONS
Doxycycline twice a day for 7 days.  Always take with food and a full glass of water and do not lay down for 1 hour after taking each dose.  Protect your skin against the sun as doxycycline is well known for causing excessive skin sun sensitivity resulting in severe sunburn, rash, blistering.

## 2024-12-15 NOTE — PROGRESS NOTES
"Subjective:      Patient ID: Loren Andre is a 74 y.o. female.    Vitals:  height is 5' 6" (1.676 m) and weight is 71.7 kg (158 lb). Her oral temperature is 98.2 °F (36.8 °C). Her blood pressure is 170/90 (abnormal) and her pulse is 59 (abnormal). Her respiration is 14 and oxygen saturation is 97%.     Chief Complaint: Sinus Problem    Sinus Problem  This is a new problem. The current episode started in the past 7 days (3-4 days ago). Associated symptoms include congestion (Green), coughing, headaches and sinus pressure. Pertinent negatives include no chills, ear pain, shortness of breath or sore throat.       Constitution: Negative for chills and fever.   HENT:  Positive for congestion (Green), postnasal drip and sinus pressure. Negative for ear pain, ear discharge and sore throat.    Respiratory:  Positive for cough. Negative for chest tightness, shortness of breath and wheezing.    Gastrointestinal:  Positive for diarrhea (X1 few days ago, resolved). Negative for nausea and vomiting.   Neurological:  Positive for headaches.      Objective:     Physical Exam   Constitutional: She is oriented to person, place, and time. She is cooperative.  Non-toxic appearance. She does not appear ill. No distress. awake  HENT:   Head: Normocephalic and atraumatic.   Ears:   Right Ear: Tympanic membrane, external ear and ear canal normal.   Left Ear: Tympanic membrane, external ear and ear canal normal.   Nose: Congestion present. No rhinorrhea.   Mouth/Throat: Mucous membranes are moist. No oropharyngeal exudate or posterior oropharyngeal erythema.   Eyes: Conjunctivae are normal. Right eye exhibits no discharge. Left eye exhibits no discharge.   Neck: Neck supple. No neck rigidity present.   Cardiovascular: Normal rate, regular rhythm and normal heart sounds.   Pulmonary/Chest: Effort normal and breath sounds normal. No accessory muscle usage. No tachypnea. No respiratory distress. She has no wheezes. She has no rhonchi. She " has no rales. She exhibits no tenderness.   Abdominal: Normal appearance.   Musculoskeletal:      Cervical back: She exhibits no tenderness.   Lymphadenopathy:     She has no cervical adenopathy.   Neurological: no focal deficit. She is alert and oriented to person, place, and time. No sensory deficit.   Skin: Skin is warm, dry, not diaphoretic and no rash. Capillary refill takes 2 to 3 seconds.   Psychiatric: Her behavior is normal. Mood normal.   Nursing note and vitals reviewed.      Assessment:     1. Sinus problem    2. History of hypertension    3. Stage 3a chronic kidney disease    4. Acute bacterial sinusitis      COVID negative  Flu a/B negative    Plan:       Sinus problem  -     SARS Coronavirus 2 Antigen, POCT Manual Read  -     POCT Influenza A/B Rapid Antigen    History of hypertension    Stage 3a chronic kidney disease    Acute bacterial sinusitis  -     doxycycline (MONODOX) 100 MG capsule; Take 1 capsule (100 mg total) by mouth 2 (two) times daily. for 7 days  Dispense: 14 capsule; Refill: 0

## 2024-12-20 ENCOUNTER — OFFICE VISIT (OUTPATIENT)
Dept: CARDIOLOGY | Facility: CLINIC | Age: 74
End: 2024-12-20
Payer: MEDICARE

## 2024-12-20 VITALS
SYSTOLIC BLOOD PRESSURE: 148 MMHG | WEIGHT: 153 LBS | OXYGEN SATURATION: 99 % | DIASTOLIC BLOOD PRESSURE: 68 MMHG | HEART RATE: 69 BPM | HEIGHT: 66 IN | BODY MASS INDEX: 24.59 KG/M2

## 2024-12-20 DIAGNOSIS — E78.5 HYPERLIPIDEMIA, UNSPECIFIED HYPERLIPIDEMIA TYPE: ICD-10-CM

## 2024-12-20 DIAGNOSIS — I10 PRIMARY HYPERTENSION: ICD-10-CM

## 2024-12-20 DIAGNOSIS — R07.9 CHEST PAIN, UNSPECIFIED TYPE: ICD-10-CM

## 2024-12-20 DIAGNOSIS — I25.10 ATHEROSCLEROSIS OF NATIVE CORONARY ARTERY OF NATIVE HEART WITHOUT ANGINA PECTORIS: Primary | ICD-10-CM

## 2024-12-20 DIAGNOSIS — R94.39 ABNORMAL NUCLEAR STRESS TEST: ICD-10-CM

## 2024-12-20 PROCEDURE — 99999 PR PBB SHADOW E&M-EST. PATIENT-LVL III: CPT | Mod: PBBFAC,HCNC,, | Performed by: NURSE PRACTITIONER

## 2024-12-20 NOTE — PROGRESS NOTES
Subjective:    Patient ID:  Loren Andre is a 74 y.o. female.  Chief Complaint   Patient presents with    Coronary Artery Disease     Patient had some chest pain , had yesterday .        HPI:  Ms. Andre presents today for follow-up appointment and to go over test results.  She states that she has had a couple of episodes of chest pain which have occurred when she is lying down.  Patient does note that 1 recent episode of chest pain was described as feeling sharp and occurred on the left side of her chest.  It was noted to occur after she had done a chest work out although she is not sure is directly correlated.  She is active and walks and exercises.  She is able to tolerate her exercises as usual.  And does not have any exertional related chest pain.    Review of patient's allergies indicates:   Allergen Reactions    Doxepin Anaphylaxis     abd pain    Norvasc [amlodipine] Palpitations    Xyzal [levocetirizine] Other (See Comments)     Can not take with blood pressure medications - headaches, higher blood pressure, red hands     Penicillins Rash    Sulfa (sulfonamide antibiotics) Rash    Duloxetine      Other reaction(s): insomnia    Levofloxacin      Other reaction(s): Headache    Milnacipran      Other reaction(s): stomach pain    Nitrofurantoin monohyd/m-cryst      Other reaction(s): Itching    Prednisone      Other reaction(s): Itching    Pseudoephedrine-guaifenesin      Other reaction(s): Stomach upset    Terbinafine      Other reaction(s): rash       Past Medical History:   Diagnosis Date    Allergy     Anemia     sickle cell trait    Anxiety     Arthritis     Asthma     Chest pain 7/8/2020    Chronic disease anemia 10/05/2017    Colon polyps     Depression     Dermatitis 03/07/2013    Fibromyalgia     HEARING LOSS     Hyperlipidemia     Hypertension     Leucopenia 10/05/2017    Polyneuropathy     Scoliosis     Sickle cell trait     Sickle-cell trait 10/05/2017    Trouble in sleeping     Urticaria       Past Surgical History:   Procedure Laterality Date    ADENOIDECTOMY      APPENDECTOMY      COLONOSCOPY  4/14/2008    Dr. Guerrero, 10 year recheck    COLONOSCOPY N/A 10/4/2018    Procedure: COLONOSCOPY;  Surgeon: Tam Kemp MD;  Location: [facility];  Service: Endoscopy;  Laterality: N/A;    HYSTERECTOMY      OOPHORECTOMY      ROTATOR CUFF REPAIR Left 01/2016    ROTATOR CUFF REPAIR W/ DISTAL CLAVICLE EXCISION Right 12/04/2017    Dr. Eligio Timmons ( [facility] )    TONSILLECTOMY       Social History     Tobacco Use    Smoking status: Never    Smokeless tobacco: Never   Substance Use Topics    Alcohol use: Yes     Comment: occasionally - wine    Drug use: No     Family History   Problem Relation Name Age of Onset    Multiple sclerosis Mother      Seizures Mother      Cancer Father          lung    Alcohol abuse Brother ruby     Early death Brother ruby     Diabetes Maternal Aunt      Diabetes Maternal Uncle      Diabetes Maternal Grandmother      Mental illness Maternal Grandfather      Alzheimer's disease Maternal Grandfather      Asthma Paternal Aunt      Sickle cell anemia Paternal Aunt      Diabetes Sister rickey     Arthritis Daughter lissett     Miscarriages / Stillbirths Daughter lissett     Anemia Daughter lissett     Hyperlipidemia Son lonnie     Hypertension Son lonnie     Allergies Son lonnie     Sickle cell anemia Paternal Uncle      Asthma Brother quentin     No Known Problems Sister luis enrique     No Known Problems Sister cassia     No Known Problems Sister fabiola     No Known Problems Brother johnson     Miscarriages / Stillbirths Daughter dondeyel     Thyroid disease Daughter dondeyel     No Known Problems Daughter dashita     Angioedema Neg Hx      Eczema Neg Hx      Immunodeficiency Neg Hx      Rheum arthritis Neg Hx      Psoriasis Neg Hx      Lupus Neg Hx          Review of Systems:   Per HPI         Objective:        Vitals:    12/20/24 0833   BP: (!) 148/68   Pulse: 69       Lab Results    Component Value Date    WBC 3.68 (L) 12/09/2024    HGB 12.5 12/09/2024    HCT 36.5 (L) 12/09/2024     12/09/2024    CHOL 125 12/09/2024    TRIG 47 12/09/2024    HDL 60 12/09/2024    ALT 25 12/09/2024    AST 28 12/09/2024     12/09/2024    K 3.6 12/09/2024     12/09/2024    CREATININE 1.1 12/09/2024    BUN 16 12/09/2024    CO2 25 12/09/2024    TSH 1.380 07/08/2020    INR 1.1 07/08/2020    HGBA1C 5.9 (H) 12/09/2024        ECHOCARDIOGRAM RESULTS  Results for orders placed during the hospital encounter of 04/09/24    Echo    Interpretation Summary    Left Ventricle: The left ventricle is normal in size. Mildly increased wall thickness. There is mild concentric hypertrophy. Normal wall motion. There is normal systolic function with a visually estimated ejection fraction of 65 - 70%. There is normal diastolic function.    Right Ventricle: Normal right ventricular cavity size. Wall thickness is normal. Right ventricle wall motion  is normal. Systolic function is normal.    Aortic Valve: There is mild aortic regurgitation with a centrally directed jet.    Tricuspid Valve: There is trace regurgitation.    Pulmonary Artery: The estimated pulmonary artery systolic pressure is 32 mmHg.    IVC/SVC: Normal venous pressure at 3 mmHg.        CURRENT/PREVIOUS VISIT EKG  Results for orders placed or performed in visit on 11/06/24   IN OFFICE EKG 12-LEAD (to [place])    Collection Time: 11/06/24 10:02 AM   Result Value Ref Range    QRS Duration 82 ms    [facility] QTC Calculation 395 ms    Narrative    Test Reason : I10,I20.0,    Vent. Rate :  57 BPM     Atrial Rate :  57 BPM     P-R Int : 152 ms          QRS Dur :  82 ms      QT Int : 406 ms       P-R-T Axes :  68   0 -18 degrees    QTcB Int : 395 ms    Sinus bradycardia  Possible Left atrial enlargement  T wave abnormality, consider lateral ischemia  Abnormal ECG  When compared with ECG of 03-APR-2024 14:05,  No significant change was found  Confirmed by Ajith Andrew  ([id number]) on 11/18/2024 11:49:51 AM    Referred By:            Confirmed By: Ajith Andrew     No valid procedures specified.   Results for orders placed during the hospital encounter of 12/04/24    Nuclear Stress - Cardiology Interpreted    Interpretation Summary    Abnormal myocardial perfusion scan.    There is amoderate intensity, medium sized, equivocal perfusion abnormality that is mostly fixed with some reversible areas in the mid to apical anterolateral and anteroapical wall(s). This finding is equivocal due to a breast shadow overlying the myocardium.    There are no other significant perfusion abnormalities.    There is a moderate intensity perfusion abnormality in the anteroapical wall of the left ventricle, secondary to breast attenuation.    The gated perfusion images showed an ejection fraction of 65% at rest. The gated perfusion images showed an ejection fraction of 65% post stress. Normal ejection fraction is greater than 53%.    There is normal wall motion at rest and post-stress.    LV cavity size is normal at rest and normal at post-stress.    The ECG portion of the study is negative for ischemia.    The patient reported no chest pain during the stress test.    There were no arrhythmias during stress.    A PET stress exam is recommended if clinically indicated.for further delineation of ischemia      Physical Exam:  CONSTITUTIONAL: No fever, no chills  HEENT: Normocephalic, atraumatic,pupils reactive to light                 NECK:  No JVD no carotid bruit  CVS: S1S2+, RRR, no murmurs,   LUNGS: Clear  ABDOMEN: Soft, NT, BS+  EXTREMITIES: No cyanosis, edema  : No [place] catheter  NEURO: AAO X 3  PSY: Normal affect      Medication List with Changes/Refills   Current Medications    ALBUTEROL (PROAIR HFA) 90 MCG/ACTUATION INHALER    Inhale 2 puffs into the lungs every 6 (six) hours as needed for Wheezing. Rescue    ALBUTEROL (PROVENTIL) 2.5 MG /3 ML (0.083 %) NEBULIZER SOLUTION    Take 3 mLs (2.5 mg  total) by nebulization every 6 (six) hours as needed for Wheezing. Rescue    ASCORBIC ACID, VITAMIN C, (VITAMIN C) 500 MG TABLET    Take 500 mg by mouth daily as needed.     ASPIRIN 81 MG CHEW    Take 1 tablet (81 mg total) by mouth once daily.    ATORVASTATIN (LIPITOR) 40 MG TABLET    Take 1 tablet (40 mg total) by mouth once daily.    CALCIUM CARBONATE/VITAMIN D3 (VITAMIN D-3 ORAL)    Take 100 Int'l Units by mouth once daily.     CARVEDILOL (COREG) 25 MG TABLET    TAKE 1 TABLET(25 MG) BY MOUTH TWICE DAILY WITH MEALS    CLONIDINE (CATAPRES) 0.1 MG TABLET    Take 1 tablet (0.1 mg total) by mouth 3 (three) times daily as needed (for systolic BP greater than 170).    COENZYME Q10 10 MG CAPSULE    Take 10 mg by mouth once daily.    DOXYCYCLINE (MONODOX) 100 MG CAPSULE    Take 1 capsule (100 mg total) by mouth 2 (two) times daily. for 7 days    FLUOCINOLONE (DERMA-SMOOTHE) 0.01 % EXTERNAL OIL    Apply topically 2 (two) times daily. Use on AA BID x 2 weeks    FOLIC ACID (FOLVITE) 400 MCG TABLET    Take 400 mcg by mouth once daily.    GABAPENTIN (NEURONTIN) 300 MG CAPSULE    Take 1 capsule (300 mg total) by mouth every evening.    GARLIC OIL ORAL    Take 1 capsule by mouth once daily.     LISINOPRIL (PRINIVIL,ZESTRIL) 40 MG TABLET    TAKE 1 TABLET(40 MG) BY MOUTH EVERY DAY    MAGNESIUM 250 MG TAB    Take 250 mg by mouth 2 (two) times a day.    MONTELUKAST (SINGULAIR) 10 MG TABLET    TAKE 1 TABLET(10 MG) BY MOUTH EVERY DAY    OMEGA-3 FATTY ACIDS/FISH OIL (FISH OIL-OMEGA-3 FATTY ACIDS) 300-1,000 MG CAPSULE    Take by mouth once daily.    TERAZOSIN (HYTRIN) 2 MG CAPSULE    Take 2 capsules (4 mg total) by mouth every evening.    VALACYCLOVIR (VALTREX) 500 MG TABLET    TAKE 1 TABLET(500 MG) BY MOUTH TWICE DAILY FOR 7 DAYS AS DIRECTED    VITAMIN E 100 UNIT CAPSULE    Take 100 Units by mouth once daily.   Discontinued Medications    CETIRIZINE (ZYRTEC) 10 MG TABLET    Take 1 tablet (10 mg total) by mouth daily as needed for  Allergies.    CLOBETASOL (OLUX) 0.05 % FOAM    APPLY TOPICALLY TO THE AFFECTED AREA TWICE DAILY    MELATONIN-MAGNESIUM CITRATE 1-71.5 MG TBEC    Take 2 each by mouth every evening.    NIACIN 250 MG TAB    Take 250 mg by mouth nightly.             Assessment:       1. Atherosclerosis of native coronary artery of native heart without angina pectoris    2. Primary hypertension    3. Hyperlipidemia, unspecified hyperlipidemia type    4. Chest pain, unspecified type    5. Abnormal nuclear stress test         Plan:     Problem List Items Addressed This Visit          Unprioritized    HTN (hypertension)    Current Assessment & Plan     Blood pressure has been decently controlled on current regimen.  Continue carvedilol 25 mg p.o. b.i.d., lisinopril 40 mg p.o. daily, and terazosin 4 mg p.o. q.h.s..         Hyperlipidemia    Current Assessment & Plan     LDL at goal on recent labs.  Continue atorvastatin 40 mg p.o. daily.         Chest pain    Current Assessment & Plan     She does report having some chest pain on the left side that was sharp and occurred at rest. She did note that she had previously worked out her chest and is unsure if it's related.     She denies any chest tightness or pressure with exertion. She is active and walks and exercises at the gym.     Nuclear stress shows fixed defects with some reversibility. Will order a pet stress for further clarification. Patient verbalizes understanding and agrees.     Continue aspirin, statin, carvedilol and lisinopril. Will defer adding any other medications as she has reactions and sensitivities to multiple other medications in the past.          Relevant Orders    Cardiac PET Scan Stress    Abnormal nuclear stress test    Relevant Orders    Cardiac PET Scan Stress    Atherosclerosis of native coronary artery of native heart without angina pectoris - Primary    Relevant Orders    IN OFFICE EKG 12-LEAD (to [place])    Cardiac PET Scan Stress       Follow up in about 3  months (around 3/20/2025).

## 2024-12-20 NOTE — ASSESSMENT & PLAN NOTE
She does report having some chest pain on the left side that was sharp and occurred at rest. She did note that she had previously worked out her chest and is unsure if it's related.     She denies any chest tightness or pressure with exertion. She is active and walks and exercises at the gym.     Nuclear stress shows fixed defects with some reversibility. Will order a pet stress for further clarification. Patient verbalizes understanding and agrees.     Continue aspirin, statin, carvedilol and lisinopril. Will defer adding any other medications as she has reactions and sensitivities to multiple other medications in the past.

## 2024-12-20 NOTE — ASSESSMENT & PLAN NOTE
Blood pressure has been decently controlled on current regimen.  Continue carvedilol 25 mg p.o. b.i.d., lisinopril 40 mg p.o. daily, and terazosin 4 mg p.o. q.h.s..

## 2024-12-26 DIAGNOSIS — J45.909 ASTHMA, UNSPECIFIED ASTHMA SEVERITY, UNSPECIFIED WHETHER COMPLICATED, UNSPECIFIED WHETHER PERSISTENT: ICD-10-CM

## 2024-12-27 NOTE — TELEPHONE ENCOUNTER
No care due was identified.  Health [facility] Embedded Care Due Messages. Reference number: [id number].   12/26/2024 7:06:54 PM CST

## 2024-12-28 NOTE — TELEPHONE ENCOUNTER
Refill Routing Note   Medication(s) are not appropriate for processing by [facility] for the following reason(s):        Non-participating provider    [facility] action(s):  Route      Medication Therapy Plan: retired      Appointments  past 12m or future 3m with PCP    Date Provider   Last Visit   11/25/2024 Sanket Toure MD   Next Visit   Visit date not found Sanket Toure MD   ED visits in past 90 days: 0        Note composed:12:00 AM 12/28/2024

## 2024-12-30 DIAGNOSIS — A60.9 HSV (HERPES SIMPLEX VIRUS) ANOGENITAL INFECTION: ICD-10-CM

## 2024-12-30 RX ORDER — MONTELUKAST SODIUM 10 MG/1
10 TABLET ORAL
Qty: 90 TABLET | Refills: 0 | Status: SHIPPED | OUTPATIENT
Start: 2024-12-30 | End: 2025-01-03 | Stop reason: SDUPTHER

## 2024-12-30 RX ORDER — VALACYCLOVIR HYDROCHLORIDE 500 MG/1
TABLET, FILM COATED ORAL
Qty: 14 TABLET | Refills: 3 | Status: SHIPPED | OUTPATIENT
Start: 2024-12-30

## 2024-12-30 NOTE — TELEPHONE ENCOUNTER
----- Message from Jessy sent at 12/30/2024  8:11 AM CST -----  Contact: Patient  Type:  Needs Medical Advice    Who Called:  Patient    Pharmacy name and phone #:        [id number] - [place] [id number] [facility] [address] [address]  Phone: [phone] Fax: [phone]    Would the patient rather a call back or a response via [facility]?   Call back  Best Call Back Number:   [phone]    Additional Information:   States she would like to speak with someone to find out why her refill of montelukast (SINGULAIR) 10 mg tablet has been denied - please call - thank you

## 2024-12-30 NOTE — TELEPHONE ENCOUNTER
No care due was identified.  [facility] Embedded Care Due Messages. Reference number: [id number].   12/30/2024 9:59:28 AM CST

## 2024-12-31 DIAGNOSIS — I10 PRIMARY HYPERTENSION: ICD-10-CM

## 2025-01-03 ENCOUNTER — OFFICE VISIT (OUTPATIENT)
Dept: FAMILY MEDICINE | Facility: CLINIC | Age: 75
End: 2025-01-03
Payer: MEDICARE

## 2025-01-03 VITALS
BODY MASS INDEX: 25.22 KG/M2 | WEIGHT: 156.94 LBS | TEMPERATURE: 98 F | OXYGEN SATURATION: 99 % | RESPIRATION RATE: 16 BRPM | HEART RATE: 68 BPM | DIASTOLIC BLOOD PRESSURE: 74 MMHG | SYSTOLIC BLOOD PRESSURE: 134 MMHG | HEIGHT: 66 IN

## 2025-01-03 DIAGNOSIS — B35.1 ONYCHOMYCOSIS: ICD-10-CM

## 2025-01-03 DIAGNOSIS — N18.31 STAGE 3A CHRONIC KIDNEY DISEASE: ICD-10-CM

## 2025-01-03 DIAGNOSIS — I10 PRIMARY HYPERTENSION: Primary | ICD-10-CM

## 2025-01-03 DIAGNOSIS — R73.03 PREDIABETES: ICD-10-CM

## 2025-01-03 DIAGNOSIS — M79.7 FIBROMYALGIA: ICD-10-CM

## 2025-01-03 DIAGNOSIS — J45.909 ASTHMA, UNSPECIFIED ASTHMA SEVERITY, UNSPECIFIED WHETHER COMPLICATED, UNSPECIFIED WHETHER PERSISTENT: ICD-10-CM

## 2025-01-03 PROCEDURE — 99999 PR PBB SHADOW E&M-EST. PATIENT-LVL V: CPT | Mod: PBBFAC,,, | Performed by: FAMILY MEDICINE

## 2025-01-03 RX ORDER — LISINOPRIL 40 MG/1
40 TABLET ORAL DAILY
Qty: 90 TABLET | Refills: 3 | Status: SHIPPED | OUTPATIENT
Start: 2025-01-03

## 2025-01-03 RX ORDER — MONTELUKAST SODIUM 10 MG/1
10 TABLET ORAL DAILY
Qty: 90 TABLET | Refills: 3 | Status: SHIPPED | OUTPATIENT
Start: 2025-01-03 | End: 2026-01-03

## 2025-01-03 RX ORDER — CICLOPIROX 80 MG/ML
SOLUTION TOPICAL NIGHTLY
Qty: 6.6 ML | Refills: 5 | Status: SHIPPED | OUTPATIENT
Start: 2026-01-01

## 2025-01-03 RX ORDER — LISINOPRIL 40 MG/1
40 TABLET ORAL
Qty: 90 TABLET | Refills: 0 | OUTPATIENT
Start: 2025-01-03

## 2025-01-03 RX ORDER — GABAPENTIN 400 MG/1
400 CAPSULE ORAL NIGHTLY
Qty: 90 CAPSULE | Refills: 3 | Status: SHIPPED | OUTPATIENT
Start: 2025-01-03 | End: 2026-01-03

## 2025-01-03 NOTE — PROGRESS NOTES
Subjective:   Patient ID: Loren Andre is a 74 y.o. female     Chief Complaint: Checkup    Here for checkup      Review of Systems   Respiratory:  Negative for shortness of breath.    Cardiovascular:  Negative for chest pain.   Gastrointestinal:  Negative for abdominal pain.   Genitourinary:  Negative for dysuria.     Past Medical History:   Diagnosis Date    Allergy     Anemia     sickle cell trait    Anxiety     Arthritis     Asthma     Chest pain 7/8/2020    Chronic disease anemia 10/05/2017    Colon polyps     Depression     Dermatitis 03/07/2013    Fibromyalgia     HEARING LOSS     Hyperlipidemia     Hypertension     Leucopenia 10/05/2017    Polyneuropathy     Scoliosis     Sickle cell trait     Sickle-cell trait 10/05/2017    Trouble in sleeping     Urticaria      Past Surgical History:   Procedure Laterality Date    ADENOIDECTOMY      APPENDECTOMY      COLONOSCOPY  4/14/2008    Dr. Guerrero, 10 year recheck    COLONOSCOPY N/A 10/4/2018    Procedure: COLONOSCOPY;  Surgeon: Tam Kemp MD;  Location: [facility];  Service: Endoscopy;  Laterality: N/A;    HYSTERECTOMY      OOPHORECTOMY      ROTATOR CUFF REPAIR Left 01/2016    ROTATOR CUFF REPAIR W/ DISTAL CLAVICLE EXCISION Right 12/04/2017    Dr. Eligio Timmons ( [facility] )    TONSILLECTOMY       Objective:     Vitals:    01/03/25 1245   BP: 134/74   Pulse: 68   Resp: 16   Temp: 97.7 °F (36.5 °C)     Body mass index is 25.34 kg/m².  Physical Exam  Vitals and nursing note reviewed.   Constitutional:       Appearance: She is well-developed.   HENT:      Head: Normocephalic and atraumatic.   Eyes:      General: No scleral icterus.     Conjunctiva/sclera: Conjunctivae normal.   Cardiovascular:      Heart sounds: No murmur heard.  Pulmonary:      Effort: Pulmonary effort is normal. No respiratory distress.   Musculoskeletal:         General: No deformity. Normal range of motion.      Cervical back: Normal range of motion and neck supple.   Skin:      Coloration: Skin is not pale.      Findings: No rash.   Neurological:      Mental Status: She is alert and oriented to person, place, and time.   Psychiatric:         Behavior: Behavior normal.         Thought Content: Thought content normal.         Judgment: Judgment normal.       Assessment:     1. Primary hypertension    2. Asthma, unspecified asthma severity, unspecified whether complicated, unspecified whether persistent    3. Fibromyalgia    4. Prediabetes    5. Stage 3a chronic kidney disease    6. Onychomycosis      Plan:   Primary hypertension  -     lisinopriL (PRINIVIL,ZESTRIL) 40 MG tablet; Take 1 tablet (40 mg total) by mouth once daily.  Dispense: 90 tablet; Refill: 3  -     Comprehensive Metabolic Panel; Future; Expected date: 01/03/2025  -     Lipid Panel; Future; Expected date: 01/03/2025    Asthma, unspecified asthma severity, unspecified whether complicated, unspecified whether persistent  -     montelukast (SINGULAIR) 10 mg tablet; Take 1 tablet (10 mg total) by mouth once daily.  Dispense: 90 tablet; Refill: 3    Fibromyalgia  -     gabapentin (NEURONTIN) 400 MG capsule; Take 1 capsule (400 mg total) by mouth every evening.  Dispense: 90 capsule; Refill: 3    Prediabetes  -     Hemoglobin A1C; Future; Expected date: 01/03/2025  -     Lipid Panel; Future; Expected date: 01/03/2025    Stage 3a chronic kidney disease    Onychomycosis  -     ciclopirox (PENLAC) 8 % Soln; Apply topically nightly.  Dispense: 6.6 mL; Refill: 5            Total time spent of Greater than 30 minutes minutes on the day of the visit.This includes face to face time and preparing to see the patient, obtaining and reviewing separately obtained history, documenting clinical information in the electronic or other health record, independently interpreting results and communicating results to the patient/family/caregiver, or care coordinator.    Established patient with me has been instructed that must see me at least 1 time  yearly (every 365 days) for refills of medications. Seeing other providers in this clinic is fine but expectation is to see me yearly.    Jamaal Quiroz MD  01/03/2025    Portions of this note have been dictated with CHARLEEN Salazar.

## 2025-01-07 DIAGNOSIS — E78.5 HYPERLIPIDEMIA, UNSPECIFIED HYPERLIPIDEMIA TYPE: ICD-10-CM

## 2025-01-07 RX ORDER — ATORVASTATIN CALCIUM 40 MG/1
40 TABLET, FILM COATED ORAL
Qty: 90 TABLET | Refills: 3 | Status: SHIPPED | OUTPATIENT
Start: 2025-01-07

## 2025-01-07 NOTE — TELEPHONE ENCOUNTER
Refill Routing Note   Medication(s) are not appropriate for processing by [facility] for the following reason(s):        No active prescription written by provider    [facility] action(s):  Defer               Appointments  past 12m or future 3m with PCP    Date Provider   Last Visit   1/3/2025 Jamaal Quiroz MD   Next Visit   Visit date not found Jamaal Quiroz MD   ED visits in past 90 days: 0        Note composed:2:27 PM 01/07/2025

## 2025-01-08 NOTE — PROGRESS NOTES
Office Note     Name: Emiliana Encinas    : 1995     MRN: [id number]     Chief Complaint  Difficulty Urinating (Painful and inside hurts to wipe ) and Urinary Frequency    Subjective     History of Present Illness:  Emiliana Encinas is a 29 y.o. female who presents today for     complains of burning with urination, dysuria, and urgency. She has had symptoms for 5 days.  Patient denies fever, stomach ache, and vaginal discharge. Patient does not have a history of recurrent UTI.       Follow up for anxiety and mood disorder  Currently on Lamictal last visit, currently on lamictal 50mg. Reports improved stability in how she feels, doesn't get to overly anxious or depress. Able to focus and sleep better.  She is also on Effexor 150mg daily ,and buspar 7.5mg TID    Review of Systems:   Review of Systems   All other systems reviewed and are negative.      Past Medical History:   Past Medical History:   Diagnosis Date    ADHD (attention deficit hyperactivity disorder)     Allergic /    Augmentin Wellbutrin    Anxiety     Depression     Hypothyroidism     I have an overactive thyroid    Infectious viral hepatitis 2016    C    Substance abuse     Visual impairment        Past Surgical History:   Past Surgical History:   Procedure Laterality Date    APPENDECTOMY  2013    TONSILLECTOMY         Family History:   Family History   Problem Relation Age of Onset    Alcohol abuse Mother     Anxiety disorder Mother     Arthritis Mother     Cancer Mother         Colon cancer    COPD Mother     Depression Mother     Developmental Disability Mother     Diabetes Mother     Drug abuse Mother     Heart disease Mother     Mental illness Mother     Miscarriages / Stillbirths Mother     Thyroid disease Mother     Alcohol abuse Father     Arthritis Father     Depression Father     Developmental Disability Father     Drug abuse Father     Learning disabilities Father     Mental illness Father     Diabetes   Subjective:    Patient ID:  Loren Andre is a 71 y.o. female patient here for evaluation Hyperlipidemia and Hypertension      History of Present Illness:       Ms. Andre is here today for her follow up visit. She has been having severe fibromyalgia pain and her BP is up today because of this she tells me. She denies chest pain or SOB. No lightheaded or dizziness. NO headaches or double vision. NO recent fever or chills.      Review of patient's allergies indicates:   Allergen Reactions    Doxepin Anaphylaxis     abd pain    Norvasc [amlodipine] Palpitations    Xyzal [levocetirizine] Other (See Comments)     Can not take with blood pressure medications - headaches, higher blood pressure, red hands     Penicillins Rash    Sulfa (sulfonamide antibiotics) Rash    Duloxetine      Other reaction(s): insomnia    Gabapentin Nausea Only    Levofloxacin      Other reaction(s): Headache    Milnacipran      Other reaction(s): stomach pain    Nitrofurantoin monohyd/m-cryst      Other reaction(s): Itching    Prednisone      Other reaction(s): Itching    Pseudoephedrine-guaifenesin      Other reaction(s): Stomach upset    Terbinafine      Other reaction(s): rash       Past Medical History:   Diagnosis Date    Allergy     Anemia     sickle cell trait    Anxiety     Arthritis     Asthma     Chronic disease anemia 10/5/2017    Colon polyps     Depression     Dermatitis 3/7/2013    Fibromyalgia     HEARING LOSS     Hyperlipidemia     Hypertension     Leucopenia 10/5/2017    Polyneuropathy     Scoliosis     Sickle cell trait     Sickle-cell trait 10/5/2017    Trouble in sleeping     Urticaria      Past Surgical History:   Procedure Laterality Date    ADENOIDECTOMY      APPENDECTOMY      COLONOSCOPY  4/14/2008    Dr. Guerrero, 10 year recheck    COLONOSCOPY N/A 10/4/2018    Procedure: COLONOSCOPY;  Surgeon: Tam Kemp MD;  Location: [facility];  Service: Endoscopy;  Laterality: N/A;     "Maternal Grandmother     Alcohol abuse Brother     Drug abuse Brother     Mental illness Brother     Cancer Maternal Aunt         Colon cancer intestinal cancer    Mental illness Sister        Social History:   Social History     Socioeconomic History    Marital status: Single   Tobacco Use    Smoking status: Every Day     Current packs/day: 1.00     Average packs/day: 1 pack/day for 10.0 years (10.0 ttl pk-yrs)     Types: Cigarettes    Smokeless tobacco: Current    Tobacco comments:     I don't smoke cigarettes no more I just vape   Vaping Use    Vaping status: Every Day    Substances: Nicotine    Devices: Disposable    Passive vaping exposure: Yes   Substance and Sexual Activity    Alcohol use: Not Currently     Comment: I don't know how much alcohol I used to consume it was a lot    Drug use: Not Currently     Types: \"Crack\" cocaine, Heroin, Hydrocodone, Marijuana, Methamphetamines, Oxycodone    Sexual activity: Not Currently     Partners: Female, Male     Birth control/protection: None       Immunizations:   Immunization History   Administered Date(s) Administered    DTP 10/11/1996    DTaP, Unspecified 03/26/1998    Hepatitis A 11/14/2018    Influenza Seasonal Injectable 12/15/2008    MMR 03/26/1998    OPV 03/26/1998        Medications:     Current Outpatient Medications:     busPIRone (BUSPAR) 7.5 MG tablet, Take 1 tablet by mouth 3 (Three) Times a Day., Disp: 90 tablet, Rfl: 1    lamoTRIgine (LaMICtal) 100 MG tablet, Take 2 tablets by mouth 2 (Two) Times a Day., Disp: 90 tablet, Rfl: 1    levothyroxine (SYNTHROID, LEVOTHROID) 100 MCG tablet, Take 1 tablet by mouth Daily., Disp: 90 tablet, Rfl: 3    venlafaxine XR (Effexor XR) 150 MG 24 hr capsule, Take 1 capsule by mouth Daily., Disp: 90 capsule, Rfl: 1    drospirenone-ethinyl estradiol (ADALGISA) 3-0.02 MG per tablet, Take 1 tablet by mouth Daily. (Patient not taking: Reported on 1/8/2025), Disp: 28 tablet, Rfl: 12    nitrofurantoin, macrocrystal-monohydrate, " HYSTERECTOMY      OOPHORECTOMY      ROTATOR CUFF REPAIR Left 01/2016    ROTATOR CUFF REPAIR W/ DISTAL CLAVICLE EXCISION Right 12/04/2017    Dr. Eligio Timmons ( [facility] )    TONSILLECTOMY       Social History     Tobacco Use    Smoking status: Never Smoker    Smokeless tobacco: Never Used   Substance Use Topics    Alcohol use: Yes     Comment: occasionally - wine    Drug use: No        Review of Systems:    As noted in HPI in addition      REVIEW OF SYSTEMS  CARDIOVASCULAR: No recent chest pain, palpitations, arm, neck, or jaw pain  RESPIRATORY: No recent fever, cough chills, SOB or congestion  : No blood in the urine  GI: No Nausea, vomiting, constipation, diarrhea, blood, or reflux.  MUSCULOSKELETAL: +fibromyalgia  NEURO: No lightheadedness or dizziness  EYES: No Double vision, blurry, vision or headache              Objective        Vitals:    02/10/22 1308   BP: (!) 180/80   Pulse: 64       LIPIDS - LAST 2   Lab Results   Component Value Date    CHOL 120 10/04/2021    CHOL 133 10/01/2020    HDL 63 10/04/2021    HDL 73 10/01/2020    LDLCALC 49.0 (L) 10/04/2021    LDLCALC 49.6 (L) 10/01/2020    TRIG 40 10/04/2021    TRIG 52 10/01/2020    CHOLHDL 52.5 (H) 10/04/2021    CHOLHDL 54.9 (H) 10/01/2020       CBC - LAST 2  Lab Results   Component Value Date    WBC 4.52 05/03/2021    WBC 3.93 10/01/2020    RBC 4.63 05/03/2021    RBC 4.69 10/01/2020    HGB 12.5 05/03/2021    HGB 12.9 10/01/2020    HCT 38.2 05/03/2021    HCT 39.6 10/01/2020    MCV 83 05/03/2021    MCV 84 10/01/2020    MCH 27.0 05/03/2021    MCH 27.5 10/01/2020    MCHC 32.7 05/03/2021    MCHC 32.6 10/01/2020    RDW 15.6 (H) 05/03/2021    RDW 15.5 (H) 10/01/2020     05/03/2021     10/01/2020    MPV 11.6 05/03/2021    MPV 11.4 10/01/2020    GRAN 2.3 05/03/2021    GRAN 49.7 05/03/2021    LYMPH 1.4 05/03/2021    LYMPH 30.8 05/03/2021    MONO 0.7 05/03/2021    MONO 15.7 (H) 05/03/2021    BASO 0.03 05/03/2021    BASO 0.03 10/01/2020     "(Macrobid) 100 MG capsule, Take 1 capsule by mouth 2 (Two) Times a Day for 5 days., Disp: 10 capsule, Rfl: 0    PEG-KCl-NaCl-NaSulf-Na Asc-C (MoviPrep) 100 g reconstituted solution powder, Use as directed by provider for colonoscopy prep (Patient not taking: Reported on 9/30/2024), Disp: 1 each, Rfl: 0    Allergies:   Allergies   Allergen Reactions    Augmentin [Amoxicillin-Pot Clavulanate] Unknown - High Severity     Small child per mother     Bupropion Hives       Objective     Vital Signs  /76   Pulse 96   Temp 97.7 °F (36.5 °C) (Temporal)   Ht 153.7 cm (60.51\")   Wt 77.5 kg (170 lb 12.8 oz)   SpO2 98%   BMI 32.79 kg/m²   Estimated body mass index is 32.79 kg/m² as calculated from the following:    Height as of this encounter: 153.7 cm (60.51\").    Weight as of this encounter: 77.5 kg (170 lb 12.8 oz).            Physical Exam  Constitutional:       Appearance: Normal appearance.   Cardiovascular:      Rate and Rhythm: Normal rate and regular rhythm.      Pulses: Normal pulses.   Pulmonary:      Effort: Pulmonary effort is normal.      Breath sounds: Normal breath sounds.   Abdominal:      Tenderness: There is no right CVA tenderness or left CVA tenderness.   Neurological:      Mental Status: She is alert.          Result Review :                Results for orders placed or performed in visit on 01/08/25   POCT urinalysis dipstick, automated    Collection Time: 01/08/25  9:10 AM    Specimen: Urine   Result Value Ref Range    Color Dark Yellow Yellow, Straw, Dark Yellow, Elisa    Clarity, UA Slightly Cloudy (A) Clear    Specific Gravity  1.025 1.005 - 1.030    pH, Urine 6.0 5.0 - 8.0    Leukocytes Negative Negative    Nitrite, UA Negative Negative    Protein, POC Trace (A) Negative mg/dL    Glucose, UA Negative Negative mg/dL    Ketones, UA Negative Negative    Urobilinogen, UA Normal Normal, 0.2 E.U./dL    Bilirubin Negative Negative    Blood, UA Negative Negative    Lot Number [id number]     " NRBC 0 05/03/2021    NRBC 0 10/01/2020       CHEMISTRY & LIVER FUNCTION - LAST 2  Lab Results   Component Value Date     05/03/2021     10/01/2020    K 4.0 05/03/2021    K 4.4 10/01/2020     05/03/2021     10/01/2020    CO2 19 (L) 05/03/2021    CO2 26 10/01/2020    ANIONGAP 15 05/03/2021    ANIONGAP 10 10/01/2020    BUN 19 05/03/2021    BUN 20 10/01/2020    CREATININE 1.3 05/03/2021    CREATININE 1.2 10/01/2020    GLU 94 05/03/2021    GLU 97 10/01/2020    CALCIUM 10.0 05/03/2021    CALCIUM 9.5 10/01/2020    MG 2.0 07/09/2020    MG 1.8 07/08/2020    ALBUMIN 3.8 05/03/2021    ALBUMIN 3.9 10/01/2020    PROT 7.4 05/03/2021    PROT 7.2 10/01/2020    ALKPHOS 72 05/03/2021    ALKPHOS 74 10/01/2020    ALT 29 05/03/2021    ALT 22 10/01/2020    AST 34 05/03/2021    AST 25 10/01/2020    BILITOT 0.5 05/03/2021    BILITOT 0.7 10/01/2020        CARDIAC PROFILE - LAST 2  Lab Results   Component Value Date     (H) 07/08/2020     06/23/2015     (H) 02/19/2014    TROPONINI <0.030 07/09/2020    TROPONINI <0.030 07/08/2020        COAGULATION - LAST 2  Lab Results   Component Value Date    LABPT 13.2 07/08/2020    INR 1.1 07/08/2020       ENDOCRINE & PSA - LAST 2  Lab Results   Component Value Date    HGBA1C 5.9 (H) 10/01/2020    HGBA1C 6.2 07/09/2020    TSH 1.380 07/08/2020    TSH 0.856 02/19/2014        ECHOCARDIOGRAM RESULTS  Results for orders placed during the hospital encounter of 07/08/20    Stress Echo Which stress agent will be used? Treadmill Exercise; Color Flow Doppler? No    Interpretation Summary  · The patient reached the end of the protocol.  · Moderate concentric left ventricular hypertrophy.  · Normal left ventricular systolic function. The estimated ejection fraction is 65%.  · Normal LV diastolic function.  · Normal right ventricular systolic function.  · Mild left atrial enlargement.  · The stress echo portion of this study is negative for myocardial ischemia.  · There  Expiration Date 3/3/26    Urine Culture - Urine, Urine, Clean Catch    Collection Time: 01/08/25 10:09 AM    Specimen: Urine, Clean Catch   Result Value Ref Range    Urine Culture <25,000 CFU/mL Streptococcus agalactiae (Group B) (A)    Chlamydia trachomatis, Neisseria gonorrhoeae, PCR - Urine, Urine, Clean Catch    Collection Time: 01/08/25 10:09 AM    Specimen: Urine, Clean Catch   Result Value Ref Range    Chlamydia trachomatis, SHAHRIAR Indeterminate Negative    Neisseria gonorrhoeae, SHAHRIAR Negative Negative         Assessment and Plan     1. Urinary tract infection without hematuria, site unspecified    - POCT urinalysis dipstick, automated  - Urine Culture - Urine, Urine, Clean Catch; Future  - Chlamydia trachomatis, Neisseria gonorrhoeae, PCR - Urine, Urine, Clean Catch; Future  - Urine Culture - Urine, Urine, Clean Catch  - Chlamydia trachomatis, Neisseria gonorrhoeae, PCR - Urine, Urine, Clean Catch    2. Painful urination    - POCT urinalysis dipstick, automated  - Urine Culture - Urine, Urine, Clean Catch; Future  - Chlamydia trachomatis, Neisseria gonorrhoeae, PCR - Urine, Urine, Clean Catch; Future  - Urine Culture - Urine, Urine, Clean Catch  - Chlamydia trachomatis, Neisseria gonorrhoeae, PCR - Urine, Urine, Clean Catch    3. Anxiety and depression  Continue with Effexor 150mg Daily and Buspar 7.5mg TID daily  - lamoTRIgine (LaMICtal) 100 MG tablet; Take 2 tablets by mouth 2 (Two) Times a Day.  Dispense: 90 tablet; Refill: 1    4. Mood disorder  increase lamictal dose to 100mg BID.  - lamoTRIgine (LaMICtal) 100 MG tablet; Take 2 tablets by mouth 2 (Two) Times a Day.  Dispense: 90 tablet; Refill: 1       Follow Up  No follow-ups on file.    Isaiah Lock MD  [facility]  [facility]  [address] [address] [phone]     were no arrhythmias during stress.  · The patient's exercise capacity was mildly impaired.  · The ECG portion of this study is abnormal but not diagnostic for ischemia.      CURRENT/PREVIOUS VISIT EKG  Results for orders placed or performed in visit on 08/10/21   IN OFFICE EKG 12-LEAD (to [place])    Collection Time: 08/10/21  1:21 PM    Narrative    Test Reason : I10,    Vent. Rate : 054 BPM     Atrial Rate : 054 BPM     P-R Int : 150 ms          QRS Dur : 066 ms      QT Int : 410 ms       P-R-T Axes : 055 004 -01 degrees     QTc Int : 388 ms    Sinus bradycardia  Possible Left atrial enlargement  Anteroseptal infarct ,age undetermined  Abnormal ECG  When compared with ECG of 08-JUL-2020 09:32,  Anteroseptal infarct is now Present  Confirmed by Obdulio Quinn MD ([id number]) on 8/20/2021 2:05:52 PM    Referred By:             Confirmed By:Obdulio Quinn MD         PHYSICAL EXAM  CONSTITUTIONAL: Well built, well nourished in no apparent distress  NECK: no carotid bruit, no JVD  LUNGS: CTA  CHEST WALL: no tenderness  HEART: regular rate and rhythm, S1, S2 normal, no murmur, click, rub or gallop   ABDOMEN: soft, non-tender; bowel sounds normal; no masses,  no organomegaly  EXTREMITIES: Extremities normal, no edema, no calf tenderness noted  NEURO: AAO X 3    I HAVE REVIEWED :    The vital signs, nurses notes, and all the pertinent radiology and labs.        Current Outpatient Medications   Medication Instructions    albuterol (PROAIR HFA) 90 mcg/actuation inhaler 2 puffs, Inhalation, Every 6 hours PRN, Rescue    ascorbic acid (vitamin C) (VITAMIN C) 500 mg, Oral, Daily PRN    aspirin 81 mg, Oral, Daily    atorvastatin (LIPITOR) 40 mg, Oral, Daily    azelastine (ASTELIN) 137 mcg, Nasal, 2 times daily    benzonatate (TESSALON) 200 mg, Oral, 3 times daily PRN    betamethasone dipropionate (DIPROLENE) 0.05 % cream Topical (Top), 2 times daily    CALCIUM CARBONATE/VITAMIN D3 (VITAMIN D-3 ORAL) 100 Int'l Units, Oral,  Daily    carvediloL (COREG) 25 mg, Oral, 2 times daily with meals    clobetasoL (OLUX) 0.05 % Foam Topical (Top), 2 times daily    coenzyme Q10 10 mg, Oral, Daily    DUREZOL 0.05 % Drop ophthalmic solution 1 drop, Both Eyes, 2 times daily    fluticasone propionate (FLONASE) 50 mcg, Each Nostril, 2 times daily    fluticasone-salmeterol diskus inhaler 250-50 mcg 1 puff, Inhalation, 2 times daily, Controller    folic acid (FOLVITE) 400 mcg, Oral, Daily    GARLIC OIL ORAL 1 capsule, Oral, Daily    ILEVRO 0.3 % DrpS 1 drop in operated eye once a day    ketorolac 0.5% (ACULAR) 0.5 % Drop 1 drop, Both Eyes, 4 times daily    lisinopriL (PRINIVIL,ZESTRIL) 40 mg, Oral, Daily    magnesium 250 mg, Oral, Once    montelukast (SINGULAIR) 10 mg, Oral, Daily    ofloxacin (OCUFLOX) 0.3 % ophthalmic solution 1 drop, Both Eyes, 4 times daily    prednisoLONE acetate (PRED FORTE) 1 % DrpS 1 drop, Both Eyes, 4 times daily    traZODone (DESYREL) 100 MG tablet TAKE 1 TABLET EVERY EVENING    valACYclovir (VALTREX) 500 MG tablet TAKE 1 TABLET (500 MG TOTAL) BY MOUTH 2 (TWO) TIMES DAILY FOR 7 DAYS AS DIRECTED.     vitamin E 100 Units, Oral, Daily          Assessment & Plan     Essential hypertension  Currently elevated 2/2 pain per patient  She will keep log  Follow low salt diet    Hyperlipidemia  Continue Lipitor 40 mg daily LDL 49'          No follow-ups on file.

## 2025-01-09 DIAGNOSIS — L30.8 PSORIASIFORM DERMATITIS: ICD-10-CM

## 2025-01-09 DIAGNOSIS — I10 PRIMARY HYPERTENSION: ICD-10-CM

## 2025-01-09 DIAGNOSIS — A60.9 HSV (HERPES SIMPLEX VIRUS) ANOGENITAL INFECTION: ICD-10-CM

## 2025-01-09 RX ORDER — FLUOCINOLONE ACETONIDE 0.11 MG/ML
OIL TOPICAL 2 TIMES DAILY
Qty: 118 ML | Refills: 2 | Status: SHIPPED | OUTPATIENT
Start: 2025-01-09

## 2025-01-09 NOTE — TELEPHONE ENCOUNTER
----- Message from Qi sent at 1/9/2025 10:36 AM CST -----  Contact: self  Type:  RX Refill Request    Who Called: pt     Refill or New Rx:refill    RX Name and Strength:fluocinolone (DERMA-SMOOTHE) 0.01 % external oil    How is the patient currently taking it? (ex. 1XDay): as directed     Is this a 30 day or 90 day RX:30    Preferred Pharmacy with phone number:  [id number] [address] & [address] [address]  Phone: [phone] Fax: [phone]          Local or Mail Order:local     Ordering Provider:irais    Would the patient rather a call back or a response via [facility]? Call bacl     Best Call Back Number:[phone]      Additional Information:    Please call back to advise. Thanks!

## 2025-01-09 NOTE — TELEPHONE ENCOUNTER
----- Message from True sent at 1/9/2025 10:12 AM CST -----  Regarding: Refill  Type:  RX Refill Request    Who Called: pt  Refill or New Rx:refill    RX Name and Strength:  1)carvediloL (COREG) 25 MG tablet  2)valACYclovir (VALTREX) 500 MG tablet    How is the patient currently taking it? (ex. 1XDay):as directed   Is this a 30 day or 90 day RX:90    Preferred Pharmacy with phone number:  [facility] #[id number] [address] & [address] [address]  Phone: [phone] Fax: [phone]        Local or Mail Order:local   Ordering Provider:milena    Would the patient rather a call back or a response via [facility]? Call back     Best Call Back Number:[phone]      Additional Information:  please call to advise, Thank You.

## 2025-01-09 NOTE — TELEPHONE ENCOUNTER
No care due was identified.  [facility] Embedded Care Due Messages. Reference number: [id number].   1/09/2025 10:24:53 AM CST

## 2025-01-10 RX ORDER — VALACYCLOVIR HYDROCHLORIDE 500 MG/1
TABLET, FILM COATED ORAL
Qty: 14 TABLET | Refills: 3 | Status: SHIPPED | OUTPATIENT
Start: 2025-01-10

## 2025-01-10 RX ORDER — CARVEDILOL 25 MG/1
25 TABLET ORAL 2 TIMES DAILY WITH MEALS
Qty: 180 TABLET | Refills: 3 | Status: SHIPPED | OUTPATIENT
Start: 2025-01-10

## 2025-01-17 ENCOUNTER — DOCUMENTATION ONLY (OUTPATIENT)
Dept: CARDIOLOGY | Facility: HOSPITAL | Age: 75
End: 2025-01-17
Payer: MEDICARE

## 2025-01-17 ENCOUNTER — HOSPITAL ENCOUNTER (OUTPATIENT)
Dept: CARDIOLOGY | Facility: HOSPITAL | Age: 75
Discharge: HOME | End: 2025-01-17
Attending: NURSE PRACTITIONER
Payer: MEDICARE

## 2025-01-17 ENCOUNTER — HOSPITAL ENCOUNTER (EMERGENCY)
Facility: HOSPITAL | Age: 75
Discharge: HOME | End: 2025-01-17
Attending: EMERGENCY MEDICINE
Payer: MEDICARE

## 2025-01-17 VITALS
HEART RATE: 60 BPM | DIASTOLIC BLOOD PRESSURE: 89 MMHG | RESPIRATION RATE: 20 BRPM | HEIGHT: 66 IN | WEIGHT: 155 LBS | TEMPERATURE: 98 F | BODY MASS INDEX: 24.91 KG/M2 | OXYGEN SATURATION: 99 % | SYSTOLIC BLOOD PRESSURE: 209 MMHG

## 2025-01-17 DIAGNOSIS — I25.10 ATHEROSCLEROSIS OF NATIVE CORONARY ARTERY OF NATIVE HEART WITHOUT ANGINA PECTORIS: ICD-10-CM

## 2025-01-17 DIAGNOSIS — R07.9 CHEST PAIN, UNSPECIFIED TYPE: ICD-10-CM

## 2025-01-17 DIAGNOSIS — R94.39 ABNORMAL NUCLEAR STRESS TEST: ICD-10-CM

## 2025-01-17 DIAGNOSIS — I10 PRIMARY HYPERTENSION: ICD-10-CM

## 2025-01-17 DIAGNOSIS — I10 WHITE COAT SYNDROME WITH DIAGNOSIS OF HYPERTENSION: ICD-10-CM

## 2025-01-17 DIAGNOSIS — I10 HYPERTENSION, UNSPECIFIED TYPE: Primary | ICD-10-CM

## 2025-01-17 LAB
ALBUMIN SERPL BCP-MCNC: 4.2 G/DL (ref 3.5–5.2)
ALP SERPL-CCNC: 71 U/L (ref 40–150)
ALT SERPL W/O P-5'-P-CCNC: 31 U/L (ref 10–44)
ANION GAP SERPL CALC-SCNC: 13 MMOL/L (ref 8–16)
AST SERPL-CCNC: 34 U/L (ref 10–40)
BASOPHILS # BLD AUTO: 0.03 K/UL (ref 0–0.2)
BASOPHILS NFR BLD: 0.7 % (ref 0–1.9)
BILIRUB SERPL-MCNC: 0.5 MG/DL (ref 0.1–1)
BNP SERPL-MCNC: 186 PG/ML (ref 0–99)
BUN SERPL-MCNC: 17 MG/DL (ref 8–23)
CALCIUM SERPL-MCNC: 10.1 MG/DL (ref 8.7–10.5)
CHLORIDE SERPL-SCNC: 109 MMOL/L (ref 95–110)
CO2 SERPL-SCNC: 20 MMOL/L (ref 23–29)
CREAT SERPL-MCNC: 1.2 MG/DL (ref 0.5–1.4)
DIFFERENTIAL METHOD BLD: NORMAL
EOSINOPHIL # BLD AUTO: 0.1 K/UL (ref 0–0.5)
EOSINOPHIL NFR BLD: 2.6 % (ref 0–8)
ERYTHROCYTE [DISTWIDTH] IN BLOOD BY AUTOMATED COUNT: 14.2 % (ref 11.5–14.5)
EST. GFR  (NO RACE VARIABLE): 47.2 ML/MIN/1.73 M^2
GLUCOSE SERPL-MCNC: 82 MG/DL (ref 70–110)
HCT VFR BLD AUTO: 40.6 % (ref 37–48.5)
HGB BLD-MCNC: 13.8 G/DL (ref 12–16)
IMM GRANULOCYTES # BLD AUTO: 0.01 K/UL (ref 0–0.04)
IMM GRANULOCYTES NFR BLD AUTO: 0.2 % (ref 0–0.5)
LYMPHOCYTES # BLD AUTO: 1.4 K/UL (ref 1–4.8)
LYMPHOCYTES NFR BLD: 33 % (ref 18–48)
MCH RBC QN AUTO: 28 PG (ref 27–31)
MCHC RBC AUTO-ENTMCNC: 34 G/DL (ref 32–36)
MCV RBC AUTO: 83 FL (ref 82–98)
MONOCYTES # BLD AUTO: 0.5 K/UL (ref 0.3–1)
MONOCYTES NFR BLD: 12.6 % (ref 4–15)
NEUTROPHILS # BLD AUTO: 2.1 K/UL (ref 1.8–7.7)
NEUTROPHILS NFR BLD: 50.9 % (ref 38–73)
NRBC BLD-RTO: 0 /100 WBC
OHS QRS DURATION: 72 MS
OHS QRS DURATION: 72 MS
OHS QTC CALCULATION: 386 MS
OHS QTC CALCULATION: 391 MS
PLATELET # BLD AUTO: 183 K/UL (ref 150–450)
PMV BLD AUTO: 12.3 FL (ref 9.2–12.9)
POTASSIUM SERPL-SCNC: 4.1 MMOL/L (ref 3.5–5.1)
PROT SERPL-MCNC: 8.8 G/DL (ref 6–8.4)
RBC # BLD AUTO: 4.92 M/UL (ref 4–5.4)
SODIUM SERPL-SCNC: 142 MMOL/L (ref 136–145)
TROPONIN I SERPL DL<=0.01 NG/ML-MCNC: 5 NG/L (ref 0–14)
TROPONIN I SERPL DL<=0.01 NG/ML-MCNC: 6 NG/L (ref 0–14)
WBC # BLD AUTO: 4.21 K/UL (ref 3.9–12.7)

## 2025-01-17 PROCEDURE — 85025 COMPLETE CBC W/AUTO DIFF WBC: CPT | Performed by: EMERGENCY MEDICINE

## 2025-01-17 PROCEDURE — 83880 ASSAY OF NATRIURETIC PEPTIDE: CPT | Performed by: EMERGENCY MEDICINE

## 2025-01-17 PROCEDURE — 93005 ELECTROCARDIOGRAM TRACING: CPT

## 2025-01-17 PROCEDURE — 93010 ELECTROCARDIOGRAM REPORT: CPT | Mod: ,,, | Performed by: INTERNAL MEDICINE

## 2025-01-17 PROCEDURE — 80053 COMPREHEN METABOLIC PANEL: CPT | Performed by: EMERGENCY MEDICINE

## 2025-01-17 PROCEDURE — 84484 ASSAY OF TROPONIN QUANT: CPT | Mod: 91 | Performed by: EMERGENCY MEDICINE

## 2025-01-17 PROCEDURE — 99285 EMERGENCY DEPT VISIT HI MDM: CPT | Mod: 25

## 2025-01-17 RX ORDER — CARVEDILOL 25 MG/1
50 TABLET ORAL 2 TIMES DAILY WITH MEALS
Qty: 180 TABLET | Refills: 3 | Status: SHIPPED | OUTPATIENT
Start: 2025-01-17

## 2025-01-17 RX ORDER — SPIRONOLACTONE 25 MG/1
25 TABLET ORAL DAILY
Qty: 30 TABLET | Refills: 11 | Status: SHIPPED | OUTPATIENT
Start: 2025-01-17 | End: 2026-01-17

## 2025-01-17 NOTE — PROGRESS NOTES
Pt arrived for Cardiac PET stress test and was found to have /85 with a slight HA. Pt reprts she did not take home BP meds today. PET stress test aborted. Contacted Dr Jett, cardiology fellow, who instructed to take pt to ED.

## 2025-01-17 NOTE — ED PROVIDER NOTES
Encounter Date: 1/17/2025       History     Chief Complaint   Patient presents with    Hypertension     Arrived via pov from cardiology clinic. Pt was having stress echo done and become hypertensive. Pt denies any n/v/chest pain/sob. Pt has hx of htn and takes meds daily. Cardiology clinic recorded pressure of 239/87.      HPI    Loren Andre is a 74 y.o. female w/ a PMHx significant for HTN, anxiety, asthma, anemia Crohn's disease, sickle cell trait, CKD. Patient presents from clinic where she was scheduled to have a stress test today she was getting for evaluation of intermittent episodes of atypical CP.  In clinic, she was recorded to have a blood pressure of 239/87.  Patient also complaining of slight SOB, which she states feels similar to her asthma but also started this morning.  Of note, patient is currently prescribed carvedilol, lisinopril, and terazosin for management of HTN.  She was previously prescribed clonidine p.r.n. for HTN but no longer has this prescription.  Patient was holding her home antihypertensives this morning for her stress test as instructed.  Last dose of home antihypertensives was last night. Patient denies f/c, HA, lightheadedness, vision changes, cough/congestion,  CP, abdominal pain, N/V/D, dysuria/hematuria, lower extremity edema/erythema/pain.    Review of patient's allergies indicates:   Allergen Reactions    Doxepin Anaphylaxis     abd pain    Norvasc [amlodipine] Palpitations    Xyzal [levocetirizine] Other (See Comments)     Can not take with blood pressure medications - headaches, higher blood pressure, red hands     Penicillins Rash    Sulfa (sulfonamide antibiotics) Rash    Duloxetine      Other reaction(s): insomnia    Levofloxacin      Other reaction(s): Headache    Milnacipran      Other reaction(s): stomach pain    Nitrofurantoin monohyd/m-cryst      Other reaction(s): Itching    Prednisone      Other reaction(s): Itching    Pseudoephedrine-guaifenesin      Other  reaction(s): Stomach upset    Terbinafine      Other reaction(s): rash     Past Medical History:   Diagnosis Date    Allergy     Anemia     sickle cell trait    Anxiety     Arthritis     Asthma     Chest pain 7/8/2020    Chronic disease anemia 10/05/2017    Colon polyps     Depression     Dermatitis 03/07/2013    Fibromyalgia     HEARING LOSS     Hyperlipidemia     Hypertension     Leucopenia 10/05/2017    Polyneuropathy     Scoliosis     Sickle cell trait     Sickle-cell trait 10/05/2017    Trouble in sleeping     Urticaria      Past Surgical History:   Procedure Laterality Date    ADENOIDECTOMY      APPENDECTOMY      COLONOSCOPY  4/14/2008    Dr. Guerrero, 10 year recheck    COLONOSCOPY N/A 10/4/2018    Procedure: COLONOSCOPY;  Surgeon: Tam Kemp MD;  [facility]: [facility];  Service: Endoscopy;  Laterality: N/A;    HYSTERECTOMY      OOPHORECTOMY      ROTATOR CUFF REPAIR Left 01/2016    ROTATOR CUFF REPAIR W/ DISTAL CLAVICLE EXCISION Right 12/04/2017    Dr. Eligio Timmons ( [facility] )    TONSILLECTOMY       Family History   Problem Relation Name Age of Onset    Multiple sclerosis Mother      Seizures Mother      Cancer Father          lung    Alcohol abuse Brother ruby     Early death Brother ruby     Diabetes Maternal Aunt      Diabetes Maternal Uncle      Diabetes Maternal Grandmother      Mental illness Maternal Grandfather      Alzheimer's disease Maternal Grandfather      Asthma Paternal Aunt      Sickle cell anemia Paternal Aunt      Diabetes Sister rickey     Arthritis Daughter lissett     Miscarriages / Stillbirths Daughter lissett     Anemia Daughter lissett     Hyperlipidemia Son lonnie     Hypertension Son lonnie     Allergies Son lonnie     Sickle cell anemia Paternal Uncle      Asthma Brother quentin     No Known Problems Sister luis enrique     No Known Problems Sister cassia     No Known Problems Sister fabiola     No Known Problems Brother johnson     Miscarriages / Stillbirths Daughter dondeyel      Thyroid disease Daughter keysha     No Known Problems Deidra snow     Angioedema Neg Hx      Eczema Neg Hx      Immunodeficiency Neg Hx      Rheum arthritis Neg Hx      Psoriasis Neg Hx      Lupus Neg Hx       Social History     Tobacco Use    Smoking status: Never    Smokeless tobacco: Never   Substance Use Topics    Alcohol use: Yes     Comment: occasionally - wine    Drug use: No     Review of Systems    Physical Exam     Initial Vitals   BP Pulse Resp Temp SpO2   01/17/25 1358 01/17/25 1357 01/17/25 1357 01/17/25 1357 01/17/25 1357   (!) 195/100 61 16 98 °F (36.7 °C) 98 %      MAP       --                Physical Exam    Nursing note and vitals reviewed.  Constitutional: She appears well-developed and well-nourished. No distress.   HENT:   Head: Normocephalic and atraumatic.   Nose: Nose normal.   Eyes: Conjunctivae and EOM are normal.   Neck: Neck supple. No JVD present.   Normal range of motion.  Cardiovascular:  Normal rate, normal heart sounds and intact distal pulses.           Pulmonary/Chest: Breath sounds normal. No respiratory distress.   Abdominal: Abdomen is soft. Bowel sounds are normal. She exhibits no distension. There is no abdominal tenderness.   Musculoskeletal:         General: No tenderness or edema. Normal range of motion.      Cervical back: Normal range of motion and neck supple.     Neurological: She is alert and oriented to person, place, and time. No sensory deficit.   Skin: Skin is warm and dry. Capillary refill takes less than 2 seconds.   Psychiatric: She has a normal mood and affect. Her behavior is normal.         ED Course   Procedures  Labs Reviewed   COMPREHENSIVE METABOLIC PANEL - Abnormal       Result Value    Sodium 142      Potassium 4.1      Chloride 109      CO2 20 (*)     Glucose 82      BUN 17      Creatinine 1.2      Calcium 10.1      Total Protein 8.8 (*)     Albumin 4.2      Total Bilirubin 0.5      Alkaline Phosphatase 71      AST 34      ALT 31      eGFR 47.2  (*)     Anion Gap 13     B-TYPE NATRIURETIC PEPTIDE - Abnormal     (*)    CBC W/ AUTO DIFFERENTIAL    WBC 4.21      RBC 4.92      Hemoglobin 13.8      Hematocrit 40.6      MCV 83      MCH 28.0      MCHC 34.0      RDW 14.2      Platelets 183      MPV 12.3      Immature Granulocytes 0.2      Gran # (ANC) 2.1      Immature Grans (Abs) 0.01      Lymph # 1.4      Mono # 0.5      Eos # 0.1      Baso # 0.03      nRBC 0      Gran % 50.9      Lymph % 33.0      Mono % 12.6      Eosinophil % 2.6      Basophil % 0.7      Differential Method Automated     TROPONIN I HIGH SENSITIVITY    Troponin I High Sensitivity 5     TROPONIN I HIGH SENSITIVITY    Troponin I High Sensitivity 6          ECG Results              EKG 12-lead (Final result)        Collection Time Result Time QRS Duration OHS QTC Calculation    01/17/25 14:21:35 01/17/25 14:49:15 72 391                     Final result by Interface, Lab In [facility] (01/17/25 14:49:22)                   Narrative:    Test Reason :    Vent. Rate :  62 BPM     Atrial Rate :  62 BPM     P-R Int : 146 ms          QRS Dur :  72 ms      QT Int : 386 ms       P-R-T Axes :  65   1   3 degrees    QTcB Int : 391 ms    Normal sinus rhythm  Possible Left atrial enlargement  T wave abnormality, consider inferolateral ischemia  Abnormal ECG  When compared with ECG of 17-Jan-2025 14:06,  T wave inversion less evident in Inferior leads  Confirmed by Jimi Manuel ([id number]) on 1/17/2025 2:49:14 PM    Referred By: AAAREFERRAL SELF           Confirmed By: Jimi Manuel                                     EKG 12-lead (Final result)        Collection Time Result Time QRS Duration OHS QTC Calculation    01/17/25 14:06:48 01/17/25 14:48:23 72 386                     Final result by Interface, Lab In [facility] (01/17/25 14:48:25)                   Narrative:    Test Reason : R42,    Vent. Rate :  58 BPM     Atrial Rate :  58 BPM     P-R Int : 150 ms          QRS Dur :  72 ms      QT Int : 394 ms        P-R-T Axes :  73  44 -36 degrees    QTcB Int : 386 ms    Sinus bradycardia  Possible Left atrial enlargement  T wave abnormality, consider inferolateral ischemia  Abnormal ECG  When compared with ECG of 17-Jan-2025 13:50,  QRS voltage has increased  T wave inversion now evident in Inferior leads  T wave inversion now evident in Anterior-lateral leads  Confirmed by Jimi Manuel ([id number]) on 1/17/2025 2:48:20 PM    Referred By: AAAREFERRAL SELF           Confirmed By: Jimi Manuel                                  Imaging Results              X-Ray Chest PA And Lateral (Final result)  Result time 01/17/25 15:25:02      Final result by Nate Cruz MD (01/17/25 15:25:02)                   Impression:      No acute cardiopulmonary disease.      Electronically signed by: Nate Cruz MD  Date:    01/17/2025  Time:    15:25               Narrative:    EXAMINATION:  XR CHEST PA AND LATERAL    CLINICAL HISTORY:  Chest Pain;    TECHNIQUE:  PA and lateral views of the chest were performed.    COMPARISON:  07/08/2020    FINDINGS:  The heart size and pulmonary vessels are normal.  Mediastinum shows aortic atherosclerosis.  The lungs are expanded and clear.  No lung consolidation or pleural fluid is identified.  The skeletal structures are intact.  Monitor wires overlie the chest.                                       Medications - No data to display  Medical Decision Making  Loren Andre is a 74 y.o. female who presents to the emergency department for evaluation of HTN as detailed in HPI. On initial evaluation, patient in NAD.  Patient is significantly hypertensive at 240/90, but otherwise VSS. On exam, patient has no focal neurologic deficits.  Lungs clear to auscultation.  Distal pulses equal bilaterally.  No murmur.  No lower extremity pitting edema. Otherwise PE largely unremarkable. Patient given home antihypertensives (has Rx with her and took her own medications). On re-evaluation, patient continues to  be hypertensive, but asymptomatic w/ no complaints.  Patient has no evidence of end-organ damage and at this time is stable for discharge home w/ medication adjustmen - increased Coreg dosing and added spironolactone to medication regimen.     Pertinent Labs:  CBC unremarkable.  Troponin WNL.  CMP shows impaired renal function, but this is baseline for patient w/ no acute worsening.  BNP slightly elevated, but not concerning for CHF given the patient has no signs/symptoms of acute volume overload.    Pertinent Imaging: CXR shows no pulmonary edema, pericardial effusion, or consolidation.     Ddx including, but not limited to: Hypertensive urgency v. Hypertensive emergency (hypertensive encephalopathy, intracerebral hemorrhage, acute ischemic stroke, acute myocardial infarction, acute left ventricular failure with pulmonary edema, unstable angina, dissecting aortic aneurysm, acute renal failure) v. Renal disease v. JALEN v. Endocrine abnormality v. missed medication     Most likely Dx:  At this time, I have high suspicion for asymptomatic HTN.  This patient has no signs of end-organ damage or other acute findings.     Disposition:   Patient discharged home. Discussed strict return precautions and home care plan with patient and/or caregiver who expressed understanding and agreement.    Please see HPI, physical exam, ED course for additional details.    Amount and/or Complexity of Data Reviewed  Labs: ordered.  Radiology: ordered.    Risk  Prescription drug management.              Attending Attestation:   Physician Attestation Statement for Resident:  As the supervising MD   Physician Attestation Statement: I have personally seen and examined this patient.   I agree with the above history.  -:   As the supervising MD I agree with the above PE.     As the supervising MD I agree with the above treatment, course, plan, and disposition.                Attending ED Notes:   STAFF ATTENDING PHYSICIAN NOTE:  I have  individually/jointly evaluated Loren Andre and discussed their ED management with the resident physician. I have also reviewed their notes, assessments, and procedures documented.  I was present during all critical portions of any procedure(s) performed on Loren Andre.   ____________________  Dirk Cowart MD, [facility]  Emergency Medicine Staff                               Clinical Impression:  Final diagnoses:  [I10] Hypertension, unspecified type (Primary)          ED Disposition Condition    Discharge Stable          ED Prescriptions       Medication Sig Dispense Start Date End Date Auth. Provider    carvediloL (COREG) 25 MG tablet Take 2 tablets (50 mg total) by mouth 2 (two) times daily with meals. 180 tablet 1/17/2025 -- Myles Bagley MD    spironolactone (ALDACTONE) 25 MG tablet Take 1 tablet (25 mg total) by mouth once daily. 30 tablet 1/17/2025 1/17/2026 Myles Bagley MD          Follow-up Information    None          Myles Bagley MD  Resident  01/17/25 [id number]       Jose Cowart MD  01/20/25 [id number]

## 2025-01-17 NOTE — DISCHARGE INSTRUCTIONS
We have increased your Coreg dosing to 50 mg 2 times daily.  We have also added Aldactone 25 mg once daily to your medication regimen.  Please take these new medications as prescribed and f/u w/your primary care provider as soon as possible for any necessary dose adjustments.    Your blood pressure was elevated in the emergency department.  You must follow-up with your primary care doctor for adjustment of your medicine.    How can you care for yourself at home?  Medical treatment  If you stop taking your medicine, your blood pressure will go back up. You may take one or more types of medicine to lower your blood pressure. Be safe with medicines. Take your medicine exactly as prescribed. Call your doctor or nurse advice line if you think you are having a problem with your medicine.  Talk to your doctor before you start taking aspirin every day. Aspirin can help certain people lower their risk of a heart attack or stroke. But taking aspirin isn't right for everyone, because it can cause serious bleeding.  See your doctor at least 2 times a year. You may need to see the doctor more often at first or until your blood pressure comes down.  If you are taking blood pressure medicine, talk to your doctor before you take decongestants or anti-inflammatory medicine, such as ibuprofen. Some of these medicines can raise blood pressure.  Learn how to check your blood pressure at home.  Lifestyle changes  Stay at a healthy weight. This is especially important if you put on weight around the waist. Losing even 4.5 kg (10 lb) can help you lower your blood pressure.  If your doctor recommends it, get more exercise. Walking is a good choice. Bit by bit, increase the amount you walk every day. Try for at least 2½ hours a week. You also may want to swim, bike, or do other activities.  If you drink alcohol, try to drink less. Your risk of harm from alcohol is low if you have 2 drinks or less per week. Work with your doctor to find  right is best for you.  Try to limit how much sodium you eat to about 2,000 milligrams (mg) a day.  Eat plenty of fruits (such as bananas and oranges), vegetables, legumes, whole grains, and low-fat dairy products.  Lower the amount of saturated fat in your diet. Saturated fat is found in animal products such as milk, cheese, and meat. Limiting these foods may help you lose weight and also lower your risk for heart disease.  Do not smoke. Smoking increases your risk for heart attack and stroke. If you need help quitting, talk to your doctor about stop-smoking programs and medicines. These can increase your chances of quitting for good.      When should you call for help?  Call 911 anytime you think you may need emergency care. This may mean having symptoms that suggest that your blood pressure is causing a serious heart or blood vessel problem. Your blood pressure may be over 180/110.  For example, call 911 if:  You have symptoms of a heart attack. These may include:  Chest pain or pressure, or a strange feeling in the chest.  Sweating.  Shortness of breath.  Nausea or vomiting.  Pain, pressure, or a strange feeling in the back, neck, jaw, or upper belly or in one or both shoulders or arms.  Light-headedness or sudden weakness.  A fast or irregular heartbeat.  You have symptoms of a stroke. These may include:  Sudden numbness, tingling, weakness, or loss of movement in your face, arm, or leg, especially on only one side of your body.  Sudden vision changes.  Sudden trouble speaking.  Sudden confusion or trouble understanding simple statements.  Sudden problems with walking or balance.  A sudden, severe headache that is different from past headaches.  You have severe back or belly pain.  Do not wait until your blood pressure comes down on its own. Get help right away.  Call your doctor or nurse advice line now or seek immediate medical care if:  Your blood pressure is much higher than normal (such as 180/110 or  higher), but you don't have symptoms.  You think high blood pressure is causing symptoms, such as:  Severe headache.  Blurry vision.  Watch closely for changes in your health, and be sure to contact your doctor or nurse advice line if:  Your blood pressure measures higher than your doctor recommends at least 2 times. That means the top number is higher or the bottom number is higher, or both.  You think you may be having side effects from your blood pressure medicine.

## 2025-01-17 NOTE — ED NOTES
"Pt presented to ED from clinic after canceled stress test d/t hypertension. Pt was having stress test done d/t "provider concerned for plaque in heart." Pt >200 systolic in clinic. Unable to obtain reading via automatic and manual performed. Pt denies CP at this time. C/O headache that started this AM. Pt did not take her "blood pressure pills" this morning, because she was "not sure if she could with the test." Started on a new medication recently, Hytrin. Pt AOx4. Family at bedside.    "

## 2025-01-18 NOTE — ED NOTES
Pt discharged home in care of self. Discharge instructions given and explained by provider. Medications and return to ED instructions discussed. Pt verbalized understanding. VSS.

## 2025-01-24 ENCOUNTER — TELEPHONE (OUTPATIENT)
Dept: CARDIOLOGY | Facility: CLINIC | Age: 75
End: 2025-01-24
Payer: MEDICARE

## 2025-01-24 NOTE — TELEPHONE ENCOUNTER
Spoke with pt she stated that she couldn't do her stress test due to high BP, pt request to come in and see Liliam Argueta NP, pt has appt scheduled for 2/6/25 at 8:00 am.

## 2025-01-24 NOTE — TELEPHONE ENCOUNTER
----- Message from Ana sent at 1/24/2025 11:04 AM CST -----  Regarding: Needs return call  Type: Needs Medical Advice  Who Called:  Pt    Best Call Back Number: [phone]    Additional Information: Pt states she needs to have the office call her to schedule a sooner appt, she said she was unable to take her test over in [place] due to her BP being too high, she needs to be seen sooner then march

## 2025-02-06 ENCOUNTER — OFFICE VISIT (OUTPATIENT)
Dept: CARDIOLOGY | Facility: CLINIC | Age: 75
End: 2025-02-06
Payer: MEDICARE

## 2025-02-06 VITALS
BODY MASS INDEX: 25.02 KG/M2 | DIASTOLIC BLOOD PRESSURE: 79 MMHG | SYSTOLIC BLOOD PRESSURE: 184 MMHG | WEIGHT: 155 LBS | OXYGEN SATURATION: 97 % | HEART RATE: 57 BPM

## 2025-02-06 DIAGNOSIS — F41.9 ANXIETY: ICD-10-CM

## 2025-02-06 DIAGNOSIS — I10 WHITE COAT SYNDROME WITH DIAGNOSIS OF HYPERTENSION: ICD-10-CM

## 2025-02-06 DIAGNOSIS — I10 PRIMARY HYPERTENSION: Primary | ICD-10-CM

## 2025-02-06 DIAGNOSIS — N18.31 STAGE 3A CHRONIC KIDNEY DISEASE: ICD-10-CM

## 2025-02-06 DIAGNOSIS — I25.10 ATHEROSCLEROSIS OF NATIVE CORONARY ARTERY OF NATIVE HEART WITHOUT ANGINA PECTORIS: ICD-10-CM

## 2025-02-06 PROCEDURE — 3288F FALL RISK ASSESSMENT DOCD: CPT | Mod: CPTII,S$GLB,, | Performed by: NURSE PRACTITIONER

## 2025-02-06 PROCEDURE — 99214 OFFICE O/P EST MOD 30 MIN: CPT | Mod: S$GLB,,, | Performed by: NURSE PRACTITIONER

## 2025-02-06 PROCEDURE — 1101F PT FALLS ASSESS-DOCD LE1/YR: CPT | Mod: CPTII,S$GLB,, | Performed by: NURSE PRACTITIONER

## 2025-02-06 PROCEDURE — 3077F SYST BP >= 140 MM HG: CPT | Mod: CPTII,S$GLB,, | Performed by: NURSE PRACTITIONER

## 2025-02-06 PROCEDURE — 99999 PR PBB SHADOW E&M-EST. PATIENT-LVL II: CPT | Mod: PBBFAC,,, | Performed by: NURSE PRACTITIONER

## 2025-02-06 PROCEDURE — 3078F DIAST BP <80 MM HG: CPT | Mod: CPTII,S$GLB,, | Performed by: NURSE PRACTITIONER

## 2025-02-06 RX ORDER — LORAZEPAM 0.5 MG/1
0.5 TABLET ORAL
Qty: 2 TABLET | Refills: 0 | Status: SHIPPED | OUTPATIENT
Start: 2025-02-06 | End: 2025-02-07

## 2025-02-06 NOTE — ASSESSMENT & PLAN NOTE
Monitor blood pressure at home.  Blood pressure significantly elevated at today's visit as per usual for the patient.

## 2025-02-06 NOTE — ASSESSMENT & PLAN NOTE
Final Anesthesia Post-op Assessment    Patient: Wendy Templeton  Procedure(s) Performed: WITH COLD BXS  Anesthesia type: Monitor Anesthesia Care    Vital Last Value   Temperature 36.3 °C (97.4 °F) (08/14/17 1355)   Pulse 74 (08/14/17 1415)   Respiratory Rate 17 (08/14/17 1415)   Non-Invasive   Blood Pressure 123/78 (08/14/17 1415)   Arterial  Blood Pressure     Pulse Oximetry 95 % (08/14/17 1415)     Last 24 I/O:   Intake/Output Summary (Last 24 hours) at 08/14/17 1424  Last data filed at 08/14/17 1400   Gross per 24 hour   Intake              600 ml   Output                0 ml   Net              600 ml         Anesthesia Post-op Note      Patient: Wendy Templeton      Vital Last Value   Temperature 36.3 °C (97.4 °F) (08/14/17 1355)   Pulse 74 (08/14/17 1415)   Respiratory 17 (08/14/17 1415)   Non-Invasive  Blood Pressure 123/78 (08/14/17 1415)   Arterial   Blood Pressure     Pulse Oximetry 95 % (08/14/17 1415)     Mental status recovered: patient participates in evaluation.  VS noted and are stable.  Respiratory function satisfactory; airway patent.  Cardiovascular function and hydration status satisfactory.  Adequate pain control.  Nausea and vomiting control satisfactory.  No apparent anesthetic complications.     One time dose of lorazepam 0.5 mg x 1 dose to be repeated in 1 hour if not effective.  This will be for the use of pre testing anxiety causing significant hypertension delaying care and testing ability.

## 2025-02-06 NOTE — PROGRESS NOTES
Subjective:    Patient ID:  Loren Andre is a 74 y.o. female.  Chief Complaint   Patient presents with    Hypertension       HPI:    Patient went for Cardiac PET Scan but had significant anxiety, and her BP was severely elevated. She also has severe white coat HTN. Her BP today is elevated. She had just taken her BP meds this morning just prior to arriving. She was seen at the ER for her BP on 1/17 after her appt for PET scan (not done yet) and was instructed to increase carvedilol to 50 mg po BID. She has been taking this with other antihypertensives since 1/17/25.     Review of patient's allergies indicates:   Allergen Reactions    Doxepin Anaphylaxis     abd pain    Norvasc [amlodipine] Palpitations    Xyzal [levocetirizine] Other (See Comments)     Can not take with blood pressure medications - headaches, higher blood pressure, red hands     Penicillins Rash    Sulfa (sulfonamide antibiotics) Rash    Duloxetine      Other reaction(s): insomnia    Levofloxacin      Other reaction(s): Headache    Milnacipran      Other reaction(s): stomach pain    Nitrofurantoin monohyd/m-cryst      Other reaction(s): Itching    Prednisone      Other reaction(s): Itching    Pseudoephedrine-guaifenesin      Other reaction(s): Stomach upset    Terbinafine      Other reaction(s): rash       Past Medical History:   Diagnosis Date    Allergy     Anemia     sickle cell trait    Anxiety     Arthritis     Asthma     Chest pain 7/8/2020    Chronic disease anemia 10/05/2017    Colon polyps     Depression     Dermatitis 03/07/2013    Fibromyalgia     HEARING LOSS     Hyperlipidemia     Hypertension     Leucopenia 10/05/2017    Polyneuropathy     Scoliosis     Sickle cell trait     Sickle-cell trait 10/05/2017    Trouble in sleeping     Urticaria      Past Surgical History:   Procedure Laterality Date    ADENOIDECTOMY      APPENDECTOMY      COLONOSCOPY  4/14/2008    Dr. Guerrero, 10 year recheck    COLONOSCOPY N/A 10/4/2018    Procedure:  COLONOSCOPY;  Surgeon: Tam Kemp MD;  Location: [facility];  Service: Endoscopy;  Laterality: N/A;    HYSTERECTOMY      OOPHORECTOMY      ROTATOR CUFF REPAIR Left 01/2016    ROTATOR CUFF REPAIR W/ DISTAL CLAVICLE EXCISION Right 12/04/2017    Dr. Eligio Timmons ( [facility] )    TONSILLECTOMY       Social History     Tobacco Use    Smoking status: Never    Smokeless tobacco: Never   Substance Use Topics    Alcohol use: Yes     Comment: occasionally - wine    Drug use: No     Family History   Problem Relation Name Age of Onset    Multiple sclerosis Mother      Seizures Mother      Cancer Father          lung    Alcohol abuse Brother ruby     Early death Brother ruby     Diabetes Maternal Aunt      Diabetes Maternal Uncle      Diabetes Maternal Grandmother      Mental illness Maternal Grandfather      Alzheimer's disease Maternal Grandfather      Asthma Paternal Aunt      Sickle cell anemia Paternal Aunt      Diabetes Sister rickey     Arthritis Daughter lissett     Miscarriages / Stillbirths Daughter lissett     Anemia Daughter lissett     Hyperlipidemia Son lonnie     Hypertension Son lonnie     Allergies Son lonnie     Sickle cell anemia Paternal Uncle      Asthma Brother quentin     No Known Problems Sister luis enrique     No Known Problems Sister cassia     No Known Problems Sister fabiola     No Known Problems Brother johnson     Miscarriages / Stillbirths Daughter dondeyel     Thyroid disease Daughter dondeyel     No Known Problems Daughter dashita     Angioedema Neg Hx      Eczema Neg Hx      Immunodeficiency Neg Hx      Rheum arthritis Neg Hx      Psoriasis Neg Hx      Lupus Neg Hx          Review of Systems:   Per HPI         Objective:        Vitals:    02/06/25 0814   BP: (!) 184/79   Pulse: (!) 57       Lab Results   Component Value Date    WBC 4.21 01/17/2025    HGB 13.8 01/17/2025    HCT 40.6 01/17/2025     01/17/2025    CHOL 125 12/09/2024    TRIG 47 12/09/2024    HDL 60 12/09/2024    ALT 31  01/17/2025    AST 34 01/17/2025     01/17/2025    K 4.1 01/17/2025     01/17/2025    CREATININE 1.2 01/17/2025    BUN 17 01/17/2025    CO2 20 (L) 01/17/2025    TSH 1.380 07/08/2020    INR 1.1 07/08/2020    HGBA1C 5.9 (H) 12/09/2024        ECHOCARDIOGRAM RESULTS  Results for orders placed during the hospital encounter of 04/09/24    Echo    Interpretation Summary    Left Ventricle: The left ventricle is normal in size. Mildly increased wall thickness. There is mild concentric hypertrophy. Normal wall motion. There is normal systolic function with a visually estimated ejection fraction of 65 - 70%. There is normal diastolic function.    Right Ventricle: Normal right ventricular cavity size. Wall thickness is normal. Right ventricle wall motion  is normal. Systolic function is normal.    Aortic Valve: There is mild aortic regurgitation with a centrally directed jet.    Tricuspid Valve: There is trace regurgitation.    Pulmonary Artery: The estimated pulmonary artery systolic pressure is 32 mmHg.    IVC/SVC: Normal venous pressure at 3 mmHg.        CURRENT/PREVIOUS VISIT EKG  Results for orders placed or performed during the hospital encounter of 01/17/25   EKG 12-lead    Collection Time: 01/17/25  2:21 PM   Result Value Ref Range    QRS Duration 72 ms    OHS QTC Calculation 391 ms    Narrative    Test Reason :    Vent. Rate :  62 BPM     Atrial Rate :  62 BPM     P-R Int : 146 ms          QRS Dur :  72 ms      QT Int : 386 ms       P-R-T Axes :  65   1   3 degrees    QTcB Int : 391 ms    Normal sinus rhythm  Possible Left atrial enlargement  T wave abnormality, consider inferolateral ischemia  Abnormal ECG  When compared with ECG of 17-Jan-2025 14:06,  T wave inversion less evident in Inferior leads  Confirmed by Jimi Manuel ([id number]) on 1/17/2025 2:49:14 PM    Referred By: AAAREFERRAL SELF           Confirmed By: Jimi Manuel     No valid procedures specified.   Results for orders placed during the  hospital encounter of 12/04/24    Nuclear Stress - Cardiology Interpreted    Interpretation Summary    Abnormal myocardial perfusion scan.    There is amoderate intensity, medium sized, equivocal perfusion abnormality that is mostly fixed with some reversible areas in the mid to apical anterolateral and anteroapical wall(s). This finding is equivocal due to a breast shadow overlying the myocardium.    There are no other significant perfusion abnormalities.    There is a moderate intensity perfusion abnormality in the anteroapical wall of the left ventricle, secondary to breast attenuation.    The gated perfusion images showed an ejection fraction of 65% at rest. The gated perfusion images showed an ejection fraction of 65% post stress. Normal ejection fraction is greater than 53%.    There is normal wall motion at rest and post-stress.    LV cavity size is normal at rest and normal at post-stress.    The ECG portion of the study is negative for ischemia.    The patient reported no chest pain during the stress test.    There were no arrhythmias during stress.    A PET stress exam is recommended if clinically indicated.for further delineation of ischemia      Physical Exam:  CONSTITUTIONAL: No fever, no chills  HEENT: Normocephalic, atraumatic,pupils reactive to light                 NECK:  No JVD no carotid bruit  CVS: S1S2+, RRR  LUNGS: Clear  ABDOMEN: Soft, NT, BS+  EXTREMITIES: No cyanosis, edema  : No trinidad catheter  NEURO: AAO X 3  PSY: Normal affect      Medication List with Changes/Refills   New Medications    LORAZEPAM (ATIVAN) 0.5 MG TABLET    Take 1 tablet (0.5 mg total) by mouth every hour. for 2 doses   Current Medications    ALBUTEROL (PROAIR HFA) 90 MCG/ACTUATION INHALER    Inhale 2 puffs into the lungs every 6 (six) hours as needed for Wheezing. Rescue    ALBUTEROL (PROVENTIL) 2.5 MG /3 ML (0.083 %) NEBULIZER SOLUTION    Take 3 mLs (2.5 mg total) by nebulization every 6 (six) hours as needed for  Wheezing. Rescue    ASCORBIC ACID, VITAMIN C, (VITAMIN C) 500 MG TABLET    Take 500 mg by mouth daily as needed.     ASPIRIN 81 MG CHEW    Take 1 tablet (81 mg total) by mouth once daily.    ATORVASTATIN (LIPITOR) 40 MG TABLET    TAKE 1 TABLET(40 MG) BY MOUTH DAILY    CALCIUM CARBONATE/VITAMIN D3 (VITAMIN D-3 ORAL)    Take 100 Int'l Units by mouth once daily.     CARVEDILOL (COREG) 25 MG TABLET    Take 2 tablets (50 mg total) by mouth 2 (two) times daily with meals.    CICLOPIROX (PENLAC) 8 % SOLN    Apply topically nightly.    CLOBETASOL PROPIONATE, BULK, MISC    0.05 % by Misc.(Non-Drug; Combo Route) route. Foam    CLONIDINE (CATAPRES) 0.1 MG TABLET    Take 1 tablet (0.1 mg total) by mouth 3 (three) times daily as needed (for systolic BP greater than 170).    COENZYME Q10 10 MG CAPSULE    Take 10 mg by mouth once daily.    FLUOCINOLONE (DERMA-SMOOTHE) 0.01 % EXTERNAL OIL    Apply topically 2 (two) times daily. Use on AA BID x 2 weeks    FOLIC ACID (FOLVITE) 400 MCG TABLET    Take 400 mcg by mouth once daily.    GABAPENTIN (NEURONTIN) 300 MG CAPSULE    Take 1 capsule (300 mg total) by mouth every evening.    GABAPENTIN (NEURONTIN) 400 MG CAPSULE    Take 1 capsule (400 mg total) by mouth every evening.    GARLIC OIL ORAL    Take 1 capsule by mouth once daily.     LISINOPRIL (PRINIVIL,ZESTRIL) 40 MG TABLET    Take 1 tablet (40 mg total) by mouth once daily.    MAGNESIUM 250 MG TAB    Take 250 mg by mouth 2 (two) times a day.    MONTELUKAST (SINGULAIR) 10 MG TABLET    Take 1 tablet (10 mg total) by mouth once daily.    OMEGA-3 FATTY ACIDS/FISH OIL (FISH OIL-OMEGA-3 FATTY ACIDS) 300-1,000 MG CAPSULE    Take by mouth once daily.    SPIRONOLACTONE (ALDACTONE) 25 MG TABLET    Take 1 tablet (25 mg total) by mouth once daily.    TERAZOSIN (HYTRIN) 2 MG CAPSULE    Take 2 capsules (4 mg total) by mouth every evening.    VALACYCLOVIR (VALTREX) 500 MG TABLET    TAKE 1 TABLET(500 MG) BY MOUTH TWICE DAILY FOR 7 DAYS AS DIRECTED     VITAMIN E 100 UNIT CAPSULE    Take 100 Units by mouth once daily.             Assessment:       1. Primary hypertension    2. Atherosclerosis of native coronary artery of native heart without angina pectoris    3. Stage 3a chronic kidney disease    4. White coat syndrome with diagnosis of hypertension    5. Anxiety         Plan:     Problem List Items Addressed This Visit          Unprioritized    Anxiety    Current Assessment & Plan     One time dose of lorazepam 0.5 mg x 1 dose to be repeated in 1 hour if not effective.  This will be for the use of pre testing anxiety causing significant hypertension delaying care and testing ability.         Relevant Medications    LORazepam (ATIVAN) 0.5 MG tablet    HTN (hypertension) - Primary    Current Assessment & Plan     Continue carvedilol 50 mg po BID, lisinopril 40 mg po daily, and terazosin 4 mg po QHS.   Continue clonidine 0.1 mg PO TID PRN.   Monitor Bp at home and turn in a log.          Stage 3a chronic kidney disease    Current Assessment & Plan     Renal function stable on labs.          Atherosclerosis of native coronary artery of native heart without angina pectoris    Current Assessment & Plan     Continue aspirin and statin.  Recommend to proceed with cardiac PET scan.  Of note the patient's son has heart disease and is currently being treated in the hospital in [place].  Will premedicate with low-dose lorazepam prior to cardiac PET scan.           White coat syndrome with diagnosis of hypertension    Current Assessment & Plan     Monitor blood pressure at home.  Blood pressure significantly elevated at today's visit as per usual for the patient.         Relevant Medications    LORazepam (ATIVAN) 0.5 MG tablet       Follow up in about 4 weeks (around 3/6/2025).          Liliam Argueta NP-C  Department of Cardiology   [facility]

## 2025-02-06 NOTE — ASSESSMENT & PLAN NOTE
Patient ID: Elizabeth Bell is a 32 year old female.    Chief Complaint   Patient presents with   • New Patient   • Physical       HPI: New patient here for Annual exam. No complaints.    Past Medical History:   Diagnosis Date   • Allergy    • Asthma    • Seasonal allergies      Past Surgical History:   Procedure Laterality Date   • Knee surgery Left 2008/2009     Social History     Socioeconomic History   • Marital status: Single     Spouse name: Not on file   • Number of children: Not on file   • Years of education: Not on file   • Highest education level: Not on file   Occupational History   • Not on file   Tobacco Use   • Smoking status: Never Smoker   • Smokeless tobacco: Never Used   Vaping Use   • Vaping Use: never used   Substance and Sexual Activity   • Alcohol use: Not Currently   • Drug use: Never   • Sexual activity: Yes     Partners: Female   Other Topics Concern   • Not on file   Social History Narrative   • Not on file     Social Determinants of Health     Financial Resource Strain: Not on file   Food Insecurity: Not on file   Transportation Needs: Not on file   Physical Activity: Not on file   Stress: Not on file   Social Connections: Not on file   Intimate Partner Violence: Not on file     Family History   Problem Relation Age of Onset   • Patient is unaware of any medical problems Mother    • Cancer Father 48        prostate   • Diabetes Father         Type II   • Patient is unaware of any medical problems Brother    • COPD Maternal Grandmother    • Cancer Maternal Grandfather    • Diabetes Paternal Grandmother    • Diabetes Paternal Grandfather    • Cancer, Lung Paternal Grandfather      Current Outpatient Medications   Medication Sig Dispense Refill   • Cetirizine HCl 10 MG Cap every 24 hours.     • EPINEPHrine 0.3 MG/0.3ML auto-injector INJECT AS DIRECTED AS NEEDED     • albuterol 108 (90 Base) MCG/ACT inhaler Inhale 1 puff into the lungs every 4 hours as needed for Shortness of Breath  Continue aspirin and statin.  Recommend to proceed with cardiac PET scan.  Of note the patient's son has heart disease and is currently being treated in the hospital in [place].  Will premedicate with low-dose lorazepam prior to cardiac PET scan.     or Wheezing. 1 each 1     No current facility-administered medications for this visit.     ALLERGIES:   Allergen Reactions   • Bee Sting Other (See Comments)   • Hydrocodone-Acetaminophen Other (See Comments)   • Seasonal Other (See Comments)       Review of Systems   Constitutional: Negative.    HENT: Negative.    Eyes: Negative.    Respiratory: Negative.    Cardiovascular: Negative.    Gastrointestinal: Negative.    Endocrine: Negative.    Genitourinary: Negative.    Musculoskeletal: Negative.    Allergic/Immunologic: Negative.    Neurological: Negative.    Hematological: Negative.    Psychiatric/Behavioral: Negative.        Visit Vitals  BP 91/60   Pulse 97   Temp 97.9 °F (36.6 °C)   Resp 16   Ht 5' 4\" (1.626 m)   Wt 95.3 kg (210 lb)   SpO2 99%   BMI 36.05 kg/m²     Physical Exam  Vitals and nursing note reviewed.   Constitutional:       Appearance: Normal appearance.   HENT:      Right Ear: Tympanic membrane, ear canal and external ear normal.      Left Ear: Tympanic membrane, ear canal and external ear normal.      Nose: Nose normal.      Mouth/Throat:      Mouth: Mucous membranes are moist.      Pharynx: Oropharynx is clear.      Neck: Normal range of motion and neck supple.   Eyes:      Conjunctiva/sclera: Conjunctivae normal.   Cardiovascular:      Rate and Rhythm: Normal rate and regular rhythm.      Pulses: Normal pulses.      Heart sounds: Normal heart sounds.   Pulmonary:      Effort: Pulmonary effort is normal.      Breath sounds: Normal breath sounds.   Abdominal:      General: Abdomen is flat.      Palpations: Abdomen is soft.   Musculoskeletal:         General: Normal range of motion.   Skin:     General: Skin is warm and dry.   Neurological:      General: No focal deficit present.      Mental Status: She is alert and oriented to person, place, and time.   Psychiatric:         Mood and Affect: Mood normal.         Behavior: Behavior normal.         No image results found.      No results found for  this visit on 08/08/22.       Assessment/Plan:  Annual physical exam  - CBC WITH DIFFERENTIAL; Future  - COMPREHENSIVE METABOLIC PANEL; Future  - LIPID PANEL WITH REFLEX; Future  - THYROID STIMULATING HORMONE; Future    Hypovitaminosis D  - VITAMIN D -25 HYDROXY; Future    - healthy eating/ exercise  - - return immediately if problems  New Prescriptions    No medications on file         Ena Valerio MD

## 2025-02-06 NOTE — ASSESSMENT & PLAN NOTE
Continue carvedilol 50 mg po BID, lisinopril 40 mg po daily, and terazosin 4 mg po QHS.   Continue clonidine 0.1 mg PO TID PRN.   Monitor Bp at home and turn in a log.

## 2025-02-12 ENCOUNTER — HOSPITAL ENCOUNTER (OUTPATIENT)
Dept: CARDIOLOGY | Facility: HOSPITAL | Age: 75
Discharge: HOME | End: 2025-02-12
Attending: NURSE PRACTITIONER
Payer: MEDICARE

## 2025-02-12 VITALS
BODY MASS INDEX: 24.91 KG/M2 | WEIGHT: 155 LBS | HEIGHT: 66 IN | HEART RATE: 62 BPM | DIASTOLIC BLOOD PRESSURE: 95 MMHG | SYSTOLIC BLOOD PRESSURE: 225 MMHG

## 2025-02-12 PROCEDURE — A9555 RB82 RUBIDIUM: HCPCS | Performed by: NURSE PRACTITIONER

## 2025-02-12 PROCEDURE — 78431 MYOCRD IMG PET RST&STRS CT: CPT

## 2025-02-12 PROCEDURE — 93018 CV STRESS TEST I&R ONLY: CPT | Mod: ,,, | Performed by: INTERNAL MEDICINE

## 2025-02-12 PROCEDURE — 78431 MYOCRD IMG PET RST&STRS CT: CPT | Mod: 26,,, | Performed by: INTERNAL MEDICINE

## 2025-02-12 PROCEDURE — 78434 AQMBF PET REST & RX STRESS: CPT | Mod: 26,,, | Performed by: INTERNAL MEDICINE

## 2025-02-12 PROCEDURE — 93016 CV STRESS TEST SUPVJ ONLY: CPT | Mod: ,,, | Performed by: INTERNAL MEDICINE

## 2025-02-12 PROCEDURE — 63600175 PHARM REV CODE 636 W HCPCS: Performed by: NURSE PRACTITIONER

## 2025-02-12 RX ORDER — REGADENOSON 0.08 MG/ML
0.4 INJECTION, SOLUTION INTRAVENOUS
Status: COMPLETED | OUTPATIENT
Start: 2025-02-12 | End: 2025-02-12

## 2025-02-12 RX ADMIN — REGADENOSON 0.4 MG: 0.08 INJECTION, SOLUTION INTRAVENOUS at 12:02

## 2025-02-12 RX ADMIN — RUBIDIUM CHLORIDE RB-82 22 MILLICURIE: 150 INJECTION, SOLUTION INTRAVENOUS at 12:02

## 2025-02-12 RX ADMIN — RUBIDIUM CHLORIDE RB-82 21.7 MILLICURIE: 150 INJECTION, SOLUTION INTRAVENOUS at 12:02

## 2025-02-13 ENCOUNTER — TELEPHONE (OUTPATIENT)
Dept: CARDIOLOGY | Facility: CLINIC | Age: 75
End: 2025-02-13
Payer: MEDICARE

## 2025-02-13 NOTE — TELEPHONE ENCOUNTER
Informed the patient that the  provider has not reviewed the results yet and someone will call him to discuss results

## 2025-02-13 NOTE — TELEPHONE ENCOUNTER
----- Message from Stephanie sent at 2/13/2025  8:57 AM CST -----  Regarding: results  Contact: patient  Type:  Test Results    Who Called:  patient  Name of Test (Lab/Mammo/Etc):  stress test  Date of Test:  02/12/25  Ordering Provider:  Liliam Argueta  Where the test was performed:  [facility]  Best Call Back Number:  [phone] (home)     Additional Information:  Please call patient to advise.  Thanks!

## 2025-02-21 ENCOUNTER — TELEPHONE (OUTPATIENT)
Dept: CARDIOLOGY | Facility: CLINIC | Age: 75
End: 2025-02-21
Payer: MEDICARE

## 2025-02-21 NOTE — TELEPHONE ENCOUNTER
Sw the pt about the assigned slot given for the f/u after scan is completed b for 3/26/25 @ 3:30 pm

## 2025-02-21 NOTE — TELEPHONE ENCOUNTER
----- Message from Juan sent at 2/21/2025  1:30 PM CST -----  Regarding: advice  Contact: KUMAR MICHAEL [[id number]]  Type: Needs Medical AdviceWho Called:  Liz (please be specific):  naHow long has patient had these symptoms:  naPharmacy name and phone #:  Tab Call Back Number: [phone] (home) Additional Information: f/u on test results. No one called back. Please call to advise.

## 2025-02-28 DIAGNOSIS — I10 PRIMARY HYPERTENSION: ICD-10-CM

## 2025-02-28 NOTE — TELEPHONE ENCOUNTER
----- Message from True sent at 2/28/2025  2:25 PM CST -----  Regarding: Refill  Type:  RX Refill RequestWho Called: pt Refill or New Rx:refillRX Name and Strength:carvediloL (COREG) 25 MG tabletHow is the patient currently taking it? (ex. 1XDay):as directed Is this a 30 day or 90 day RX:90Preferred Pharmacy with phone number:[id number] - [place] [id number] [facility] & [address] [address]Gvmqp: [phone] Fax: [phone] or Mail Order:local Ordering Provider:Gabriella Would the patient rather a call back or a response via [facility]? Call back Best Call Back Number: [phone] Information: pt needs rx refills left to be transferred closer or resent in to a pharmacy above,  please call to advise, Thank You.

## 2025-02-28 NOTE — TELEPHONE ENCOUNTER
No care due was identified.  [facility] Embedded Care Due Messages. Reference number: [id number].   2/28/2025 3:57:35 PM CST

## 2025-03-03 ENCOUNTER — PATIENT MESSAGE (OUTPATIENT)
Dept: FAMILY MEDICINE | Facility: CLINIC | Age: 75
End: 2025-03-03
Payer: MEDICARE

## 2025-03-03 DIAGNOSIS — I10 PRIMARY HYPERTENSION: ICD-10-CM

## 2025-03-03 RX ORDER — CARVEDILOL 25 MG/1
50 TABLET ORAL 2 TIMES DAILY WITH MEALS
Qty: 180 TABLET | Refills: 3 | Status: SHIPPED | OUTPATIENT
Start: 2025-03-03

## 2025-03-03 RX ORDER — CARVEDILOL 25 MG/1
50 TABLET ORAL 2 TIMES DAILY WITH MEALS
Qty: 180 TABLET | Refills: 3 | OUTPATIENT
Start: 2025-03-03

## 2025-03-03 NOTE — TELEPHONE ENCOUNTER
----- Message from eyesFinder sent at 3/3/2025 11:19 AM CST -----  Type: Needs Medical AdviceWho Called:  Kiet Call Back Number: [phone] Information: roshni  states she needs to talk to nurse about medication , [please call to further assist thank you .

## 2025-03-03 NOTE — TELEPHONE ENCOUNTER
No care due was identified.  [facility] Embedded Care Due Messages. Reference number: [id number].   3/03/2025 11:27:40 AM CST

## 2025-03-19 ENCOUNTER — PATIENT MESSAGE (OUTPATIENT)
Dept: ADMINISTRATIVE | Facility: HOSPITAL | Age: 75
End: 2025-03-19
Payer: MEDICARE

## 2025-03-19 ENCOUNTER — OFFICE VISIT (OUTPATIENT)
Dept: URGENT CARE | Facility: CLINIC | Age: 75
End: 2025-03-19
Payer: MEDICARE

## 2025-03-19 VITALS
WEIGHT: 155 LBS | HEIGHT: 66 IN | OXYGEN SATURATION: 98 % | TEMPERATURE: 98 F | SYSTOLIC BLOOD PRESSURE: 176 MMHG | DIASTOLIC BLOOD PRESSURE: 79 MMHG | RESPIRATION RATE: 16 BRPM | HEART RATE: 53 BPM | BODY MASS INDEX: 24.91 KG/M2

## 2025-03-19 DIAGNOSIS — B96.89 ACUTE BACTERIAL SINUSITIS: Primary | ICD-10-CM

## 2025-03-19 DIAGNOSIS — J02.9 SORE THROAT: ICD-10-CM

## 2025-03-19 DIAGNOSIS — J01.90 ACUTE BACTERIAL SINUSITIS: Primary | ICD-10-CM

## 2025-03-19 DIAGNOSIS — R09.82 POSTNASAL DRIP: ICD-10-CM

## 2025-03-19 LAB
CTP QC/QA: YES
FLUAV AG NPH QL: NEGATIVE
FLUBV AG NPH QL: NEGATIVE
S PYO RRNA THROAT QL PROBE: NEGATIVE
SARS CORONAVIRUS 2 ANTIGEN: NEGATIVE

## 2025-03-19 PROCEDURE — 87880 STREP A ASSAY W/OPTIC: CPT | Mod: QW,,, | Performed by: NURSE PRACTITIONER

## 2025-03-19 PROCEDURE — 99214 OFFICE O/P EST MOD 30 MIN: CPT | Mod: S$GLB,,, | Performed by: NURSE PRACTITIONER

## 2025-03-19 PROCEDURE — 87804 INFLUENZA ASSAY W/OPTIC: CPT | Mod: QW,,, | Performed by: NURSE PRACTITIONER

## 2025-03-19 PROCEDURE — 87811 SARS-COV-2 COVID19 W/OPTIC: CPT | Mod: QW,S$GLB,, | Performed by: NURSE PRACTITIONER

## 2025-03-19 RX ORDER — DOXYCYCLINE HYCLATE 100 MG
100 TABLET ORAL EVERY 12 HOURS
Qty: 20 TABLET | Refills: 0 | Status: SHIPPED | OUTPATIENT
Start: 2025-03-19 | End: 2025-03-29

## 2025-03-19 RX ORDER — MINERAL OIL
180 ENEMA (ML) RECTAL DAILY
Qty: 30 TABLET | Refills: 0 | Status: SHIPPED | OUTPATIENT
Start: 2025-03-19 | End: 2025-04-18

## 2025-03-19 NOTE — PROGRESS NOTES
"Subjective:      Patient ID: Loren Andre is a 75 y.o. female.    Vitals:  height is 5' 6" (1.676 m) and weight is 70.3 kg (155 lb). Her temperature is 97.8 °F (36.6 °C). Her blood pressure is 176/79 (abnormal) and her pulse is 53 (abnormal). Her respiration is 16 and oxygen saturation is 98%.     Chief Complaint: Cough    Seven hundred seventy 75-year-old female seen today with complaints of nasal congestion, blowing out clear nasal drainage, sore throat and a nonproductive cough.  Patient reports having a history of seasonal allergies and reports "nothing works for me " she reports using Flonase, Astelin, size, and Zyrtec without any improvement her allergies.  States she is considering getting a sinus surgery done.  She has not had a fever denies any sick contacts.  At a sinus infection about 3 months ago and reports his 7 days of antibiotics is not enough for her.     Cough  This is a new problem. Episode onset: 3/17. The cough is Productive of sputum. Associated symptoms include nasal congestion, postnasal drip and a sore throat. Pertinent negatives include no chills, ear pain, fever, myalgias, shortness of breath or wheezing.       Constitution: Negative for chills, sweating, fatigue and fever.   HENT:  Positive for postnasal drip and sore throat. Negative for ear pain and ear discharge.    Neck: neck negative.   Cardiovascular: Negative.    Eyes: Negative.    Respiratory:  Positive for cough. Negative for sputum production, shortness of breath and wheezing.    Endocrine: negative.   Genitourinary: Negative.    Musculoskeletal: Negative.  Negative for muscle ache.   Skin: Negative.    Allergic/Immunologic: Negative.    Neurological: Negative.    Hematologic/Lymphatic: Negative.    Psychiatric/Behavioral: Negative.        Objective:     Physical Exam   Constitutional: She is oriented to person, place, and time.  Non-toxic appearance. She does not appear ill. No distress. normal  HENT:   Head: Atraumatic. "   Ears:   Right Ear: Tympanic membrane, external ear and ear canal normal.   Left Ear: Tympanic membrane, external ear and ear canal normal.   Nose: Congestion present.      Comments: Purulent nasal drainage with tenderness along the right and left maxillary sinus areas  Mouth/Throat: Mucous membranes are moist. No oropharyngeal exudate or posterior oropharyngeal erythema.   Eyes: Conjunctivae are normal. Pupils are equal, round, and reactive to light. Extraocular movement intact   Neck: Neck supple. Carotid bruit is not present.   Cardiovascular: Normal rate, regular rhythm and normal heart sounds.   Pulmonary/Chest: Effort normal and breath sounds normal. No respiratory distress. She has no wheezes. She has no rhonchi. She has no rales.   Abdominal: Normal appearance and bowel sounds are normal. She exhibits no distension. Soft. There is no abdominal tenderness. There is no rebound and no guarding.   Musculoskeletal: Normal range of motion.         General: Normal range of motion.      Cervical back: She exhibits no tenderness.   Lymphadenopathy:     She has cervical adenopathy.   Neurological: no focal deficit. She is alert, oriented to person, place, and time and at baseline. She displays no weakness. No cranial nerve deficit. Coordination and gait normal.   Skin: Skin is warm and not diaphoretic. Capillary refill takes less than 2 seconds.   Psychiatric: Her behavior is normal. Mood, judgment and thought content normal.       Assessment:     1. Acute bacterial sinusitis    2. Sore throat    3. Postnasal drip        Plan:       Acute bacterial sinusitis  -     fexofenadine (ALLEGRA) 180 MG tablet; Take 1 tablet (180 mg total) by mouth once daily.  Dispense: 30 tablet; Refill: 0  -     doxycycline (VIBRA-TABS) 100 MG tablet; Take 1 tablet (100 mg total) by mouth every 12 (twelve) hours. for 10 days  Dispense: 20 tablet; Refill: 0    Sore throat  -     SARS Coronavirus 2 Antigen, POCT Manual Read  -     POCT rapid  strep A  -     POCT Influenza A/B Rapid Antigen    Postnasal drip        Follow-up with your primary care provider if no improvement in 1 week

## 2025-03-25 ENCOUNTER — OFFICE VISIT (OUTPATIENT)
Dept: FAMILY MEDICINE | Facility: CLINIC | Age: 75
End: 2025-03-25
Payer: MEDICARE

## 2025-03-25 VITALS
HEART RATE: 58 BPM | TEMPERATURE: 98 F | OXYGEN SATURATION: 97 % | HEIGHT: 66 IN | SYSTOLIC BLOOD PRESSURE: 158 MMHG | DIASTOLIC BLOOD PRESSURE: 92 MMHG | BODY MASS INDEX: 25.3 KG/M2 | WEIGHT: 157.44 LBS

## 2025-03-25 DIAGNOSIS — M79.604 BILATERAL LEG PAIN: ICD-10-CM

## 2025-03-25 DIAGNOSIS — M79.7 FIBROMYALGIA: Primary | ICD-10-CM

## 2025-03-25 DIAGNOSIS — M79.605 BILATERAL LEG PAIN: ICD-10-CM

## 2025-03-25 DIAGNOSIS — I10 PRIMARY HYPERTENSION: ICD-10-CM

## 2025-03-25 PROCEDURE — 1125F AMNT PAIN NOTED PAIN PRSNT: CPT | Mod: CPTII,S$GLB,,

## 2025-03-25 PROCEDURE — 1159F MED LIST DOCD IN RCRD: CPT | Mod: CPTII,S$GLB,,

## 2025-03-25 PROCEDURE — 3079F DIAST BP 80-89 MM HG: CPT | Mod: CPTII,S$GLB,,

## 2025-03-25 PROCEDURE — 3077F SYST BP >= 140 MM HG: CPT | Mod: CPTII,S$GLB,,

## 2025-03-25 PROCEDURE — 99999 PR PBB SHADOW E&M-EST. PATIENT-LVL V: CPT | Mod: PBBFAC,,,

## 2025-03-25 PROCEDURE — 99214 OFFICE O/P EST MOD 30 MIN: CPT | Mod: S$GLB,,,

## 2025-03-25 PROCEDURE — 1160F RVW MEDS BY RX/DR IN RCRD: CPT | Mod: CPTII,S$GLB,,

## 2025-03-25 RX ORDER — GABAPENTIN 400 MG/1
400 CAPSULE ORAL 2 TIMES DAILY
Qty: 180 CAPSULE | Refills: 3 | Status: SHIPPED | OUTPATIENT
Start: 2025-03-25 | End: 2026-03-25

## 2025-03-25 NOTE — PROGRESS NOTES
Subjective:       Patient ID: Loren Andre is a 75 y.o. female.    Chief Complaint: leg pain       Loren Andre is a 75 y.o. female with arthritis, fibromyalgia, HTN, HLD, polyneuropathy who presents to clinic for evaluation of intermittent bilateral leg pain x 2 weeks. Pain starts in the waist and travels down to the feet. She states she has pain with laying on her sides. Stretching the legs helps to alleviate the pain. Denies restless legs. She has tried Tylenol as needed. She also increased the Gabapentin 400 mg to twice daily (previously once daily).         Review of Systems   Constitutional:  Negative for activity change, appetite change and fatigue.   Respiratory:  Negative for cough and shortness of breath.    Cardiovascular:  Negative for chest pain and leg swelling.   Musculoskeletal:  Positive for arthralgias and myalgias.        Leg pain    Neurological:  Negative for dizziness and headaches.         Past Medical History:   Diagnosis Date    Allergy     Anemia     sickle cell trait    Anxiety     Arthritis     Asthma     Chest pain 7/8/2020    Chronic disease anemia 10/05/2017    Colon polyps     Depression     Dermatitis 03/07/2013    Fibromyalgia     HEARING LOSS     Hyperlipidemia     Hypertension     Leucopenia 10/05/2017    Polyneuropathy     Scoliosis     Sickle cell trait     Sickle-cell trait 10/05/2017    Trouble in sleeping     Urticaria        Review of patient's allergies indicates:   Allergen Reactions    Doxepin Anaphylaxis     abd pain    Norvasc [amlodipine] Palpitations    Xyzal [levocetirizine] Other (See Comments)     Can not take with blood pressure medications - headaches, higher blood pressure, red hands     Penicillins Rash    Sulfa (sulfonamide antibiotics) Rash    Duloxetine      Other reaction(s): insomnia    Levofloxacin      Other reaction(s): Headache    Milnacipran      Other reaction(s): stomach pain    Nitrofurantoin monohyd/m-cryst      Other reaction(s): Itching     "Prednisone      Other reaction(s): Itching    Pseudoephedrine-guaifenesin      Other reaction(s): Stomach upset    Terbinafine      Other reaction(s): rash       Current Medications[1]    Objective:        Physical Exam  Vitals reviewed.   Constitutional:       Appearance: Normal appearance.   HENT:      Head: Normocephalic and atraumatic.   Eyes:      Conjunctiva/sclera: Conjunctivae normal.   Cardiovascular:      Rate and Rhythm: Normal rate and regular rhythm.      Heart sounds: Normal heart sounds.   Pulmonary:      Effort: Pulmonary effort is normal.      Breath sounds: Normal breath sounds.   Musculoskeletal:         General: Normal range of motion.      Cervical back: Normal range of motion.      Right lower leg: No edema.      Left lower leg: No edema.      Right ankle: Normal pulse.      Left ankle: Normal pulse.      Comments: Bilateral legs are diffusely tender to palpation.    Skin:     General: Skin is warm and dry.   Neurological:      Mental Status: She is alert and oriented to person, place, and time.   Psychiatric:         Behavior: Behavior normal.           Visit Vitals  BP (!) 144/82 (BP Location: Right arm, Patient Position: Sitting)   Pulse (!) 58   Temp 98 °F (36.7 °C) (Oral)   Ht 5' 6" (1.676 m)   Wt 71.4 kg (157 lb 6.5 oz)   SpO2 97%   BMI 25.41 kg/m²      Assessment:         1. Fibromyalgia    2. Bilateral leg pain    3. Primary hypertension        Plan:         Diagnoses and all orders for this visit:    Fibromyalgia  -     gabapentin (NEURONTIN) 400 MG capsule; Take 1 capsule (400 mg total) by mouth 2 (two) times daily.  -     Increase Gabapentin to 400 mg twice daily. Discussed may cause drowsiness.     Bilateral leg pain        -     Increase Gabapentin to 400 mg twice daily. Discussed may cause drowsiness.         -   May be secondary to fibromyalgia, polyneuropathy or possible lumbar radiculopathy. If no improvement in 1-2 weeks consider l-spine imaging.    Primary hypertension        "    -    Repeat BP higher than initial, 158/92. Continue Lisinopril, Coreg, Spironolactone, and Hytrin. Continue to monitor.       Follow up in 1-2 weeks if pain persists.        Family Medicine Physician Assistant     Future Appointments       Date Provider Specialty Appt Notes    3/26/2025 Lliiam Argueta NP Cardiology f/u after scan    03/25/25 :advised the patient to bring a list of medications/gn    12/26/2025  Lab .    1/2/2026 Jamaal Quiroz MD Family Medicine annual             Tests to Keep You Healthy    Colon Cancer Screening: Met on 10/4/2018  Last Blood Pressure <= 139/89 (3/25/2025): NO       I spent a total of 30 minutes on the day of the visit.This includes face to face time and non-face to face time preparing to see the patient (eg, review of tests), obtaining and/or reviewing separately obtained history, documenting clinical information in the electronic or other health record, independently interpreting results and communicating results to the patient/family/caregiver, or care coordinator.         [1]   Current Outpatient Medications:     albuterol (PROAIR HFA) 90 mcg/actuation inhaler, Inhale 2 puffs into the lungs every 6 (six) hours as needed for Wheezing. Rescue, Disp: 6.7 g, Rfl: 3    albuterol (PROVENTIL) 2.5 mg /3 mL (0.083 %) nebulizer solution, Take 3 mLs (2.5 mg total) by nebulization every 6 (six) hours as needed for Wheezing. Rescue, Disp: 90 mL, Rfl: 2    ascorbic acid, vitamin C, (VITAMIN C) 500 MG tablet, Take 500 mg by mouth daily as needed. , Disp: , Rfl:     atorvastatin (LIPITOR) 40 MG tablet, TAKE 1 TABLET(40 MG) BY MOUTH DAILY, Disp: 90 tablet, Rfl: 3    CALCIUM CARBONATE/VITAMIN D3 (VITAMIN D-3 ORAL), Take 100 Int'l Units by mouth once daily. , Disp: , Rfl:     carvediloL (COREG) 25 MG tablet, Take 2 tablets (50 mg total) by mouth 2 (two) times daily with meals., Disp: 180 tablet, Rfl: 3    [START ON 1/1/2026] ciclopirox (PENLAC) 8 % Soln, Apply topically nightly.,  Disp: 6.6 mL, Rfl: 5    CLOBETASOL PROPIONATE, BULK, MISC, 0.05 % by Misc.(Non-Drug; Combo Route) route. Foam, Disp: , Rfl:     coenzyme Q10 10 mg capsule, Take 10 mg by mouth once daily., Disp: , Rfl:     doxycycline (VIBRA-TABS) 100 MG tablet, Take 1 tablet (100 mg total) by mouth every 12 (twelve) hours. for 10 days, Disp: 20 tablet, Rfl: 0    fexofenadine (ALLEGRA) 180 MG tablet, Take 1 tablet (180 mg total) by mouth once daily., Disp: 30 tablet, Rfl: 0    fluocinolone (DERMA-SMOOTHE) 0.01 % external oil, Apply topically 2 (two) times daily. Use on AA BID x 2 weeks, Disp: 118 mL, Rfl: 2    folic acid (FOLVITE) 400 MCG tablet, Take 400 mcg by mouth once daily., Disp: , Rfl:     GARLIC OIL ORAL, Take 1 capsule by mouth once daily. , Disp: , Rfl:     lisinopriL (PRINIVIL,ZESTRIL) 40 MG tablet, Take 1 tablet (40 mg total) by mouth once daily., Disp: 90 tablet, Rfl: 3    magnesium 250 mg Tab, Take 250 mg by mouth 2 (two) times a day., Disp: , Rfl:     montelukast (SINGULAIR) 10 mg tablet, Take 1 tablet (10 mg total) by mouth once daily., Disp: 90 tablet, Rfl: 3    omega-3 fatty acids/fish oil (FISH OIL-OMEGA-3 FATTY ACIDS) 300-1,000 mg capsule, Take by mouth once daily., Disp: , Rfl:     spironolactone (ALDACTONE) 25 MG tablet, Take 1 tablet (25 mg total) by mouth once daily., Disp: 30 tablet, Rfl: 11    terazosin (HYTRIN) 2 MG capsule, Take 2 capsules (4 mg total) by mouth every evening., Disp: , Rfl:     valACYclovir (VALTREX) 500 MG tablet, TAKE 1 TABLET(500 MG) BY MOUTH TWICE DAILY FOR 7 DAYS AS DIRECTED, Disp: 14 tablet, Rfl: 3    vitamin E 100 UNIT capsule, Take 100 Units by mouth once daily., Disp: , Rfl:     aspirin 81 MG Chew, Take 1 tablet (81 mg total) by mouth once daily., Disp: , Rfl: 0    cloNIDine (CATAPRES) 0.1 MG tablet, Take 1 tablet (0.1 mg total) by mouth 3 (three) times daily as needed (for systolic BP greater than 170). (Patient not taking: Reported on 11/25/2024), Disp: 180 tablet, Rfl: 3     gabapentin (NEURONTIN) 400 MG capsule, Take 1 capsule (400 mg total) by mouth 2 (two) times daily., Disp: 180 capsule, Rfl: 3    LORazepam (ATIVAN) 0.5 MG tablet, Take 1 tablet (0.5 mg total) by mouth every hour. for 2 doses, Disp: 2 tablet, Rfl: 0

## 2025-03-26 ENCOUNTER — OFFICE VISIT (OUTPATIENT)
Dept: CARDIOLOGY | Facility: CLINIC | Age: 75
End: 2025-03-26
Payer: MEDICARE

## 2025-03-26 VITALS
HEART RATE: 56 BPM | WEIGHT: 156.19 LBS | BODY MASS INDEX: 25.21 KG/M2 | DIASTOLIC BLOOD PRESSURE: 72 MMHG | SYSTOLIC BLOOD PRESSURE: 182 MMHG | OXYGEN SATURATION: 99 %

## 2025-03-26 DIAGNOSIS — R07.89 ATYPICAL CHEST PAIN: ICD-10-CM

## 2025-03-26 DIAGNOSIS — N18.31 STAGE 3A CHRONIC KIDNEY DISEASE: ICD-10-CM

## 2025-03-26 DIAGNOSIS — I10 WHITE COAT SYNDROME WITH DIAGNOSIS OF HYPERTENSION: ICD-10-CM

## 2025-03-26 DIAGNOSIS — I10 PRIMARY HYPERTENSION: Primary | ICD-10-CM

## 2025-03-26 PROCEDURE — 3077F SYST BP >= 140 MM HG: CPT | Mod: CPTII,S$GLB,, | Performed by: NURSE PRACTITIONER

## 2025-03-26 PROCEDURE — 3288F FALL RISK ASSESSMENT DOCD: CPT | Mod: CPTII,S$GLB,, | Performed by: NURSE PRACTITIONER

## 2025-03-26 PROCEDURE — 3078F DIAST BP <80 MM HG: CPT | Mod: CPTII,S$GLB,, | Performed by: NURSE PRACTITIONER

## 2025-03-26 PROCEDURE — 99214 OFFICE O/P EST MOD 30 MIN: CPT | Mod: S$GLB,,, | Performed by: NURSE PRACTITIONER

## 2025-03-26 PROCEDURE — 1101F PT FALLS ASSESS-DOCD LE1/YR: CPT | Mod: CPTII,S$GLB,, | Performed by: NURSE PRACTITIONER

## 2025-03-26 PROCEDURE — 1159F MED LIST DOCD IN RCRD: CPT | Mod: CPTII,S$GLB,, | Performed by: NURSE PRACTITIONER

## 2025-03-26 PROCEDURE — 99999 PR PBB SHADOW E&M-EST. PATIENT-LVL III: CPT | Mod: PBBFAC,,, | Performed by: NURSE PRACTITIONER

## 2025-03-26 RX ORDER — TERAZOSIN 2 MG/1
4 CAPSULE ORAL NIGHTLY
Start: 2025-03-26 | End: 2026-03-26

## 2025-03-26 RX ORDER — SPIRONOLACTONE 25 MG/1
25 TABLET ORAL DAILY
Qty: 30 TABLET | Refills: 11 | Status: SHIPPED | OUTPATIENT
Start: 2025-03-26 | End: 2026-03-26

## 2025-03-26 NOTE — ASSESSMENT & PLAN NOTE
Cardiac pet stress showed no concern for reversibility or underlying ischemia. Her chest pain is atypical. Recommend follow up with PCP to explore other causes of chest pain.

## 2025-03-26 NOTE — ASSESSMENT & PLAN NOTE
Continue carvedilol 50 mg po BID, lisinopril 40 mg po daily, and terazosin 4 mg po QHS.   Recommend low sodium diet. She has known white coat HTN. Encouraged her to monitor Bp at home.

## 2025-03-26 NOTE — PROGRESS NOTES
Subjective:    Patient ID:  Loren Andre is a 75 y.o. female.  Chief Complaint   Patient presents with    Hypertension    Results       HPI:  Patient seen today for follow up appointment and test results. She again has elevated BP in the clinic and has a known hx of white coat Htn. She states at home her BP runs in the 130-140s systolic. She did have the cardiac pet scan done. She has intermittent transient chest pain which lasts about a second and subsides. They are nonexertional.     Review of patient's allergies indicates:   Allergen Reactions    Doxepin Anaphylaxis     abd pain    Norvasc [amlodipine] Palpitations    Xyzal [levocetirizine] Other (See Comments)     Can not take with blood pressure medications - headaches, higher blood pressure, red hands     Penicillins Rash    Sulfa (sulfonamide antibiotics) Rash    Duloxetine      Other reaction(s): insomnia    Levofloxacin      Other reaction(s): Headache    Milnacipran      Other reaction(s): stomach pain    Nitrofurantoin monohyd/m-cryst      Other reaction(s): Itching    Prednisone      Other reaction(s): Itching    Pseudoephedrine-guaifenesin      Other reaction(s): Stomach upset    Terbinafine      Other reaction(s): rash       Past Medical History:   Diagnosis Date    Allergy     Anemia     sickle cell trait    Anxiety     Arthritis     Asthma     Chest pain 7/8/2020    Chronic disease anemia 10/05/2017    Colon polyps     Depression     Dermatitis 03/07/2013    Fibromyalgia     HEARING LOSS     Hyperlipidemia     Hypertension     Leucopenia 10/05/2017    Polyneuropathy     Scoliosis     Sickle cell trait     Sickle-cell trait 10/05/2017    Trouble in sleeping     Urticaria      Past Surgical History:   Procedure Laterality Date    ADENOIDECTOMY      APPENDECTOMY      COLONOSCOPY  4/14/2008    Dr. Guerrero, 10 year recheck    COLONOSCOPY N/A 10/4/2018    Procedure: COLONOSCOPY;  Surgeon: Tam Kemp MD;  Location: [facility];  Service: Endoscopy;   Laterality: N/A;    HYSTERECTOMY      OOPHORECTOMY      ROTATOR CUFF REPAIR Left 01/2016    ROTATOR CUFF REPAIR W/ DISTAL CLAVICLE EXCISION Right 12/04/2017    Dr. Eligio Timmons ( [facility] )    TONSILLECTOMY       Social History[1]  Family History   Problem Relation Name Age of Onset    Multiple sclerosis Mother      Seizures Mother      Cancer Father          lung    Alcohol abuse Brother ruby     Early death Brother ruby     Diabetes Maternal Aunt      Diabetes Maternal Uncle      Diabetes Maternal Grandmother      Mental illness Maternal Grandfather      Alzheimer's disease Maternal Grandfather      Asthma Paternal Aunt      Sickle cell anemia Paternal Aunt      Diabetes Sister rickey     Arthritis Daughter lissett     Miscarriages / Stillbirths Daughter lissett     Anemia Daughter lissett     Hyperlipidemia Son lonnie     Hypertension Son lonnie     Allergies Son lonnie     Sickle cell anemia Paternal Uncle      Asthma Brother quentin     No Known Problems Sister luis enrique     No Known Problems Sister cassia     No Known Problems Sister fabiola     No Known Problems Brother johnson     Miscarriages / Stillbirths Daughter dondeyel     Thyroid disease Daughter dondeyel     No Known Problems Daughter dashita     Angioedema Neg Hx      Eczema Neg Hx      Immunodeficiency Neg Hx      Rheum arthritis Neg Hx      Psoriasis Neg Hx      Lupus Neg Hx          Review of Systems:   Per HPI         Objective:        Vitals:    03/26/25 1539   BP: (!) 182/72   Pulse: (!) 56       Lab Results   Component Value Date    WBC 4.21 01/17/2025    HGB 13.8 01/17/2025    HCT 40.6 01/17/2025     01/17/2025    CHOL 125 12/09/2024    TRIG 47 12/09/2024    HDL 60 12/09/2024    ALT 31 01/17/2025    AST 34 01/17/2025     01/17/2025    K 4.1 01/17/2025     01/17/2025    CREATININE 1.2 01/17/2025    BUN 17 01/17/2025    CO2 20 (L) 01/17/2025    TSH 1.380 07/08/2020    INR 1.1 07/08/2020    HGBA1C 5.9 (H) 12/09/2024         ECHOCARDIOGRAM RESULTS  Results for orders placed during the hospital encounter of 01/17/25    Stress Test        CURRENT/PREVIOUS VISIT EKG  Results for orders placed or performed during the hospital encounter of 01/17/25   EKG 12-lead    Collection Time: 01/17/25  2:21 PM   Result Value Ref Range    QRS Duration 72 ms    [facility] QTC Calculation 391 ms    Narrative    Test Reason :    Vent. Rate :  62 BPM     Atrial Rate :  62 BPM     P-R Int : 146 ms          QRS Dur :  72 ms      QT Int : 386 ms       P-R-T Axes :  65   1   3 degrees    QTcB Int : 391 ms    Normal sinus rhythm  Possible Left atrial enlargement  T wave abnormality, consider inferolateral ischemia  Abnormal ECG  When compared with ECG of 17-Jan-2025 14:06,  T wave inversion less evident in Inferior leads  Confirmed by Jimi Manuel ([id number]) on 1/17/2025 2:49:14 PM    Referred By: AAAREFERRAL SELF           Confirmed By: Jimi Manuel     No valid procedures specified.   Results for orders placed during the hospital encounter of 12/04/24    Nuclear Stress - Cardiology Interpreted    Interpretation Summary    Abnormal myocardial perfusion scan.    There is amoderate intensity, medium sized, equivocal perfusion abnormality that is mostly fixed with some reversible areas in the mid to apical anterolateral and anteroapical wall(s). This finding is equivocal due to a breast shadow overlying the myocardium.    There are no other significant perfusion abnormalities.    There is a moderate intensity perfusion abnormality in the anteroapical wall of the left ventricle, secondary to breast attenuation.    The gated perfusion images showed an ejection fraction of 65% at rest. The gated perfusion images showed an ejection fraction of 65% post stress. Normal ejection fraction is greater than 53%.    There is normal wall motion at rest and post-stress.    LV cavity size is normal at rest and normal at post-stress.    The ECG portion of the study is negative for  ischemia.    The patient reported no chest pain during the stress test.    There were no arrhythmias during stress.    A PET stress exam is recommended if clinically indicated.for further delineation of ischemia      Physical Exam:  CONSTITUTIONAL: No fever, no chills  HEENT: Normocephalic, atraumatic,pupils reactive to light                 NECK:  No JVD no carotid bruit  CVS: S1S2+, RRR, no murmurs,   LUNGS: Clear  ABDOMEN: Soft, NT, BS+  EXTREMITIES: No cyanosis, edema  : No [place] catheter  NEURO: AAO X 3  PSY: Normal affect      Medication List with Changes/Refills   Current Medications    ALBUTEROL (PROAIR HFA) 90 MCG/ACTUATION INHALER    Inhale 2 puffs into the lungs every 6 (six) hours as needed for Wheezing. Rescue    ALBUTEROL (PROVENTIL) 2.5 MG /3 ML (0.083 %) NEBULIZER SOLUTION    Take 3 mLs (2.5 mg total) by nebulization every 6 (six) hours as needed for Wheezing. Rescue    ASCORBIC ACID, VITAMIN C, (VITAMIN C) 500 MG TABLET    Take 500 mg by mouth daily as needed.     ASPIRIN 81 MG CHEW    Take 1 tablet (81 mg total) by mouth once daily.    ATORVASTATIN (LIPITOR) 40 MG TABLET    TAKE 1 TABLET(40 MG) BY MOUTH DAILY    CALCIUM CARBONATE/VITAMIN D3 (VITAMIN D-3 ORAL)    Take 100 Int'l Units by mouth once daily.     CARVEDILOL (COREG) 25 MG TABLET    Take 2 tablets (50 mg total) by mouth 2 (two) times daily with meals.    CICLOPIROX (PENLAC) 8 % SOLN    Apply topically nightly.    CLOBETASOL PROPIONATE, BULK, MISC    0.05 % by Misc.(Non-Drug; Combo Route) route. Foam    CLONIDINE (CATAPRES) 0.1 MG TABLET    Take 1 tablet (0.1 mg total) by mouth 3 (three) times daily as needed (for systolic BP greater than 170).    COENZYME Q10 10 MG CAPSULE    Take 10 mg by mouth once daily.    DOXYCYCLINE (VIBRA-TABS) 100 MG TABLET    Take 1 tablet (100 mg total) by mouth every 12 (twelve) hours. for 10 days    FEXOFENADINE (ALLEGRA) 180 MG TABLET    Take 1 tablet (180 mg total) by mouth once daily.    FOLIC ACID  (FOLVITE) 400 MCG TABLET    Take 400 mcg by mouth once daily.    GABAPENTIN (NEURONTIN) 400 MG CAPSULE    Take 1 capsule (400 mg total) by mouth 2 (two) times daily.    GARLIC OIL ORAL    Take 1 capsule by mouth once daily.     LISINOPRIL (PRINIVIL,ZESTRIL) 40 MG TABLET    Take 1 tablet (40 mg total) by mouth once daily.    LORAZEPAM (ATIVAN) 0.5 MG TABLET    Take 1 tablet (0.5 mg total) by mouth every hour. for 2 doses    MAGNESIUM 250 MG TAB    Take 250 mg by mouth 2 (two) times a day.    MONTELUKAST (SINGULAIR) 10 MG TABLET    Take 1 tablet (10 mg total) by mouth once daily.    OMEGA-3 FATTY ACIDS/FISH OIL (FISH OIL-OMEGA-3 FATTY ACIDS) 300-1,000 MG CAPSULE    Take by mouth once daily.    VALACYCLOVIR (VALTREX) 500 MG TABLET    TAKE 1 TABLET(500 MG) BY MOUTH TWICE DAILY FOR 7 DAYS AS DIRECTED    VITAMIN E 100 UNIT CAPSULE    Take 100 Units by mouth once daily.   Changed and/or Refilled Medications    Modified Medication Previous Medication    SPIRONOLACTONE (ALDACTONE) 25 MG TABLET spironolactone (ALDACTONE) 25 MG tablet       Take 1 tablet (25 mg total) by mouth once daily.    Take 1 tablet (25 mg total) by mouth once daily.    TERAZOSIN (HYTRIN) 2 MG CAPSULE terazosin (HYTRIN) 2 MG capsule       Take 2 capsules (4 mg total) by mouth every evening.    Take 2 capsules (4 mg total) by mouth every evening.   Discontinued Medications    FLUOCINOLONE (DERMA-SMOOTHE) 0.01 % EXTERNAL OIL    Apply topically 2 (two) times daily. Use on AA BID x 2 weeks             Assessment:       1. Primary hypertension    2. Atypical chest pain    3. White coat syndrome with diagnosis of hypertension    4. Stage 3a chronic kidney disease         Plan:     Problem List Items Addressed This Visit          Unprioritized    HTN (hypertension) - Primary    Current Assessment & Plan   Continue carvedilol 50 mg po BID, lisinopril 40 mg po daily, and terazosin 4 mg po QHS.   Recommend low sodium diet. She has known white coat HTN. Encouraged  her to monitor Bp at home.          Stage 3a chronic kidney disease    Current Assessment & Plan   Followed by primary care. Labs stable.          Atypical chest pain    Current Assessment & Plan   Cardiac pet stress showed no concern for reversibility or underlying ischemia. Her chest pain is atypical. Recommend follow up with PCP to explore other causes of chest pain.          White coat syndrome with diagnosis of hypertension       Follow up in about 6 months (around 9/26/2025).          SKYLAR GarrettC  Department of Cardiology   [facility] [place], [place]         [1]   Social History  Tobacco Use    Smoking status: Never    Smokeless tobacco: Never   Substance Use Topics    Alcohol use: Yes     Comment: occasionally - wine    Drug use: No

## 2025-03-31 ENCOUNTER — TELEPHONE (OUTPATIENT)
Dept: CARDIOLOGY | Facility: CLINIC | Age: 75
End: 2025-03-31
Payer: MEDICARE

## 2025-03-31 DIAGNOSIS — I10 PRIMARY HYPERTENSION: Primary | ICD-10-CM

## 2025-03-31 NOTE — TELEPHONE ENCOUNTER
----- Message from BAMBI Craig sent at 3/31/2025 10:20 AM CDT -----  Regarding: 3/26 EKG Order  The EKG performed on 3/26/2025 is missing an order.  Please place an order so that the EKG can be read in [place].Thank you,Kiara George, Kiana OperatorOMCNO Echo/ Stress Lab3rd Floor Cardiology [phone]/ [id number]  
Universal Safety Interventions

## 2025-04-03 RX ORDER — FLUOCINOLONE ACETONIDE 0.11 MG/ML
OIL TOPICAL
Qty: 118.28 ML | Refills: 3 | Status: SHIPPED | OUTPATIENT
Start: 2025-04-03

## 2025-05-21 ENCOUNTER — TELEPHONE (OUTPATIENT)
Dept: DERMATOLOGY | Facility: CLINIC | Age: 75
End: 2025-05-21
Payer: MEDICARE

## 2025-05-21 NOTE — TELEPHONE ENCOUNTER
Attempted to contact patient, no answer, lvm for a return call.    ----- Message from Elmo sent at 5/21/2025  8:06 AM CDT -----  Regarding: appointment  Contact: patient  Type:  Sooner Apoointment RequestCaller is requesting a sooner appointment.  Caller declined first available appointment listed below.  Caller will not accept being placed on the waitlist and is requesting a message be sent to doctor.Name of Caller:patientWhen is the first available appointment?11/01/2025Symptoms:RashWould the patient rather a call back or a response via [facility]? Please call to schedule sooner/ adviseBest Call Back Number:[phone] Information:

## 2025-05-21 NOTE — TELEPHONE ENCOUNTER
Patient scheduled.    ----- Message from Lila sent at 5/21/2025  8:57 AM CDT -----  Type:  Patient Returning CallWho Called:ptMarvo Left Message for Patient:Meies the patient know what this is regarding?:apptWould the patient rather a call back or a response via [facility]? Call [facility] Call Back Number:[phone] Additional Information: pt st she received a call & would like a call back. please call to discuss

## 2025-06-05 ENCOUNTER — OFFICE VISIT (OUTPATIENT)
Dept: DERMATOLOGY | Facility: CLINIC | Age: 75
End: 2025-06-05
Payer: MEDICARE

## 2025-06-05 VITALS — WEIGHT: 156.31 LBS | BODY MASS INDEX: 25.12 KG/M2 | HEIGHT: 66 IN

## 2025-06-05 DIAGNOSIS — L30.9 DERMATITIS: Primary | ICD-10-CM

## 2025-06-05 PROCEDURE — 3288F FALL RISK ASSESSMENT DOCD: CPT | Mod: CPTII,S$GLB,, | Performed by: DERMATOLOGY

## 2025-06-05 PROCEDURE — 99213 OFFICE O/P EST LOW 20 MIN: CPT | Mod: S$GLB,,, | Performed by: DERMATOLOGY

## 2025-06-05 PROCEDURE — 1101F PT FALLS ASSESS-DOCD LE1/YR: CPT | Mod: CPTII,S$GLB,, | Performed by: DERMATOLOGY

## 2025-06-05 PROCEDURE — 1159F MED LIST DOCD IN RCRD: CPT | Mod: CPTII,S$GLB,, | Performed by: DERMATOLOGY

## 2025-06-05 PROCEDURE — 1160F RVW MEDS BY RX/DR IN RCRD: CPT | Mod: CPTII,S$GLB,, | Performed by: DERMATOLOGY

## 2025-06-05 RX ORDER — TRIAMCINOLONE ACETONIDE 1 MG/G
CREAM TOPICAL
Qty: 60 G | Refills: 3 | Status: SHIPPED | OUTPATIENT
Start: 2025-06-05

## 2025-06-13 ENCOUNTER — CLINICAL SUPPORT (OUTPATIENT)
Dept: REHABILITATION | Facility: HOSPITAL | Age: 75
End: 2025-06-13
Payer: MEDICARE

## 2025-06-13 DIAGNOSIS — M25.60 STIFFNESS IN JOINT: ICD-10-CM

## 2025-06-13 DIAGNOSIS — M25.532 PAIN IN LEFT WRIST: Primary | ICD-10-CM

## 2025-06-13 DIAGNOSIS — R53.1 WEAKNESS: ICD-10-CM

## 2025-06-13 PROCEDURE — 97530 THERAPEUTIC ACTIVITIES: CPT | Mod: PN

## 2025-06-13 PROCEDURE — 97165 OT EVAL LOW COMPLEX 30 MIN: CPT | Mod: PN

## 2025-06-13 NOTE — PROGRESS NOTES
Outpatient Rehab    Occupational Therapy Evaluation    Patient Name: Loren Andre  MRN: [id number]  YOB: 1950  Encounter Date: 6/13/2025    Therapy Diagnosis:   Encounter Diagnoses   Name Primary?    Pain in left wrist Yes    Stiffness in joint     Weakness      Physician: Janet Saucedo PA    Physician Orders: Eval and Treat  Medical Diagnosis: Left wrist pain  Surgical Diagnosis: Not applicable for this Episode   Surgical Date: Not applicable for this Episode    Visit # / Visits Authorized: 1 / 1  Insurance Authorization Period: 6/9/2025 to 12/31/2025  Date of Evaluation: 6/13/2025  Plan of Care Certification: 6/13/2025 to 8/12/25     Time In: 1530   Time Out: 1600  Total Time (in minutes): 30   Total Billable Time (in minutes): 30        Subjective   History of Present Illness  Loren is a 75 y.o. female who reports to occupational therapy with a chief concern of pain and weakness in left wrist.     The patient reports a medical diagnosis of M25.532 (ICD-10-CM) - Left wrist pain. The patient has experienced this issue since 06/13/25.   Diagnostic tests related to this condition: X-ray.        Dominant Hand: Right  History of Present Condition/Illness: Pt says that her hand started hurting about a month ago. She states that she went to the doctor and received a CSI to see if her symptoms would resolve. Pt states that they have not. She has been referred to therapy for conservative treatment.     Activities of Daily Living            Patient Responsibilities: Driving, Health management, Home management, Personal ADL, Meal prep    Previously independent with activities of daily living? Yes     Currently independent with activities of daily living? Yes          Previously independent with instrumental activities of daily living? Yes     Currently independent with instrumental activities of daily living? Yes              Pain     Patient reports a current pain level of 1/10. Pain at best is reported  as 0/10. Pain at worst is reported as 5/10.   Location: left wrist  Clinical Progression (since onset): Stable  Pain Qualities: Aching, Dull  Pain-Relieving Factors: Medications - over-the-counter, Immobilization, Rest  Pain-Aggravating Factors: Computer work, Cooking, Driving, Holding objects, Rotation         Treatment History  Treatments  Previously Received Treatments: No  Currently Receiving Treatments: No    Living Arrangements  Living Situation  Housing: Home independently  Living Arrangements: Family members  Support Systems: Family members        Employment  Patient does not report that: Does the patient's condition impact their ability to work?  Employment Status: Retired          Past Medical History/Physical Systems Review:   Loren Andre  has a past medical history of Allergy, Anemia, Anxiety, Arthritis, Asthma, Chest pain, Chronic disease anemia, Colon polyps, Depression, Dermatitis, Fibromyalgia, HEARING LOSS, Hyperlipidemia, Hypertension, Leucopenia, Polyneuropathy, Scoliosis, Sickle cell trait, Sickle-cell trait, Trouble in sleeping, and Urticaria.    Loren Andre  has a past surgical history that includes Hysterectomy; Appendectomy; Colonoscopy (4/14/2008); Tonsillectomy; Adenoidectomy; Oophorectomy; Rotator cuff repair (Left, 01/2016); Rotator cuff repair w/ distal clavicle excision (Right, 12/04/2017); and Colonoscopy (N/A, 10/4/2018).    Loren has a current medication list which includes the following prescription(s): albuterol, albuterol, ascorbic acid (vitamin c), aspirin, atorvastatin, calcium carbonate/vitamin d3, carvedilol, [START ON 1/1/2026] ciclopirox, clobetasol propionate, clonidine, coenzyme q10, fexofenadine, fluocinolone and shower cap, folic acid, gabapentin, garlic, lisinopril, lorazepam, magnesium, montelukast, fish oil-omega-3 fatty acids, spironolactone, terazosin, triamcinolone acetonide 0.1%, valacyclovir, and vitamin e.    Review of patient's allergies indicates:    Allergen Reactions    Doxepin Anaphylaxis     abd pain    Norvasc [amlodipine] Palpitations    Xyzal [levocetirizine] Other (See Comments)     Can not take with blood pressure medications - headaches, higher blood pressure, red hands     Penicillins Rash    Sulfa (sulfonamide antibiotics) Rash    Duloxetine      Other reaction(s): insomnia    Levofloxacin      Other reaction(s): Headache    Milnacipran      Other reaction(s): stomach pain    Nitrofurantoin monohyd/m-cryst      Other reaction(s): Itching    Prednisone      Other reaction(s): Itching    Pseudoephedrine-guaifenesin      Other reaction(s): Stomach upset    Terbinafine      Other reaction(s): rash        Objective      Wrist Range of Motion  Left Wrist   Active (deg) Passive (deg) Pain Comment   Flexion 70         Extension 70         Radial Deviation 20         Ulnar Deviation 50                            Right  Strength  Right Hand Dynamometer Position: 2  Elbow Position Forearm Position Trial 1 (lbs) Trial 2  (lbs) Trial 3  (lbs) Average  (lbs) Pain   Flexed Neutral 60 60 60 60         Left  Strength  Left Hand Dynamometer Position: 2  Elbow Position Forearm Position Trial 1 (lbs) Trial 2 (lbs) Trial 3 (lbs) Average (lbs) Pain   Flexed Neutral 35 35 35 35                   Treatment:  Therapeutic Activity  TA 1: demonstration and explanation of HEP    Time Entry(in minutes):  OT Evaluation (Low) Time Entry: 15  Therapeutic Activity Time Entry: 15    Assessment & Plan   Assessment  Loren presents with a condition of Low complexity.   Presentation of Symptoms: Stable  Will Comorbidities Impact Care: No          Functional Limitations: Carrying objects, Fine motor coordination, Manipulating objects, Pain when reaching, Pain with ADLs/IADLs, Range of motion                 Evaluation/Treatment Response: Patient responded to treatment well  Patient Goal for Therapy (OT): To gain the strength back in her left wrist  Prognosis:  Good    Plan  From an occupational therapy perspective, the patient would benefit from: Skilled Rehab Services    Planned therapy interventions include: Therapeutic exercise, Therapeutic activities, Neuromuscular re-education, Manual therapy, ADLs/IADLs, Orthotic management and training, Lymphatic compression wrapping, Prosthetic management and training, Work conditioning, Work hardening, and Wound care.    Planned modalities to include: Contrast bath, Cryotherapy (cold pack), Electrical stimulation - attended, Electrical stimulation - passive/unattended, Fluidotherapy, Paraffin bath, Thermotherapy (hot pack), Ultrasound, and Whirlpool.        Visit Frequency: 2 times Per Week for 6 Weeks.       This plan was discussed with Patient.   Discussion participants: Agreed Upon Plan of Care             The patient's spiritual, cultural, and educational needs were considered, and the patient is agreeable to the plan of care and goals.     Education  Education was done with Patient. The patient's learning style includes Demonstration. The patient Demonstrates understanding and Verbalizes understanding.         Pt was educated on HEP which can be found under patient instructions.        Goals:   Active       LTGs       Pt will report no more than 1/10 pain when completing ADL tasks       Start:  06/13/25    Expected End:  08/12/25            Pt will be able to open a water bottle with her left hand        Start:  06/13/25    Expected End:  08/12/25            Pt will describe a decrease in numbness in left fingers        Start:  06/13/25    Expected End:  08/12/25               STGs       Pt will report no more than 3/10 pain when completing ADL tasks       Start:  06/13/25    Expected End:  07/13/25            Pt will be able to open a peanut butter jar with left hand       Start:  06/13/25    Expected End:  07/13/25            Initiate HEP       Start:  06/13/25    Expected End:  07/13/25                Lindsey Rodriguez  OT

## 2025-06-18 ENCOUNTER — CLINICAL SUPPORT (OUTPATIENT)
Dept: REHABILITATION | Facility: HOSPITAL | Age: 75
End: 2025-06-18
Payer: MEDICARE

## 2025-06-18 DIAGNOSIS — M25.60 STIFFNESS IN JOINT: ICD-10-CM

## 2025-06-18 DIAGNOSIS — M25.532 PAIN IN LEFT WRIST: Primary | ICD-10-CM

## 2025-06-18 DIAGNOSIS — R53.1 WEAKNESS: ICD-10-CM

## 2025-06-18 PROCEDURE — 97530 THERAPEUTIC ACTIVITIES: CPT | Mod: PN

## 2025-06-18 PROCEDURE — 97018 PARAFFIN BATH THERAPY: CPT | Mod: PN

## 2025-06-18 PROCEDURE — 97140 MANUAL THERAPY 1/> REGIONS: CPT | Mod: PN

## 2025-06-18 NOTE — PROGRESS NOTES
Outpatient Rehab    Occupational Therapy Visit    Patient Name: Loren Andre  MRN: [id number]  YOB: 1950  Encounter Date: 6/18/2025    Therapy Diagnosis:   Encounter Diagnoses   Name Primary?    Pain in left wrist Yes    Stiffness in joint     Weakness      Physician: Janet Saucedo PA    Physician Orders: Eval and Treat  Medical Diagnosis: Left wrist pain  Surgical Diagnosis: Not applicable for this Episode   Surgical Date: Not applicable for this Episode  Days Since Last Surgery: Not applicable for this Episode    Visit # / Visits Authorized: 1 / 6  Insurance Authorization Period: 6/13/2025 to 8/13/2025  Date of Evaluation: 6/13/2025  Plan of Care Certification:       Time In: 0800   Time Out: 0845  Total Time (in minutes): 45   Total Billable Time (in minutes): 45       Subjective   Pt states she was a little sore after her exercises, but she feels better now.  Pain reported as 1/10.      Objective          Progress note to be taken every 10th visit or every 30 days with updated objective information     Treatment:  Therapeutic Activity  TA 1: wrist 3 ways with 1# weight  TA 2: pom pom  with gripper on the 1st rung  TA 3: wrist wheel pro/sup & flex/ ext 2 minutes each  TA 4: wrist maze x 5 minutes  TA 5: yellow flex bar smiles/frowns/twists  TA 6: tan putty   Manual Therapy  MT 1: retrograde massage to wrist and fingers  MT 2: joint mobilizations of wrist grades 1-2  Modalities  Paraffin Bath: for 10 minutes to decrease stiffness    Time Entry(in minutes):  Paraffin Bath Time Entry: 15  Manual Therapy Time Entry: 15  Therapeutic Activity Time Entry: 15    Assessment & Plan   Assessment: Pt tolerated added therapeutic activities well without complaints of additional pain. Pt is motivated. Will progress as tolerated.   Evaluation/Treatment Tolerance: Patient tolerated treatment well    The patient will continue to benefit from skilled outpatient occupational therapy in order to address  the deficits listed in the problem list on the initial evaluation, provide patient and family education, and maximize the patients level of independence in the home and community environments.     The patient's spiritual, cultural, and educational needs were considered, and the patient is agreeable to the plan of care and goals.           Plan: Continue with current plan of care    Goals:   Active       LTGs       Pt will report no more than 1/10 pain when completing ADL tasks (Progressing)       Start:  06/13/25    Expected End:  08/12/25            Pt will be able to open a water bottle with her left hand  (Progressing)       Start:  06/13/25    Expected End:  08/12/25            Pt will describe a decrease in numbness in left fingers  (Progressing)       Start:  06/13/25    Expected End:  08/12/25               STGs       Pt will report no more than 3/10 pain when completing ADL tasks (Progressing)       Start:  06/13/25    Expected End:  07/13/25            Pt will be able to open a peanut butter jar with left hand (Progressing)       Start:  06/13/25    Expected End:  07/13/25            Initiate HEP (Progressing)       Start:  06/13/25    Expected End:  07/13/25                Lindsey Rodriguez, OT

## 2025-06-19 ENCOUNTER — TELEPHONE (OUTPATIENT)
Dept: FAMILY MEDICINE | Facility: CLINIC | Age: 75
End: 2025-06-19
Payer: MEDICARE

## 2025-06-19 ENCOUNTER — CLINICAL SUPPORT (OUTPATIENT)
Dept: REHABILITATION | Facility: HOSPITAL | Age: 75
End: 2025-06-19
Payer: MEDICARE

## 2025-06-19 DIAGNOSIS — M25.532 PAIN IN LEFT WRIST: Primary | ICD-10-CM

## 2025-06-19 DIAGNOSIS — M25.60 STIFFNESS IN JOINT: ICD-10-CM

## 2025-06-19 DIAGNOSIS — R53.1 WEAKNESS: ICD-10-CM

## 2025-06-19 PROCEDURE — 97530 THERAPEUTIC ACTIVITIES: CPT | Mod: PN

## 2025-06-19 PROCEDURE — 97140 MANUAL THERAPY 1/> REGIONS: CPT | Mod: PN

## 2025-06-19 NOTE — TELEPHONE ENCOUNTER
Call placed to patient to offer appointment, patient states due to currently having so many appointments already scheduled her first availability would be 2 weeks after July 4th.  Patient also stated Tuesdays and Thursdays are best for her.  Appointment scheduled for the date of Tuesday 7/29/25; patient agreed to appointment date, time, and location.

## 2025-06-19 NOTE — TELEPHONE ENCOUNTER
Call placed to number indicated in the attached message below.  Spoke to Chris Rodriguez (nurse practitioner) with [employer] who states he performs in home yearly assessments for the insurance.  Mr. Perez states he performed a Quantaflo test to check for peripheral vascular disease and the testing indicates patient has mild PVD in both lower extremities.  Mr. Perez will be faxing the test results to the office at fax number [phone].  Will send follow up message to Dr. Quiroz for notification.

## 2025-06-19 NOTE — TELEPHONE ENCOUNTER
CRM # [id number]  Owner: None  Status: Unresolved  Open  Priority: Routine Created on: 06/19/2025 09:45 AM By: Sammi Elias     Primary Information    Source   Loren Andre (Patient)    Subject   Loren Andre (Patient)    Topic   General Inquiry - Patient Advice      Communication   Type:  Needs Medical Advice            Who Called:home health nurse deidra brunnerin      Would the patient rather a call back or a response via [facility]? Call back      Best Call Back Number: [phone]      Additional Information: home health nurse did af few test and pt has a peripheral vascular disease  please call back       06/19/2025 09:47 AM Sammi Elias P [employer] STAFF Routine

## 2025-06-19 NOTE — PROGRESS NOTES
Outpatient Rehab    Occupational Therapy Progress Note    Patient Name: Loren Andre  MRN: [id number]  YOB: 1950  Encounter Date: 6/19/2025    Therapy Diagnosis:   Encounter Diagnoses   Name Primary?    Pain in left wrist Yes    Stiffness in joint     Weakness      Physician: Janet Saucedo PA    Physician Orders: Eval and Treat  Medical Diagnosis: Left wrist pain  Surgical Diagnosis: Not applicable for this Episode   Surgical Date: Not applicable for this Episode  Days Since Last Surgery: Not applicable for this Episode    Visit # / Visits Authorized: 2 / 6  Insurance Authorization Period: 6/13/2025 to 8/13/2025  Date of Evaluation: 6/13/2025  Plan of Care Certification:       Time In: 0800   Time Out: 0845  Total Time (in minutes): 45   Total Billable Time (in minutes): 45      Subjective   Pt states she was a little sore after our treatment session yesterday..  Pain reported as 2/10.      Objective    Progress note to be taken every 10th visit or every 30 days with updated objective information         Treatment:  Therapeutic Activity  TA 1: wrist 3 ways with 1# weight  TA 2: pom pom  with gripper on the 1st rung  TA 3: wrist wheel pro/sup & flex/ ext 2 minutes each  TA 4: wrist maze x 5 minutes  TA 5: yellow flex bar smiles/frowns/twists  TA 6: tan putty   Manual Therapy  MT 1: retrograde massage to wrist and hand    Time Entry(in minutes):  Paraffin Bath Time Entry: 15  Manual Therapy Time Entry: 15  Therapeutic Activity Time Entry: 15    Assessment & Plan   Assessment: Pt tolerated added therapeutic activities well without complaints of additional pain. Focus on therapy to be pain free range of motion and strengthening. Pt is motivated. Will progress as tolerated.   Evaluation/Treatment Tolerance: Patient tolerated treatment well    The patient will continue to benefit from skilled outpatient occupational therapy in order to address the deficits listed in the problem list on the  initial evaluation, provide patient and family education, and maximize the patients level of independence in the home and community environments.     The patient's spiritual, cultural, and educational needs were considered, and the patient is agreeable to the plan of care and goals.           Plan: Continue with current plan of care    Goals:   Active       LTGs       Pt will report no more than 1/10 pain when completing ADL tasks (Progressing)       Start:  06/13/25    Expected End:  08/12/25            Pt will be able to open a water bottle with her left hand  (Progressing)       Start:  06/13/25    Expected End:  08/12/25            Pt will describe a decrease in numbness in left fingers  (Progressing)       Start:  06/13/25    Expected End:  08/12/25               STGs       Pt will report no more than 3/10 pain when completing ADL tasks (Progressing)       Start:  06/13/25    Expected End:  07/13/25            Pt will be able to open a peanut butter jar with left hand (Progressing)       Start:  06/13/25    Expected End:  07/13/25            Initiate HEP (Progressing)       Start:  06/13/25    Expected End:  07/13/25                Lindsey Rodriguez, OT

## 2025-06-20 ENCOUNTER — TELEPHONE (OUTPATIENT)
Dept: FAMILY MEDICINE | Facility: CLINIC | Age: 75
End: 2025-06-20
Payer: MEDICARE

## 2025-06-24 ENCOUNTER — CLINICAL SUPPORT (OUTPATIENT)
Dept: REHABILITATION | Facility: HOSPITAL | Age: 75
End: 2025-06-24
Payer: MEDICARE

## 2025-06-24 DIAGNOSIS — R53.1 WEAKNESS: ICD-10-CM

## 2025-06-24 DIAGNOSIS — M25.60 STIFFNESS IN JOINT: ICD-10-CM

## 2025-06-24 DIAGNOSIS — M25.532 PAIN IN LEFT WRIST: Primary | ICD-10-CM

## 2025-06-24 PROCEDURE — 97140 MANUAL THERAPY 1/> REGIONS: CPT | Mod: PN

## 2025-06-24 PROCEDURE — 97530 THERAPEUTIC ACTIVITIES: CPT | Mod: PN

## 2025-06-24 NOTE — PROGRESS NOTES
Outpatient Rehab    Occupational Therapy Visit    Patient Name: Loren Andre  MRN: [id number]  YOB: 1950  Encounter Date: 6/24/2025    Therapy Diagnosis:   Encounter Diagnoses   Name Primary?    Pain in left wrist Yes    Stiffness in joint     Weakness      Physician: Janet Saucedo PA    Physician Orders: Eval and Treat  Medical Diagnosis: Left wrist pain  Surgical Diagnosis: Not applicable for this Episode   Surgical Date: Not applicable for this Episode  Days Since Last Surgery: Not applicable for this Episode    Visit # / Visits Authorized: 3 / 6  Insurance Authorization Period: 6/13/2025 to 8/13/2025  Date of Evaluation: 6/13/2025  Plan of Care Certification:       Time In: 0800   Time Out: 0845  Total Time (in minutes): 45   Total Billable Time (in minutes): 45         Subjective   Pt states that she has been having less pain and numbness.  Pain reported as 2/10.      Objective          Progress note to be taken every 10th visit or every 30 days with updated objective information     Treatment:  Therapeutic Activity  TA 1: wrist 3 ways with 1# weight  TA 2: pom pom  with gripper on the 1st rung  TA 3: wrist wheel pro/sup & flex/ ext 2 minutes each  TA 4: wrist maze x 5 minutes  TA 5: bead lacing  TA 6: blue sponge squeezes  TA 7: isospheres  Manual Therapy  MT 1: retrograde massage to wrist and hand    Time Entry(in minutes):  Manual Therapy Time Entry: 15  Therapeutic Activity Time Entry: 30    Assessment & Plan   Assessment: Pt tolerated added therapeutic activities well without complaints of additional pain. Focus on therapy to be pain free range of motion and strengthening. Pt is motivated. Will progress as tolerated.   Evaluation/Treatment Tolerance: Patient tolerated treatment well    The patient will continue to benefit from skilled outpatient occupational therapy in order to address the deficits listed in the problem list on the initial evaluation, provide patient and family  education, and maximize the patients level of independence in the home and community environments.     The patient's spiritual, cultural, and educational needs were considered, and the patient is agreeable to the plan of care and goals.           Plan: Continue with current plan of care    Goals:   Active       LTGs       Pt will report no more than 1/10 pain when completing ADL tasks (Progressing)       Start:  06/13/25    Expected End:  08/12/25            Pt will be able to open a water bottle with her left hand  (Progressing)       Start:  06/13/25    Expected End:  08/12/25            Pt will describe a decrease in numbness in left fingers  (Progressing)       Start:  06/13/25    Expected End:  08/12/25               STGs       Pt will report no more than 3/10 pain when completing ADL tasks (Progressing)       Start:  06/13/25    Expected End:  07/13/25            Pt will be able to open a peanut butter jar with left hand (Progressing)       Start:  06/13/25    Expected End:  07/13/25            Initiate HEP (Progressing)       Start:  06/13/25    Expected End:  07/13/25                Lindsey Rodriguez, OT

## 2025-06-27 ENCOUNTER — CLINICAL SUPPORT (OUTPATIENT)
Dept: REHABILITATION | Facility: HOSPITAL | Age: 75
End: 2025-06-27
Payer: MEDICARE

## 2025-06-27 DIAGNOSIS — M25.60 STIFFNESS IN JOINT: ICD-10-CM

## 2025-06-27 DIAGNOSIS — M25.532 PAIN IN LEFT WRIST: Primary | ICD-10-CM

## 2025-06-27 DIAGNOSIS — R53.1 WEAKNESS: ICD-10-CM

## 2025-06-27 PROCEDURE — 97140 MANUAL THERAPY 1/> REGIONS: CPT | Mod: PN

## 2025-06-27 PROCEDURE — 97530 THERAPEUTIC ACTIVITIES: CPT | Mod: PN

## 2025-06-27 NOTE — PROGRESS NOTES
Outpatient Rehab    Occupational Therapy Visit    Patient Name: Loren Andre  MRN: [id number]  YOB: 1950  Encounter Date: 6/27/2025    Therapy Diagnosis:   Encounter Diagnoses   Name Primary?    Pain in left wrist Yes    Stiffness in joint     Weakness      Physician: Janet Saucedo PA    Physician Orders: Eval and Treat  Medical Diagnosis: Left wrist pain  Surgical Diagnosis: Not applicable for this Episode   Surgical Date: Not applicable for this Episode  Days Since Last Surgery: Not applicable for this Episode    Visit # / Visits Authorized: 4 / 6  Insurance Authorization Period: 6/13/2025 to 8/13/2025  Date of Evaluation: 6/13/2025  Plan of Care Certification:       Time In: 0800   Time Out: 0845  Total Time (in minutes): 45   Total Billable Time (in minutes): 45      Subjective   Pt states that her pain is on the back of her hand. She thinks she over did it with cleaning the other day..  Pain reported as 2/10.      Objective        Progress note to be taken every 10th visit or every 30 days with updated objective information     Treatment:  Therapeutic Activity  TA 2: pom pom  with gripper on the 1st rung  TA 3: wrist wheel pro/sup & flex/ ext 2 minutes each  TA 4: wrist maze x 5 minutes  TA 5: bead lacing  TA 6: blue sponge squeezes  Manual Therapy  MT 1: retrograde massage to wrist and hand    Time Entry(in minutes):  Manual Therapy Time Entry: 15  Therapeutic Activity Time Entry: 30    Assessment & Plan   Assessment: Pt tolerated added therapeutic activities well without complaints of additional pain. Focus on therapy to be pain free range of motion and strengthening. Pt is motivated. Will progress as tolerated.   Evaluation/Treatment Tolerance: Patient tolerated treatment well    The patient will continue to benefit from skilled outpatient occupational therapy in order to address the deficits listed in the problem list on the initial evaluation, provide patient and family education,  and maximize the patients level of independence in the home and community environments.     The patient's spiritual, cultural, and educational needs were considered, and the patient is agreeable to the plan of care and goals.           Plan: Continue with current plan of care    Goals:   Active       LTGs       Pt will report no more than 1/10 pain when completing ADL tasks (Progressing)       Start:  06/13/25    Expected End:  08/12/25            Pt will be able to open a water bottle with her left hand  (Progressing)       Start:  06/13/25    Expected End:  08/12/25            Pt will describe a decrease in numbness in left fingers  (Progressing)       Start:  06/13/25    Expected End:  08/12/25               STGs       Pt will report no more than 3/10 pain when completing ADL tasks (Progressing)       Start:  06/13/25    Expected End:  07/13/25            Pt will be able to open a peanut butter jar with left hand (Progressing)       Start:  06/13/25    Expected End:  07/13/25            Initiate HEP (Progressing)       Start:  06/13/25    Expected End:  07/13/25                Lindsey Rodriguez, OT

## 2025-07-08 ENCOUNTER — CLINICAL SUPPORT (OUTPATIENT)
Dept: REHABILITATION | Facility: HOSPITAL | Age: 75
End: 2025-07-08
Payer: MEDICARE

## 2025-07-08 DIAGNOSIS — M25.532 PAIN IN LEFT WRIST: Primary | ICD-10-CM

## 2025-07-08 DIAGNOSIS — M25.60 STIFFNESS IN JOINT: ICD-10-CM

## 2025-07-08 DIAGNOSIS — R53.1 WEAKNESS: ICD-10-CM

## 2025-07-08 PROCEDURE — 97140 MANUAL THERAPY 1/> REGIONS: CPT | Mod: PN

## 2025-07-08 PROCEDURE — 97530 THERAPEUTIC ACTIVITIES: CPT | Mod: PN

## 2025-07-08 NOTE — PROGRESS NOTES
Outpatient Rehab    Occupational Therapy Visit    Patient Name: Loren Andre  MRN: [id number]  YOB: 1950  Encounter Date: 7/8/2025    Therapy Diagnosis:   Encounter Diagnoses   Name Primary?    Pain in left wrist Yes    Stiffness in joint     Weakness      Physician: Janet Saucedo PA    Physician Orders: Eval and Treat  Medical Diagnosis: Left wrist pain  Surgical Diagnosis: Not applicable for this Episode   Surgical Date: Not applicable for this Episode  Days Since Last Surgery: Not applicable for this Episode    Visit # / Visits Authorized: 5 / 6  Insurance Authorization Period: 6/13/2025 to 8/13/2025  Date of Evaluation: 6/13/2025  Plan of Care Certification:       Time In: 0800   Time Out: 0900  Total Time (in minutes): 60   Total Billable Time (in minutes): 60      Subjective   Pt states that she has a little ache today, but it isn't bad.  Pain reported as 1/10.      Objective        Progress note to be taken every 10th visit or every 30 days with updated objective information     Treatment:  Therapeutic Activity  TA 1: wrist 3 ways with 1# weight  TA 2: pom pom  with gripper on the 1st rung  TA 3: wrist wheel pro/sup & flex/ ext 2 minutes each  TA 4: wrist maze x 5 minutes  TA 5: bead lacing  TA 6: blue sponge squeezes  Manual Therapy  MT 1: retrograde massage to wrist and hand    Time Entry(in minutes):  Manual Therapy Time Entry: 15  Therapeutic Activity Time Entry: 45    Assessment & Plan   Assessment: Pt tolerated added therapeutic activities well without complaints of additional pain. Focus on therapy to be pain free range of motion and strengthening. Pt is motivated. Will progress as tolerated.   Evaluation/Treatment Tolerance: Patient tolerated treatment well    The patient will continue to benefit from skilled outpatient occupational therapy in order to address the deficits listed in the problem list on the initial evaluation, provide patient and family education, and maximize  the patients level of independence in the home and community environments.     The patient's spiritual, cultural, and educational needs were considered, and the patient is agreeable to the plan of care and goals.           Plan: Continue with current plan of care    Goals:   Active       LTGs       Pt will report no more than 1/10 pain when completing ADL tasks (Progressing)       Start:  06/13/25    Expected End:  08/12/25            Pt will be able to open a water bottle with her left hand  (Progressing)       Start:  06/13/25    Expected End:  08/12/25            Pt will describe a decrease in numbness in left fingers  (Progressing)       Start:  06/13/25    Expected End:  08/12/25               STGs       Pt will report no more than 3/10 pain when completing ADL tasks (Progressing)       Start:  06/13/25    Expected End:  07/13/25            Pt will be able to open a peanut butter jar with left hand (Progressing)       Start:  06/13/25    Expected End:  07/13/25            Initiate HEP (Progressing)       Start:  06/13/25    Expected End:  07/13/25                Lindsey Rodriguez OT

## 2025-07-10 ENCOUNTER — CLINICAL SUPPORT (OUTPATIENT)
Dept: REHABILITATION | Facility: HOSPITAL | Age: 75
End: 2025-07-10
Payer: MEDICARE

## 2025-07-10 DIAGNOSIS — M25.60 STIFFNESS IN JOINT: ICD-10-CM

## 2025-07-10 DIAGNOSIS — R53.1 WEAKNESS: ICD-10-CM

## 2025-07-10 DIAGNOSIS — M25.532 PAIN IN LEFT WRIST: Primary | ICD-10-CM

## 2025-07-10 PROCEDURE — 97140 MANUAL THERAPY 1/> REGIONS: CPT | Mod: PN

## 2025-07-10 PROCEDURE — 97530 THERAPEUTIC ACTIVITIES: CPT | Mod: PN

## 2025-07-10 PROCEDURE — 97035 APP MDLTY 1+ULTRASOUND EA 15: CPT | Mod: PN

## 2025-07-10 NOTE — PROGRESS NOTES
Outpatient Rehab    Occupational Therapy Progress Note    Patient Name: Loren Andre  MRN: [id number]  YOB: 1950  Encounter Date: 7/10/2025    Therapy Diagnosis:   Encounter Diagnoses   Name Primary?    Pain in left wrist Yes    Stiffness in joint     Weakness      Physician: Janet Saucedo PA    Physician Orders: Eval and Treat  Medical Diagnosis: Left wrist pain  Surgical Diagnosis: Not applicable for this Episode   Surgical Date: Not applicable for this Episode  Days Since Last Surgery: Not applicable for this Episode    Visit # / Visits Authorized: 6 / 6  Insurance Authorization Period: 6/13/2025 to 8/13/2025  Date of Evaluation: 6/13/2025  Plan of Care Certification:       Time In: 0805   Time Out: 0900  Total Time (in minutes): 55   Total Billable Time (in minutes): 55       Subjective   Pt states that the pain in her wrist woke her up last night, but it feels much better today..  Pain reported as 1/10.      Objective    Progress note to be taken every 10th visit or every 30 days with updated objective information      Left  Strength  Left Hand Dynamometer Position: 2  Elbow Position Forearm Position Trial 1 (lbs) Trial 2 (lbs) Trial 3 (lbs) Average (lbs) Pain   Flexed Neutral 35 35 35 35                   Treatment:  Therapeutic Activity  TA 1: wrist 3 ways with 1# weight  TA 2: pom pom  with gripper on the 1st rung  TA 3: wrist wheel pro/sup & flex/ ext 2 minutes each  TA 4: wrist maze x 5 minutes  TA 5: bead lacing  Manual Therapy  MT 1: retrograde massage to wrist and hand  Modalities  Ultrasound: 100%, 3mhz, 1.0 w/cm2, 8 minutes to decrease scar tissue and increase circulation    Time Entry(in minutes):  Ultrasound Time Entry: 8  Manual Therapy Time Entry: 15  Therapeutic Activity Time Entry: 37    Assessment & Plan   Assessment: Pt tolerated added therapeutic activities well without complaints of additional pain. She continues to have pain and weakness in her left hand. Focus  on therapy to be pain free range of motion and strengthening. Pt is motivated. Will progress as tolerated.   Evaluation/Treatment Tolerance: Patient tolerated treatment well    The patient will continue to benefit from skilled outpatient occupational therapy in order to address the deficits listed in the problem list on the initial evaluation, provide patient and family education, and maximize the patients level of independence in the home and community environments.     The patient's spiritual, cultural, and educational needs were considered, and the patient is agreeable to the plan of care and goals.           Plan: Continue with current plan of care    Goals:   Active       LTGs       Pt will report no more than 1/10 pain when completing ADL tasks (Progressing)       Start:  06/13/25    Expected End:  08/12/25            Pt will be able to open a water bottle with her left hand  (Progressing)       Start:  06/13/25    Expected End:  08/12/25            Pt will describe a decrease in numbness in left fingers  (Met)       Start:  06/13/25    Expected End:  08/12/25    Resolved:  07/10/25            STGs       Pt will report no more than 3/10 pain when completing ADL tasks (Progressing)       Start:  06/13/25    Expected End:  07/13/25            Pt will be able to open a peanut butter jar with left hand (Progressing)       Start:  06/13/25    Expected End:  07/13/25            Initiate HEP (Met)       Start:  06/13/25    Expected End:  07/13/25    Resolved:  07/10/25             Lindsey Rodriguez OT

## 2025-07-15 ENCOUNTER — CLINICAL SUPPORT (OUTPATIENT)
Dept: REHABILITATION | Facility: HOSPITAL | Age: 75
End: 2025-07-15
Payer: MEDICARE

## 2025-07-15 DIAGNOSIS — M25.532 PAIN IN LEFT WRIST: Primary | ICD-10-CM

## 2025-07-15 DIAGNOSIS — M25.60 STIFFNESS IN JOINT: ICD-10-CM

## 2025-07-15 DIAGNOSIS — R53.1 WEAKNESS: ICD-10-CM

## 2025-07-15 PROCEDURE — 97530 THERAPEUTIC ACTIVITIES: CPT | Mod: PN

## 2025-07-15 PROCEDURE — 97140 MANUAL THERAPY 1/> REGIONS: CPT | Mod: PN

## 2025-07-15 NOTE — PROGRESS NOTES
Outpatient Rehab    Occupational Therapy Visit    Patient Name: Loren Andre  MRN: [id number]  YOB: 1950  Encounter Date: 7/15/2025    Therapy Diagnosis:   Encounter Diagnoses   Name Primary?    Pain in left wrist Yes    Stiffness in joint     Weakness      Physician: Janet Saucedo PA    Physician Orders: Eval and Treat  Medical Diagnosis: Left wrist pain  Surgical Diagnosis: Not applicable for this Episode   Surgical Date: Not applicable for this Episode  Days Since Last Surgery: Not applicable for this Episode    Visit # / Visits Authorized: 7 / 6  Insurance Authorization Period: 6/13/2025 to 8/13/2025  Date of Evaluation: 6/13/2025  Plan of Care Certification:       Time In: 0800   Time Out: 0845  Total Time (in minutes): 45   Total Billable Time (in minutes): 45      Subjective   Pt states that her fingertips were numb this weekend.  Pain reported as 1/10.      Objective     Wrist Range of Motion  Left Wrist   Active (deg) Passive (deg) Pain Comment   Flexion 72         Extension 70         Radial Deviation 20         Ulnar Deviation 50                            Left  Strength  Left Hand Dynamometer Position: 2  Elbow Position Forearm Position Trial 1 (lbs) Trial 2 (lbs) Trial 3 (lbs) Average (lbs) Pain   Flexed Neutral 35 35 35 35                    Treatment:  Therapeutic Activity  TA 1: wrist 3 ways with 1# weight  TA 2: pom pom  with gripper on the 1st rung  TA 3: wrist wheel pro/sup & flex/ ext 2 minutes each  TA 4: wrist maze x 5 minutes  TA 5: bead lacing  TA 6: reassessment of objective measures  Manual Therapy  MT 1: retrograde massage to wrist and hand    Time Entry(in minutes):  Manual Therapy Time Entry: 15  Therapeutic Activity Time Entry: 30    Assessment & Plan   Assessment: Pt tolerated added therapeutic activities well without complaints of additional pain. She continues to have pain and weakness in her left hand. Focus on therapy to be pain free range of motion  and strengthening. Pt is motivated. Will progress as tolerated.   Evaluation/Treatment Tolerance: Patient tolerated treatment well    The patient will continue to benefit from skilled outpatient occupational therapy in order to address the deficits listed in the problem list on the initial evaluation, provide patient and family education, and maximize the patients level of independence in the home and community environments.     The patient's spiritual, cultural, and educational needs were considered, and the patient is agreeable to the plan of care and goals.           Plan: Continue with current plan of care    Goals:   Active       LTGs       Pt will report no more than 1/10 pain when completing ADL tasks (Progressing)       Start:  06/13/25    Expected End:  08/12/25            Pt will be able to open a water bottle with her left hand  (Progressing)       Start:  06/13/25    Expected End:  08/12/25            Pt will describe a decrease in numbness in left fingers  (Met)       Start:  06/13/25    Expected End:  08/12/25    Resolved:  07/10/25            STGs       Pt will report no more than 3/10 pain when completing ADL tasks (Progressing)       Start:  06/13/25    Expected End:  07/13/25            Pt will be able to open a peanut butter jar with left hand (Progressing)       Start:  06/13/25    Expected End:  07/13/25            Initiate HEP (Met)       Start:  06/13/25    Expected End:  07/13/25    Resolved:  07/10/25             Lindsey Rodriguez, OT

## 2025-07-29 ENCOUNTER — TELEPHONE (OUTPATIENT)
Dept: FAMILY MEDICINE | Facility: CLINIC | Age: 75
End: 2025-07-29

## 2025-07-29 ENCOUNTER — OFFICE VISIT (OUTPATIENT)
Dept: FAMILY MEDICINE | Facility: CLINIC | Age: 75
End: 2025-07-29
Payer: MEDICARE

## 2025-07-29 VITALS
WEIGHT: 157.44 LBS | TEMPERATURE: 98 F | HEIGHT: 66 IN | HEART RATE: 56 BPM | RESPIRATION RATE: 16 BRPM | BODY MASS INDEX: 25.3 KG/M2 | DIASTOLIC BLOOD PRESSURE: 82 MMHG | SYSTOLIC BLOOD PRESSURE: 178 MMHG | OXYGEN SATURATION: 98 %

## 2025-07-29 DIAGNOSIS — E78.5 HYPERLIPIDEMIA, UNSPECIFIED HYPERLIPIDEMIA TYPE: ICD-10-CM

## 2025-07-29 DIAGNOSIS — I10 PRIMARY HYPERTENSION: ICD-10-CM

## 2025-07-29 DIAGNOSIS — N18.31 STAGE 3A CHRONIC KIDNEY DISEASE: ICD-10-CM

## 2025-07-29 DIAGNOSIS — I70.0 AORTIC ATHEROSCLEROSIS: Primary | ICD-10-CM

## 2025-07-29 PROCEDURE — 3079F DIAST BP 80-89 MM HG: CPT | Mod: CPTII,S$GLB,, | Performed by: FAMILY MEDICINE

## 2025-07-29 PROCEDURE — 3077F SYST BP >= 140 MM HG: CPT | Mod: CPTII,S$GLB,, | Performed by: FAMILY MEDICINE

## 2025-07-29 PROCEDURE — 1125F AMNT PAIN NOTED PAIN PRSNT: CPT | Mod: CPTII,S$GLB,, | Performed by: FAMILY MEDICINE

## 2025-07-29 PROCEDURE — 99999 PR PBB SHADOW E&M-EST. PATIENT-LVL V: CPT | Mod: PBBFAC,,, | Performed by: FAMILY MEDICINE

## 2025-07-29 PROCEDURE — 99214 OFFICE O/P EST MOD 30 MIN: CPT | Mod: S$GLB,,, | Performed by: FAMILY MEDICINE

## 2025-07-29 PROCEDURE — 3288F FALL RISK ASSESSMENT DOCD: CPT | Mod: CPTII,S$GLB,, | Performed by: FAMILY MEDICINE

## 2025-07-29 PROCEDURE — 1101F PT FALLS ASSESS-DOCD LE1/YR: CPT | Mod: CPTII,S$GLB,, | Performed by: FAMILY MEDICINE

## 2025-07-29 PROCEDURE — G2211 COMPLEX E/M VISIT ADD ON: HCPCS | Mod: S$GLB,,, | Performed by: FAMILY MEDICINE

## 2025-07-29 PROCEDURE — 1159F MED LIST DOCD IN RCRD: CPT | Mod: CPTII,S$GLB,, | Performed by: FAMILY MEDICINE

## 2025-07-29 NOTE — TELEPHONE ENCOUNTER
Phoned patient for remote BP reading per Dr. Quiroz request. States she is just getting home & will call us tomorrow w/reading.

## 2025-07-29 NOTE — PROGRESS NOTES
Subjective:   Patient ID: Loren Andre is a 75 y.o. female     Chief Complaint:Follow-up (AWV results )      Follow-up  Pertinent negatives include no abdominal pain or chest pain.     Review of Systems   Respiratory:  Negative for shortness of breath.    Cardiovascular:  Negative for chest pain.   Gastrointestinal:  Negative for abdominal pain.   Genitourinary:  Negative for dysuria.     Past Medical History:   Diagnosis Date    Allergy     Anemia     sickle cell trait    Anxiety     Arthritis     Asthma     Chest pain 7/8/2020    Chronic disease anemia 10/05/2017    Colon polyps     Depression     Dermatitis 03/07/2013    Fibromyalgia     HEARING LOSS     Hyperlipidemia     Hypertension     Leucopenia 10/05/2017    Polyneuropathy     Scoliosis     Sickle cell trait     Sickle-cell trait 10/05/2017    Trouble in sleeping     Urticaria      Past Surgical History:   Procedure Laterality Date    ADENOIDECTOMY      APPENDECTOMY      COLONOSCOPY  4/14/2008    Dr. Guerrero, 10 year recheck    COLONOSCOPY N/A 10/4/2018    Procedure: COLONOSCOPY;  Surgeon: Tam Kemp MD;  Location: [facility];  Service: Endoscopy;  Laterality: N/A;    HYSTERECTOMY      OOPHORECTOMY      ROTATOR CUFF REPAIR Left 01/2016    ROTATOR CUFF REPAIR W/ DISTAL CLAVICLE EXCISION Right 12/04/2017    Dr. Eligio Timmons ( [facility] )    TONSILLECTOMY       Objective:     Vitals:    07/29/25 1027   BP: (!) 178/82   Pulse: (!) 56   Resp: 16   Temp: 97.9 °F (36.6 °C)     Body mass index is 25.41 kg/m².  Physical Exam  Vitals and nursing note reviewed.   Constitutional:       Appearance: She is well-developed.   HENT:      Head: Normocephalic and atraumatic.   Eyes:      General: No scleral icterus.     Conjunctiva/sclera: Conjunctivae normal.   Cardiovascular:      Heart sounds: No murmur heard.  Pulmonary:      Effort: Pulmonary effort is normal. No respiratory distress.   Musculoskeletal:         General: No deformity. Normal range of  motion.      Cervical back: Normal range of motion and neck supple.   Skin:     Coloration: Skin is not pale.      Findings: No rash.   Neurological:      Mental Status: She is alert and oriented to person, place, and time.   Psychiatric:         Behavior: Behavior normal.         Thought Content: Thought content normal.         Judgment: Judgment normal.       Assessment:     1. Aortic atherosclerosis    2. Primary hypertension    3. Hyperlipidemia, unspecified hyperlipidemia type    4. Stage 3a chronic kidney disease      Plan:   Aortic atherosclerosis  -     US Lower Extremity Arteries Bilateral; Future; Expected date: 07/29/2025    Primary hypertension  Counseled. Reports white coat htn. Asymptomatic. Will check BP at home and fu  Hyperlipidemia, unspecified hyperlipidemia type  Review of labs from 12/2024 shows ldl cholesterol at goal  Stage 3a chronic kidney disease  Review of labs form 1/2025 shows stable renal function    Chronic condition not otherwise mentioned is stable      Total time spent of Greater than 30 minutes minutes on the day of the visit.This includes face to face time and preparing to see the patient, obtaining and reviewing separately obtained history, documenting clinical information in the electronic or other health record, independently interpreting results and communicating results to the patient/family/caregiver, or care coordinator.    Established patient with me has been instructed that must see me at least 1 time yearly (every 365 days) for refills of medications. Seeing other providers in this clinic is fine but expectation is to see me yearly.    Jamaal Quiroz MD  07/29/2025    Portions of this note have been dictated with CHARLEEN Cox

## 2025-07-30 ENCOUNTER — TELEPHONE (OUTPATIENT)
Dept: FAMILY MEDICINE | Facility: CLINIC | Age: 75
End: 2025-07-30
Payer: MEDICARE

## 2025-07-30 NOTE — TELEPHONE ENCOUNTER
Copied from CRM #[id number]. Topic: General Inquiry - Patient Advice  >> Jul 30, 2025  8:06 AM Landy wrote:  Type:  Needs Medical Advice    Who Called: pt  Would the patient rather a call back or a response via [facility]? call  Best Call Back Number: Telephone Information:  Mobile          [phone]    Additional Information: pt states that she needs to let the nurse in the office know that her blood pressure was 150/73 this morning      Routing History     From To [facility]   07/30/2025 08:07 AM Landy Lorenzo [place] STAFF Routine

## 2025-07-30 NOTE — TELEPHONE ENCOUNTER
Spoke to patient 4 week B/P check has been set up and patient will bring her home B/P machine to the appointment

## 2025-08-14 DIAGNOSIS — I10 PRIMARY HYPERTENSION: ICD-10-CM

## 2025-08-14 RX ORDER — CARVEDILOL 25 MG/1
TABLET ORAL
Qty: 180 TABLET | Refills: 3 | Status: SHIPPED | OUTPATIENT
Start: 2025-08-14

## 2025-08-28 ENCOUNTER — TELEPHONE (OUTPATIENT)
Dept: FAMILY MEDICINE | Facility: CLINIC | Age: 75
End: 2025-08-28

## 2025-08-28 ENCOUNTER — CLINICAL SUPPORT (OUTPATIENT)
Dept: FAMILY MEDICINE | Facility: CLINIC | Age: 75
End: 2025-08-28
Payer: MEDICARE

## 2025-08-28 VITALS — HEART RATE: 55 BPM | DIASTOLIC BLOOD PRESSURE: 78 MMHG | SYSTOLIC BLOOD PRESSURE: 146 MMHG | OXYGEN SATURATION: 98 %

## 2025-08-28 DIAGNOSIS — I10 PRIMARY HYPERTENSION: ICD-10-CM

## 2025-08-28 DIAGNOSIS — I10 PRIMARY HYPERTENSION: Primary | ICD-10-CM

## 2025-08-28 PROCEDURE — 99999 PR PBB SHADOW E&M-EST. PATIENT-LVL I: CPT | Mod: PBBFAC,,,

## 2025-08-28 RX ORDER — CARVEDILOL 25 MG/1
25 TABLET ORAL 2 TIMES DAILY WITH MEALS
Qty: 180 TABLET | Refills: 3 | Status: SHIPPED | OUTPATIENT
Start: 2025-08-28

## 2025-09-04 ENCOUNTER — OFFICE VISIT (OUTPATIENT)
Dept: URGENT CARE | Facility: CLINIC | Age: 75
End: 2025-09-04
Payer: MEDICARE

## 2025-09-04 VITALS
HEIGHT: 66 IN | SYSTOLIC BLOOD PRESSURE: 177 MMHG | TEMPERATURE: 98 F | BODY MASS INDEX: 25.23 KG/M2 | HEART RATE: 60 BPM | OXYGEN SATURATION: 98 % | RESPIRATION RATE: 16 BRPM | WEIGHT: 157 LBS | DIASTOLIC BLOOD PRESSURE: 81 MMHG

## 2025-09-04 DIAGNOSIS — H65.191 ACUTE EFFUSION OF RIGHT EAR: ICD-10-CM

## 2025-09-04 DIAGNOSIS — R05.9 COUGH, UNSPECIFIED TYPE: ICD-10-CM

## 2025-09-04 DIAGNOSIS — J32.9 FREQUENT SINUS INFECTIONS: ICD-10-CM

## 2025-09-04 DIAGNOSIS — J01.01 ACUTE RECURRENT MAXILLARY SINUSITIS: Primary | ICD-10-CM

## 2025-09-04 DIAGNOSIS — I10 PRIMARY HYPERTENSION: ICD-10-CM

## 2025-09-04 DIAGNOSIS — N18.31 STAGE 3A CHRONIC KIDNEY DISEASE: ICD-10-CM

## 2025-09-04 LAB
CTP QC/QA: YES
CTP QC/QA: YES
FLUAV AG NPH QL: NEGATIVE
FLUBV AG NPH QL: NEGATIVE
SARS-COV+SARS-COV-2 AG RESP QL IA.RAPID: NEGATIVE

## 2025-09-04 RX ORDER — DOXYCYCLINE 100 MG/1
100 CAPSULE ORAL 2 TIMES DAILY
Qty: 14 CAPSULE | Refills: 0 | Status: SHIPPED | OUTPATIENT
Start: 2025-09-04 | End: 2025-09-11